# Patient Record
Sex: FEMALE | Race: WHITE | NOT HISPANIC OR LATINO | Employment: OTHER | URBAN - METROPOLITAN AREA
[De-identification: names, ages, dates, MRNs, and addresses within clinical notes are randomized per-mention and may not be internally consistent; named-entity substitution may affect disease eponyms.]

---

## 2017-01-09 DIAGNOSIS — R10.11 ABDOMINAL PAIN, RIGHT UPPER QUADRANT: Primary | ICD-10-CM

## 2017-01-09 DIAGNOSIS — G47.9 SLEEP DISORDER: ICD-10-CM

## 2017-01-09 DIAGNOSIS — M54.2 NECK PAIN: ICD-10-CM

## 2017-01-09 RX ORDER — OMEPRAZOLE 40 MG/1
40 CAPSULE, DELAYED RELEASE ORAL DAILY
Qty: 30 CAPSULE | Refills: 10 | Status: CANCELLED | OUTPATIENT
Start: 2017-01-09

## 2017-01-09 RX ORDER — OXYCODONE AND ACETAMINOPHEN 5; 325 MG/1; MG/1
1-2 TABLET ORAL EVERY 6 HOURS PRN
Qty: 120 TABLET | Refills: 0 | Status: SHIPPED | OUTPATIENT
Start: 2017-01-09 | End: 2017-02-07

## 2017-01-09 RX ORDER — ZOLPIDEM TARTRATE 5 MG/1
5 TABLET ORAL
Qty: 90 TABLET | Refills: 1 | Status: SHIPPED | OUTPATIENT
Start: 2017-01-09 | End: 2017-06-29

## 2017-01-09 NOTE — TELEPHONE ENCOUNTER
Last Written Prescription Date: 7/13/16  Last Fill Quantity: 90,  # refills: 1   Last Office Visit with [facility], P or [facility] prescribing provider: 12/9/16    Katlyn Mckenna, Pharmacy [facility]

## 2017-01-09 NOTE — TELEPHONE ENCOUNTER
Controlled Substance Refill Request for percocet and ambien  Problem List Complete:  No     PROVIDER TO CONSIDER COMPLETION OF PROBLEM LIST AND OVERVIEW/CONTROLLED SUBSTANCE AGREEMENT    Last Written Prescription Date:  Percocet=11/9 ambien=7/13/16  Last Fill Quantity: percocet=120,  ambien=90 # refills: percocet=0 ambien=1    Last Office Visit with [facility] primary care provider: 12/9/16    Future Office visit:     Controlled substance agreement on file: No.     Processing:  Staff will hand deliver Rx to on-site pharmacy   checked in past 6 months?  No, route to RN looks lot be overdue  Katlyn Mckenna, Pharmacy [facility]

## 2017-01-10 RX ORDER — OMEPRAZOLE 40 MG/1
40 CAPSULE, DELAYED RELEASE ORAL DAILY
Qty: 90 CAPSULE | Refills: 3 | Status: SHIPPED | OUTPATIENT
Start: 2017-01-10 | End: 2018-01-21

## 2017-01-10 NOTE — TELEPHONE ENCOUNTER
Prescription approved per [facility] Refill Protocol.      Tom Price [facility]  Flo Pharmacist on behalf of: [facility]

## 2017-01-20 DIAGNOSIS — G47.00 PERSISTENT DISORDER OF INITIATING OR MAINTAINING SLEEP: Primary | ICD-10-CM

## 2017-01-20 NOTE — TELEPHONE ENCOUNTER
Trazodone 50mg       Last Written Prescription Date: 1/18/16  Last Fill Quantity: 90; # refills: 2  Last Office Visit with [facility], [facility] or [facility] prescribing provider:  12/9/16        Last PHQ-9 score on record=   PHQ-9 SCORE 12/9/2016   Total Score -   Total Score 1       AST       10   9/20/2015  ALT       13   9/20/2015      Gemma Kapoor CPhT  [facility]    On behalf of [facility]

## 2017-01-21 PROBLEM — Z98.1 S/P CERVICAL SPINAL FUSION: Status: ACTIVE | Noted: 2017-01-21

## 2017-01-21 PROBLEM — G47.00 PERSISTENT DISORDER OF INITIATING OR MAINTAINING SLEEP: Status: ACTIVE | Noted: 2017-01-21

## 2017-01-21 RX ORDER — TRAZODONE HYDROCHLORIDE 50 MG/1
50 TABLET, FILM COATED ORAL AT BEDTIME
Qty: 90 TABLET | Refills: 2 | Status: SHIPPED | OUTPATIENT
Start: 2017-01-21 | End: 2017-09-12

## 2017-01-24 RX ORDER — TRAZODONE HYDROCHLORIDE 50 MG/1
100 TABLET, FILM COATED ORAL AT BEDTIME
Qty: 180 TABLET | Refills: 2 | Status: CANCELLED | OUTPATIENT
Start: 2017-01-24

## 2017-01-24 RX ORDER — TRAZODONE HYDROCHLORIDE 100 MG/1
100 TABLET ORAL
Qty: 90 TABLET | Refills: 2 | Status: SHIPPED | OUTPATIENT
Start: 2017-01-24 | End: 2017-09-08

## 2017-01-24 NOTE — TELEPHONE ENCOUNTER
Pt calls, now taking 2 tablets at qhs, discussed at 12/9/16 visit, needs new rx sent as now to early to refill and pt out, routed to PAVAN TRACEY MD, please confirm dose, stay at 50 mg or change to 100 mg qhs (both doses T'd up), pt states taking 100 mg qhs now, inform pt when final  Annalise Clifford RN, BSN  Message handled by Nurse Triage.

## 2017-02-07 DIAGNOSIS — M54.2 NECK PAIN: Primary | ICD-10-CM

## 2017-02-07 NOTE — TELEPHONE ENCOUNTER
[place] reviewed:  reviewed- no concerns    [place]  [email or web address]

## 2017-02-07 NOTE — TELEPHONE ENCOUNTER
Controlled Substance Refill Request for Percocet  Problem List Complete:  Yes    Patient is followed by Data Unavailable for ongoing prescription of pain medication.  All refills should be approved by this provider, or covering partner.    Medication(s):     .   Maximum quantity per month: 180  Clinic visit frequency required: Q 4 months     Controlled substance agreement on file: YES    Pain Clinic evaluation in the past: Yes       Date/Location:   [facility] [address] 2013  DIRE Total Score(s):  No flowsheet data found.    Last [facility] website verification:  done on recheck 6/16     checked in past 6 months?  No, route to RN Overdue    Katlyn Mckenna, Pharmacy [facility]

## 2017-02-08 RX ORDER — OXYCODONE AND ACETAMINOPHEN 5; 325 MG/1; MG/1
1-2 TABLET ORAL EVERY 6 HOURS PRN
Qty: 120 TABLET | Refills: 0 | Status: SHIPPED | OUTPATIENT
Start: 2017-02-08 | End: 2017-03-08

## 2017-02-08 RX ORDER — CYCLOBENZAPRINE HCL 10 MG
10 TABLET ORAL 3 TIMES DAILY PRN
Qty: 45 TABLET | Refills: 1 | Status: SHIPPED | OUTPATIENT
Start: 2017-02-08 | End: 2017-04-05

## 2017-03-08 ENCOUNTER — OFFICE VISIT (OUTPATIENT)
Dept: FAMILY MEDICINE | Facility: CLINIC | Age: 62
End: 2017-03-08
Payer: COMMERCIAL

## 2017-03-08 VITALS
HEART RATE: 90 BPM | BODY MASS INDEX: 23.48 KG/M2 | RESPIRATION RATE: 12 BRPM | TEMPERATURE: 98.4 F | WEIGHT: 146.1 LBS | HEIGHT: 66 IN

## 2017-03-08 DIAGNOSIS — D06.9 CARCINOMA IN SITU OF CERVIX UTERI: Primary | ICD-10-CM

## 2017-03-08 DIAGNOSIS — Z98.1 S/P CERVICAL SPINAL FUSION: ICD-10-CM

## 2017-03-08 DIAGNOSIS — F41.8 DEPRESSION WITH ANXIETY: ICD-10-CM

## 2017-03-08 DIAGNOSIS — D35.02 ADRENAL ADENOMA, LEFT: ICD-10-CM

## 2017-03-08 DIAGNOSIS — G47.00 PERSISTENT DISORDER OF INITIATING OR MAINTAINING SLEEP: ICD-10-CM

## 2017-03-08 DIAGNOSIS — M54.2 NECK PAIN: ICD-10-CM

## 2017-03-08 PROCEDURE — 99214 OFFICE O/P EST MOD 30 MIN: CPT | Performed by: FAMILY MEDICINE

## 2017-03-08 PROCEDURE — 99000 SPECIMEN HANDLING OFFICE-LAB: CPT | Performed by: FAMILY MEDICINE

## 2017-03-08 PROCEDURE — 80307 DRUG TEST PRSMV CHEM ANLYZR: CPT | Mod: 90 | Performed by: FAMILY MEDICINE

## 2017-03-08 RX ORDER — OXYCODONE AND ACETAMINOPHEN 5; 325 MG/1; MG/1
1-2 TABLET ORAL EVERY 6 HOURS PRN
Qty: 120 TABLET | Refills: 0 | Status: SHIPPED | OUTPATIENT
Start: 2017-03-08 | End: 2017-04-05

## 2017-03-08 NOTE — PROGRESS NOTES
SUBJECTIVE:                                                    Annalise Wallace is a 61 year old female who presents to clinic today for the following health issues:      Medication Followup of Oxycodone    Taking Medication as prescribed: yes    Side Effects:  None    Medication Helping Symptoms:  yes       Past Medical History   Diagnosis Date     Abnormal Papanicolaou smear of cervix and cervical HPV      Degenerative disc disease      Depressive disorder, not elsewhere classified      Displacement of cervical intervertebral disc without myelopathy      Gastro-oesophageal reflux disease      H/O hysterectomy for benign disease      TOBACCO ABUSE-CONTINUOUS        Past Surgical History   Procedure Laterality Date     C nonspecific procedure       Lump removed L. breast - benign     C nonspecific procedure       Cervical disk     C nonspecific procedure       C-spine - Nany     Hysterectomy vaginal, bilateral salpingo-oopherectomy, combined       Back surgery       Discectomy lumbar minimally invasive one level  2/13/2014     Procedure: DISCECTOMY LUMBAR MINIMALLY INVASIVE ONE LEVEL;  Minimally Invasive Discectomy L2-3 Left ;  Surgeon: Juanjose Pitt MD;  Location: [facility] OR     Discectomy lumbar minimally invasive one level       Fusion spine posterior minimally invasive one level  5/15/2014     Procedure: FUSION SPINE POSTERIOR MINIMALLY INVASIVE ONE LEVEL;  Surgeon: Juanjose Pitt MD;  Location: [facility] OR     Laparoscopic hysterectomy total         Family History   Problem Relation Age of Onset     CANCER Mother      CANCER Father      throat     DIABETES Paternal Grandmother        Social History   Substance Use Topics     Smoking status: Current Some Day Smoker     Packs/day: 1.00     Years: 30.00     Types: Cigarettes     Smokeless tobacco: Never Used      Comment: 2 cigs per day, using e-cig     Alcohol use No     Inventory of mental status shows issues with anxiety and  Depression.  Current  Outpatient Prescriptions   Medication     oxyCODONE-acetaminophen (PERCOCET) 5-325 MG per tablet     cyclobenzaprine (FLEXERIL) 10 MG tablet     traZODone (DESYREL) 100 MG tablet     traZODone (DESYREL) 50 MG tablet     omeprazole (PRILOSEC) 40 MG capsule     zolpidem (AMBIEN) 5 MG tablet     citalopram (CELEXA) 20 MG tablet     No current facility-administered medications for this visit.        Discussed regarding anxiety, sleep, anhedonia, irritability, energy,  perspective, self image, affect, current stressors, holistic parameters, work situation,  physical and social mitigating factors.    Past history has included past  episodes and positive  family history.    Prior treatment has included ssri  and talk therapy is not active  .    Suicidal ideation is  absent.    No problems with vision, hearing, thyroid, chest pain, dyspnea,rash,  stomach upset, polyuria, polydipsia, dysuria,muscle aches, numbness,  cough, tics or balance issues. Memory is reliable .    SANAZ,thyroid normal, lungs clear, s1s2,soft abdoman, symmetrical dtrs,  no abdominal mass,good peripheral pulses, vs stable.  Discussed ssri vs dop/ser grouping vs wellbutrin in depression mgmt  Discussed anxiolytics as situational tools not specific therapy  On exam the vital signs are stable  Weight is Body mass index is 23.58 kg/(m^2).   Eyes show sanaz   No neck masses or thyromegaly.Ear nose and throat shows normal  No bruits, murmers, rubs or extrasounds. No cardiomegaly or chest wall tenderness. Lungs clear, no abdominal masses or organomegaly. No CVA tenderness.    No hernias, good range of motion neck, back and extremities. No abnormal skin lesions. Normal genitalia. Good peripheral pulses. No adenopathy.  Normal gait and stance. Neck is supple.  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

## 2017-03-08 NOTE — LETTER
[facility]    03/08/17    Patient: Annalise Wallace  YOB: 1955  Medical Record Number: [id number]                                                                  Controlled Substance Agreement  I understand that my care provider has prescribed controlled substances (narcotics, tranquilizers, and/or stimulants) to help manage my condition(s).  I am taking this medicine to help me function or work.  I know that this is strong medicine.  It could have serious side effects and even cause a dependency on the drug.  If I stop these medicines suddenly, I could have severe withdrawal symptoms.    The risks, benefits, and side effects of these medication(s) were explained to me.  I agree that:  1. I will take part in other treatments as advised by my provider.  This may be psychiatry or counseling, physical therapy, behavioral therapy, group treatment, or a referral to a pain clinic.  I will reduce or stop my medicine when my provider tells me to do so.   2. I will take my medicines as prescribed.  I will not change the dose or schedule unless my provider tells me to.  There will be no refills if I  run out early.   I may be contacted at any time without warning and asked to complete a drug test or pill count.   3. I will keep all my appointments at the clinic.  If I miss appointments or fail to follow instructions, my provider may stop my medicine.  4. I will not ask other providers to prescribe controlled substances. And I will not accept controlled substances from other people. If I need another prescribed controlled substance for a new reason, I will notify my provider within one business day.  5. If I enroll in the [place] Medical Marijuana program, I will tell my provider.  I will also sign an agreement to share my medical records with my provider.  6. I will use one pharmacy to fill all of my controlled substance prescriptions.  If my prescription is mailed to my pharmacy, it may take  5 to 7 days for my medicine to be ready.  7. I understand that my provider, clinic care team, and pharmacy can track controlled substance prescriptions from other providers through a central database (prescription monitoring program).  8. I will bring in my list of medications (or my medicine bottles) each time I come to the clinic.  REV- 04/2016                                                                                                                                            Page 1 of 2      [facility]    03/08/17    Patient: Annalise Wallace  YOB: 1955  Medical Record Number: [id number]    9. Refills of controlled substances will be made only during office hours.  It is up to me to make sure that I do not run out of my medicines on weekends or holidays.    10. I am responsible for my prescriptions.  If the medicine is lost or stolen, it will not be replaced.   I also agree not to share these medicines with anyone.  11. I agree to not use ANY illegal or recreational drugs.  This includes marijuana, cocaine, bath salts or other drugs.  I agree not to use alcohol unless my provider says I may.  I agree to give urine samples whenever asked.  If I fail to give a urine sample, the provider may stop my medicine.     12. I will tell my nurse or provider right away if I become pregnant or have a new medical problem treated outside of [facility].  13. I understand that this medicine can affect my thinking and judgment.  It may be unsafe for me to drive, use machinery and do dangerous tasks.  I will not do any of these things until I know how the medicine affects me.  If my dose changes, I will wait to see how it affects me.  I will contact my provider if I have concerns about medicine side effects.  I understand that if I do not follow any of the conditions above, my prescriptions or treatment may be stopped.    I agree that my provider, clinic care team, and pharmacy may work with  any city, state or federal law enforcement agency that investigates the misuse, sale, or other diversion of my controlled medicine. I will allow my provider to discuss my care with or share a copy of this agreement with any other treating provider, pharmacy or emergency room where I receive care.  I agree to give up (waive) any right of privacy or confidentiality with respect to these authorizations.   I have read this agreement and have asked questions about anything I did not understand.   ___________________________________    ___________________________  Patient Signature                                                           Date and Time  ___________________________________     ____________________________  Witness                                                                            Date and Time  ___________________________________  Abelino Flores MD  REV-  04/2016                                                                                                                                                                 Page 2 of 2

## 2017-03-08 NOTE — NURSING NOTE
"Chief Complaint   Patient presents with     Recheck Medication       Initial BP (P) 130/70 (BP Location: Left arm, Patient Position: Chair, Cuff Size: Adult Regular)  Pulse 90  Temp 98.4  F (36.9  C) (Oral)  Resp 12  Ht 5' 6\" (1.676 m)  Wt 146 lb 1.6 oz (66.3 kg)  BMI 23.58 kg/m2 Estimated body mass index is 23.58 kg/(m^2) as calculated from the following:    Height as of this encounter: 5' 6\" (1.676 m).    Weight as of this encounter: 146 lb 1.6 oz (66.3 kg).  Medication Reconciliation: complete   Tom Arnold CMA       "

## 2017-03-08 NOTE — MR AVS SNAPSHOT
"              After Visit Summary   3/8/2017    Annalise Wallace    MRN: [id number]           Patient Information     Date Of Birth          1955        Visit Information        Provider Department      3/8/2017 9:00 AM Abelino Flores MD [facility]        Today's Diagnoses     Carcinoma in situ of cervix uteri    -  1    Neck pain        Depression with anxiety        Adrenal adenoma, left        Persistent disorder of initiating or maintaining sleep        S/P cervical spinal fusion           Follow-ups after your visit        Follow-up notes from your care team     Return in about 3 months (around 6/8/2017).      Who to contact     If you have questions or need follow up information about today's clinic visit or your schedule please contact [facility] directly at [phone].  Normal or non-critical lab and imaging results will be communicated to you by MyChart, letter or phone within 4 business days after the clinic has received the results. If you do not hear from us within 7 days, please contact the clinic through MyChart or phone. If you have a critical or abnormal lab result, we will notify you by phone as soon as possible.  Submit refill requests through [employer] or call your pharmacy and they will forward the refill request to us. Please allow 3 business days for your refill to be completed.          Additional Information About Your Visit        MyChart Information     [employer] lets you send messages to your doctor, view your test results, renew your prescriptions, schedule appointments and more. To sign up, go to [email or web address] . Click on \"Log in\" on the left side of the screen, which will take you to the Welcome page. Then click on \"Sign up Now\" on the right side of the page.     You will be asked to enter the access code listed below, as well as some personal information. Please follow the directions to create your username and password.   " "  Your access code is: [email or web address]  Expires: 3/9/2017  8:53 AM     Your access code will  in 90 days. If you need help or a new code, please call your [facility] clinic or [phone].        Care EveryWhere ID     This is your Care EveryWhere ID. This could be used by other organizations to access your [facility] medical records  [id number]        Your Vitals Were     Pulse Temperature Respirations Height BMI (Body Mass Index)       90 98.4  F (36.9  C) (Oral) 12 5' 6\" (1.676 m) 23.58 kg/m2        Blood Pressure from Last 3 Encounters:   17 (P) 130/70   16 126/78   16 130/80    Weight from Last 3 Encounters:   17 146 lb 1.6 oz (66.3 kg)   16 142 lb (64.4 kg)   16 143 lb (64.9 kg)              We Performed the Following     Drug Screen Comprehensive , Urine with Reported Meds (Pain Care Package)          Where to get your medicines      Some of these will need a paper prescription and others can be bought over the counter.  Ask your nurse if you have questions.     Bring a paper prescription for each of these medications     oxyCODONE-acetaminophen 5-325 MG per tablet          Primary Care Provider Office Phone # Fax #    Abelino Flores [phone]       [address] [address]        Thank you!     Thank you for choosing [facility]  for your care. Our goal is always to provide you with excellent care. Hearing back from our patients is one way we can continue to improve our services. Please take a few minutes to complete the written survey that you may receive in the mail after your visit with us. Thank you!             Your Updated Medication List - Protect others around you: Learn how to safely use, store and throw away your medicines at www.disposemymeds.org.          This list is accurate as of: 3/8/17  8:34 PM.  Always use your most recent med list.                   Brand Name " Dispense Instructions for use    citalopram 20 MG tablet    celeXA    90 tablet    Take 1 tablet (20 mg) by mouth daily       cyclobenzaprine 10 MG tablet    FLEXERIL    45 tablet    Take 1 tablet (10 mg) by mouth 3 times daily as needed for muscle spasms       omeprazole 40 MG capsule    priLOSEC    90 capsule    Take 1 capsule (40 mg) by mouth daily       oxyCODONE-acetaminophen 5-325 MG per tablet    PERCOCET    120 tablet    Take 1-2 tablets by mouth every 6 hours as needed for moderate to severe pain LAST REFILL VISIT NEXT TIME       * traZODone 50 MG tablet    DESYREL    90 tablet    Take 1 tablet (50 mg) by mouth At Bedtime       * traZODone 100 MG tablet    DESYREL    90 tablet    Take 1 tablet (100 mg) by mouth nightly as needed for sleep       zolpidem 5 MG tablet    AMBIEN    90 tablet    Take 1 tablet (5 mg) by mouth nightly as needed for sleep       * Notice:  This list has 2 medication(s) that are the same as other medications prescribed for you. Read the directions carefully, and ask your doctor or other care provider to review them with you.

## 2017-03-11 LAB — PAIN DRUG SCR UR W RPTD MEDS: NORMAL

## 2017-04-05 DIAGNOSIS — N95.1 SYMPTOMATIC MENOPAUSAL OR FEMALE CLIMACTERIC STATES: ICD-10-CM

## 2017-04-05 DIAGNOSIS — M54.2 NECK PAIN: ICD-10-CM

## 2017-04-05 RX ORDER — OXYCODONE AND ACETAMINOPHEN 5; 325 MG/1; MG/1
1-2 TABLET ORAL EVERY 6 HOURS PRN
Qty: 120 TABLET | Refills: 0 | Status: SHIPPED | OUTPATIENT
Start: 2017-04-05 | End: 2017-05-08

## 2017-04-05 NOTE — TELEPHONE ENCOUNTER
Controlled Substance Refill Request for Percocet  Problem List Complete:  Yes    Patient is followed by Data Unavailable for ongoing prescription of pain medication.  All refills should be approved by this provider, or covering partner.    Medication(s):     .   Maximum quantity per month: 180  Clinic visit frequency required: Q 4 months Last office visit: 3/8/17    Controlled substance agreement on file: YES    Pain Clinic evaluation in the past: Yes       Date/Location:   [facility] [address] 2013  DIRE Total Score(s):  No flowsheet data found.    Last [facility] website verification:  done on recheck 6/16     checked in past 6 months?  Yes 2/7/17     Last picked up: 3/8/17, qty 120 for 15 days, due 3/23    Katlyn Mckenna, Pharmacy [facility]

## 2017-04-06 NOTE — TELEPHONE ENCOUNTER
Other encounter for Percocet walked to pharmacy, this encounter is for Flexeril and Celexa      cyclobenzaprine (FLEXERIL) 10 MG tablet 45 tablet 1 2/8/2017  No   Sig: Take 1 tablet (10 mg) by mouth 3 times daily as needed for muscle spasms          Last Written Prescription Date: 2.8.17  Last Fill Quantity: 45,  # refills: 1   [facility] Visit with [facility] or [facility] prescribing provider:                                                  citalopram (CELEXA) 20 MG tablet 90 tablet 1 4/14/2016  No   Sig: Take 1 tablet (20 mg) by mouth daily         Last Written Prescription Date: 4.14.16  Last Fill Quantity: 90, # refills: 1  Last Office Visit with [facility] [place] care provider:  3.8.17        Last PHQ-9 score on record=   PHQ-9 SCORE 12/9/2016   Total Score -   Total Score 1         Routing refill request to provider for review/approval because:  Drug not on the [facility] refill protocol     Etelvina Gonzalez RN, BS  Clinical Nurse Triage.

## 2017-04-07 RX ORDER — CITALOPRAM HYDROBROMIDE 20 MG/1
TABLET ORAL
Qty: 90 TABLET | Refills: 1 | Status: SHIPPED | OUTPATIENT
Start: 2017-04-07 | End: 2017-09-08

## 2017-04-07 RX ORDER — CYCLOBENZAPRINE HCL 10 MG
TABLET ORAL
Qty: 45 TABLET | Refills: 1 | Status: SHIPPED | OUTPATIENT
Start: 2017-04-07 | End: 2017-06-29

## 2017-04-25 ENCOUNTER — TELEPHONE (OUTPATIENT)
Dept: FAMILY MEDICINE | Facility: CLINIC | Age: 62
End: 2017-04-25

## 2017-04-25 NOTE — TELEPHONE ENCOUNTER
4/25/2017    Call Regarding Preventive Health Screening Mammogram    Attempt 1    Message on voicemail     Comments:       Outreach   KV

## 2017-05-08 ENCOUNTER — OFFICE VISIT (OUTPATIENT)
Dept: FAMILY MEDICINE | Facility: CLINIC | Age: 62
End: 2017-05-08
Payer: COMMERCIAL

## 2017-05-08 VITALS
WEIGHT: 143.6 LBS | HEIGHT: 66 IN | DIASTOLIC BLOOD PRESSURE: 82 MMHG | HEART RATE: 81 BPM | TEMPERATURE: 97.7 F | RESPIRATION RATE: 12 BRPM | SYSTOLIC BLOOD PRESSURE: 122 MMHG | BODY MASS INDEX: 23.08 KG/M2 | OXYGEN SATURATION: 99 %

## 2017-05-08 DIAGNOSIS — Z12.31 ENCOUNTER FOR SCREENING MAMMOGRAM FOR BREAST CANCER: ICD-10-CM

## 2017-05-08 DIAGNOSIS — M54.2 NECK PAIN: ICD-10-CM

## 2017-05-08 DIAGNOSIS — Z11.59 NEED FOR HEPATITIS C SCREENING TEST: Primary | ICD-10-CM

## 2017-05-08 PROCEDURE — 99214 OFFICE O/P EST MOD 30 MIN: CPT | Performed by: FAMILY MEDICINE

## 2017-05-08 RX ORDER — OXYCODONE AND ACETAMINOPHEN 5; 325 MG/1; MG/1
1-2 TABLET ORAL EVERY 6 HOURS PRN
Qty: 120 TABLET | Refills: 0 | Status: SHIPPED | OUTPATIENT
Start: 2017-05-08 | End: 2017-06-07

## 2017-05-08 NOTE — MR AVS SNAPSHOT
"              After Visit Summary   5/8/2017    Annalise Wallace    MRN: [id number]           Patient Information     Date Of Birth          1955        Visit Information        Provider Department      5/8/2017 9:30 AM Abelino Flores MD [facility]        Today's Diagnoses     Need for hepatitis C screening test    -  1    Encounter for screening mammogram for breast cancer        Neck pain          Care Instructions    Get mammogram this spring        Follow-ups after your visit        Follow-up notes from your care team     Return in about 3 months (around 8/8/2017).      Future tests that were ordered for you today     Open Future Orders        Priority Expected Expires Ordered    *MA Screening Digital Bilateral Routine  5/8/2018 5/8/2017    **Hepatitis C Screen Reflex to RNA FUTURE anytime Routine 5/8/2017 5/8/2018 5/8/2017            Who to contact     If you have questions or need follow up information about today's clinic visit or your schedule please contact [facility] directly at [phone].  Normal or non-critical lab and imaging results will be communicated to you by Futureware Inchart, letter or phone within 4 business days after the clinic has received the results. If you do not hear from us within 7 days, please contact the clinic through [facility] [facility] [employer] or phone. If you have a critical or abnormal lab result, we will notify you by phone as soon as possible.  Submit refill requests through Guaranteach or call your pharmacy and they will forward the refill request to us. Please allow 3 business days for your refill to be completed.          Additional Information About Your Visit        Futureware Inchart Information     Guaranteach lets you send messages to your doctor, view your test results, renew your prescriptions, schedule appointments and more. To sign up, go to [email or web address] . Click on \"Log in\" on the left side of the screen, which will take you to the Welcome page. Then " "click on \"Sign up Now\" on the right side of the page.     You will be asked to enter the access code listed below, as well as some personal information. Please follow the directions to create your username and password.     Your access code is: [email or web address]  Expires: 2017  9:44 AM     Your access code will  in 90 days. If you need help or a new code, please call your [facility] clinic or [phone].        Care EveryWhere ID     This is your Care EveryWhere ID. This could be used by other organizations to access your [facility] medical records  [id number]        Your Vitals Were     Pulse Temperature Respirations Height Pulse Oximetry BMI (Body Mass Index)    81 97.7  F (36.5  C) (Oral) 12 5' 6\" (1.676 m) 99% 23.18 kg/m2       Blood Pressure from Last 3 Encounters:   17 122/82   17 (P) 130/70   16 126/78    Weight from Last 3 Encounters:   17 143 lb 9.6 oz (65.1 kg)   17 146 lb 1.6 oz (66.3 kg)   16 142 lb (64.4 kg)                 Where to get your medicines      Some of these will need a paper prescription and others can be bought over the counter.  Ask your nurse if you have questions.     Bring a paper prescription for each of these medications     oxyCODONE-acetaminophen 5-325 MG per tablet          Primary Care Provider Office Phone # Fax #    Abelino Flores [phone]       [facility] [id number] [place] [id number]        Thank you!     Thank you for choosing [facility]  for your care. Our goal is always to provide you with excellent care. Hearing back from our patients is one way we can continue to improve our services. Please take a few minutes to complete the written survey that you may receive in the mail after your visit with us. Thank you!             Your Updated Medication List - Protect others around you: Learn how to safely use, store and throw away your medicines at " www.disposemymeds.org.          This list is accurate as of: 5/8/17  9:44 AM.  Always use your most recent med list.                   Brand Name Dispense Instructions for use    citalopram 20 MG tablet    celeXA    90 tablet    TAKE ONE TABLET BY MOUTH DAILY       cyclobenzaprine 10 MG tablet    FLEXERIL    45 tablet    TAKE ONE TABLET BY MOUTH THREE TIMES A DAY AS NEEDED FOR MUSCLE SPASMS       omeprazole 40 MG capsule    priLOSEC    90 capsule    Take 1 capsule (40 mg) by mouth daily       oxyCODONE-acetaminophen 5-325 MG per tablet    PERCOCET    120 tablet    Take 1-2 tablets by mouth every 6 hours as needed for moderate to severe pain LAST REFILL VISIT NEXT TIME       * traZODone 50 MG tablet    DESYREL    90 tablet    Take 1 tablet (50 mg) by mouth At Bedtime       * traZODone 100 MG tablet    DESYREL    90 tablet    Take 1 tablet (100 mg) by mouth nightly as needed for sleep       zolpidem 5 MG tablet    AMBIEN    90 tablet    Take 1 tablet (5 mg) by mouth nightly as needed for sleep       * Notice:  This list has 2 medication(s) that are the same as other medications prescribed for you. Read the directions carefully, and ask your doctor or other care provider to review them with you.

## 2017-05-08 NOTE — NURSING NOTE
"Chief Complaint   Patient presents with     chronic pain follow up     medication check       Initial /82 (BP Location: Right arm, Patient Position: Chair, Cuff Size: Adult Regular)  Pulse 81  Temp 97.7  F (36.5  C) (Oral)  Resp 12  Ht 5' 6\" (1.676 m)  Wt 143 lb 9.6 oz (65.1 kg)  SpO2 99%  BMI 23.18 kg/m2 Estimated body mass index is 23.18 kg/(m^2) as calculated from the following:    Height as of this encounter: 5' 6\" (1.676 m).    Weight as of this encounter: 143 lb 9.6 oz (65.1 kg).  Medication Reconciliation: complete   Tom Arnold CMA       "

## 2017-05-15 NOTE — TELEPHONE ENCOUNTER
5/15/2017     Call Regarding Preventive Health Screening Mammogram  Attempt 2     Message on voicemail   Comments:         Outreach   HAIR

## 2017-05-26 NOTE — TELEPHONE ENCOUNTER
5/26/2017    Call Regarding Preventive Health Screening Mammogram    Attempt 3    Message on voicemail     Comments:       Outreach   hussain

## 2017-06-07 DIAGNOSIS — M54.2 NECK PAIN: ICD-10-CM

## 2017-06-07 RX ORDER — OXYCODONE AND ACETAMINOPHEN 5; 325 MG/1; MG/1
1-2 TABLET ORAL EVERY 6 HOURS PRN
Qty: 120 TABLET | Refills: 0 | Status: SHIPPED | OUTPATIENT
Start: 2017-06-07 | End: 2017-07-07

## 2017-06-07 NOTE — TELEPHONE ENCOUNTER
Controlled Substance Refill Request for Percocet  Problem List Complete:  Yes    Patient is followed by Data Unavailable for ongoing prescription of pain medication.  All refills should be approved by this provider, or covering partner.    Medication(s):     .   Maximum quantity per month: 180  Clinic visit frequency required: Q 4 months Last office visit: 5/8/17    Controlled substance agreement on file: YES    Pain Clinic evaluation in the past: Yes       Date/Location:   [facility] [address] 2013  DIRE Total Score(s):  No flowsheet data found.    Last [facility] website verification:  done on recheck 6/16   [email or web address]       checked in past 6 months?  Yes 2/7/17     Last picked up: 5/8/17    Katlyn Mckenna, Pharmacy [facility]

## 2017-06-16 ENCOUNTER — RADIANT APPOINTMENT (OUTPATIENT)
Dept: MAMMOGRAPHY | Facility: CLINIC | Age: 62
End: 2017-06-16
Attending: FAMILY MEDICINE
Payer: COMMERCIAL

## 2017-06-16 DIAGNOSIS — Z12.31 ENCOUNTER FOR SCREENING MAMMOGRAM FOR BREAST CANCER: ICD-10-CM

## 2017-06-16 PROCEDURE — G0202 SCR MAMMO BI INCL CAD: HCPCS | Mod: TC

## 2017-06-29 DIAGNOSIS — G47.9 SLEEP DISORDER: ICD-10-CM

## 2017-06-29 DIAGNOSIS — M54.2 NECK PAIN: ICD-10-CM

## 2017-06-29 NOTE — TELEPHONE ENCOUNTER
Cyclobenzaprine 10 mg       Last Written Prescription Date: 04/07/14  Last Fill Quantity: 45,  # refills: 1   Last Office Visit with G, P or [facility] prescribing provider: 05/08/17 Dr. Flores                                         Next 5 appointments (look out 90 days)     Jul 07, 2017  9:30 AM CDT   SHORT with Abelino Flores MD   [facility]    [address] [place] [id number] [phone]

## 2017-06-29 NOTE — TELEPHONE ENCOUNTER
Routing refill request to provider for review/approval because:  Drug not on the [facility] refill protocol     Etelvina Gonzalez RN, BS  Clinical Nurse Triage.

## 2017-06-29 NOTE — TELEPHONE ENCOUNTER
Controlled Substance Refill Request for Ambien  Problem List Complete:  No     PROVIDER TO CONSIDER COMPLETION OF PROBLEM LIST AND OVERVIEW/CONTROLLED SUBSTANCE AGREEMENT    Last Written Prescription Date:  1/9/17  Last Fill Quantity: 90,   # refills: 1    Last Office Visit with [facility] care provider: 5/8/17    Future Office visit:   Next 5 appointments (look out 90 days)     Jul 07, 2017  9:30 AM CDT   SHORT with Abelino Flores MD   [facility]    [address] [place] [phone]                  Controlled substance agreement on file: Yes:  Date 3/16/17.     Processing:  Staff will hand deliver Rx to on-site pharmacy   checked in past 6 months?  Yes 2/17/17     Katlyn Mckenna, Pharmacy [facility]

## 2017-06-30 RX ORDER — CYCLOBENZAPRINE HCL 10 MG
TABLET ORAL
Qty: 45 TABLET | Refills: 1 | Status: SHIPPED | OUTPATIENT
Start: 2017-06-30 | End: 2017-09-08 | Stop reason: ALTCHOICE

## 2017-06-30 RX ORDER — ZOLPIDEM TARTRATE 5 MG/1
5 TABLET ORAL
Qty: 90 TABLET | Refills: 1 | Status: SHIPPED | OUTPATIENT
Start: 2017-06-30 | End: 2017-12-27

## 2017-07-07 ENCOUNTER — OFFICE VISIT (OUTPATIENT)
Dept: FAMILY MEDICINE | Facility: CLINIC | Age: 62
End: 2017-07-07
Payer: COMMERCIAL

## 2017-07-07 VITALS
TEMPERATURE: 97.9 F | SYSTOLIC BLOOD PRESSURE: 129 MMHG | HEIGHT: 66 IN | WEIGHT: 145.7 LBS | BODY MASS INDEX: 23.42 KG/M2 | OXYGEN SATURATION: 96 % | HEART RATE: 95 BPM | DIASTOLIC BLOOD PRESSURE: 83 MMHG | RESPIRATION RATE: 10 BRPM

## 2017-07-07 DIAGNOSIS — M54.2 NECK PAIN: ICD-10-CM

## 2017-07-07 DIAGNOSIS — Z13.220 LIPID SCREENING: ICD-10-CM

## 2017-07-07 DIAGNOSIS — D35.02 ADRENAL ADENOMA, LEFT: ICD-10-CM

## 2017-07-07 DIAGNOSIS — F41.8 DEPRESSION WITH ANXIETY: ICD-10-CM

## 2017-07-07 DIAGNOSIS — M47.12 CERVICAL SPONDYLOSIS WITH MYELOPATHY: Primary | ICD-10-CM

## 2017-07-07 DIAGNOSIS — Z11.59 ENCOUNTER FOR HCV SCREENING TEST FOR LOW RISK PATIENT: ICD-10-CM

## 2017-07-07 LAB
ALBUMIN SERPL-MCNC: 4.1 G/DL (ref 3.4–5)
ALP SERPL-CCNC: 91 U/L (ref 40–150)
ALT SERPL W P-5'-P-CCNC: 14 U/L (ref 0–50)
ANION GAP SERPL CALCULATED.3IONS-SCNC: 8 MMOL/L (ref 3–14)
AST SERPL W P-5'-P-CCNC: 12 U/L (ref 0–45)
BILIRUB SERPL-MCNC: 0.3 MG/DL (ref 0.2–1.3)
BUN SERPL-MCNC: 19 MG/DL (ref 7–30)
CALCIUM SERPL-MCNC: 9.2 MG/DL (ref 8.5–10.1)
CHLORIDE SERPL-SCNC: 105 MMOL/L (ref 94–109)
CO2 SERPL-SCNC: 27 MMOL/L (ref 20–32)
CREAT SERPL-MCNC: 0.96 MG/DL (ref 0.52–1.04)
GFR SERPL CREATININE-BSD FRML MDRD: 59 ML/MIN/1.7M2
GLUCOSE SERPL-MCNC: 97 MG/DL (ref 70–99)
LDLC SERPL DIRECT ASSAY-MCNC: 138 MG/DL
POTASSIUM SERPL-SCNC: 4.6 MMOL/L (ref 3.4–5.3)
PROT SERPL-MCNC: 7.9 G/DL (ref 6.8–8.8)
SODIUM SERPL-SCNC: 140 MMOL/L (ref 133–144)

## 2017-07-07 PROCEDURE — 99214 OFFICE O/P EST MOD 30 MIN: CPT | Performed by: FAMILY MEDICINE

## 2017-07-07 PROCEDURE — 83721 ASSAY OF BLOOD LIPOPROTEIN: CPT | Performed by: FAMILY MEDICINE

## 2017-07-07 PROCEDURE — 36415 COLL VENOUS BLD VENIPUNCTURE: CPT | Performed by: FAMILY MEDICINE

## 2017-07-07 PROCEDURE — 86803 HEPATITIS C AB TEST: CPT | Performed by: FAMILY MEDICINE

## 2017-07-07 PROCEDURE — 80053 COMPREHEN METABOLIC PANEL: CPT | Performed by: FAMILY MEDICINE

## 2017-07-07 RX ORDER — OXYCODONE AND ACETAMINOPHEN 5; 325 MG/1; MG/1
1-2 TABLET ORAL EVERY 6 HOURS PRN
Qty: 120 TABLET | Refills: 0 | Status: SHIPPED | OUTPATIENT
Start: 2017-07-07 | End: 2017-08-07

## 2017-07-07 NOTE — LETTER
[facility]  [address] [place], [address]  [phone]      July 11, 2017    Annalise MELARA Rodrigo  [address] [place] [address]          Dear Annalise,    Blood is pretty good. Cholesterol is up just a little, GFR is a measure of how our kidneys age-you have modest signs of kidney aging (stage 3 kidney disease),  Avoiding advil and aleve makes sense to minimize wear and tear. Enclosed is a copy of the results.  It was a pleasure to see you at your last appointment.    If you have any questions or concerns, please call myself or my nurse at [phone].          Sincerely,    Abelino Flores MD    Results for orders placed or performed in visit on 07/07/17   Comprehensive metabolic panel (BMP + Alb, Alk Phos, ALT, AST, Total. Bili, TP)   Result Value Ref Range    Sodium 140 133 - 144 mmol/L    Potassium 4.6 3.4 - 5.3 mmol/L    Chloride 105 94 - 109 mmol/L    Carbon Dioxide 27 20 - 32 mmol/L    Anion Gap 8 3 - 14 mmol/L    Glucose 97 70 - 99 mg/dL    Urea Nitrogen 19 7 - 30 mg/dL    Creatinine 0.96 0.52 - 1.04 mg/dL    GFR Estimate 59 (L) >60 mL/min/1.7m2    GFR Estimate If Black 71 >60 mL/min/1.7m2    Calcium 9.2 8.5 - 10.1 mg/dL    Bilirubin Total 0.3 0.2 - 1.3 mg/dL    Albumin 4.1 3.4 - 5.0 g/dL    Protein Total 7.9 6.8 - 8.8 g/dL    Alkaline Phosphatase 91 40 - 150 U/L    ALT 14 0 - 50 U/L    AST 12 0 - 45 U/L   Hepatitis C Screen Reflex to HCV RNA Quant and Genotype   Result Value Ref Range    Hepatitis C Antibody  NR     Nonreactive   Assay performance characteristics have not been established for newborns,   infants, and children     LDL cholesterol direct   Result Value Ref Range    LDL Cholesterol Direct 138 (H) <100 mg/dL     MJ

## 2017-07-07 NOTE — PROGRESS NOTES
SUBJECTIVE:                                                    Annalise Wallace is a 61 year old female who presents to clinic today for the following health issues:      Medication Followup of Percocet,Cervial fusion status, depression and anxiety   Current Outpatient Prescriptions   Medication     oxyCODONE-acetaminophen (PERCOCET) 5-325 MG per tablet     cyclobenzaprine (FLEXERIL) 10 MG tablet     zolpidem (AMBIEN) 5 MG tablet     citalopram (CELEXA) 20 MG tablet     traZODone (DESYREL) 100 MG tablet     traZODone (DESYREL) 50 MG tablet     omeprazole (PRILOSEC) 40 MG capsule     No current facility-administered medications for this visit.          Taking Medication as prescribed: yes    Side Effects:  None    Medication Helping Symptoms:  yes   SHE HAS QUIT TOBACCO THIS YEAR  Back Subjective:         Symptoms began:   15 year(s) ago       Symptoms changing:  onset after trauma  and are better.                  Location:  neck bilateral region       Radiation to radiates to arm        At worst a 7 on a scale of 1-10.         Personal hx of back pain is:  recurrent self limited episodes of low back pain in the past.       Pain is exacerbated by: lifting, standing and sitting.       Pain is relieved by: heat and rest.       Associated sx include: none.       Previous plain films obtained: No.        Results: post fusion degeneration.       Red flag symptoms: negative    Inventory of mental status shows issues with anxiety and  depression.    Discussed regarding anxiety, sleep, anhedonia, irritability, energy,  perspective, self image, affect, current stressors, holistic parameters, work situation,  physical and social mitigating factors.    Past history has included chronic pain  episodes and positive  family history.    Prior treatment has included ssri and talk therapy is none .    Suicidal ideation is  absent.    No problems with vision, hearing, thyroid, chest pain, dyspnea,rash,  stomach upset, polyuria,  polydipsia, dysuria,muscle aches, numbness,  cough, tics or balance issues. Memory is reliable .    SANAZ,thyroid normal, lungs clear, s1s2,soft abdoman, symmetrical dtrs,  no abdominal mass,good peripheral pulses, vs stable.  Discussed ssri vs dop/ser grouping vs wellbutrin in depression mgmt  Discussed anxiolytics as situational tools not specific therapy  .  (M47.12) Cervical spondylosis with myelopathy  (primary encounter diagnosis)  Comment: stable, no vehicle for improvement   Plan: meds, overall wellness and exercise     (F41.8) Depression with anxiety  Comment:   Plan: Comprehensive metabolic panel (BMP + Alb, Alk         Phos, ALT, AST, Total. Bili, TP)        Much better lately, grandchildren pick her up     (D35.02) Adrenal adenoma, left  Comment:   Plan: follows on ct with Endocrine     (Z11.59) Encounter for HCV screening test for low risk patient  Comment:   Plan: Hepatitis C Screen Reflex to HCV RNA Quant and         Genotype            (Z13.220) Lipid screening  Comment:   Plan: LDL cholesterol direct        At goal    (M54.2) Neck pain  Comment:   Plan: oxyCODONE-acetaminophen (PERCOCET) 5-325 MG per        tablet        Stable on her meds

## 2017-07-07 NOTE — NURSING NOTE
"Chief Complaint   Patient presents with     Recheck Medication       Initial /83 (BP Location: Left arm, Patient Position: Chair, Cuff Size: Adult Regular)  Pulse 95  Temp 97.9  F (36.6  C) (Oral)  Resp 10  Ht 5' 6\" (1.676 m)  Wt 145 lb 11.2 oz (66.1 kg)  SpO2 96%  BMI 23.52 kg/m2 Estimated body mass index is 23.52 kg/(m^2) as calculated from the following:    Height as of this encounter: 5' 6\" (1.676 m).    Weight as of this encounter: 145 lb 11.2 oz (66.1 kg).  Medication Reconciliation: complete   Tom Arnold CMA       "

## 2017-07-07 NOTE — MR AVS SNAPSHOT
"              After Visit Summary   2017    Annalise Wallace    MRN: [id number]           Patient Information     Date Of Birth          1955        Visit Information        Provider Department      2017 9:30 AM Abelino Flores MD [facility]        Today's Diagnoses     Cervical spondylosis with myelopathy    -  1    Depression with anxiety        Adrenal adenoma, left        Encounter for HCV screening test for low risk patient        Lipid screening           Follow-ups after your visit        Who to contact     If you have questions or need follow up information about today's clinic visit or your schedule please contact [facility] directly at [phone].  Normal or non-critical lab and imaging results will be communicated to you by SiteJabberhart, letter or phone within 4 business days after the clinic has received the results. If you do not hear from us within 7 days, please contact the clinic through SiteJabberhart or phone. If you have a critical or abnormal lab result, we will notify you by phone as soon as possible.  Submit refill requests through [employer] Xueyuan or call your pharmacy and they will forward the refill request to us. Please allow 3 business days for your refill to be completed.          Additional Information About Your Visit        MyChart Information     Jike Xueyuan lets you send messages to your doctor, view your test results, renew your prescriptions, schedule appointments and more. To sign up, go to [email or web address] . Click on \"Log in\" on the left side of the screen, which will take you to the Welcome page. Then click on \"Sign up Now\" on the right side of the page.     You will be asked to enter the access code listed below, as well as some personal information. Please follow the directions to create your username and password.     Your access code is: [email or web address]  Expires: 2017  9:44 AM     Your access code will  in 90 days. If you need " "help or a new code, please call your [facility] clinic or [phone].        Care EveryWhere ID     This is your Care EveryWhere ID. This could be used by other organizations to access your [facility] medical records  [id number]        Your Vitals Were     Pulse Temperature Respirations Height Pulse Oximetry BMI (Body Mass Index)    95 97.9  F (36.6  C) (Oral) 10 5' 6\" (1.676 m) 96% 23.52 kg/m2       Blood Pressure from Last 3 Encounters:   07/07/17 129/83   05/08/17 122/82   03/08/17 (P) 130/70    Weight from Last 3 Encounters:   07/07/17 145 lb 11.2 oz (66.1 kg)   05/08/17 143 lb 9.6 oz (65.1 kg)   03/08/17 146 lb 1.6 oz (66.3 kg)              We Performed the Following     Comprehensive metabolic panel (BMP + Alb, Alk Phos, ALT, AST, Total. Bili, TP)     Hepatitis C Screen Reflex to HCV RNA Quant and Genotype     LDL cholesterol direct        Primary Care Provider Office Phone # Fax #    Abelino Rudolph Flores [phone]       [address] [address]        Equal Access to Services     LE ELY : Hadii aad ku hadasho Soomaali, waaxda luqadaha, qaybta kaalmada adeegyada, waxay idiin haylucasn chema frye laamarjit park. So [facility] [phone].    ATENCIÓN: Si habla español, tiene a esteban disposición servicios gratuitos de asistencia lingüística. Llbradley al [phone].    We comply with applicable federal civil rights laws and [place] laws. We do not discriminate on the basis of race, color, national origin, age, disability sex, sexual orientation or gender identity.            Thank you!     Thank you for choosing [facility]  for your care. Our goal is always to provide you with excellent care. Hearing back from our patients is one way we can continue to improve our services. Please take a few minutes to complete the written survey that you may receive in the mail after your visit with us. Thank you!             Your Updated Medication " List - Protect others around you: Learn how to safely use, store and throw away your medicines at www.disposemymeds.org.          This list is accurate as of: 7/7/17  9:47 AM.  Always use your most recent med list.                   Brand Name Dispense Instructions for use Diagnosis    citalopram 20 MG tablet    celeXA    90 tablet    TAKE ONE TABLET BY MOUTH DAILY    Symptomatic menopausal or female climacteric states       cyclobenzaprine 10 MG tablet    FLEXERIL    45 tablet    TAKE ONE TABLET BY MOUTH THREE TIMES A DAY AS NEEDED FOR MUSCLE SPASMS    Neck pain       omeprazole 40 MG capsule    priLOSEC    90 capsule    Take 1 capsule (40 mg) by mouth daily    Abdominal pain, right upper quadrant       oxyCODONE-acetaminophen 5-325 MG per tablet    PERCOCET    120 tablet    Take 1-2 tablets by mouth every 6 hours as needed for moderate to severe pain LAST REFILL VISIT NEXT TIME    Neck pain       * traZODone 50 MG tablet    DESYREL    90 tablet    Take 1 tablet (50 mg) by mouth At Bedtime    Persistent disorder of initiating or maintaining sleep       * traZODone 100 MG tablet    DESYREL    90 tablet    Take 1 tablet (100 mg) by mouth nightly as needed for sleep    Persistent disorder of initiating or maintaining sleep       zolpidem 5 MG tablet    AMBIEN    90 tablet    Take 1 tablet (5 mg) by mouth nightly as needed for sleep    Sleep disorder       * Notice:  This list has 2 medication(s) that are the same as other medications prescribed for you. Read the directions carefully, and ask your doctor or other care provider to review them with you.

## 2017-07-08 LAB — HCV AB SERPL QL IA: NORMAL

## 2017-08-07 ENCOUNTER — TELEPHONE (OUTPATIENT)
Dept: FAMILY MEDICINE | Facility: CLINIC | Age: 62
End: 2017-08-07

## 2017-08-07 DIAGNOSIS — M54.2 NECK PAIN: ICD-10-CM

## 2017-08-07 RX ORDER — OXYCODONE AND ACETAMINOPHEN 5; 325 MG/1; MG/1
1-2 TABLET ORAL EVERY 6 HOURS PRN
Qty: 120 TABLET | Refills: 0 | Status: SHIPPED | OUTPATIENT
Start: 2017-08-07 | End: 2017-09-08

## 2017-08-07 NOTE — TELEPHONE ENCOUNTER
Controlled Substance Refill Request for Percocet  Problem List Complete:  Yes  Patient is followed by Data Unavailable for ongoing prescription of pain medication.  All refills should be approved by this provider, or covering partner.    Medication(s):     .   Maximum quantity per month: 180  Clinic visit frequency required: Q 4 months last 7/7/17    Controlled substance agreement on file: YES    Pain Clinic evaluation in the past: Yes       Date/Location:   [facility] [address] 2013  DIRE Total Score(s):  No flowsheet data found.    Last [facility] website verification:  done on recheck 6/16   [email or web address]     checked in past 6 months?  Yes 2/17/17     Last Picked up: 7/7/17    Katlyn Mckenna, Pharmacy [facility]

## 2017-09-08 ENCOUNTER — OFFICE VISIT (OUTPATIENT)
Dept: FAMILY MEDICINE | Facility: CLINIC | Age: 62
End: 2017-09-08
Payer: COMMERCIAL

## 2017-09-08 VITALS
WEIGHT: 153.8 LBS | HEIGHT: 66 IN | SYSTOLIC BLOOD PRESSURE: 138 MMHG | BODY MASS INDEX: 24.72 KG/M2 | TEMPERATURE: 97.9 F | RESPIRATION RATE: 12 BRPM | DIASTOLIC BLOOD PRESSURE: 88 MMHG | HEART RATE: 68 BPM | OXYGEN SATURATION: 99 %

## 2017-09-08 DIAGNOSIS — G47.00 PERSISTENT DISORDER OF INITIATING OR MAINTAINING SLEEP: ICD-10-CM

## 2017-09-08 DIAGNOSIS — Z98.1 S/P CERVICAL SPINAL FUSION: ICD-10-CM

## 2017-09-08 DIAGNOSIS — M54.2 NECK PAIN: ICD-10-CM

## 2017-09-08 DIAGNOSIS — N95.1 SYMPTOMATIC MENOPAUSAL OR FEMALE CLIMACTERIC STATES: ICD-10-CM

## 2017-09-08 DIAGNOSIS — D35.02 ADRENAL ADENOMA, LEFT: ICD-10-CM

## 2017-09-08 DIAGNOSIS — G89.29 OTHER CHRONIC PAIN: Primary | ICD-10-CM

## 2017-09-08 PROCEDURE — 99213 OFFICE O/P EST LOW 20 MIN: CPT | Performed by: FAMILY MEDICINE

## 2017-09-08 RX ORDER — CITALOPRAM HYDROBROMIDE 20 MG/1
20 TABLET ORAL DAILY
Qty: 90 TABLET | Refills: 1 | Status: SHIPPED | OUTPATIENT
Start: 2017-09-08 | End: 2018-03-23

## 2017-09-08 RX ORDER — OXYCODONE AND ACETAMINOPHEN 5; 325 MG/1; MG/1
1-2 TABLET ORAL EVERY 6 HOURS PRN
Qty: 120 TABLET | Refills: 0 | Status: SHIPPED | OUTPATIENT
Start: 2017-09-08 | End: 2017-10-05

## 2017-09-08 RX ORDER — TRAZODONE HYDROCHLORIDE 100 MG/1
100 TABLET ORAL
Qty: 90 TABLET | Refills: 2 | Status: SHIPPED | OUTPATIENT
Start: 2017-09-08 | End: 2018-07-21

## 2017-09-08 NOTE — PROGRESS NOTES
SUBJECTIVE:   Annalise Wallace is a 61 year old female who presents to clinic today for the following health issues:      Medication Followup of Percocet    Taking Medication as prescribed: yes    Side Effects:  None    Medication Helping Symptoms:  yes         Patient would also like a referral for an MR of her back to see what is going on.    Past Medical History:   Diagnosis Date     Abnormal Papanicolaou smear of cervix and cervical HPV      Degenerative disc disease      Depressive disorder, not elsewhere classified      Displacement of cervical intervertebral disc without myelopathy      Gastro-oesophageal reflux disease      H/O hysterectomy for benign disease      TOBACCO ABUSE-CONTINUOUS        Past Surgical History:   Procedure Laterality Date     BACK SURGERY       C NONSPECIFIC PROCEDURE      Lump removed L. breast - benign     C NONSPECIFIC PROCEDURE      Cervical disk     C NONSPECIFIC PROCEDURE      C-spine - N[place]     DISCECTOMY LUMBAR MINIMALLY INVASIVE ONE LEVEL  2/13/2014    Procedure: DISCECTOMY LUMBAR MINIMALLY INVASIVE ONE LEVEL;  Minimally Invasive Discectomy L2-3 Left ;  Surgeon: Juanjose Pitt MD;  Location: [facility] OR     DISCECTOMY LUMBAR MINIMALLY INVASIVE ONE LEVEL       FUSION SPINE POSTERIOR MINIMALLY INVASIVE ONE LEVEL  5/15/2014    Procedure: FUSION SPINE POSTERIOR MINIMALLY INVASIVE ONE LEVEL;  Surgeon: Juanjose Pitt MD;  Location: [facility] OR     HYSTERECTOMY VAGINAL, BILATERAL SALPINGO-OOPHERECTOMY, COMBINED       LAPAROSCOPIC HYSTERECTOMY TOTAL         Family History   Problem Relation Age of Onset     CANCER Mother      CANCER Father      throat     DIABETES Paternal Grandmother        Social History   Substance Use Topics     Smoking status: Former Smoker     Packs/day: 1.00     Years: 30.00     Types: Cigarettes     Quit date: 6/21/2017     Smokeless tobacco: Never Used     Alcohol use No        REVIEW OF SYSTEMS    Generally has been mostly feeling well until this  episode. No problems with vision, hearing, dental or neck pain.Has hph airborne or ingestion allergy  No chest pain, palpitations, dyspnea, change in bowel habits, blood  in stool or dyspepsia.  No rashes, changing moles, weakness, lassitude or back problems.  No chronic issues . No dysuria  Patient still a smoker. No problems with significant headaches.  Neck pain and low back pain     On exam the vital signs are stable  Weight is Body mass index is 24.82 kg/(m^2).   Eyes show leticia   No neck masses or thyromegaly.Ear nose and throat shows normal  No bruits, murmers, rubs or extrasounds. No cardiomegaly or chest wall tenderness. Lungs clear, no abdominal masses or organomegaly. No CVA tenderness.  Skin eval no iman, pain on lumbar lateral flexion  No hernias, good range of motion neck, back and extremities. No abnormal skin lesions. Normal genitalia. Good peripheral pulses. No adenopathy.  Normal gait and stance. Neck is supple.      (G89.29) Other chronic pain  (primary encounter diagnosis)  Comment: stable on low dose, exercise is regular but modest  Plan: improve the exercise     (Z98.1) S/P cervical spinal fusion  Comment:   Plan:     (D35.02) Adrenal adenoma, left  Comment:   Plan: monitor at 5 year intervals

## 2017-09-08 NOTE — MR AVS SNAPSHOT
After Visit Summary   9/8/2017    Annalise Wallace    MRN: [id number]           Patient Information     Date Of Birth          1955        Visit Information        Provider Department      9/8/2017 9:15 AM Abelino Flores MD [facility]        Today's Diagnoses     Other chronic pain    -  1    S/P cervical spinal fusion        Adrenal adenoma, left        Neck pain        Persistent disorder of initiating or maintaining sleep        Symptomatic menopausal or female climacteric states          Care Instructions      Before Your Surgery      Call your surgeon if there is any change in your health. This includes signs of a cold or flu (such as a sore throat, runny nose, cough, rash or fever).    Do not smoke, drink alcohol or take over the counter medicine (unless your surgeon or primary care doctor tells you to) for the 24 hours before and after surgery.    If you take prescribed drugs: Follow your doctor s orders about which medicines to take and which to stop until after surgery.    Eating and drinking prior to surgery: follow the instructions from your surgeon    Take a shower or bath the night before surgery. Use the soap your surgeon gave you to gently clean your skin. If you do not have soap from your surgeon, use your regular soap. Do not shave or scrub the surgery site.  Wear clean pajamas and have clean sheets on your bed.           Follow-ups after your visit        Who to contact     If you have questions or need follow up information about today's clinic visit or your schedule please contact [facility] directly at [phone].  Normal or non-critical lab and imaging results will be communicated to you by MyChart, letter or phone within 4 business days after the clinic has received the results. If you do not hear from us within 7 days, please contact the clinic through MyChart or phone. If you have a critical or abnormal lab result, we will  "notify you by phone as soon as possible.  Submit refill requests through [employer] or call your pharmacy and they will forward the refill request to us. Please allow 3 business days for your refill to be completed.          Additional Information About Your Visit        [employer] Information     [employer] lets you send messages to your doctor, view your test results, renew your prescriptions, schedule appointments and more. To sign up, go to [email or web address] . Click on \"Log in\" on the left side of the screen, which will take you to the Welcome page. Then click on \"Sign up Now\" on the right side of the page.     You will be asked to enter the access code listed below, as well as some personal information. Please follow the directions to create your username and password.     Your access code is: [email or web address]  Expires: 2017  9:44 AM     Your access code will  in 90 days. If you need help or a new code, please call your [place] clinic or [phone].        Care EveryWhere ID     This is your Care EveryWhere ID. This could be used by other organizations to access your [place] medical records  [id number]        Your Vitals Were     Pulse Temperature Respirations Height Pulse Oximetry BMI (Body Mass Index)    68 97.9  F (36.6  C) (Oral) 12 5' 6\" (1.676 m) 99% 24.82 kg/m2       Blood Pressure from Last 3 Encounters:   17 138/88   17 129/83   17 122/82    Weight from Last 3 Encounters:   17 153 lb 12.8 oz (69.8 kg)   17 145 lb 11.2 oz (66.1 kg)   17 143 lb 9.6 oz (65.1 kg)              Today, you had the following     No orders found for display         Today's Medication Changes          These changes are accurate as of: 17  9:44 AM.  If you have any questions, ask your nurse or doctor.               Start taking these medicines.        Dose/Directions    tiZANidine 4 MG tablet   Commonly known as:  ZANAFLEX   Used for:  S/P cervical spinal fusion   Started by:  " Abelino Flores MD        Dose:  4-8 mg   Take 1-2 tablets (4-8 mg) by mouth 3 times daily as needed for muscle spasms   Quantity:  60 tablet   Refills:  1         These medicines have changed or have updated prescriptions.        Dose/Directions    citalopram 20 MG tablet   Commonly known as:  celeXA   This may have changed:  See the new instructions.   Used for:  Symptomatic menopausal or female climacteric states   Changed by:  Abelino Flores MD        Dose:  20 mg   Take 1 tablet (20 mg) by mouth daily   Quantity:  90 tablet   Refills:  1         Stop taking these medicines if you haven't already. Please contact your care team if you have questions.     cyclobenzaprine 10 MG tablet   Commonly known as:  FLEXERIL   Stopped by:  Abelino Flores MD                Where to get your medicines      These medications were sent to [facility] [address]  [id number] [place] [address]     Phone:  [phone]     citalopram 20 MG tablet    tiZANidine 4 MG tablet    traZODone 100 MG tablet         Some of these will need a paper prescription and others can be bought over the counter.  Ask your nurse if you have questions.     Bring a paper prescription for each of these medications     oxyCODONE-acetaminophen 5-325 MG per tablet                Primary Care Provider Office Phone # Fax #    Abelino Flores [phone] [address] [facility] [address]        Equal Access to Services     [facility] : Hadii rito ku hadasho Soomaali, waaxda luqadaha, qaybta kaalmada adeegyada, joel park. So [facility] [phone].    ATENCIÓN: Si habla español, tiene a esteban disposición servicios gratuitos de asistencia lingüística. Llame al [phone].    We comply with applicable federal civil rights laws and [place] laws. We do not discriminate on the basis of race, color, national origin, age,  disability sex, sexual orientation or gender identity.            Thank you!     Thank you for choosing [facility]  for your care. Our goal is always to provide you with excellent care. Hearing back from our patients is one way we can continue to improve our services. Please take a few minutes to complete the written survey that you may receive in the mail after your visit with us. Thank you!             Your Updated Medication List - Protect others around you: Learn how to safely use, store and throw away your medicines at www.disposemymeds.org.          This list is accurate as of: 9/8/17  9:44 AM.  Always use your most recent med list.                   Brand Name Dispense Instructions for use Diagnosis    citalopram 20 MG tablet    celeXA    90 tablet    Take 1 tablet (20 mg) by mouth daily    Symptomatic menopausal or female climacteric states       omeprazole 40 MG capsule    priLOSEC    90 capsule    Take 1 capsule (40 mg) by mouth daily    Abdominal pain, right upper quadrant       oxyCODONE-acetaminophen 5-325 MG per tablet    PERCOCET    120 tablet    Take 1-2 tablets by mouth every 6 hours as needed for moderate to severe pain LAST REFILL VISIT NEXT TIME    Neck pain       tiZANidine 4 MG tablet    ZANAFLEX    60 tablet    Take 1-2 tablets (4-8 mg) by mouth 3 times daily as needed for muscle spasms    S/P cervical spinal fusion       * traZODone 50 MG tablet    DESYREL    90 tablet    Take 1 tablet (50 mg) by mouth At Bedtime    Persistent disorder of initiating or maintaining sleep       * traZODone 100 MG tablet    DESYREL    90 tablet    Take 1 tablet (100 mg) by mouth nightly as needed for sleep    Persistent disorder of initiating or maintaining sleep       zolpidem 5 MG tablet    AMBIEN    90 tablet    Take 1 tablet (5 mg) by mouth nightly as needed for sleep    Sleep disorder       * Notice:  This list has 2 medication(s) that are the same as other medications prescribed for you.  Read the directions carefully, and ask your doctor or other care provider to review them with you.

## 2017-09-08 NOTE — NURSING NOTE
"Chief Complaint   Patient presents with     Recheck Medication       Initial /78 (BP Location: Left arm, Patient Position: Chair, Cuff Size: Adult Regular)  Pulse 68  Temp 97.9  F (36.6  C) (Oral)  Resp 12  Ht 5' 6\" (1.676 m)  Wt 153 lb 12.8 oz (69.8 kg)  SpO2 99%  BMI 24.82 kg/m2 Estimated body mass index is 24.82 kg/(m^2) as calculated from the following:    Height as of this encounter: 5' 6\" (1.676 m).    Weight as of this encounter: 153 lb 12.8 oz (69.8 kg).  Medication Reconciliation: complete   Tom Arnold CMA       "

## 2017-09-12 ENCOUNTER — OFFICE VISIT (OUTPATIENT)
Dept: FAMILY MEDICINE | Facility: CLINIC | Age: 62
End: 2017-09-12
Payer: COMMERCIAL

## 2017-09-12 VITALS
TEMPERATURE: 98.5 F | HEART RATE: 60 BPM | BODY MASS INDEX: 24.69 KG/M2 | WEIGHT: 153 LBS | SYSTOLIC BLOOD PRESSURE: 126 MMHG | RESPIRATION RATE: 14 BRPM | DIASTOLIC BLOOD PRESSURE: 78 MMHG

## 2017-09-12 DIAGNOSIS — J02.9 ACUTE PHARYNGITIS, UNSPECIFIED ETIOLOGY: Primary | ICD-10-CM

## 2017-09-12 LAB
DEPRECATED S PYO AG THROAT QL EIA: NORMAL
SPECIMEN SOURCE: NORMAL

## 2017-09-12 PROCEDURE — 87081 CULTURE SCREEN ONLY: CPT | Performed by: NURSE PRACTITIONER

## 2017-09-12 PROCEDURE — 99213 OFFICE O/P EST LOW 20 MIN: CPT | Performed by: NURSE PRACTITIONER

## 2017-09-12 PROCEDURE — 87880 STREP A ASSAY W/OPTIC: CPT | Performed by: NURSE PRACTITIONER

## 2017-09-12 NOTE — PATIENT INSTRUCTIONS
Viral Pharyngitis (Sore Throat)    You (or your child, if your child is the patient) have pharyngitis (sore throat). This infection is caused by a virus. It can cause throat pain that is worse when swallowing, aching all over, headache, and fever. The infection may be spread by coughing, kissing, or touching others after touching your mouth or nose. Antibiotic medications do not work against viruses, so they are not used for treating this condition.  Home care    If your symptoms are severe, rest at home. Return to work or school when you feel well enough.     Drink plenty of fluids to avoid dehydration.    For children: Use acetaminophen for fever, fussiness or discomfort. In infants over six months of age, you may use ibuprofen instead of acetaminophen. (NOTE: If your child has chronic liver or kidney disease or ever had a stomach ulcer or GI bleeding, talk with your doctor before using these medicines.) (NOTE: Aspirin should never be used in anyone under 18 years of age who is ill with a fever. It may cause severe liver damage.)     For adults: You may use acetaminophen or ibuprofen to control pain or fever, unless another medicine was prescribed for this. (NOTE: If you have chronic liver or kidney disease or ever had a stomach ulcer or GI bleeding, talk with your doctor before using these medicines.)    Throat lozenges or numbing throat sprays can help reduce pain. Gargling with warm salt water will also help reduce throat pain. For this, dissolve 1/2 teaspoon of salt in 1 glass of warm water. To help soothe a sore throat, children can sip on juice or a popsicle. Children 5 years and older can also suck on a lollipop or hard candy.    Avoid salty or spicy foods, which can be irritating to the throat.  Follow-up care  Follow up with your healthcare provider or our staff if you are not improving over the next week.  When to seek medical advice  Call your healthcare provider right away if any of these  occur:    Fever as directed by your doctor.  For children, seek care if:    Your child is of any age and has repeated fevers above 104 F (40 C).    Your child is younger than 2 years of age and has a fever of 100.4 F (38 C) that continues for more than 1 day.    Your child is 2 years old or older and has a fever of 100.4 F (38 C) that continues for more than 3 days.    New or worsening ear pain, sinus pain, or headache    Painful lumps in the back of neck    Stiff neck    Lymph nodes are getting larger    Inability to swallow liquids, excessive drooling, or inability to open mouth wide due to throat pain    Signs of dehydration (very dark urine or no urine, sunken eyes, dizziness)    Trouble breathing or noisy breathing    Muffled voice    New rash    Child appears to be getting sicker  Date Last Reviewed: 4/13/2015 2000-2017 [employer]. [address] [place] [address], [place] [address]. All rights reserved. This information is not intended as a substitute for professional medical care. Always follow your healthcare professional's instructions.

## 2017-09-12 NOTE — NURSING NOTE
"Chief Complaint   Patient presents with     Pharyngitis       Initial /78 (BP Location: Right arm, Cuff Size: Adult Regular)  Pulse 60  Temp 98.5  F (36.9  C) (Oral)  Resp 14  Wt 153 lb (69.4 kg)  BMI 24.69 kg/m2 Estimated body mass index is 24.69 kg/(m^2) as calculated from the following:    Height as of 9/8/17: 5' 6\" (1.676 m).    Weight as of this encounter: 153 lb (69.4 kg).  Medication Reconciliation: complete   Molly Rubin MA    "

## 2017-09-12 NOTE — PROGRESS NOTES
HPI    SUBJECTIVE:   Annalise Wallace is a 61 year old female who presents to clinic today for the following health issues:      RESPIRATORY SYMPTOMS      Duration: started 2 days ago     Description  sore throat and nausea    Severity: moderate    Accompanying signs and symptoms: None    History (predisposing factors):  None-strep exposure a month ago     Precipitating or alleviating factors: None    Therapies tried and outcome:  none    Had a sore throat about a month ago when her granddaughter had strep.  Never seen.  Sore throat resolved, but has been really bad again for the past 2 days.  No fevers.  Pain worse with swallowing.          Problem list and histories reviewed & adjusted, as indicated.  Additional history: as documented    Current Outpatient Prescriptions   Medication Sig Dispense Refill     oxyCODONE-acetaminophen (PERCOCET) 5-325 MG per tablet Take 1-2 tablets by mouth every 6 hours as needed for moderate to severe pain LAST REFILL VISIT NEXT TIME 120 tablet 0     traZODone (DESYREL) 100 MG tablet Take 1 tablet (100 mg) by mouth nightly as needed for sleep 90 tablet 2     citalopram (CELEXA) 20 MG tablet Take 1 tablet (20 mg) by mouth daily 90 tablet 1     tiZANidine (ZANAFLEX) 4 MG tablet Take 1-2 tablets (4-8 mg) by mouth 3 times daily as needed for muscle spasms 60 tablet 1     zolpidem (AMBIEN) 5 MG tablet Take 1 tablet (5 mg) by mouth nightly as needed for sleep 90 tablet 1     omeprazole (PRILOSEC) 40 MG capsule Take 1 capsule (40 mg) by mouth daily 90 capsule 3     [DISCONTINUED] traZODone (DESYREL) 50 MG tablet Take 1 tablet (50 mg) by mouth At Bedtime 90 tablet 2     Allergies   Allergen Reactions     Sulfa Drugs Other (See Comments)     Headaches       Reviewed and updated as needed this visit by clinical staffTobacco  Allergies  Problems  Med Hx  Soc Hx      Reviewed and updated as needed this visit by Provider         ROS:  Constitutional, HEENT, cardiovascular, pulmonary, gi and gu  systems are negative, except as otherwise noted.      OBJECTIVE:   /78 (BP Location: Right arm, Cuff Size: Adult Regular)  Pulse 60  Temp 98.5  F (36.9  C) (Oral)  Resp 14  Wt 153 lb (69.4 kg)  BMI 24.69 kg/m2  Body mass index is 24.69 kg/(m^2).  GENERAL: healthy, alert and no distress  HENT: ear canals and TM's normal, nose and mouth without ulcers or lesions, mild erythema over oropharynx, moist mucous membranes  NECK: no adenopathy, no asymmetry, masses, or scars and thyroid normal to palpation  RESP: lungs clear to auscultation - no rales, rhonchi or wheezes  CV: regular rate and rhythm, normal S1 S2, no S3 or S4, no murmur, click or rub    Diagnostic Test Results:  Results for orders placed or performed in visit on 09/12/17   Strep, Rapid Screen   Result Value Ref Range    Specimen Description Throat     Rapid Strep A Screen       NEGATIVE: No Group A streptococcal antigen detected by immunoassay, await culture report.       ASSESSMENT/PLAN:   1. Acute pharyngitis, unspecified etiology  RST negative; likely viral.  Supportive cares.   - Strep, Rapid Screen  - Beta strep group A culture    Patient Instructions     Viral Pharyngitis (Sore Throat)    You (or your child, if your child is the patient) have pharyngitis (sore throat). This infection is caused by a virus. It can cause throat pain that is worse when swallowing, aching all over, headache, and fever. The infection may be spread by coughing, kissing, or touching others after touching your mouth or nose. Antibiotic medications do not work against viruses, so they are not used for treating this condition.  Home care    If your symptoms are severe, rest at home. Return to work or school when you feel well enough.     Drink plenty of fluids to avoid dehydration.    For children: Use acetaminophen for fever, fussiness or discomfort. In infants over six months of age, you may use ibuprofen instead of acetaminophen. (NOTE: If your child has chronic  liver or kidney disease or ever had a stomach ulcer or GI bleeding, talk with your doctor before using these medicines.) (NOTE: Aspirin should never be used in anyone under 18 years of age who is ill with a fever. It may cause severe liver damage.)     For adults: You may use acetaminophen or ibuprofen to control pain or fever, unless another medicine was prescribed for this. (NOTE: If you have chronic liver or kidney disease or ever had a stomach ulcer or GI bleeding, talk with your doctor before using these medicines.)    Throat lozenges or numbing throat sprays can help reduce pain. Gargling with warm salt water will also help reduce throat pain. For this, dissolve 1/2 teaspoon of salt in 1 glass of warm water. To help soothe a sore throat, children can sip on juice or a popsicle. Children 5 years and older can also suck on a lollipop or hard candy.    Avoid salty or spicy foods, which can be irritating to the throat.  Follow-up care  Follow up with your healthcare provider or our staff if you are not improving over the next week.  When to seek medical advice  Call your healthcare provider right away if any of these occur:    Fever as directed by your doctor.  For children, seek care if:    Your child is of any age and has repeated fevers above 104 F (40 C).    Your child is younger than 2 years of age and has a fever of 100.4 F (38 C) that continues for more than 1 day.    Your child is 2 years old or older and has a fever of 100.4 F (38 C) that continues for more than 3 days.    New or worsening ear pain, sinus pain, or headache    Painful lumps in the back of neck    Stiff neck    Lymph nodes are getting larger    Inability to swallow liquids, excessive drooling, or inability to open mouth wide due to throat pain    Signs of dehydration (very dark urine or no urine, sunken eyes, dizziness)    Trouble breathing or noisy breathing    Muffled voice    New rash    Child appears to be getting sicker  Date Last  Reviewed: 4/13/2015 2000-2017 [employer]. [address] [place] [address], [place] [address]. All rights reserved. This information is not intended as a substitute for professional medical care. Always follow your healthcare professional's instructions.        RTC prn     Ruth Flynn, KWADWO RUSHING  [facility]      Physical Exam

## 2017-09-12 NOTE — MR AVS SNAPSHOT
After Visit Summary   9/12/2017    Annalise Wallace    MRN: [id number]           Patient Information     Date Of Birth          1955        Visit Information        Provider Department      9/12/2017 1:20 PM Ruth Flynn APRN [facility]        Today's Diagnoses     Acute pharyngitis, unspecified etiology    -  1      Care Instructions      Viral Pharyngitis (Sore Throat)    You (or your child, if your child is the patient) have pharyngitis (sore throat). This infection is caused by a virus. It can cause throat pain that is worse when swallowing, aching all over, headache, and fever. The infection may be spread by coughing, kissing, or touching others after touching your mouth or nose. Antibiotic medications do not work against viruses, so they are not used for treating this condition.  Home care    If your symptoms are severe, rest at home. Return to work or school when you feel well enough.     Drink plenty of fluids to avoid dehydration.    For children: Use acetaminophen for fever, fussiness or discomfort. In infants over six months of age, you may use ibuprofen instead of acetaminophen. (NOTE: If your child has chronic liver or kidney disease or ever had a stomach ulcer or GI bleeding, talk with your doctor before using these medicines.) (NOTE: Aspirin should never be used in anyone under 18 years of age who is ill with a fever. It may cause severe liver damage.)     For adults: You may use acetaminophen or ibuprofen to control pain or fever, unless another medicine was prescribed for this. (NOTE: If you have chronic liver or kidney disease or ever had a stomach ulcer or GI bleeding, talk with your doctor before using these medicines.)    Throat lozenges or numbing throat sprays can help reduce pain. Gargling with warm salt water will also help reduce throat pain. For this, dissolve 1/2 teaspoon of salt in 1 glass of warm water. To help soothe a sore throat,  children can sip on juice or a popsicle. Children 5 years and older can also suck on a lollipop or hard candy.    Avoid salty or spicy foods, which can be irritating to the throat.  Follow-up care  Follow up with your healthcare provider or our staff if you are not improving over the next week.  When to seek medical advice  Call your healthcare provider right away if any of these occur:    Fever as directed by your doctor.  For children, seek care if:    Your child is of any age and has repeated fevers above 104 F (40 C).    Your child is younger than 2 years of age and has a fever of 100.4 F (38 C) that continues for more than 1 day.    Your child is 2 years old or older and has a fever of 100.4 F (38 C) that continues for more than 3 days.    New or worsening ear pain, sinus pain, or headache    Painful lumps in the back of neck    Stiff neck    Lymph nodes are getting larger    Inability to swallow liquids, excessive drooling, or inability to open mouth wide due to throat pain    Signs of dehydration (very dark urine or no urine, sunken eyes, dizziness)    Trouble breathing or noisy breathing    Muffled voice    New rash    Child appears to be getting sicker  Date Last Reviewed: 4/13/2015 2000-2017 [employer]. [address] [place] [address], [place] [address]. All rights reserved. This information is not intended as a substitute for professional medical care. Always follow your healthcare professional's instructions.                Follow-ups after your visit        Who to contact     If you have questions or need follow up information about today's clinic visit or your schedule please contact [facility] directly at [phone].  Normal or non-critical lab and imaging results will be communicated to you by MyChart, letter or phone within 4 business days after the clinic has received the results. If you do not hear from us within 7 days, please contact the clinic through Courserat or  "phone. If you have a critical or abnormal lab result, we will notify you by phone as soon as possible.  Submit refill requests through Bolster or call your pharmacy and they will forward the refill request to us. Please allow 3 business days for your refill to be completed.          Additional Information About Your Visit        Skorpios Technologieshart Information     Bolster lets you send messages to your doctor, view your test results, renew your prescriptions, schedule appointments and more. To sign up, go to [email or web address] . Click on \"Log in\" on the left side of the screen, which will take you to the Welcome page. Then click on \"Sign up Now\" on the right side of the page.     You will be asked to enter the access code listed below, as well as some personal information. Please follow the directions to create your username and password.     Your access code is: [email or web address]  Expires: 2017  9:44 AM     Your access code will  in 90 days. If you need help or a new code, please call your [place] clinic or [phone].        Care EveryWhere ID     This is your Care EveryWhere ID. This could be used by other organizations to access your [place] medical records  [id number]        Your Vitals Were     Pulse Temperature Respirations BMI (Body Mass Index)          60 98.5  F (36.9  C) (Oral) 14 24.69 kg/m2         Blood Pressure from Last 3 Encounters:   17 126/78   17 138/88   17 129/83    Weight from Last 3 Encounters:   17 153 lb (69.4 kg)   17 153 lb 12.8 oz (69.8 kg)   17 145 lb 11.2 oz (66.1 kg)              We Performed the Following     Beta strep group A culture     Strep, Rapid Screen        Primary Care Provider Office Phone # Fax #    Abelino Flores [phone] [address] [facility] [address]        Equal Access to Services     LE ELY AH: Ankit Michaud, shakira bush, qaybta joel anderson " jenna montelexa laLisaaalv ah. So [facility] [phone].    ATENCIÓN: Si samyla bita, tiene a esteban disposición servicios gratuitos de asistencia lingüística. Dave douglas [phone].    We comply with applicable federal civil rights laws and [place] laws. We do not discriminate on the basis of race, color, national origin, age, disability sex, sexual orientation or gender identity.            Thank you!     Thank you for choosing [facility]  for your care. Our goal is always to provide you with excellent care. Hearing back from our patients is one way we can continue to improve our services. Please take a few minutes to complete the written survey that you may receive in the mail after your visit with us. Thank you!             Your Updated Medication List - Protect others around you: Learn how to safely use, store and throw away your medicines at www.disposemymeds.org.          This list is accurate as of: 9/12/17  1:46 PM.  Always use your most recent med list.                   Brand Name Dispense Instructions for use Diagnosis    citalopram 20 MG tablet    celeXA    90 tablet    Take 1 tablet (20 mg) by mouth daily    Symptomatic menopausal or female climacteric states       omeprazole 40 MG capsule    priLOSEC    90 capsule    Take 1 capsule (40 mg) by mouth daily    Abdominal pain, right upper quadrant       oxyCODONE-acetaminophen 5-325 MG per tablet    PERCOCET    120 tablet    Take 1-2 tablets by mouth every 6 hours as needed for moderate to severe pain LAST REFILL VISIT NEXT TIME    Neck pain       tiZANidine 4 MG tablet    ZANAFLEX    60 tablet    Take 1-2 tablets (4-8 mg) by mouth 3 times daily as needed for muscle spasms    S/P cervical spinal fusion       traZODone 100 MG tablet    DESYREL    90 tablet    Take 1 tablet (100 mg) by mouth nightly as needed for sleep    Persistent disorder of initiating or maintaining sleep       zolpidem 5 MG tablet    AMBIEN    90 tablet    Take 1 tablet (5  mg) by mouth nightly as needed for sleep    Sleep disorder

## 2017-09-13 LAB
BACTERIA SPEC CULT: NORMAL
SPECIMEN SOURCE: NORMAL

## 2017-09-21 NOTE — TELEPHONE ENCOUNTER
Routing refill request to provider for review/approval because:  Diagnosis associated is neither depression/anxiety or insomnia.  Requires review      Axel Miller, PharmD  [facility] Pharmacist    on behalf of: [facility].            Patient

## 2017-09-26 ENCOUNTER — OFFICE VISIT (OUTPATIENT)
Dept: FAMILY MEDICINE | Facility: CLINIC | Age: 62
End: 2017-09-26
Payer: COMMERCIAL

## 2017-09-26 VITALS
SYSTOLIC BLOOD PRESSURE: 129 MMHG | HEART RATE: 69 BPM | HEIGHT: 66 IN | TEMPERATURE: 98.4 F | DIASTOLIC BLOOD PRESSURE: 61 MMHG | BODY MASS INDEX: 24.62 KG/M2 | OXYGEN SATURATION: 98 % | WEIGHT: 153.2 LBS

## 2017-09-26 DIAGNOSIS — R07.0 THROAT PAIN: Primary | ICD-10-CM

## 2017-09-26 DIAGNOSIS — J00 ACUTE NASOPHARYNGITIS: ICD-10-CM

## 2017-09-26 LAB
DEPRECATED S PYO AG THROAT QL EIA: NORMAL
SPECIMEN SOURCE: NORMAL

## 2017-09-26 PROCEDURE — 87081 CULTURE SCREEN ONLY: CPT | Performed by: FAMILY MEDICINE

## 2017-09-26 PROCEDURE — 87880 STREP A ASSAY W/OPTIC: CPT | Performed by: FAMILY MEDICINE

## 2017-09-26 PROCEDURE — 99213 OFFICE O/P EST LOW 20 MIN: CPT | Performed by: FAMILY MEDICINE

## 2017-09-26 NOTE — NURSING NOTE
"Chief Complaint   Patient presents with     Pharyngitis       Initial /61 (BP Location: Right arm, Patient Position: Chair, Cuff Size: Adult Regular)  Pulse 69  Temp 98.4  F (36.9  C) (Oral)  Ht 5' 6\" (1.676 m)  Wt 153 lb 3.2 oz (69.5 kg)  SpO2 98%  BMI 24.73 kg/m2 Estimated body mass index is 24.73 kg/(m^2) as calculated from the following:    Height as of this encounter: 5' 6\" (1.676 m).    Weight as of this encounter: 153 lb 3.2 oz (69.5 kg).  Medication Reconciliation: complete  "

## 2017-09-26 NOTE — MR AVS SNAPSHOT
"              After Visit Summary   2017    Annalise Wallace    MRN: [id number]           Patient Information     Date Of Birth          1955        Visit Information        Provider Department      2017 10:45 AM Abelino Flores MD [facility]        Today's Diagnoses     Throat pain    -  1    Acute nasopharyngitis           Follow-ups after your visit        Who to contact     If you have questions or need follow up information about today's clinic visit or your schedule please contact [facility] directly at [phone].  Normal or non-critical lab and imaging results will be communicated to you by Shoozyhart, letter or phone within 4 business days after the clinic has received the results. If you do not hear from us within 7 days, please contact the clinic through mechatronic systemtechnikt or phone. If you have a critical or abnormal lab result, we will notify you by phone as soon as possible.  Submit refill requests through Axcient or call your pharmacy and they will forward the refill request to us. Please allow 3 business days for your refill to be completed.          Additional Information About Your Visit        MyChart Information     Axcient lets you send messages to your doctor, view your test results, renew your prescriptions, schedule appointments and more. To sign up, go to [email or web address] . Click on \"Log in\" on the left side of the screen, which will take you to the Welcome page. Then click on \"Sign up Now\" on the right side of the page.     You will be asked to enter the access code listed below, as well as some personal information. Please follow the directions to create your username and password.     Your access code is: [email or web address]  Expires: 2017  9:44 AM     Your access code will  in 90 days. If you need help or a new code, please call your [facility] or [phone].        Care EveryWhere ID     This is your Care EveryWhere ID. " "This could be used by other organizations to access your [place] medical records  [id number]        Your Vitals Were     Pulse Temperature Height Pulse Oximetry BMI (Body Mass Index)       69 98.4  F (36.9  C) (Oral) 5' 6\" (1.676 m) 98% 24.73 kg/m2        Blood Pressure from Last 3 Encounters:   09/26/17 129/61   09/12/17 126/78   09/08/17 138/88    Weight from Last 3 Encounters:   09/26/17 153 lb 3.2 oz (69.5 kg)   09/12/17 153 lb (69.4 kg)   09/08/17 153 lb 12.8 oz (69.8 kg)              We Performed the Following     Beta strep group A culture     Rapid strep screen        Primary Care Provider Office Phone # Fax #    Abelino Flores [phone]  [id number]        Equal Access to Services     [facility] Center[facility] : Hadii rito masterso Somartha, waaxda luqadaha, qaybta kaalmada adeegyada, joel saleh . So [facility] [place] [phone].    ATENCIÓN: Si lew sunshine, tiene a esteban disposición servicios gratuitos de asistencia lingüística. Llame al [phone].    We comply with applicable federal civil rights laws and [place] laws. We do not discriminate on the basis of race, color, national origin, age, disability sex, sexual orientation or gender identity.            Thank you!     Thank you for choosing [facility]  for your care. Our goal is always to provide you with excellent care. Hearing back from our patients is one way we can continue to improve our services. Please take a few minutes to complete the written survey that you may receive in the mail after your visit with us. Thank you!             Your Updated Medication List - Protect others around you: Learn how to safely use, store and throw away your medicines at www.disposemymeds.org.          This list is accurate as of: 9/26/17 11:25 AM.  Always use your most recent med list.                   Brand Name Dispense Instructions for use Diagnosis    citalopram 20 MG " tablet    celeXA    90 tablet    Take 1 tablet (20 mg) by mouth daily    Symptomatic menopausal or female climacteric states       omeprazole 40 MG capsule    priLOSEC    90 capsule    Take 1 capsule (40 mg) by mouth daily    Abdominal pain, right upper quadrant       oxyCODONE-acetaminophen 5-325 MG per tablet    PERCOCET    120 tablet    Take 1-2 tablets by mouth every 6 hours as needed for moderate to severe pain LAST REFILL VISIT NEXT TIME    Neck pain       tiZANidine 4 MG tablet    ZANAFLEX    60 tablet    Take 1-2 tablets (4-8 mg) by mouth 3 times daily as needed for muscle spasms    S/P cervical spinal fusion       traZODone 100 MG tablet    DESYREL    90 tablet    Take 1 tablet (100 mg) by mouth nightly as needed for sleep    Persistent disorder of initiating or maintaining sleep       zolpidem 5 MG tablet    AMBIEN    90 tablet    Take 1 tablet (5 mg) by mouth nightly as needed for sleep    Sleep disorder

## 2017-09-26 NOTE — PROGRESS NOTES
SUBJECTIVE:   Annalise Wallace is a 61 year old female who presents to clinic today for the following health issues:      Acute Illness   Acute illness concerns: sore throat   Onset: 2 days     Fever: YES    Chills/Sweats: no     Headache (location?): YES    Sinus Pressure:YES    Conjunctivitis:  no    Ear Pain: YES: right    Rhinorrhea: no     Congestion: no     Sore Throat: YES     Cough: YES-non-productive    Wheeze: no     Decreased Appetite: YES    Nausea: no     Vomiting: no     Diarrhea:  Yes     Dysuria/Freq.: no     Fatigue/Achiness: YES    Sick/Strep Exposure: YES, grand kid      Therapies Tried and outcome: salt water        Past Medical History:   Diagnosis Date     Abnormal Papanicolaou smear of cervix and cervical HPV      Degenerative disc disease      Depressive disorder, not elsewhere classified      Displacement of cervical intervertebral disc without myelopathy      Gastro-oesophageal reflux disease      H/O hysterectomy for benign disease      TOBACCO ABUSE-CONTINUOUS        Past Surgical History:   Procedure Laterality Date     BACK SURGERY       C NONSPECIFIC PROCEDURE      Lump removed L. breast - benign     C NONSPECIFIC PROCEDURE      Cervical disk     C NONSPECIFIC PROCEDURE      C-spine - Nany     DISCECTOMY LUMBAR MINIMALLY INVASIVE ONE LEVEL  2/13/2014    Procedure: DISCECTOMY LUMBAR MINIMALLY INVASIVE ONE LEVEL;  Minimally Invasive Discectomy L2-3 Left ;  Surgeon: Juanjose Pitt MD;  Location: [facility] OR     DISCECTOMY LUMBAR MINIMALLY INVASIVE ONE LEVEL       FUSION SPINE POSTERIOR MINIMALLY INVASIVE ONE LEVEL  5/15/2014    Procedure: FUSION SPINE POSTERIOR MINIMALLY INVASIVE ONE LEVEL;  Surgeon: Juanjose Pitt MD;  Location: [facility] OR     HYSTERECTOMY VAGINAL, BILATERAL SALPINGO-OOPHERECTOMY, COMBINED       LAPAROSCOPIC HYSTERECTOMY TOTAL         Family History   Problem Relation Age of Onset     CANCER Mother      CANCER Father      throat     DIABETES Paternal Grandmother         Social History   Substance Use Topics     Smoking status: Former Smoker     Packs/day: 1.00     Years: 30.00     Types: Cigarettes     Quit date: 6/21/2017     Smokeless tobacco: Never Used     Alcohol use No   Complanis of sore throat for three days.  Associated symptoms include uri fever swollen glands strep contact slightheadache mildstomach upset.    Review of systems shows no problems with vision,hearing or learning  No heart murmer, asthma, bowel or bladder problems, rashes, back or muscle problems,dental problems, headaches,balance or frequent infections  On exam the vital signs are stable. no neck nodes no necks stiffness or thyromegaly.    No bruits, murmers, rubs or extrasounds. No cardiomegaly or chest wall tenderness. Lungs clear, no abdominal masses or organomegaly. No CVA tenderness.    Strep test is neg  Treatment  Viral support  and adjunctive vitimin c  (R07.0) Throat pain  (primary encounter diagnosis)  Comment:   Plan: Rapid strep screen, Beta strep group A culture        Granddaughter gets strep

## 2017-09-27 LAB
BACTERIA SPEC CULT: NORMAL
SPECIMEN SOURCE: NORMAL

## 2017-10-05 DIAGNOSIS — G89.29 OTHER CHRONIC PAIN: ICD-10-CM

## 2017-10-05 DIAGNOSIS — M54.2 NECK PAIN: ICD-10-CM

## 2017-10-05 NOTE — TELEPHONE ENCOUNTER
Controlled Substance Refill Request for percocet 5/325  Problem List Complete:  No     PROVIDER TO CONSIDER COMPLETION OF PROBLEM LIST AND OVERVIEW/CONTROLLED SUBSTANCE AGREEMENT    Last Written Prescription Date:  9/8/2017  Last Fill Quantity: 120,   # refills: 0    Last Office Visit with [facility] primary care provider: 9/26/2017    Future Office visit:     Controlled substance agreement on file: Yes:  Date 03/08/2017.     Processing:  Staff will hand deliver Rx to on-site pharmacy     checked in past 6 months?  No, route to RN - looks overdue

## 2017-10-06 RX ORDER — OXYCODONE AND ACETAMINOPHEN 5; 325 MG/1; MG/1
1-2 TABLET ORAL EVERY 6 HOURS PRN
Qty: 120 TABLET | Refills: 0 | Status: SHIPPED | OUTPATIENT
Start: 2017-10-06 | End: 2017-11-06

## 2017-10-06 NOTE — TELEPHONE ENCOUNTER
Patient called to find out the status of her refill.  Patient would like to  her prescription before the weekend.  Please call patient once the prescription has been walked over to the [facility] pharmacy. Patient can be reached at [phone].    Meena Walker/

## 2017-10-06 NOTE — TELEPHONE ENCOUNTER
Disp Refills Start End TONNY   oxyCODONE-acetaminophen (PERCOCET) 5-325 MG per tablet 120 tablet 0 9/8/2017  No   Sig: Take 1-2 tablets by mouth every 6 hours as needed for moderate to severe pain     Last OV: 9.26.17  Routing refill request to provider for review/approval because:  Drug not on the [facility] refill protocol   : 10.6.17, monthly fills from PCP    Etelvina Gonzalez RN, BS  Clinical Nurse Triage.

## 2017-10-19 DIAGNOSIS — G47.00 PERSISTENT DISORDER OF INITIATING OR MAINTAINING SLEEP: ICD-10-CM

## 2017-10-19 RX ORDER — TRAZODONE HYDROCHLORIDE 100 MG/1
TABLET ORAL
Qty: 90 TABLET | Refills: 2
Start: 2017-10-19

## 2017-10-19 NOTE — TELEPHONE ENCOUNTER
3 month Supply with 2 RF sent 9/8/17.                                   Fax sent to pharm informing above.  Please disregard request.    STEPHANIE Hidalgo  October 19, 2017  10:14 AM

## 2017-11-06 ENCOUNTER — OFFICE VISIT (OUTPATIENT)
Dept: FAMILY MEDICINE | Facility: CLINIC | Age: 62
End: 2017-11-06
Payer: COMMERCIAL

## 2017-11-06 VITALS
DIASTOLIC BLOOD PRESSURE: 73 MMHG | HEART RATE: 69 BPM | SYSTOLIC BLOOD PRESSURE: 138 MMHG | RESPIRATION RATE: 14 BRPM | HEIGHT: 66 IN | WEIGHT: 155.8 LBS | OXYGEN SATURATION: 99 % | BODY MASS INDEX: 25.04 KG/M2 | TEMPERATURE: 98.1 F

## 2017-11-06 DIAGNOSIS — Z23 NEED FOR PROPHYLACTIC VACCINATION AND INOCULATION AGAINST INFLUENZA: Primary | ICD-10-CM

## 2017-11-06 DIAGNOSIS — M54.2 NECK PAIN: ICD-10-CM

## 2017-11-06 DIAGNOSIS — Z98.1 S/P CERVICAL SPINAL FUSION: ICD-10-CM

## 2017-11-06 PROCEDURE — 90686 IIV4 VACC NO PRSV 0.5 ML IM: CPT | Performed by: FAMILY MEDICINE

## 2017-11-06 PROCEDURE — 99214 OFFICE O/P EST MOD 30 MIN: CPT | Mod: 25 | Performed by: FAMILY MEDICINE

## 2017-11-06 PROCEDURE — G0008 ADMIN INFLUENZA VIRUS VAC: HCPCS | Performed by: FAMILY MEDICINE

## 2017-11-06 RX ORDER — OXYCODONE AND ACETAMINOPHEN 5; 325 MG/1; MG/1
1-2 TABLET ORAL EVERY 6 HOURS PRN
Qty: 120 TABLET | Refills: 0 | Status: SHIPPED | OUTPATIENT
Start: 2017-11-06 | End: 2017-12-05

## 2017-11-06 NOTE — MR AVS SNAPSHOT
"              After Visit Summary   2017    Annalise Wallace    MRN: [id number]           Patient Information     Date Of Birth          1955        Visit Information        Provider Department      2017 9:30 AM Abelino Flores MD [facility]        Today's Diagnoses     Need for prophylactic vaccination and inoculation against influenza    -  1    Neck pain           Follow-ups after your visit        Who to contact     If you have questions or need follow up information about today's clinic visit or your schedule please contact [facility] directly at [phone].  Normal or non-critical lab and imaging results will be communicated to you by Reapplixhart, letter or phone within 4 business days after the clinic has received the results. If you do not hear from us within 7 days, please contact the clinic through Reapplixhart or phone. If you have a critical or abnormal lab result, we will notify you by phone as soon as possible.  Submit refill requests through [employer] or call your pharmacy and they will forward the refill request to us. Please allow 3 business days for your refill to be completed.          Additional Information About Your Visit        MyChart Information     [employer] lets you send messages to your doctor, view your test results, renew your prescriptions, schedule appointments and more. To sign up, go to [email or web address] . Click on \"Log in\" on the left side of the screen, which will take you to the Welcome page. Then click on \"Sign up Now\" on the right side of the page.     You will be asked to enter the access code listed below, as well as some personal information. Please follow the directions to create your username and password.     Your access code is: [email or web address]  Expires: 2017  8:44 AM     Your access code will  in 90 days. If you need help or a new code, please call your [facility] or [phone].        Care EveryWhere " "ID     This is your Care EveryWhere ID. This could be used by other organizations to access your [place] medical records  [id number]        Your Vitals Were     Pulse Temperature Respirations Height Pulse Oximetry BMI (Body Mass Index)    69 98.1  F (36.7  C) (Oral) 14 5' 6\" (1.676 m) 99% 25.15 kg/m2       Blood Pressure from Last 3 Encounters:   11/06/17 138/73   09/26/17 129/61   09/12/17 126/78    Weight from Last 3 Encounters:   11/06/17 155 lb 12.8 oz (70.7 kg)   09/26/17 153 lb 3.2 oz (69.5 kg)   09/12/17 153 lb (69.4 kg)              We Performed the Following     FLU VAC, SPLIT VIRUS IM > 3 YO (QUADRIVALENT) [71627]     Vaccine Administration, Initial [26426]          Where to get your medicines      Some of these will need a paper prescription and others can be bought over the counter.  Ask your nurse if you have questions.     Bring a paper prescription for each of these medications     oxyCODONE-acetaminophen 5-325 MG per tablet          Primary Care Provider Office Phone # Fax #    Abelino Flores [phone] [address] [facility] [address]        Equal Access to Services     LE ELY : Hadii rito ku hadasho Soomaali, waaxda luqadaha, qaybta kaalmada adeegyada, joel park. So [facility] [phone].    ATENCIÓN: Si habla español, tiene a esteban disposición servicios gratuitos de asistencia lingüística. Llame al [phone].    We comply with applicable federal civil rights laws and [place] laws. We do not discriminate on the basis of race, color, national origin, age, disability, sex, sexual orientation, or gender identity.            Thank you!     Thank you for choosing [facility]  for your care. Our goal is always to provide you with excellent care. Hearing back from our patients is one way we can continue to improve our services. Please take a few minutes to complete the written survey that you may receive in the " mail after your visit with us. Thank you!             Your Updated Medication List - Protect others around you: Learn how to safely use, store and throw away your medicines at www.disposemymeds.org.          This list is accurate as of: 11/6/17  9:48 AM.  Always use your most recent med list.                   Brand Name Dispense Instructions for use Diagnosis    citalopram 20 MG tablet    celeXA    90 tablet    Take 1 tablet (20 mg) by mouth daily    Symptomatic menopausal or female climacteric states       omeprazole 40 MG capsule    priLOSEC    90 capsule    Take 1 capsule (40 mg) by mouth daily    Abdominal pain, right upper quadrant       oxyCODONE-acetaminophen 5-325 MG per tablet    PERCOCET    120 tablet    Take 1-2 tablets by mouth every 6 hours as needed for moderate to severe pain    Neck pain       tiZANidine 4 MG tablet    ZANAFLEX    60 tablet    Take 1-2 tablets (4-8 mg) by mouth 3 times daily as needed for muscle spasms    S/P cervical spinal fusion       traZODone 100 MG tablet    DESYREL    90 tablet    Take 1 tablet (100 mg) by mouth nightly as needed for sleep    Persistent disorder of initiating or maintaining sleep       zolpidem 5 MG tablet    AMBIEN    90 tablet    Take 1 tablet (5 mg) by mouth nightly as needed for sleep    Sleep disorder

## 2017-11-06 NOTE — PROGRESS NOTES
SUBJECTIVE:   Annalise Wallace is a 62 year old female who presents to clinic today for the following health issues:      Chronic Pain Follow-Up, status post neck fusion        Type / Location of Pain: back and neck   Analgesia/pain control:       Recent changes:  improved      Overall control: Tolerable with discomfort  Activity level/function:      Daily activities:  Can do most things most days, with some rest    Work:  Unable to work  Adverse effects:  No  Adherance    Taking medication as directed?  Yes    Participating in other treatments: no -   Risk Factors:    Sleep:  Good    Mood/anxiety:  controlled    Recent family or social stressors:  none noted    Other aggravating factors: none  PHQ-9 SCORE 9/21/2015 4/14/2016 12/9/2016   Total Score - - -   Total Score 2 3 1     JEANNINE-7 SCORE 1/12/2015 9/21/2015 12/9/2016   Total Score 2 - -   Total Score - 0 0     Encounter-Level CSA - 03/08/2017:          Controlled Substance Agreement - Scan on 3/16/2017 11:46 AM : CONTROLLED SUBSTANCE AGREEMENT (below)            Encounter-Level CSA - 03/08/2017:          Controlled Substance Agreement - Scan on 12/16/2014  9:34 AM : [facility] Controlled Medication Agreement 12-15-14 (below)              Back Subjective:         Symptoms began:   20 years ago  year(s) ago       Symptoms changing:  onset ddd and cervical fusion and are stable.                  Location:  neck bilateral region       Radiation to radiates to right uppper arm       At worst a 7 on a scale of 1-10.         Personal hx of back pain is:  recurrent self limited episodes of low back pain in the past, previous osteoarthritis of lumbar spine and previous spinal surgery - cervical twice .       Pain is exacerbated by: lying, sitting and bending.       Pain is relieved by: heat, rest and meds .       Associated sx include: denies.       Previous plain films obtained: Yes.        Results: fusion.       Red flag symptoms: negative.      Amount of exercise or  "physical activity: couple of times a week    Problems taking medications regularly: No    Medication side effects: none    Diet: regular (no restrictions)                 ROS:  Constitutional, HEENT, cardiovascular, pulmonary, gi and gu systems are negative, except as otherwise noted.      OBJECTIVE:   /73 (BP Location: Right arm, Patient Position: Chair, Cuff Size: Adult Large)  Pulse 69  Temp 98.1  F (36.7  C) (Oral)  Resp 14  Ht 5' 6\" (1.676 m)  Wt 155 lb 12.8 oz (70.7 kg)  SpO2 99%  BMI 25.15 kg/m2  Body mass index is 25.15 kg/(m^2).  GENERAL: healthy, alert and no distress  EYES: Eyes grossly normal to inspection, PERRL and conjunctivae and sclerae normal  HENT: ear canals and TM's normal, nose and mouth without ulcers or lesions  NECK: no adenopathy, no asymmetry, masses, or scars and thyroid normal to palpation  RESP: lungs clear to auscultation - no rales, rhonchi or wheezes  CV: regular rate and rhythm, normal S1 S2, no S3 or S4, no murmur, click or rub, no peripheral edema and peripheral pulses strong  ABDOMEN: soft, nontender, no hepatosplenomegaly, no masses and bowel sounds normal  MS: no gross musculoskeletal defects noted, no edema  SKIN: no suspicious lesions or rashes        ASSESSMENT/PLAN:     (Z23) Need for prophylactic vaccination and inoculation against influenza  (primary encounter diagnosis)  Comment:   Plan: FLU VAC, SPLIT VIRUS IM > 3 YO (QUADRIVALENT)         [83420], Vaccine Administration, Initial         [82243]            (M54.2) Neck pain  Comment:   Plan: oxyCODONE-acetaminophen (PERCOCET) 5-325 MG per        tablet        Post fusion djd    (Z98.1) S/P cervical spinal fusion  Comment:   Plan: tiZANidine (ZANAFLEX) 4 MG tablet              Abelino Flores MD  [facility]   Injectable Influenza Immunization Documentation    1.  Is the person to be vaccinated sick today?   No    2. Does the person to be vaccinated have an allergy to a component "   of the vaccine?   No  Egg Allergy Algorithm Link    3. Has the person to be vaccinated ever had a serious reaction   to influenza vaccine in the past?   No    4. Has the person to be vaccinated ever had Guillain-Barré syndrome?   No    Form completed by Tammy Fountain

## 2017-11-06 NOTE — NURSING NOTE
"Chief Complaint   Patient presents with     Recheck Medication     Norco       Initial /73 (BP Location: Right arm, Patient Position: Chair, Cuff Size: Adult Large)  Pulse 69  Temp 98.1  F (36.7  C) (Oral)  Resp 14  Ht 5' 6\" (1.676 m)  Wt 155 lb 12.8 oz (70.7 kg)  SpO2 99%  BMI 25.15 kg/m2 Estimated body mass index is 25.15 kg/(m^2) as calculated from the following:    Height as of this encounter: 5' 6\" (1.676 m).    Weight as of this encounter: 155 lb 12.8 oz (70.7 kg).  Medication Reconciliation: complete   "

## 2017-12-05 ENCOUNTER — OFFICE VISIT (OUTPATIENT)
Dept: FAMILY MEDICINE | Facility: CLINIC | Age: 62
End: 2017-12-05
Payer: COMMERCIAL

## 2017-12-05 VITALS
TEMPERATURE: 98.1 F | SYSTOLIC BLOOD PRESSURE: 165 MMHG | DIASTOLIC BLOOD PRESSURE: 88 MMHG | RESPIRATION RATE: 12 BRPM | BODY MASS INDEX: 25.31 KG/M2 | HEART RATE: 74 BPM | HEIGHT: 66 IN | WEIGHT: 157.5 LBS | OXYGEN SATURATION: 97 %

## 2017-12-05 DIAGNOSIS — G89.29 CHRONIC BILATERAL LOW BACK PAIN WITHOUT SCIATICA: ICD-10-CM

## 2017-12-05 DIAGNOSIS — M54.2 NECK PAIN: ICD-10-CM

## 2017-12-05 DIAGNOSIS — M51.369 DDD (DEGENERATIVE DISC DISEASE), LUMBAR: Primary | ICD-10-CM

## 2017-12-05 DIAGNOSIS — Z98.1 S/P CERVICAL SPINAL FUSION: ICD-10-CM

## 2017-12-05 DIAGNOSIS — M54.50 CHRONIC BILATERAL LOW BACK PAIN WITHOUT SCIATICA: ICD-10-CM

## 2017-12-05 PROCEDURE — 99213 OFFICE O/P EST LOW 20 MIN: CPT | Performed by: FAMILY MEDICINE

## 2017-12-05 RX ORDER — OXYCODONE AND ACETAMINOPHEN 5; 325 MG/1; MG/1
1-2 TABLET ORAL EVERY 6 HOURS PRN
Qty: 120 TABLET | Refills: 0 | Status: SHIPPED | OUTPATIENT
Start: 2017-12-05 | End: 2018-01-03

## 2017-12-05 ASSESSMENT — PATIENT HEALTH QUESTIONNAIRE - PHQ9
SUM OF ALL RESPONSES TO PHQ QUESTIONS 1-9: 0
5. POOR APPETITE OR OVEREATING: NOT AT ALL

## 2017-12-05 ASSESSMENT — ANXIETY QUESTIONNAIRES
6. BECOMING EASILY ANNOYED OR IRRITABLE: NOT AT ALL
GAD7 TOTAL SCORE: 0
3. WORRYING TOO MUCH ABOUT DIFFERENT THINGS: NOT AT ALL
2. NOT BEING ABLE TO STOP OR CONTROL WORRYING: NOT AT ALL
1. FEELING NERVOUS, ANXIOUS, OR ON EDGE: NOT AT ALL
7. FEELING AFRAID AS IF SOMETHING AWFUL MIGHT HAPPEN: NOT AT ALL
IF YOU CHECKED OFF ANY PROBLEMS ON THIS QUESTIONNAIRE, HOW DIFFICULT HAVE THESE PROBLEMS MADE IT FOR YOU TO DO YOUR WORK, TAKE CARE OF THINGS AT HOME, OR GET ALONG WITH OTHER PEOPLE: NOT DIFFICULT AT ALL
5. BEING SO RESTLESS THAT IT IS HARD TO SIT STILL: NOT AT ALL

## 2017-12-05 NOTE — MR AVS SNAPSHOT
"              After Visit Summary   2017    Annalise Wallace    MRN: [id number]           Patient Information     Date Of Birth          1955        Visit Information        Provider Department      2017 10:30 AM Abelino Flores MD [facility]        Today's Diagnoses     DDD (degenerative disc disease), lumbar    -  1    Neck pain        S/P cervical spinal fusion        Chronic bilateral low back pain without sciatica           Follow-ups after your visit        Who to contact     If you have questions or need follow up information about today's clinic visit or your schedule please contact [facility] directly at [phone].  Normal or non-critical lab and imaging results will be communicated to you by MyChart, letter or phone within 4 business days after the clinic has received the results. If you do not hear from us within 7 days, please contact the clinic through MyChart or phone. If you have a critical or abnormal lab result, we will notify you by phone as soon as possible.  Submit refill requests through Subtextual or call your pharmacy and they will forward the refill request to us. Please allow 3 business days for your refill to be completed.          Additional Information About Your Visit        MyChart Information     Subtextual lets you send messages to your doctor, view your test results, renew your prescriptions, schedule appointments and more. To sign up, go to [email or web address] . Click on \"Log in\" on the left side of the screen, which will take you to the Welcome page. Then click on \"Sign up Now\" on the right side of the page.     You will be asked to enter the access code listed below, as well as some personal information. Please follow the directions to create your username and password.     Your access code is: [email or web address]  Expires: 2017  8:44 AM     Your access code will  in 90 days. If you need help or a new code, please " "call your [place] clinic or [phone].        Care EveryWhere ID     This is your Care EveryWhere ID. This could be used by other organizations to access your [place] medical records  [id number]        Your Vitals Were     Pulse Temperature Respirations Height Pulse Oximetry BMI (Body Mass Index)    74 98.1  F (36.7  C) (Oral) 12 5' 6\" (1.676 m) 97% 25.42 kg/m2       Blood Pressure from Last 3 Encounters:   12/05/17 165/88   11/06/17 138/73   09/26/17 129/61    Weight from Last 3 Encounters:   12/05/17 157 lb 8 oz (71.4 kg)   11/06/17 155 lb 12.8 oz (70.7 kg)   09/26/17 153 lb 3.2 oz (69.5 kg)              Today, you had the following     No orders found for display         Where to get your medicines      These medications were sent to [facility] [address]  [address] [facility] [address]     Phone:  [phone]     tiZANidine 4 MG tablet         Some of these will need a paper prescription and others can be bought over the counter.  Ask your nurse if you have questions.     Bring a paper prescription for each of these medications     oxyCODONE-acetaminophen 5-325 MG per tablet          Primary Care Provider Office Phone # Fax #    Abelino Flores [phone] [address] [facility] [address]        Equal Access to Services     LE ELY AH: Hadii rito ku hadasho Soomaali, waaxda luqadaha, qaybta kaalmada adeegyada, [place]. So [place] [phone].    ATENCIÓN: Si habla español, tiene a esteban disposición servicios gratuitos de asistencia lingüística. Llame al [phone].    We comply with applicable federal civil rights laws and [place] laws. We do not discriminate on the basis of race, color, national origin, age, disability, sex, sexual orientation, or gender identity.            Thank you!     Thank you for choosing [facility]  for your care. Our goal is always to " provide you with excellent care. Hearing back from our patients is one way we can continue to improve our services. Please take a few minutes to complete the written survey that you may receive in the mail after your visit with us. Thank you!             Your Updated Medication List - Protect others around you: Learn how to safely use, store and throw away your medicines at www.disposemymeds.org.          This list is accurate as of: 12/5/17 11:59 PM.  Always use your most recent med list.                   Brand Name Dispense Instructions for use Diagnosis    citalopram 20 MG tablet    celeXA    90 tablet    Take 1 tablet (20 mg) by mouth daily    Symptomatic menopausal or female climacteric states       omeprazole 40 MG capsule    priLOSEC    90 capsule    Take 1 capsule (40 mg) by mouth daily    Abdominal pain, right upper quadrant       oxyCODONE-acetaminophen 5-325 MG per tablet    PERCOCET    120 tablet    Take 1-2 tablets by mouth every 6 hours as needed for moderate to severe pain    Neck pain       tiZANidine 4 MG tablet    ZANAFLEX    60 tablet    Take 1-2 tablets (4-8 mg) by mouth 3 times daily as needed for muscle spasms    S/P cervical spinal fusion       traZODone 100 MG tablet    DESYREL    90 tablet    Take 1 tablet (100 mg) by mouth nightly as needed for sleep    Persistent disorder of initiating or maintaining sleep       zolpidem 5 MG tablet    AMBIEN    90 tablet    Take 1 tablet (5 mg) by mouth nightly as needed for sleep    Sleep disorder

## 2017-12-05 NOTE — PROGRESS NOTES
SUBJECTIVE:   Annalise Wallace is a 62 year old female who presents to clinic today for the following health issues:      Back Pain  Duration of complaint: Patient is here for a follow up on her back pain.  It has been getting worse for the past 2 weeks.    Back Subjective:         Symptoms began:   2 week(s) ago       Symptoms changing:  onset gradual and are worse.                  Location:  low back bilateral region       Radiation to radiates into the right buttocks       At worst a 7 on a scale of 1-10.         Personal hx of back pain is:  recurrent self limited episodes of low back pain in the past, previous osteoarthritis of lumbar spine and previous spinal surgery - cervical .       Pain is exacerbated by: lifting, standing and lying.       Pain is relieved by: heat and rest.       Associated sx include: none.       Previous plain films obtained: No.        Results: two prior laminectomies and djd:        Red flag symptoms: no   Past Medical History:   Diagnosis Date     Abnormal Papanicolaou smear of cervix and cervical HPV      Degenerative disc disease      Depressive disorder, not elsewhere classified      Displacement of cervical intervertebral disc without myelopathy      Gastro-oesophageal reflux disease      H/O hysterectomy for benign disease      TOBACCO ABUSE-CONTINUOUS        Past Surgical History:   Procedure Laterality Date     BACK SURGERY       C NONSPECIFIC PROCEDURE      Lump removed L. breast - benign     C NONSPECIFIC PROCEDURE      Cervical disk     C NONSPECIFIC PROCEDURE      C-spine - [place]     DISCECTOMY LUMBAR MINIMALLY INVASIVE ONE LEVEL  2/13/2014    Procedure: DISCECTOMY LUMBAR MINIMALLY INVASIVE ONE LEVEL;  Minimally Invasive Discectomy L2-3 Left ;  Surgeon: Juanjose Pitt MD;  Location: [facility] OR     DISCECTOMY LUMBAR MINIMALLY INVASIVE ONE LEVEL       FUSION SPINE POSTERIOR MINIMALLY INVASIVE ONE LEVEL  5/15/2014    Procedure: FUSION SPINE POSTERIOR MINIMALLY INVASIVE ONE  LEVEL;  Surgeon: Juanjose Pitt MD;  Location: [facility] OR     HYSTERECTOMY VAGINAL, BILATERAL SALPINGO-OOPHERECTOMY, COMBINED       LAPAROSCOPIC HYSTERECTOMY TOTAL         Family History   Problem Relation Age of Onset     CANCER Mother      CANCER Father      throat     DIABETES Paternal Grandmother        Reflexes symmetrical, no weakness, limited lateral flexion     (M54.2) Neck pain  Comment:   Plan: oxyCODONE-acetaminophen (PERCOCET) 5-325 MG per        tablet        Long hx, two fusion     (Z98.1) S/P cervical spinal fusion  Comment:   Plan: tiZANidine (ZANAFLEX) 4 MG tablet        New issues with recurrent lumbar pain     LUMBAR LOW BACK PAIN BILATERAL WITHOUT SCIATICA

## 2017-12-06 ASSESSMENT — ANXIETY QUESTIONNAIRES: GAD7 TOTAL SCORE: 0

## 2017-12-27 DIAGNOSIS — G47.9 SLEEP DISORDER: ICD-10-CM

## 2017-12-27 RX ORDER — ZOLPIDEM TARTRATE 5 MG/1
5 TABLET ORAL
Qty: 90 TABLET | Refills: 1 | Status: SHIPPED | OUTPATIENT
Start: 2017-12-27 | End: 2018-06-21

## 2017-12-27 NOTE — TELEPHONE ENCOUNTER
Controlled Substance Refill Request for zolpidem  Problem List Complete:  No     PROVIDER TO CONSIDER COMPLETION OF PROBLEM LIST AND OVERVIEW/CONTROLLED SUBSTANCE AGREEMENT    Last Written Prescription Date:  6/30/17  Last Fill Quantity: 90,   # refills: 1    [place] with [facility] [place] provider: 12/5/17    Future Office visit:     Controlled substance agreement on file: Yes:  Date 3/16/17.     Processing:  Staff will hand deliver Rx to on-site pharmacy     checked in past 6 months?  Yes 10/6/17     Katlyn Mckenna, Pharmacy [facility]

## 2018-01-03 DIAGNOSIS — M54.2 NECK PAIN: ICD-10-CM

## 2018-01-03 RX ORDER — OXYCODONE AND ACETAMINOPHEN 5; 325 MG/1; MG/1
1-2 TABLET ORAL EVERY 6 HOURS PRN
Qty: 120 TABLET | Refills: 0 | Status: SHIPPED | OUTPATIENT
Start: 2018-01-03 | End: 2018-01-31

## 2018-01-03 NOTE — TELEPHONE ENCOUNTER
Controlled Substance Refill Request for Percocet  Problem List Complete:  Yes  Patient is followed by Abelino Flores for ongoing prescription of pain medication.  All refills should be approved by this provider, or covering partner.    Medication(s): oxyCODONE-acetaminophen (PERCOCET) 5-325 MG per tablet    .   Maximum quantity per month: 120  Clinic visit frequency required: Q 4 months last office visit: 12/5/17    Controlled substance agreement on file: YES    Pain Clinic evaluation in the past: Yes       Date/Location:   [facility] [address] 2013  DIRE Total Score(s):  No flowsheet data found.    Last [facility] website verification:  10.6.17   [email or web address]     checked in past 6 months?  Yes 10/6/17     Last picked up: 12/5/17    Katlyn Mckenna, Pharmacy [facility]

## 2018-01-21 DIAGNOSIS — R10.11 ABDOMINAL PAIN, RIGHT UPPER QUADRANT: ICD-10-CM

## 2018-01-21 NOTE — TELEPHONE ENCOUNTER
"Requested Prescriptions   Pending Prescriptions Disp Refills     omeprazole (PRILOSEC) 40 MG capsule [Pharmacy Med Name: OMEPRAZOLE 40MG CPDR]  Last Written Prescription Date:  1/10/2017  Last Fill Quantity: 90 capsule,  # refills: 3   Last Office Visit with G, P or [facility] prescribing provider:  2/5/2017    90 capsule 3     Sig: TAKE ONE CAPSULE BY MOUTH ONCE DAILY    PPI Protocol Passed    1/21/2018 12:42 PM       Passed - Not on Clopidogrel (unless Pantoprazole ordered)       Passed - No diagnosis of osteoporosis on record       Passed - Recent or future visit with authorizing provider's specialty    Patient had office visit in the last year or has a visit in the next 30 days with authorizing provider.  See \"Patient Info\" tab in inbasket, or \"Choose Columns\" in Meds & Orders section of the refill encounter.            Passed - Patient is age 18 or older       Passed - No active pregnacy on record       Passed - No positive pregnancy test in past 12 months          "

## 2018-01-24 RX ORDER — OMEPRAZOLE 40 MG/1
CAPSULE, DELAYED RELEASE ORAL
Qty: 90 CAPSULE | Refills: 3 | Status: SHIPPED | OUTPATIENT
Start: 2018-01-24 | End: 2019-02-13

## 2018-01-24 NOTE — TELEPHONE ENCOUNTER
Prescription approved per [facility] Refill Protocol.  Cinthya Aguirre RN, BSN

## 2018-01-31 ENCOUNTER — OFFICE VISIT (OUTPATIENT)
Dept: FAMILY MEDICINE | Facility: CLINIC | Age: 63
End: 2018-01-31
Payer: COMMERCIAL

## 2018-01-31 VITALS
WEIGHT: 157.2 LBS | BODY MASS INDEX: 25.26 KG/M2 | TEMPERATURE: 97.7 F | OXYGEN SATURATION: 97 % | SYSTOLIC BLOOD PRESSURE: 149 MMHG | RESPIRATION RATE: 16 BRPM | HEART RATE: 62 BPM | HEIGHT: 66 IN | DIASTOLIC BLOOD PRESSURE: 81 MMHG

## 2018-01-31 DIAGNOSIS — M54.2 NECK PAIN: ICD-10-CM

## 2018-01-31 DIAGNOSIS — Z98.1 S/P CERVICAL SPINAL FUSION: ICD-10-CM

## 2018-01-31 DIAGNOSIS — J01.00 ACUTE NON-RECURRENT MAXILLARY SINUSITIS: Primary | ICD-10-CM

## 2018-01-31 DIAGNOSIS — F51.01 PRIMARY INSOMNIA: ICD-10-CM

## 2018-01-31 PROCEDURE — 99213 OFFICE O/P EST LOW 20 MIN: CPT | Performed by: FAMILY MEDICINE

## 2018-01-31 RX ORDER — OXYCODONE AND ACETAMINOPHEN 5; 325 MG/1; MG/1
1-2 TABLET ORAL EVERY 6 HOURS PRN
Qty: 120 TABLET | Refills: 0 | Status: SHIPPED | OUTPATIENT
Start: 2018-01-31 | End: 2018-03-02

## 2018-01-31 RX ORDER — AZITHROMYCIN 250 MG/1
TABLET, FILM COATED ORAL
Qty: 6 TABLET | Refills: 0 | Status: SHIPPED | OUTPATIENT
Start: 2018-01-31 | End: 2018-02-09

## 2018-01-31 NOTE — NURSING NOTE
"Chief Complaint   Patient presents with     Sinus Problem       Initial /81 (BP Location: Right arm, Patient Position: Chair, Cuff Size: Adult Regular)  Pulse 62  Temp 97.7  F (36.5  C) (Oral)  Resp 16  Ht 5' 6\" (1.676 m)  Wt 157 lb 3.2 oz (71.3 kg)  SpO2 97%  BMI 25.37 kg/m2 Estimated body mass index is 25.37 kg/(m^2) as calculated from the following:    Height as of this encounter: 5' 6\" (1.676 m).    Weight as of this encounter: 157 lb 3.2 oz (71.3 kg).  Medication Reconciliation: complete   "

## 2018-01-31 NOTE — PROGRESS NOTES
SUBJECTIVE:   Annalise Wallace is a 62 year old female who presents to clinic today for the following health issues:      Acute Illness   Acute illness concerns: sinus   Onset:  1 1/2 month    Fever: no    Chills/Sweats: YES    Headache (location?): YES    Sinus Pressure:YES    Conjunctivitis:  YES: bilateral    Ear Pain: YES: right    Rhinorrhea: YES    Congestion: YES    Sore Throat: no      Cough: non    Wheeze: no     Decreased Appetite: YES    Nausea: no    Vomiting: no    Diarrhea:  no    Dysuria/Freq.: no    Fatigue/Achiness: no     Sick/Strep Exposure: no      Therapies Tried and outcome:   Patient complains of congestion with sore throat, nasal congestion and sinus pain. Some mucopurulant discharge but no upper dental pain and no sustained fever. Duration less than five dasy.  Has history of airborn allergy or asthma.   No chest pain, diaphoresis, or sob.  moderatelyproductive cough.  TMs dull not *red, sinus tenderness none   lungs clear, s1s2,soft abd,no distress, cyanosis or stridor.  A:URI with congestion, nearly a year off smoking !!!  P:fluids, antipyretics,rest and .meds as directed.  Recheck PRN worsening or non-improvement over 5 days.  Discussed signs of systemic or constutional illness.    Follow up chronic neck pain:  Current Outpatient Prescriptions   Medication     azithromycin (ZITHROMAX) 250 MG tablet     tiZANidine (ZANAFLEX) 4 MG tablet     oxyCODONE-acetaminophen (PERCOCET) 5-325 MG per tablet     omeprazole (PRILOSEC) 40 MG capsule     zolpidem (AMBIEN) 5 MG tablet     traZODone (DESYREL) 100 MG tablet     citalopram (CELEXA) 20 MG tablet     No current facility-administered medications for this visit.        (J01.00) Acute non-recurrent maxillary sinusitis  (primary encounter diagnosis)  Comment:   Plan: azithromycin (ZITHROMAX) 250 MG tablet          Primary insomnia: zolpidem prn if trazodone fails

## 2018-01-31 NOTE — MR AVS SNAPSHOT
"              After Visit Summary   2018    Annalise Wallace    MRN: [id number]           Patient Information     Date Of Birth          1955        Visit Information        Provider Department      2018 2:30 PM Abelino Flores MD [facility]        Today's Diagnoses     Acute non-recurrent maxillary sinusitis    -  1       Follow-ups after your visit        Who to contact     If you have questions or need follow up information about today's clinic visit or your schedule please contact [facility] directly at [phone].  Normal or non-critical lab and imaging results will be communicated to you by Philohart, letter or phone within 4 business days after the clinic has received the results. If you do not hear from us within 7 days, please contact the clinic through Philohart or phone. If you have a critical or abnormal lab result, we will notify you by phone as soon as possible.  Submit refill requests through Socialmoth or call your pharmacy and they will forward the refill request to us. Please allow 3 business days for your refill to be completed.          Additional Information About Your Visit        MyChart Information     Socialmoth lets you send messages to your doctor, view your test results, renew your prescriptions, schedule appointments and more. To sign up, go to [email or web address] . Click on \"Log in\" on the left side of the screen, which will take you to the Welcome page. Then click on \"Sign up Now\" on the right side of the page.     You will be asked to enter the access code listed below, as well as some personal information. Please follow the directions to create your username and password.     Your access code is: [email or web address]  Expires: 2018  2:49 PM     Your access code will  in 90 days. If you need help or a new code, please call your [facility] or [phone].        Care EveryWhere ID     This is your Care EveryWhere ID. This " "could be used by other organizations to access your [facility] [place] medical records  [id number]        Your Vitals Were     Pulse Temperature Respirations Height Pulse Oximetry BMI (Body Mass Index)    62 97.7  F (36.5  C) (Oral) 16 5' 6\" (1.676 m) 97% 25.37 kg/m2       Blood Pressure from Last 3 Encounters:   01/31/18 149/81   12/05/17 165/88   11/06/17 138/73    Weight from Last 3 Encounters:   01/31/18 157 lb 3.2 oz (71.3 kg)   12/05/17 157 lb 8 oz (71.4 kg)   11/06/17 155 lb 12.8 oz (70.7 kg)              Today, you had the following     No orders found for display         Today's Medication Changes          These changes are accurate as of 1/31/18  2:49 PM.  If you have any questions, ask your nurse or doctor.               Start taking these medicines.        Dose/Directions    azithromycin 250 MG tablet   Commonly known as:  ZITHROMAX   Used for:  Acute non-recurrent maxillary sinusitis   Started by:  Abelino Flores MD        Two tablets first day, then one tablet daily for four days.   Quantity:  6 tablet   Refills:  0            Where to get your medicines      These medications were sent to [facility] [address] [address]     Phone:  [phone]     azithromycin 250 MG tablet                Primary Care Provider Office Phone # Fax #    Abelino Flores [phone] [address] [facility] [address]        Equal Access to Services     SACHA ELY : Hadii aad ku hadasho Somartha, waaxda luqadaha, qaybta kaalmada kiarra, joel idiin hayaan adeeg kharash la'aan . So [facility] [phone].    ATENCIÓN: Si habla español, tiene a esteban disposición servicios gratuitos de asistencia lingüística. Llame al [phone].    We comply with applicable federal civil rights laws and [place] laws. We do not discriminate on the basis of race, color, national origin, age, disability, sex, sexual orientation, or " gender identity.            Thank you!     Thank you for choosing [facility]  for your care. Our goal is always to provide you with excellent care. Hearing back from our patients is one way we can continue to improve our services. Please take a few minutes to complete the written survey that you may receive in the mail after your visit with us. Thank you!             Your Updated Medication List - Protect others around you: Learn how to safely use, store and throw away your medicines at www.disposemymeds.org.          This list is accurate as of 1/31/18  2:49 PM.  Always use your most recent med list.                   Brand Name Dispense Instructions for use Diagnosis    azithromycin 250 MG tablet    ZITHROMAX    6 tablet    Two tablets first day, then one tablet daily for four days.    Acute non-recurrent maxillary sinusitis       citalopram 20 MG tablet    celeXA    90 tablet    Take 1 tablet (20 mg) by mouth daily    Symptomatic menopausal or female climacteric states       omeprazole 40 MG capsule    priLOSEC    90 capsule    TAKE ONE CAPSULE BY MOUTH ONCE DAILY    Abdominal pain, right upper quadrant       oxyCODONE-acetaminophen 5-325 MG per tablet    PERCOCET    120 tablet    Take 1-2 tablets by mouth every 6 hours as needed for moderate to severe pain    Neck pain       tiZANidine 4 MG tablet    ZANAFLEX    60 tablet    Take 1-2 tablets (4-8 mg) by mouth 3 times daily as needed for muscle spasms    S/P cervical spinal fusion       traZODone 100 MG tablet    DESYREL    90 tablet    Take 1 tablet (100 mg) by mouth nightly as needed for sleep    Persistent disorder of initiating or maintaining sleep       zolpidem 5 MG tablet    AMBIEN    90 tablet    Take 1 tablet (5 mg) by mouth nightly as needed for sleep    Sleep disorder

## 2018-02-09 ENCOUNTER — OFFICE VISIT (OUTPATIENT)
Dept: FAMILY MEDICINE | Facility: CLINIC | Age: 63
End: 2018-02-09
Payer: COMMERCIAL

## 2018-02-09 VITALS
RESPIRATION RATE: 14 BRPM | DIASTOLIC BLOOD PRESSURE: 82 MMHG | TEMPERATURE: 97.4 F | WEIGHT: 157 LBS | BODY MASS INDEX: 25.34 KG/M2 | OXYGEN SATURATION: 99 % | HEART RATE: 72 BPM | SYSTOLIC BLOOD PRESSURE: 130 MMHG

## 2018-02-09 DIAGNOSIS — J01.01 ACUTE RECURRENT MAXILLARY SINUSITIS: Primary | ICD-10-CM

## 2018-02-09 PROCEDURE — 99213 OFFICE O/P EST LOW 20 MIN: CPT | Performed by: NURSE PRACTITIONER

## 2018-02-09 NOTE — PROGRESS NOTES
SUBJECTIVE:   Annalise Wallace is a 62 year old female who presents to clinic today for the following health issues:    RESPIRATORY SYMPTOMS      Duration: 2 days    Description  nasal congestion, rhinorrhea, facial pain/pressure and headache    Severity: moderate    Accompanying signs and symptoms: None    History (predisposing factors):  none    Precipitating or alleviating factors: None    Therapies tried and outcome:  Recently treated with antibiotic for same symptoms, improved while on it but then came right back    Annalise reports that she has been having sinus problems since Christmas. She saw Dr. Flores 1/31/18 and received a Z-pac. She was feeling better while on the Z-pac but in the past few days it has gotten worse again. She also mentioned that her right ear and right side of her throat has been tickling. She has been drinking plenty of fluids. She has been taking Mucinex daily. She has not had any other sinus concerns recently. She denies having a fever. Stopped smoking in 7/2017. Denies cough, shortness of breath, wheezing.       Problem list and histories reviewed & adjusted, as indicated.  Additional history: as documented    Reviewed and updated as needed this visit by clinical staff  Tobacco  Allergies  Meds  Problems  Med Hx  Surg Hx  Fam Hx  Soc Hx        Reviewed and updated as needed this visit by Provider  Allergies  Meds  Problems       ROS:  Constitutional, HEENT, cardiovascular, pulmonary and psych systems are negative, except as otherwise noted.    This document serves as a record of the services and decisions personally performed and made by Susan Haase, CNP. It was created on her behalf by Franc Chaparro, a trained medical scribe. The creation of this document is based the provider's statements to the medical scribe.  Franc Chaparro February 9, 2018 11:45 AM      OBJECTIVE:   /82 (BP Location: Right arm, Patient Position: Chair, Cuff Size: Adult Regular)  Pulse 72  Temp 97.4   F (36.3  C) (Oral)  Resp 14  Wt 71.2 kg (157 lb)  SpO2 99%  BMI 25.34 kg/m2  Body mass index is 25.34 kg/(m^2).  GENERAL: healthy, alert and no distress  EYES: Eyes grossly normal to inspection, PERRL and conjunctivae and sclerae normal  HENT: frontal sinus tender to palpation, ear canals and TM's normal, nose and mouth without ulcers or lesions  NECK: no adenopathy, no asymmetry, masses, or scars and thyroid normal to palpation  RESP: lungs clear to auscultation - no rales, rhonchi or wheezes  CV: regular rate and rhythm, normal S1 S2, no S3 or S4, no murmur, click or rub,  PSYCH: mentation appears normal, affect normal/bright    ASSESSMENT/PLAN:     Annalise was seen today for uri.    Diagnoses and all orders for this visit:    Acute recurrent maxillary sinusitis: continue on saline nasal spray as well as decongestant.  Increase fluid intake, cool mist humidifier.   -     amoxicillin-clavulanate (AUGMENTIN) 875-125 MG per tablet; Take 1 tablet by mouth 2 times daily  Follow up as needed if symptoms get worse or do not improve.   The information in this document, created by the medical scribe for me, accurately reflects the services I personally performed and the decisions made by me. I have reviewed and approved this document for accuracy.   Susan Haase, CNP Susan Haase, APRN [facility]

## 2018-02-09 NOTE — MR AVS SNAPSHOT
"              After Visit Summary   2018    Annalise Wallace    MRN: [id number]           Patient Information     Date Of Birth          1955        Visit Information        Provider Department      2018 11:30 AM Haase, Susan Rachele, APRN CNP [facility]        Today's Diagnoses     Acute recurrent maxillary sinusitis    -  1       Follow-ups after your visit        Follow-up notes from your care team     Return if symptoms worsen or fail to improve.      Who to contact     If you have questions or need follow up information about today's clinic visit or your schedule please contact [facility] directly at [phone].  Normal or non-critical lab and imaging results will be communicated to you by MyChart, letter or phone within 4 business days after the clinic has received the results. If you do not hear from us within 7 days, please contact the clinic through MyChart or phone. If you have a critical or abnormal lab result, we will notify you by phone as soon as possible.  Submit refill requests through [employer] or call your pharmacy and they will forward the refill request to us. Please allow 3 business days for your refill to be completed.          Additional Information About Your Visit        MyChart Information     [employer] lets you send messages to your doctor, view your test results, renew your prescriptions, schedule appointments and more. To sign up, go to [email or web address] . Click on \"Log in\" on the left side of the screen, which will take you to the Welcome page. Then click on \"Sign up Now\" on the right side of the page.     You will be asked to enter the access code listed below, as well as some personal information. Please follow the directions to create your username and password.     Your access code is: [email or web address]  Expires: 2018  2:49 PM     Your access code will  in 90 days. If you need help or a new code, please call your [place] " clinic or [phone].        Care EveryWhere ID     This is your Care EveryWhere ID. This could be used by other organizations to access your [place] medical records  [id number]        Your Vitals Were     Pulse Temperature Respirations Pulse Oximetry BMI (Body Mass Index)       72 97.4  F (36.3  C) (Oral) 14 99% 25.34 kg/m2        Blood Pressure from Last 3 Encounters:   02/09/18 130/82   01/31/18 149/81   12/05/17 165/88    Weight from Last 3 Encounters:   02/09/18 157 lb (71.2 kg)   01/31/18 157 lb 3.2 oz (71.3 kg)   12/05/17 157 lb 8 oz (71.4 kg)              Today, you had the following     No orders found for display         Today's Medication Changes          These changes are accurate as of 2/9/18 11:49 AM.  If you have any questions, ask your nurse or doctor.               Start taking these medicines.        Dose/Directions    amoxicillin-clavulanate 875-125 MG per tablet   Commonly known as:  AUGMENTIN   Used for:  Acute recurrent maxillary sinusitis   Started by:  Haase, Susan Rachele, APRN CNP        Dose:  1 tablet   Take 1 tablet by mouth 2 times daily   Quantity:  20 tablet   Refills:  0            Where to get your medicines      These medications were sent to [facility] [address] [address]     Phone:  [phone]     amoxicillin-clavulanate 875-125 MG per tablet                Primary Care Provider Office Phone # Fax #    Abelino Flores [phone] [address] [facility] [address]        Equal Access to Services     LE ELY : Ankit Michaud, wafriedada ludavid, qaybta kaaljoel shields. So [facility] [phone].    ATENCIÓN: Si habla español, tiene a esteban disposición servicios gratuitos de asistencia lingüística. Dave al [phone].    We comply with applicable federal civil rights laws and [place] laws. We do not  discriminate on the basis of race, color, national origin, age, disability, sex, sexual orientation, or gender identity.            Thank you!     Thank you for choosing [facility]  for your care. Our goal is always to provide you with excellent care. Hearing back from our patients is one way we can continue to improve our services. Please take a few minutes to complete the written survey that you may receive in the mail after your visit with us. Thank you!             Your Updated Medication List - Protect others around you: Learn how to safely use, store and throw away your medicines at www.disposemymeds.org.          This list is accurate as of 2/9/18 11:49 AM.  Always use your most recent med list.                   Brand Name Dispense Instructions for use Diagnosis    amoxicillin-clavulanate 875-125 MG per tablet    AUGMENTIN    20 tablet    Take 1 tablet by mouth 2 times daily    Acute recurrent maxillary sinusitis       citalopram 20 MG tablet    celeXA    90 tablet    Take 1 tablet (20 mg) by mouth daily    Symptomatic menopausal or female climacteric states       omeprazole 40 MG capsule    priLOSEC    90 capsule    TAKE ONE CAPSULE BY MOUTH ONCE DAILY    Abdominal pain, right upper quadrant       oxyCODONE-acetaminophen 5-325 MG per tablet    PERCOCET    120 tablet    Take 1-2 tablets by mouth every 6 hours as needed for moderate to severe pain    Neck pain       tiZANidine 4 MG tablet    ZANAFLEX    60 tablet    Take 1-2 tablets (4-8 mg) by mouth 3 times daily as needed for muscle spasms    S/P cervical spinal fusion       traZODone 100 MG tablet    DESYREL    90 tablet    Take 1 tablet (100 mg) by mouth nightly as needed for sleep    Persistent disorder of initiating or maintaining sleep       zolpidem 5 MG tablet    AMBIEN    90 tablet    Take 1 tablet (5 mg) by mouth nightly as needed for sleep    Sleep disorder

## 2018-03-02 DIAGNOSIS — M54.2 NECK PAIN: ICD-10-CM

## 2018-03-02 RX ORDER — OXYCODONE AND ACETAMINOPHEN 5; 325 MG/1; MG/1
1-2 TABLET ORAL EVERY 6 HOURS PRN
Qty: 120 TABLET | Refills: 0 | Status: SHIPPED | OUTPATIENT
Start: 2018-03-02 | End: 2018-03-30

## 2018-03-02 NOTE — TELEPHONE ENCOUNTER
Routing refill request to provider to review approval because:  Drug not on the [facility], [facility] or [facility] refill protocol or controlled substance    Routed to Abelino Flores MD, pt needs today    Last OV: 1/31/18  Date last filled: 1/31/18    Annalise Clifford RN, BSN  Message handled by Nurse Triage.

## 2018-03-02 NOTE — TELEPHONE ENCOUNTER
Controlled Substance Refill Request for Percocet 5-325  Problem List Complete:  Yes    Patient is followed by Abelino Flores for ongoing prescription of pain medication.  All refills should be approved by this provider, or covering partner.    Medication(s): oxyCODONE-acetaminophen (PERCOCET) 5-325 MG per tablet    .   Maximum quantity per month: 120  Clinic visit frequency required: Q 4 months     Controlled substance agreement on file: YES    Pain Clinic evaluation in the past: Yes       Date/Location:   [facility] [address]  DIRE Total Score(s):  No flowsheet data found.    Last [facility] website verification:  10.6.17   [email or web address]       checked in past 6 months?  Yes 10.6.2017     Thanks!    Niall Brady  Pharmacy Technician  [facility]  [id number] [place] [address] [phone]

## 2018-03-23 DIAGNOSIS — N95.1 SYMPTOMATIC MENOPAUSAL OR FEMALE CLIMACTERIC STATES: ICD-10-CM

## 2018-03-23 NOTE — TELEPHONE ENCOUNTER
"Requested Prescriptions   Pending Prescriptions Disp Refills     citalopram (CELEXA) 20 MG tablet [Pharmacy Med Name: CITALOPRAM 20MG TAB] 90 tablet 1    Last Written Prescription Date:  09/08/2017  Last Fill Quantity: 90 tablet,  # refills: 1   Last office visit: 2/9/2018 with prescribing provider:  02/09/2018   Future Office Visit:     Sig: TAKE ONE TABLET BY MOUTH EVERY DAY    SSRIs Protocol Passed    3/23/2018 12:19 PM       Passed - Recent (12 mo) or future (30 days) visit within the authorizing provider's specialty    Patient had office visit in the last 12 months or has a visit in the next 30 days with authorizing provider or within the authorizing provider's specialty.  See \"Patient Info\" tab in inbasket, or \"Choose Columns\" in Meds & Orders section of the refill encounter.           Passed - Patient is age 18 or older       Passed - No active pregnancy on record       Passed - No positive pregnancy test in last 12 months          Porter BROWN  "

## 2018-03-23 NOTE — TELEPHONE ENCOUNTER
"Requested Prescriptions   Pending Prescriptions Disp Refills     citalopram (CELEXA) 20 MG tablet [Pharmacy Med Name: CITALOPRAM 20MG TAB] 90 tablet 1     Sig: TAKE ONE TABLET BY MOUTH EVERY DAY    Last Written Prescription Date:  9/8/2017  Last Fill Quantity: 90 tablet,  # refills: 1   Last office visit: 2/9/2018 with prescribing provider:  Sandra   Future Office Visit:        SSRIs Protocol Passed    3/23/2018 12:19 PM  PHQ-9 SCORE 4/14/2016 12/9/2016 12/5/2017   Total Score - - -   Total Score 3 1 0     JEANNINE-7 SCORE 9/21/2015 12/9/2016 12/5/2017   Total Score - - -   Total Score 0 0 0            Passed - Recent (12 mo) or future (30 days) visit within the authorizing provider's specialty    Patient had office visit in the last 12 months or has a visit in the next 30 days with authorizing provider or within the authorizing provider's specialty.  See \"Patient Info\" tab in inbasket, or \"Choose Columns\" in Meds & Orders section of the refill encounter.           Passed - Patient is age 18 or older       Passed - No active pregnancy on record       Passed - No positive pregnancy test in last 12 months        "

## 2018-03-24 NOTE — TELEPHONE ENCOUNTER
Routing refill request to provider for review/approval because:  Drug interaction warning    Tiffanie Boswell, RN

## 2018-03-25 RX ORDER — CITALOPRAM HYDROBROMIDE 20 MG/1
TABLET ORAL
Qty: 90 TABLET | Refills: 1 | Status: SHIPPED | OUTPATIENT
Start: 2018-03-25 | End: 2018-09-17

## 2018-03-30 DIAGNOSIS — Z98.1 S/P CERVICAL SPINAL FUSION: ICD-10-CM

## 2018-03-30 NOTE — TELEPHONE ENCOUNTER
Requested Prescriptions   Pending Prescriptions Disp Refills     tiZANidine (ZANAFLEX) 4 MG tablet [Pharmacy Med Name: TIZANIDINE HCL 4MG TABS] 60 tablet 1     Sig: TAKE ONE TO TWO TABLETS BY MOUTH THREE TIMES A DAY AS NEEDED FOR MUSCLE SPASMS    There is no refill protocol information for this order        Last Written Prescription Date:  1/31/18  Last Fill Quantity: 60,  # refills: 1   Last Office Visit: 2/9/2018   Future Office Visit:

## 2018-04-02 NOTE — TELEPHONE ENCOUNTER
Routing refill request to provider to review approval because:  Drug not on the [facility], [facility] or [facility] refill protocol or controlled substance  ? Ongoing  Annalise Clifford RN, BSN  Message handled by Nurse Triage.

## 2018-04-30 DIAGNOSIS — M54.2 NECK PAIN: ICD-10-CM

## 2018-04-30 DIAGNOSIS — G89.29 OTHER CHRONIC PAIN: ICD-10-CM

## 2018-04-30 RX ORDER — OXYCODONE AND ACETAMINOPHEN 5; 325 MG/1; MG/1
1-2 TABLET ORAL EVERY 6 HOURS PRN
Qty: 120 TABLET | Refills: 0 | Status: SHIPPED | OUTPATIENT
Start: 2018-04-30 | End: 2018-05-31

## 2018-04-30 NOTE — TELEPHONE ENCOUNTER
GUZMANMP completed.  LPU 4/2/18  Dr. Flores is only prescirber    Katlyn Mckenna, Pharmacy [facility]

## 2018-04-30 NOTE — TELEPHONE ENCOUNTER
Controlled Substance Refill Request for Percocet  Problem List Complete:  Yes    Patient is followed by Abelino Flores for ongoing prescription of pain medication.  All refills should be approved by this provider, or covering partner.    Medication(s): oxyCODONE-acetaminophen (PERCOCET) 5-325 MG per tablet    .   Maximum quantity per month: 120  Clinic visit frequency required: Q 4 months Last Office Visit: 1/31/18    Controlled substance agreement on file: YES    Pain Clinic evaluation in the past: Yes       Date/Location:   [facility] [address]  DIRE Total Score(s):  No flowsheet data found.    Last [facility] website verification:  10.6.17   [email or web address]       checked in past 6 months?  Yes no     Katlyn Mckenna, Pharmacy [facility]

## 2018-05-31 DIAGNOSIS — M54.2 NECK PAIN: ICD-10-CM

## 2018-05-31 RX ORDER — OXYCODONE AND ACETAMINOPHEN 5; 325 MG/1; MG/1
1-2 TABLET ORAL EVERY 6 HOURS PRN
Qty: 120 TABLET | Refills: 0 | Status: SHIPPED | OUTPATIENT
Start: 2018-05-31 | End: 2018-06-27

## 2018-05-31 NOTE — TELEPHONE ENCOUNTER
Controlled Substance Refill Request for percocet 5/325  Problem List Complete:  Yes  Patient is followed by Abelino Flores for ongoing prescription of pain medication.  All refills should be approved by this provider, or covering partner.    Medication(s): oxyCODONE-acetaminophen (PERCOCET) 5-325 MG per tablet    .   Maximum quantity per month: 120  Clinic visit frequency required: Q 4 months     Controlled substance agreement on file: YES    Pain Clinic evaluation in the past: Yes       Date/Location:   [facility] [address]  DIRE Total Score(s):  No flowsheet data found.    Last [facility] website verification:  4/30/2018   [email or web address]     checked in past 6 months?  Yes 4-30-18     Please walk signed prescription over to pharmacy.    Thanks!  Patricia Fya CPhT  [facility]  [phone]

## 2018-06-11 DIAGNOSIS — Z98.1 S/P CERVICAL SPINAL FUSION: ICD-10-CM

## 2018-06-11 NOTE — TELEPHONE ENCOUNTER
Routing refill request to provider for review/approval because:  Drug not on the [facility] refill protocol   Cinthya Aguirre RN, BSN

## 2018-06-11 NOTE — TELEPHONE ENCOUNTER
Controlled Substance Refill Request for tiZANidine (ZANAFLEX) 4 MG tablet  Problem List Complete:  No     PROVIDER TO CONSIDER COMPLETION OF PROBLEM LIST AND OVERVIEW/CONTROLLED SUBSTANCE AGREEMENT    Last Written Prescription Date:  4/2/2018  Last Fill Quantity: 60 tablet,   # refills: 1    Last Office Visit with [facility] primary care provider: 2/9/2018Sandra      Controlled substance agreement on file: No.     Processing:  Staff will hand deliver Rx to on-site pharmacy    Last [facility] website verification:  4/30/2018   [email or web address]     checked in past 6 months?  Yes 4/30/2018

## 2018-06-21 DIAGNOSIS — G47.9 SLEEP DISORDER: ICD-10-CM

## 2018-06-21 RX ORDER — ZOLPIDEM TARTRATE 5 MG/1
5 TABLET ORAL
Qty: 90 TABLET | Refills: 1 | Status: SHIPPED | OUTPATIENT
Start: 2018-06-21 | End: 2018-12-18

## 2018-06-21 NOTE — TELEPHONE ENCOUNTER
Controlled Substance Refill Request for ambien 5  Problem List Complete:  No     PROVIDER TO CONSIDER COMPLETION OF PROBLEM LIST AND OVERVIEW/CONTROLLED SUBSTANCE AGREEMENT    Last Written Prescription Date:  12/27/17  Last Fill Quantity: 90,   # refills: 1    Last Office Visit with [facility] primary care provider: 2/9/18    Future Office visit:     Controlled substance agreement on file: Yes:  Date 3/8/17.     Processing:  Staff will hand deliver Rx to on-site pharmacy   checked in past 6 months?  Yes 4/30/18     Thanks  Patricia Fay CPhT  [facility]  [phone]

## 2018-06-21 NOTE — TELEPHONE ENCOUNTER
Controlled Substance Refill Request for zolpidem (AMBIEN) 5 MG tablet  Problem List Complete:  Yes   checked in past 6 months?  Yes yes    Other       CARDIOVASCULAR SCREENING; LDL GOAL LESS THAN 160       Health Care Home       S/P cervical spinal fusion       Primary insomnia

## 2018-06-27 DIAGNOSIS — M54.2 NECK PAIN: ICD-10-CM

## 2018-06-27 NOTE — TELEPHONE ENCOUNTER
Pt would like to  Rx Friday evening, she is leaving town 6/30 AM.    Controlled Substance Refill Request for oxyCODONE-acetaminophen (PERCOCET) 5-325 MG per tablet  Problem List Complete:  Yes    Last Written Prescription Date:  5/31/18  Last Fill Quantity: 120,   # refills: 0    Last Office Visit with [facility] primary care provider: 2/9/2018   Future Office visit:     Processing:  Staff will hand deliver Rx to on-site pharmacy   checked in past 3 months?  Yes 4/30/18     ---------------------------------------------------------------------------------------------------------------------------------      Noted:  9/23/2015       Priority:  Medium      Overview Addendum 4/30/2018  2:11 PM by Hanna Mckenna     Patient is followed by Abelino Flores for ongoing prescription of pain medication.  All refills should be approved by this provider, or covering partner.     Medication(s): oxyCODONE-acetaminophen (PERCOCET) 5-325 MG per tablet     .   Maximum quantity per month: 120  Clinic visit frequency required: Q 4 months      Controlled substance agreement on file: YES     Pain Clinic evaluation in the past: Yes       Date/Location:   [facility] [address]  DIRE Total Score(s):  No flowsheet data found.     Last [facility] website verification:  4/30/2018     David Calderon   06/27/18 1:33 PM

## 2018-06-28 RX ORDER — OXYCODONE AND ACETAMINOPHEN 5; 325 MG/1; MG/1
1-2 TABLET ORAL EVERY 6 HOURS PRN
Qty: 120 TABLET | Refills: 0 | Status: SHIPPED | OUTPATIENT
Start: 2018-06-28 | End: 2018-07-30

## 2018-07-03 ENCOUNTER — TELEPHONE (OUTPATIENT)
Dept: FAMILY MEDICINE | Facility: CLINIC | Age: 63
End: 2018-07-03

## 2018-07-03 NOTE — TELEPHONE ENCOUNTER
Panel Management Review      Patient has the following on her problem list: None      Composite cancer screening  Chart review shows that this patient is due/due soon for the following Colonoscopy  Summary:    Patient is due/failing the following:   COLONOSCOPY    Action needed:   Patient needs referral/order: colonoscopy    Type of outreach:    panel pool    Questions for provider review:    None                                                                                                                                    Kirsty Mcbride/WILIAN  [place]       Chart routed to Care Team .

## 2018-07-05 NOTE — TELEPHONE ENCOUNTER
Type of outreach:  Phone, spoke to patient.  Patient declined but will talk to Dr. Flores about it

## 2018-07-21 DIAGNOSIS — G47.00 PERSISTENT DISORDER OF INITIATING OR MAINTAINING SLEEP: ICD-10-CM

## 2018-07-21 NOTE — TELEPHONE ENCOUNTER
"Last Written Prescription Date:  9/08/17  Last Fill Quantity: 90 tablet,  # refills: 2   Last office visit: 1/31/2018 with prescribing provider:  Sandra   Future Office Visit:      Requested Prescriptions   Pending Prescriptions Disp Refills     traZODone (DESYREL) 100 MG tablet [Pharmacy Med Name: TRAZODONE HCL 100MG TABS] 90 tablet 2     Sig: TAKE ONE TABLET BY MOUTH NIGHTLY AS NEEDED FOR SLEEP    Serotonin Modulators Passed    7/21/2018 12:25 PM       Passed - Recent (12 mo) or future (30 days) visit within the authorizing provider's specialty    Patient had office visit in the last 12 months or has a visit in the next 30 days with authorizing provider or within the authorizing provider's specialty.  See \"Patient Info\" tab in inbasket, or \"Choose Columns\" in Meds & Orders section of the refill encounter.           Passed - Patient is age 18 or older       Passed - No active pregnancy on record       Passed - No positive pregnancy test in past 12 months          "

## 2018-07-23 RX ORDER — TRAZODONE HYDROCHLORIDE 100 MG/1
TABLET ORAL
Qty: 90 TABLET | Refills: 0 | Status: SHIPPED | OUTPATIENT
Start: 2018-07-23 | End: 2018-10-23

## 2018-07-23 NOTE — TELEPHONE ENCOUNTER
Prescription approved per [facility] Refill Protocol.  Annalise Clifford RN, BSN

## 2018-07-30 DIAGNOSIS — M54.2 NECK PAIN: ICD-10-CM

## 2018-07-30 RX ORDER — OXYCODONE AND ACETAMINOPHEN 5; 325 MG/1; MG/1
1-2 TABLET ORAL EVERY 6 HOURS PRN
Qty: 120 TABLET | Refills: 0 | Status: SHIPPED | OUTPATIENT
Start: 2018-07-30 | End: 2018-08-28

## 2018-07-30 NOTE — TELEPHONE ENCOUNTER
Controlled Substance Refill Request for Percocet  Problem List Complete:  Yes    Patient is followed by Abelino Flores for ongoing prescription of pain medication.  All refills should be approved by this provider, or covering partner.    Medication(s): oxyCODONE-acetaminophen (PERCOCET) 5-325 MG per tablet    .   Maximum quantity per month: 120  Clinic visit frequency required: Q 4 months Last Office Visit: 2/9/18    Controlled substance agreement on file: YES    Pain Clinic evaluation in the past: Yes       Date/Location:   [facility] [address]  DIRE Total Score(s):  No flowsheet data found.    Last [facility] website verification:  4/30/2018   [email or web address]     checked in past 3 months?  Yes 7/30/18    Katlyn Mckenna, Pharmacy [facility]

## 2018-08-28 DIAGNOSIS — M54.2 NECK PAIN: ICD-10-CM

## 2018-08-28 DIAGNOSIS — Z98.1 S/P CERVICAL SPINAL FUSION: ICD-10-CM

## 2018-08-28 DIAGNOSIS — G89.29 OTHER CHRONIC PAIN: ICD-10-CM

## 2018-08-28 RX ORDER — OXYCODONE AND ACETAMINOPHEN 5; 325 MG/1; MG/1
1-2 TABLET ORAL EVERY 6 HOURS PRN
Qty: 120 TABLET | Refills: 0 | Status: SHIPPED | OUTPATIENT
Start: 2018-08-28 | End: 2018-09-25

## 2018-08-28 NOTE — TELEPHONE ENCOUNTER
Requested Prescriptions   Pending Prescriptions Disp Refills     tiZANidine (ZANAFLEX) 4 MG tablet [Pharmacy Med Name: TIZANIDINE HCL 4MG TABS] 60 tablet 1     Sig: TAKE ONE TO TWO TABLETS BY MOUTH THREE TIMES A DAY AS NEEDED FOR MUSCLE SPASMS    There is no refill protocol information for this order        tiZANidine (ZANAFLEX) 4 MG tablet      Last Written Prescription Date:  6/11/18  Last Fill Quantity: 60 tablet,   # refills: 1  Last Office Visit: 1/31/18 (Sandra)  Future Office visit:       Routing refill request to provider for review/approval because:  Drug not on the [facility], [facility] or [facility] refill protocol or controlled substance

## 2018-08-28 NOTE — LETTER
[facility]  [address] [place], [address]  [phone]        August 28, 2018    Annalise Wallace                                                                                                                                                       [address] [place] [address]            Dear Annalise,    I need to see you in September for your medication check. Hope you've had a good summer.         Abelino Flores MD

## 2018-08-28 NOTE — TELEPHONE ENCOUNTER
Controlled Substance Refill Request for Percocet  Problem List Complete:  Yes    Patient is followed by Abelino Flores for ongoing prescription of pain medication.  All refills should be approved by this provider, or covering partner.    Medication(s): oxyCODONE-acetaminophen (PERCOCET) 5-325 MG per tablet    .   Maximum quantity per month: 120  Clinic visit frequency required: Q 4 months Last Office Visit: 2/9/18    Controlled substance agreement on file: YES    Pain Clinic evaluation in the past: Yes       Date/Location:   [facility] [address]  DIRE Total Score(s):  No flowsheet data found.    Last [facility] website verification:  4/30/2018   [email or web address]   checked in past 3 months?  Yes 4/30/18     Katlyn Mckenna, Pharmacy [facility]

## 2018-09-05 ENCOUNTER — OFFICE VISIT (OUTPATIENT)
Dept: FAMILY MEDICINE | Facility: CLINIC | Age: 63
End: 2018-09-05
Payer: COMMERCIAL

## 2018-09-05 VITALS
RESPIRATION RATE: 14 BRPM | HEART RATE: 71 BPM | WEIGHT: 155.6 LBS | SYSTOLIC BLOOD PRESSURE: 132 MMHG | BODY MASS INDEX: 25.11 KG/M2 | DIASTOLIC BLOOD PRESSURE: 84 MMHG | OXYGEN SATURATION: 99 % | TEMPERATURE: 97.9 F

## 2018-09-05 DIAGNOSIS — Z98.1 S/P CERVICAL SPINAL FUSION: ICD-10-CM

## 2018-09-05 DIAGNOSIS — Z12.11 SPECIAL SCREENING FOR MALIGNANT NEOPLASMS, COLON: Primary | ICD-10-CM

## 2018-09-05 DIAGNOSIS — G89.29 OTHER CHRONIC PAIN: ICD-10-CM

## 2018-09-05 DIAGNOSIS — Z23 NEED FOR PROPHYLACTIC VACCINATION AND INOCULATION AGAINST INFLUENZA: ICD-10-CM

## 2018-09-05 PROCEDURE — 99000 SPECIMEN HANDLING OFFICE-LAB: CPT | Performed by: FAMILY MEDICINE

## 2018-09-05 PROCEDURE — 99214 OFFICE O/P EST MOD 30 MIN: CPT | Performed by: FAMILY MEDICINE

## 2018-09-05 PROCEDURE — 80307 DRUG TEST PRSMV CHEM ANLYZR: CPT | Mod: 90 | Performed by: FAMILY MEDICINE

## 2018-09-05 PROCEDURE — 90682 RIV4 VACC RECOMBINANT DNA IM: CPT | Performed by: FAMILY MEDICINE

## 2018-09-05 NOTE — PROGRESS NOTES
SUBJECTIVE:   Annalise Wallace is a 62 year old female who presents to clinic today for the following health issues:      Medication Followup of tinzanidine    Taking Medication as prescribed: yes    Side Effects:  None    Medication Helping Symptoms:  Somewhat would like to discuss a different medication     Busy grandmother, retired from work, stays busy and walks for exercise     SUBJECTIVE:    CC: Annalise Wallace is a 62 year old female who presents for follow up chronic pain in neck and needs for flu shot     HPI: finds muscle relaxants sedate her, discussed not to overlap opioid and muscle relaxants, and the role for just stopping the relaxant. She'll try minimal dosing first     Discussed activity, diet, aerobics,  Stress management and the importance of health screen of her colon     PROBLEM LIST:                   Patient Active Problem List   Diagnosis     Carcinoma in situ of cervix uteri     Acute maxillary sinusitis     Symptomatic menopausal or female climacteric states     CARDIOVASCULAR SCREENING; LDL GOAL LESS THAN 160     Health Care Home     Depression with anxiety     Benign neoplasm of skin     Adrenal adenoma     Ganglion cyst     Other chronic pain     Inflamed seborrheic keratosis     Persistent disorder of initiating or maintaining sleep     S/P cervical spinal fusion     Primary insomnia       PAST MEDICAL HISTORY:                  Past Medical History:   Diagnosis Date     Abnormal Papanicolaou smear of cervix and cervical HPV      Degenerative disc disease      Depressive disorder, not elsewhere classified      Displacement of cervical intervertebral disc without myelopathy      Gastro-oesophageal reflux disease      H/O hysterectomy for benign disease      TOBACCO ABUSE-CONTINUOUS        PAST SURGICAL HISTORY:                  Past Surgical History:   Procedure Laterality Date     BACK SURGERY       C NONSPECIFIC PROCEDURE      Lump removed L. breast - benign     C NONSPECIFIC PROCEDURE       Cervical disk     C NONSPECIFIC PROCEDURE      C-spine - Nany     DISCECTOMY LUMBAR MINIMALLY INVASIVE ONE LEVEL  2/13/2014    Procedure: DISCECTOMY LUMBAR MINIMALLY INVASIVE ONE LEVEL;  Minimally Invasive Discectomy L2-3 Left ;  Surgeon: Juanjose Pitt MD;  Location: [facility] OR     DISCECTOMY LUMBAR MINIMALLY INVASIVE ONE LEVEL       FUSION SPINE POSTERIOR MINIMALLY INVASIVE ONE LEVEL  5/15/2014    Procedure: FUSION SPINE POSTERIOR MINIMALLY INVASIVE ONE LEVEL;  Surgeon: Juanjose Pitt MD;  Location: [facility] OR     HYSTERECTOMY VAGINAL, BILATERAL SALPINGO-OOPHERECTOMY, COMBINED       LAPAROSCOPIC HYSTERECTOMY TOTAL         CURRENT MEDICATIONS:                  Current Outpatient Prescriptions   Medication Sig Dispense Refill     citalopram (CELEXA) 20 MG tablet TAKE ONE TABLET BY MOUTH EVERY DAY 90 tablet 1     omeprazole (PRILOSEC) 40 MG capsule TAKE ONE CAPSULE BY MOUTH ONCE DAILY 90 capsule 3     oxyCODONE-acetaminophen (PERCOCET) 5-325 MG per tablet Take 1-2 tablets by mouth every 6 hours as needed for moderate to severe pain LAST REFILL SEE  tablet 0     tiZANidine (ZANAFLEX) 4 MG tablet TAKE ONE TO TWO TABLETS BY MOUTH THREE TIMES A DAY AS NEEDED FOR MUSCLE SPASMS 60 tablet 1     traZODone (DESYREL) 100 MG tablet TAKE ONE TABLET BY MOUTH NIGHTLY AS NEEDED FOR SLEEP 90 tablet 0     zolpidem (AMBIEN) 5 MG tablet Take 1 tablet (5 mg) by mouth nightly as needed for sleep 90 tablet 1             FAMILY HISTORY:                   Family History   Problem Relation Age of Onset     Cancer Mother      Cancer Father      throat     Diabetes Paternal Grandmother        HEALTH MAINTENANCE:                    REVIEW OF OUTSIDE RECORDS: NO    REVIEW OF SYSTEMS:  CONSTITUTIONAL: NEGATIVE for fever, chills  EYES: NEGATIVE for vision changes   RESP: NEGATIVE for significant cough or SOB  CV: NEGATIVE for chest pain, palpitations   GI: NEGATIVE for nausea, abdominal pain, heartburn, or change in bowel habits  :  NEGATIVE for frequency, dysuria, or hematuria  MUSCULOSKELETAL: NEGATIVE for significant arthralgias or myalgia  NEURO: NEGATIVE for weakness, dizziness or paresthesias or headache  NVS:no headache or balance issus  INTEG:no moles or new rashes  LYMPH:no nodes or night sweats    EXAM:    /84 (BP Location: Left arm, Patient Position: Sitting, Cuff Size: Adult Regular)  Pulse 71  Temp 97.9  F (36.6  C) (Oral)  Resp 14  Wt 155 lb 9.6 oz (70.6 kg)  SpO2 99%  Breastfeeding? No  BMI 25.11 kg/m2  GENERAL APPEARANCE: healthy, alert and no distress   EXAM:  GENERAL APPEARANCE: healthy, alert and no distress  EYES: EOMI,  PERRL  HENT: ear canals and TM's normal and nose and mouth without ulcers or lesions  RESP: lungs clear to auscultation - no rales, rhonchi or wheezes  CV: regular rates and rhythm, normal S1 S2, no S3 or S4 and no murmur, click or rub -  ABDOMEN:  soft, nontender, no HSM or masses and bowel sounds normal  BACK:no tenderness or pain on straight let raise           ASSESSMENT/PLAN  1. Special screening for malignant neoplasms, colon    2. Other chronic pain    3. Need for prophylactic vaccination and inoculation against influenza      She needs follow up of past colon polyps: request colon screen     Review urine tox,   CSA     She's been very stable                                I have discussed with patient the risks, benefits, medications, treatment options and modalities.   I have instructed the patient to call or schedule a follow-up appointment if any problems or failure to improve.

## 2018-09-05 NOTE — MR AVS SNAPSHOT
After Visit Summary   9/5/2018    Annalise Wallace    MRN: [id number]           Patient Information     Date Of Birth          1955        Visit Information        Provider Department      9/5/2018 1:30 PM Abelino Flores MD [facility]        Today's Diagnoses     Special screening for malignant neoplasms, colon    -  1       Follow-ups after your visit        Additional Services     GASTROENTEROLOGY ADULT REF PROCEDURE ONLY Jhonatan Denson [phone]; [facility]       Last Lab Result: Creatinine (mg/dL)       Date                     Value                 07/07/2017               0.96             ----------  Body mass index is 25.11 kg/(m^2).      Patient will be contacted to schedule procedure.     Please be aware that coverage of these services is subject to the terms and limitations of your health insurance plan.  Call member services at your health plan with any benefit or coverage questions.  Any procedures must be performed at a [place] facility OR coordinated by your clinic's referral office.    Please bring the following with you to your appointment:    (1) Any X-Rays, CTs or MRIs which have been performed.  Contact the facility where they were done to arrange for  prior to your scheduled appointment.    (2) List of current medications   (3) This referral request   (4) Any documents/labs given to you for this referral                  Who to contact     If you have questions or need follow up information about today's clinic visit or your schedule please contact [facility] directly at [phone].  Normal or non-critical lab and imaging results will be communicated to you by MyChart, letter or phone within 4 business days after the clinic has received the results. If you do not hear from us within 7 days, please contact the clinic through MyChart or phone. If you have a critical or abnormal lab result, we will notify you by phone  "as soon as possible.  Submit refill requests through [employer] or call your pharmacy and they will forward the refill request to us. Please allow 3 business days for your refill to be completed.          Additional Information About Your Visit        [employer] Information     [employer] lets you send messages to your doctor, view your test results, renew your prescriptions, schedule appointments and more. To sign up, go to www.[email or web address].[employer] . Click on \"Log in\" on the left side of the screen, which will take you to the Welcome page. Then click on \"Sign up Now\" on the right side of the page.     You will be asked to enter the access code listed below, as well as some personal information. Please follow the directions to create your username and password.     Your access code is: [email or web address]  Expires: 2018  1:16 PM     Your access code will  in 90 days. If you need help or a new code, please call your [place] clinic or [phone].        Care EveryWhere ID     This is your Care EveryWhere ID. This could be used by other organizations to access your [place] medical records  [id number]        Your Vitals Were     Pulse Temperature Respirations Pulse Oximetry Breastfeeding? BMI (Body Mass Index)    71 97.9  F (36.6  C) (Oral) 14 99% No 25.11 kg/m2       Blood Pressure from Last 3 Encounters:   18 132/84   18 130/82   18 149/81    Weight from Last 3 Encounters:   18 155 lb 9.6 oz (70.6 kg)   18 157 lb (71.2 kg)   18 157 lb 3.2 oz (71.3 kg)              We Performed the Following     GASTROENTEROLOGY ADULT REF PROCEDURE ONLY Jhonatan Denson [phone]; [facility]          Today's Medication Changes          These changes are accurate as of 18  2:07 PM.  If you have any questions, ask your nurse or doctor.               Stop taking these medicines if you haven't already. Please contact your care team if you have questions.     amoxicillin-clavulanate 875-125 MG " per tablet   Commonly known as:  AUGMENTIN   Stopped by:  Abelino Flores MD                    Primary Care Provider Office Phone # Fax #    Abelino Flores [phone] [address] [facility] [address]        Equal Access to Services     [facility]: Hadii aad ku hadasho Soomaali, waaxda luqadaha, qaybta kaalmada adeegyada, waxay idiin hayaan adeeg kharash la'lucasn . So [facility] [phone].    ATENCIÓN: Si habla español, tiene a esteban disposición servicios gratuitos de asistencia lingüística. Llame al [phone].    We comply with applicable federal civil rights laws and [place] laws. We do not discriminate on the basis of race, color, national origin, age, disability, sex, sexual orientation, or gender identity.            Thank you!     Thank you for choosing [facility]  for your care. Our goal is always to provide you with excellent care. Hearing back from our patients is one way we can continue to improve our services. Please take a few minutes to complete the written survey that you may receive in the mail after your visit with us. Thank you!             Your Updated Medication List - Protect others around you: Learn how to safely use, store and throw away your medicines at www.disposemymeds.org.          This list is accurate as of 9/5/18  2:07 PM.  Always use your most recent med list.                   Brand Name Dispense Instructions for use Diagnosis    citalopram 20 MG tablet    celeXA    90 tablet    TAKE ONE TABLET BY MOUTH EVERY DAY    Symptomatic menopausal or female climacteric states       omeprazole 40 MG capsule    priLOSEC    90 capsule    TAKE ONE CAPSULE BY MOUTH ONCE DAILY    Abdominal pain, right upper quadrant       oxyCODONE-acetaminophen 5-325 MG per tablet    PERCOCET    120 tablet    Take 1-2 tablets by mouth every 6 hours as needed for moderate to severe pain LAST REFILL SEE MD    Neck pain       tiZANidine 4 MG tablet     ZANAFLEX    60 tablet    TAKE ONE TO TWO TABLETS BY MOUTH THREE TIMES A DAY AS NEEDED FOR MUSCLE SPASMS    S/P cervical spinal fusion       traZODone 100 MG tablet    DESYREL    90 tablet    TAKE ONE TABLET BY MOUTH NIGHTLY AS NEEDED FOR SLEEP    Persistent disorder of initiating or maintaining sleep       zolpidem 5 MG tablet    AMBIEN    90 tablet    Take 1 tablet (5 mg) by mouth nightly as needed for sleep    Sleep disorder

## 2018-09-05 NOTE — PROGRESS NOTES

## 2018-09-08 LAB — COMPREHEN DRUG ANALYSIS UR: NORMAL

## 2018-09-17 DIAGNOSIS — N95.1 SYMPTOMATIC MENOPAUSAL OR FEMALE CLIMACTERIC STATES: ICD-10-CM

## 2018-09-17 NOTE — TELEPHONE ENCOUNTER
"Requested Prescriptions   Pending Prescriptions Disp Refills     citalopram (CELEXA) 20 MG tablet [Pharmacy Med Name: CITALOPRAM HYDROBROMIDE 20MG TABS]  Last Written Prescription Date:  3/25/2018  Last Fill Quantity: 90 tablet,  # refills: 1   Last office visit: 9/5/2018 with prescribing provider:  Sandra      90 tablet 1     Sig: TAKE ONE TABLET BY MOUTH EVERY DAY    SSRIs Protocol Passed    9/17/2018 11:08 AM       Passed - Recent (12 mo) or future (30 days) visit within the authorizing provider's specialty    Patient had office visit in the last 12 months or has a visit in the next 30 days with authorizing provider or within the authorizing provider's specialty.  See \"Patient Info\" tab in inbasket, or \"Choose Columns\" in Meds & Orders section of the refill encounter.           Passed - Patient is age 18 or older       Passed - No active pregnancy on record       Passed - No positive pregnancy test in last 12 months          "

## 2018-09-18 RX ORDER — CITALOPRAM HYDROBROMIDE 20 MG/1
TABLET ORAL
Qty: 90 TABLET | Refills: 1 | Status: SHIPPED | OUTPATIENT
Start: 2018-09-18 | End: 2019-03-12

## 2018-09-18 NOTE — TELEPHONE ENCOUNTER
Routing refill request to provider to review approval because:  OK TO refill for diagnosis, no associated diagnosis in past 12 months  Annalise Clifford RN, BSN  Message handled by Nurse Triage.

## 2018-09-25 DIAGNOSIS — G89.29 OTHER CHRONIC PAIN: ICD-10-CM

## 2018-09-25 DIAGNOSIS — M54.2 NECK PAIN: ICD-10-CM

## 2018-09-25 NOTE — TELEPHONE ENCOUNTER
Controlled Substance Refill Request for Percocet  Problem List Complete:  Yes    Patient is followed by Abelino Flores for ongoing prescription of pain medication.  All refills should be approved by this provider, or covering partner.    Medication(s): oxyCODONE-acetaminophen (PERCOCET) 5-325 MG per tablet    .   Maximum quantity per month: 120  Clinic visit frequency required: Q 3 months Last Office Visit: 9/5/18    Controlled substance agreement on file: YES    Pain Clinic evaluation in the past: Yes       Date/Location:   [facility] [address]  DIRE Total Score(s):  No flowsheet data found.    Last [facility] website verification:  4/30/2018   [email or web address]   checked in past 3 months?  Yes 9/25/18     [facility] ran today;  Looks good; dates are good; Dr. Flores is only controlled prescriber (besides one [facility] in 5/31/18 )    Katlyn Mckenna, Pharmacy [facility]

## 2018-09-26 RX ORDER — OXYCODONE AND ACETAMINOPHEN 5; 325 MG/1; MG/1
1-2 TABLET ORAL EVERY 6 HOURS PRN
Qty: 120 TABLET | Refills: 0 | Status: SHIPPED | OUTPATIENT
Start: 2018-09-26 | End: 2018-10-23

## 2018-10-23 ENCOUNTER — MYC REFILL (OUTPATIENT)
Dept: FAMILY MEDICINE | Facility: CLINIC | Age: 63
End: 2018-10-23

## 2018-10-23 DIAGNOSIS — G47.00 PERSISTENT DISORDER OF INITIATING OR MAINTAINING SLEEP: ICD-10-CM

## 2018-10-23 DIAGNOSIS — M54.2 NECK PAIN: ICD-10-CM

## 2018-10-24 RX ORDER — TRAZODONE HYDROCHLORIDE 100 MG/1
100 TABLET ORAL AT BEDTIME
Qty: 90 TABLET | Refills: 1 | Status: SHIPPED | OUTPATIENT
Start: 2018-10-24 | End: 2019-04-15

## 2018-10-24 NOTE — TELEPHONE ENCOUNTER
Message from [email or web address]:  Original authorizing provider: Abelino Flores MD    Annalise Wallace would like a refill of the following medications:  traZODone (DESYREL) 100 MG tablet [Abelino Flores MD]  oxyCODONE-acetaminophen (PERCOCET) 5-325 MG per tablet [Abelino Flores MD]    Preferred pharmacy: [place] [address]    Comment:

## 2018-10-24 NOTE — TELEPHONE ENCOUNTER
[facility] inform pt when final, routed to MP as PAVAN TRACEY MD OOO    Routing refill request to provider to review approval because:  Drug not on the [facility], [facility] or  Health refill protocol or controlled substance    Last OV: 9/5/18  Date last filled: 9/26/18 #120    Controlled Substance Refill Request for Norco  Problem List Complete:  Yes    edication(s): oxyCODONE-acetaminophen (PERCOCET) 5-325 MG per tablet    .   Maximum quantity per month: 120  Clinic visit frequency required: Q 3 months     Controlled substance agreement on file: YES    Pain Clinic evaluation in the past: Yes       Date/Location:   [facility] [address]  DIRE Total Score(s):  No flowsheet data found.    Last [facility] website verification:  09/25/2018   [email or web address]  Annalise Clifford RN, BSN  Message handled by Nurse Triage.

## 2018-10-25 ENCOUNTER — MYC REFILL (OUTPATIENT)
Dept: FAMILY MEDICINE | Facility: CLINIC | Age: 63
End: 2018-10-25

## 2018-10-25 DIAGNOSIS — M54.2 NECK PAIN: ICD-10-CM

## 2018-10-25 RX ORDER — OXYCODONE AND ACETAMINOPHEN 5; 325 MG/1; MG/1
1-2 TABLET ORAL EVERY 6 HOURS PRN
Qty: 120 TABLET | Refills: 0 | Status: CANCELLED | OUTPATIENT
Start: 2018-10-25

## 2018-10-25 NOTE — TELEPHONE ENCOUNTER
Message from [email or web address]:  Original authorizing provider: Abelino Flores MD    Annalise MELARADanielle Rodrigo would like a refill of the following medications:  oxyCODONE-acetaminophen (PERCOCET) 5-325 MG per tablet [Abelino Flores MD]    Preferred pharmacy: [facility] [address]    Comment:

## 2018-10-25 NOTE — TELEPHONE ENCOUNTER
Triplicate, see other refill encounter  Annalise Clifford, RN, BSN  Message handled by Nurse Triage.

## 2018-10-26 RX ORDER — OXYCODONE AND ACETAMINOPHEN 5; 325 MG/1; MG/1
1-2 TABLET ORAL EVERY 6 HOURS PRN
Qty: 120 TABLET | Refills: 0 | Status: SHIPPED | OUTPATIENT
Start: 2018-10-26 | End: 2018-11-26

## 2018-11-05 ENCOUNTER — TELEPHONE (OUTPATIENT)
Dept: FAMILY MEDICINE | Facility: CLINIC | Age: 63
End: 2018-11-05

## 2018-11-05 NOTE — TELEPHONE ENCOUNTER
Panel Management Review      Patient has the following on her problem list:     Depression / Dysthymia review    Measure:  Needs PHQ-9 score of 4 or less during index window.  Administer PHQ-9 and if score is 5 or more, send encounter to provider for next steps.      PHQ-9 SCORE 4/14/2016 12/9/2016 12/5/2017   Total Score - - -   Total Score 3 1 0       If PHQ-9 recheck is 5 or more, route to provider for next steps.    Patient is due for:  PHQ9      Composite cancer screening  Chart review shows that this patient is due/due soon for the following Colonoscopy  Summary:    Patient is due/failing the following:   COLONOSCOPY    Action needed:   Patient needs referral/order: colonoscopy    Type of outreach:    routed to panel pool for outreach    Questions for provider review:    None                                                                                                                                    Maria G Wallace       Chart routed to Care Team .

## 2018-11-05 NOTE — TELEPHONE ENCOUNTER
Type of outreach:  Phone, spoke to patient.  Patient states she just lost her brother and wants to postpone scheduling a colonoscopy for a couple of months.    Megan Spain MA on 11/5/2018 at 12:04 PM

## 2018-11-14 DIAGNOSIS — Z98.1 S/P CERVICAL SPINAL FUSION: ICD-10-CM

## 2018-11-15 NOTE — TELEPHONE ENCOUNTER
Requested Prescriptions   Pending Prescriptions Disp Refills     tiZANidine (ZANAFLEX) 4 MG tablet [Pharmacy Med Name: TIZANIDINE HCL 4MG TABS] 60 tablet 1     Sig: TAKE ONE TO TWO TABLETS BY MOUTH THREE TIMES A DAY AS NEEDED FOR MUSCLE SPASMS    There is no refill protocol information for this order        LatiZANidine (ZANAFLEX) 4 MG tablets      Written Prescription Date: 8/28/18  Last Fill Quantity: 60 tablets,   # refills: 1  Last Office Visit: 9/5/18 Sandra  Future Office visit:       Routing refill request to provider for review/approval because:  Drug not on the [facility], P or [facility] refill protocol or controlled substance

## 2018-11-15 NOTE — TELEPHONE ENCOUNTER
Routing refill request to provider to review approval because:  Drug not on the [facility], [facility] or [facility] refill protocol or controlled substance  Annalise Clifford RN, BSN  Message handled by Nurse Triage.

## 2018-11-26 DIAGNOSIS — M54.2 NECK PAIN: ICD-10-CM

## 2018-11-26 RX ORDER — OXYCODONE AND ACETAMINOPHEN 5; 325 MG/1; MG/1
1-2 TABLET ORAL EVERY 6 HOURS PRN
Qty: 120 TABLET | Refills: 0 | Status: SHIPPED | OUTPATIENT
Start: 2018-11-26 | End: 2018-12-26

## 2018-11-26 NOTE — TELEPHONE ENCOUNTER
Controlled Substance Refill Request for Percocet  Problem List Complete:  Yes    Patient is followed by Abelino Flores for ongoing prescription of pain medication.  All refills should be approved by this provider, or covering partner.    Medication(s): oxyCODONE-acetaminophen (PERCOCET) 5-325 MG per tablet    .   Maximum quantity per month: 120  Clinic visit frequency required: Q 3 months Last Office Visit: 9/5/18    Controlled substance agreement on file: YES    Pain Clinic evaluation in the past: Yes       Date/Location:   [facility] [address]  DIRE Total Score(s):  No flowsheet data found.    Last [facility] website verification:  09/25/2018   [email or web address]   checked in past 3 months?  Yes 9/25/18     Katlyn Mckenna, Pharmacy [facility]  [phone]

## 2018-12-14 DIAGNOSIS — G47.9 SLEEP DISORDER: ICD-10-CM

## 2018-12-14 RX ORDER — ZOLPIDEM TARTRATE 5 MG/1
5 TABLET ORAL
Qty: 90 TABLET | Refills: 1 | Status: CANCELLED | OUTPATIENT
Start: 2018-12-14

## 2018-12-14 NOTE — TELEPHONE ENCOUNTER
zolpidem (AMBIEN) 5 MG tablet  Last Written Prescription Date:  6/21/18  Last Fill Quantity: 90 tablets,   # refills: 1  Last Office Visit: 9/5/18 Sandra  Future Office visit:    Next 5 appointments (look out 90 days)    Dec 18, 2018  3:45 PM CST  SHORT with Abelino Flores MD  [facility] [address] [place] [id number] [phone]           Routing refill request to provider for review/approval because:  Drug not on the [facility], [facility] or [facility] refill protocol or controlled substance

## 2018-12-18 ENCOUNTER — OFFICE VISIT (OUTPATIENT)
Dept: FAMILY MEDICINE | Facility: CLINIC | Age: 63
End: 2018-12-18
Payer: COMMERCIAL

## 2018-12-18 DIAGNOSIS — G47.9 SLEEP DISORDER: ICD-10-CM

## 2018-12-18 DIAGNOSIS — J01.01 ACUTE RECURRENT MAXILLARY SINUSITIS: Primary | ICD-10-CM

## 2018-12-18 PROCEDURE — 99213 OFFICE O/P EST LOW 20 MIN: CPT | Performed by: FAMILY MEDICINE

## 2018-12-18 RX ORDER — ZOLPIDEM TARTRATE 5 MG/1
5 TABLET ORAL
Qty: 90 TABLET | Refills: 1 | Status: SHIPPED | OUTPATIENT
Start: 2018-12-18 | End: 2021-05-06

## 2018-12-18 NOTE — PROGRESS NOTES
SUBJECTIVE:   Annalise Wallace is a 63 year old female who presents to clinic today for the following health issues:      RESPIRATORY SYMPTOMS      Duration: 3 months    Description  nasal congestion and headache    Severity: severe    Accompanying signs and symptoms: None    History (predisposing factors):  allergies    Precipitating or alleviating factors: None    Therapies tried and outcome:  Multiple otc medications     Patient complains of congestion with sore throat, nasal congestion and sinus pain. Some mucopurulant discharge but no upper dental pain and no sustained fever. Duration less than three weks.  Sinus pain and history of airborn allergy or asthma.   No chest pain, diaphoresis, or sob.  moderatelynotproductive cough.  TMs dull not *red, sinus tenderness left   lungs clear, s1s2,soft abd,no distress, cyanosis or stridor.  A:URI with maxillary sinusitis  P:fluids, antipyretics,rest and meds as directed.  Recheck PRN worsening or non-improvement over 5 days.  Discussed signs of systemic or constutional illness.    (J01.01) Acute recurrent maxillary sinusitis  (primary encounter diagnosis)  Comment:   Plan: amoxicillin-clavulanate (AUGMENTIN) 875-125 MG         tablet            (G47.9) Sleep disorder  Comment: review success, low dose effective  Plan: zolpidem (AMBIEN) 5 MG tablet                   Reviewed and updated as needed this visit by Provider

## 2018-12-19 VITALS
SYSTOLIC BLOOD PRESSURE: 138 MMHG | OXYGEN SATURATION: 97 % | DIASTOLIC BLOOD PRESSURE: 84 MMHG | HEART RATE: 83 BPM | WEIGHT: 161.1 LBS | BODY MASS INDEX: 26 KG/M2 | TEMPERATURE: 98.2 F | RESPIRATION RATE: 12 BRPM

## 2018-12-21 ENCOUNTER — TELEPHONE (OUTPATIENT)
Dept: FAMILY MEDICINE | Facility: CLINIC | Age: 63
End: 2018-12-21

## 2018-12-21 NOTE — TELEPHONE ENCOUNTER
Fax from [facility], zolpidem no longer covered in 2019, recommend Belsomra, called pt, discussed, aware of issue, has 90 day supply so we do not need to do anything now, pt will let us know next year if paying cash or wants rx change  Annalise Clifford RN, BSN  Message handled by Nurse Triage.

## 2018-12-23 ENCOUNTER — OFFICE VISIT (OUTPATIENT)
Dept: URGENT CARE | Facility: URGENT CARE | Age: 63
End: 2018-12-23
Payer: COMMERCIAL

## 2018-12-23 VITALS
SYSTOLIC BLOOD PRESSURE: 134 MMHG | OXYGEN SATURATION: 97 % | DIASTOLIC BLOOD PRESSURE: 78 MMHG | BODY MASS INDEX: 25.99 KG/M2 | WEIGHT: 161 LBS | HEART RATE: 75 BPM | TEMPERATURE: 97.8 F

## 2018-12-23 DIAGNOSIS — J01.00 ACUTE NON-RECURRENT MAXILLARY SINUSITIS: Primary | ICD-10-CM

## 2018-12-23 PROCEDURE — 99213 OFFICE O/P EST LOW 20 MIN: CPT | Performed by: FAMILY MEDICINE

## 2018-12-23 RX ORDER — PREDNISONE 20 MG/1
40 TABLET ORAL DAILY
Qty: 10 TABLET | Refills: 0 | Status: SHIPPED | OUTPATIENT
Start: 2018-12-23 | End: 2018-12-28

## 2018-12-23 NOTE — PROGRESS NOTES
SUBJECTIVE:  Chief Complaint   Patient presents with     Urgent Care     on Amoxicillin      Sinus Problem     sinus congestion, SOB, hard to swallow seen on Tuesday and getting worse over the last 2 days, sinus feels blocked      Annalise Wallace is a 63 year old female here with concerns about sinus infection.  She states onset of symptoms were 10 day(s) ago.  She has had maxillary, frontal pressure. Course of illness is worsening. Severity moderate  She was evaluated in primary care 5 days ago and diagnosed with sinusitis,  Taking augmentin and trying to apply fluticisone spray inside the nostrils to decrease swelling  The swellling in her nose has become much increased to the point that no air passes through the nose and when she tries to apply nasal medications the liquids will not enter, they immediately drain out due to nasal blockage  She is not aware of any allergens, or allergic exposures  She takes 1 zyrtec daily  Current and Associated symptoms: nasal congestion, facial pain/pressure, tooth pain, headache and nausea  Predisposing factors include HX of recurrent sinusitis and recent illness. Recent treatment has included -  Used sudafed weeks ago- but none for 2 weeks  No cough , or lung symptoms    Past Medical History:   Diagnosis Date     Abnormal Papanicolaou smear of cervix and cervical HPV      Degenerative disc disease      Depressive disorder, not elsewhere classified      Displacement of cervical intervertebral disc without myelopathy      Gastro-oesophageal reflux disease      H/O hysterectomy for benign disease      TOBACCO ABUSE-CONTINUOUS      Patient Active Problem List   Diagnosis     Carcinoma in situ of cervix uteri     Acute maxillary sinusitis     Symptomatic menopausal or female climacteric states     CARDIOVASCULAR SCREENING; LDL GOAL LESS THAN 160     Health Care Home     Depression with anxiety     Benign neoplasm of skin     Adrenal adenoma     Ganglion cyst     Other chronic pain      Inflamed seborrheic keratosis     Persistent disorder of initiating or maintaining sleep     S/P cervical spinal fusion     Primary insomnia         ALLERGIES:  Sulfa drugs      Current Outpatient Medications on File Prior to Visit:  amoxicillin-clavulanate (AUGMENTIN) 875-125 MG tablet Take 1 tablet by mouth 2 times daily for 10 days   citalopram (CELEXA) 20 MG tablet TAKE ONE TABLET BY MOUTH EVERY DAY   omeprazole (PRILOSEC) 40 MG capsule TAKE ONE CAPSULE BY MOUTH ONCE DAILY   oxyCODONE-acetaminophen (PERCOCET) 5-325 MG tablet Take 1-2 tablets by mouth every 6 hours as needed for moderate to severe pain   tiZANidine (ZANAFLEX) 4 MG tablet TAKE ONE TO TWO TABLETS BY MOUTH THREE TIMES A DAY AS NEEDED FOR MUSCLE SPASMS   traZODone (DESYREL) 100 MG tablet Take 1 tablet (100 mg) by mouth At Bedtime   zolpidem (AMBIEN) 5 MG tablet Take 1 tablet (5 mg) by mouth nightly as needed for sleep     No current facility-administered medications on file prior to visit.     Social History     Tobacco Use     Smoking status: Former Smoker     Packs/day: 1.00     Years: 30.00     Pack years: 30.00     Types: Cigarettes     Last attempt to quit: 2017     Years since quittin.5     Smokeless tobacco: Never Used   Substance Use Topics     Alcohol use: No     Alcohol/week: 0.0 oz       Family History   Problem Relation Age of Onset     Cancer Mother      Cancer Father         throat     Diabetes Paternal Grandmother        ROS:  CONSTITUTIONAL:NEGATIVE for fever, chills, change in weight  INTEGUMENTARY/SKIN: NEGATIVE for worrisome rashes, moles or lesions  EYES: NEGATIVE for vision changes or irritation  RESP:NEGATIVE for significant cough or SOB  GI: NEGATIVE for nausea, abdominal pain, heartburn, or change in bowel habits    OBJECTIVE:  /78   Pulse 75   Temp 97.8  F (36.6  C) (Oral)   Wt 73 kg (161 lb)   SpO2 97%   BMI 25.99 kg/m    GENERAL APPEARANCE: alert, moderate distress and cooperative  EYES: EOMI,   PERRL, conjunctiva clear  HENT: ear canals and TM's normal.     Pharynx erythematous with some exudate noted.  HENT: nasal turbinates erythematous, swollen- complete blockage of the nasal passages , bilateral  NECK: supple, non-tender to palpation, no adenopathy noted  RESP: lungs clear to auscultation - no rales, rhonchi or wheezes  CV: regular rates and rhythm, normal S1 S2, no murmur noted  SKIN: no suspicious lesions or rashes    ASSESSMENT:  Acute non-recurrent maxillary sinusitis     - predniSONE (DELTASONE) 20 MG tablet; Take 40 mg by mouth daily for 5 days.    To decrease the swelling of the turbinates- will take prednisone, since topical steroid will not pass the swelling  Also recommend increased dose of OTC allergy meds 2-4 claritin/ zyrtec per day until turbinates decreased  Discussed rebound swelling of the turbinates with use of afrin or prolonged decongestants    Once the turbinate swelling is reduced - she may start normal cares with nasal steroids/ rinses    She should finish her augmentin     We discussed the primary importance of home cares to promote drainage and ventilation of the sinuses to decrease symptoms of sinus pressure and to eliminate infectious drainage from the sinuses.  I encouraged the use of saline nasal spray as needed to promote cleaning of the nasal passages and to promote drainage of the sinuses.  Allergy medications and steroid nasal spray help reduce swelling within the nasal tissue and may help open drainage/ ventilation passages to the sinuses.  Expectorants are recommended rather than decongestants to help promote sinus drainage.  Antibiotics are discussed as a secondary therapy for sinus infections that are unresponsive to home measures to promote sinus drainage and ventilation.     Follow up with primary clinic if not improving

## 2018-12-26 DIAGNOSIS — M54.2 NECK PAIN: ICD-10-CM

## 2018-12-26 RX ORDER — OXYCODONE AND ACETAMINOPHEN 5; 325 MG/1; MG/1
1-2 TABLET ORAL EVERY 6 HOURS PRN
Qty: 120 TABLET | Refills: 0 | Status: SHIPPED | OUTPATIENT
Start: 2018-12-26 | End: 2019-01-23

## 2018-12-26 NOTE — TELEPHONE ENCOUNTER
Controlled Substance Refill Request for Percocet  Problem List Complete:  Yes    Patient is followed by Abelino Flores for ongoing prescription of pain medication.  All refills should be approved by this provider, or covering partner.     Medication(s): oxyCODONE-acetaminophen (PERCOCET) 5-325 MG per tablet     .   Maximum quantity per month: 120  Clinic visit frequency required: Q 3 months last office visit: 12/18/18      Controlled substance agreement on file: YES     Pain Clinic evaluation in the past: Yes       Date/Location:   [facility] [address]  DIRE Total Score(s):  No flowsheet data found.     Last [facility] website verification:  09/25/2018   [email or web address]      checked in past 3 months?  Yes 9/25/2018     Katlyn Mckenna, Pharmacy [facility]  [phone]

## 2019-01-15 NOTE — TELEPHONE ENCOUNTER
Fax from [employer] confirming below, pt has supply and not needed until March, see note below, pt will inform us when needed  Annalise Clifford RN, BSN  Message handled by Nurse Triage.

## 2019-01-22 DIAGNOSIS — M54.2 NECK PAIN: ICD-10-CM

## 2019-01-22 DIAGNOSIS — G89.29 OTHER CHRONIC PAIN: ICD-10-CM

## 2019-01-22 NOTE — TELEPHONE ENCOUNTER
Drug:percocet 5/325  Last Fill Date: 12/26/2018  Last Fill Quantity: 120  Last Office Visit: 12/23/2018    Please walk signed prescription over to pharmacy.  Thanks!  Patricia Fay CPhT  [facility]  [phone]

## 2019-01-23 RX ORDER — OXYCODONE AND ACETAMINOPHEN 5; 325 MG/1; MG/1
1-2 TABLET ORAL EVERY 6 HOURS PRN
Qty: 120 TABLET | Refills: 0 | Status: SHIPPED | OUTPATIENT
Start: 2019-01-24 | End: 2019-02-22

## 2019-01-23 NOTE — TELEPHONE ENCOUNTER
Routing refill request to provider to review approval because:  Drug not on the G, P or [facility] refill protocol or controlled substance    LAST REFILL: 12/26/18 -DATED 1/24/19      Medication(s): oxyCODONE-acetaminophen (PERCOCET) 5-325 MG per tablet    .   Maximum quantity per month: 120  Clinic visit frequency required: Q 3 months     Controlled substance agreement on file: YES    Pain Clinic evaluation in the past: Yes       Date/Location:   [facility] [address]  DIRE Total Score(s):  No flowsheet data found.    Last [facility] website verification:  1/23/19 at [facility]   [email or web address]  Annalise Clifford RN, BSN  Message handled by Nurse Triage.

## 2019-02-04 ENCOUNTER — OFFICE VISIT (OUTPATIENT)
Dept: FAMILY MEDICINE | Facility: CLINIC | Age: 64
End: 2019-02-04
Payer: COMMERCIAL

## 2019-02-04 VITALS
BODY MASS INDEX: 26.79 KG/M2 | HEART RATE: 82 BPM | WEIGHT: 166 LBS | TEMPERATURE: 98.2 F | SYSTOLIC BLOOD PRESSURE: 138 MMHG | OXYGEN SATURATION: 95 % | DIASTOLIC BLOOD PRESSURE: 88 MMHG

## 2019-02-04 DIAGNOSIS — G89.29 OTHER CHRONIC PAIN: ICD-10-CM

## 2019-02-04 DIAGNOSIS — F51.01 PRIMARY INSOMNIA: ICD-10-CM

## 2019-02-04 DIAGNOSIS — J01.01 ACUTE RECURRENT MAXILLARY SINUSITIS: Primary | ICD-10-CM

## 2019-02-04 PROCEDURE — 99214 OFFICE O/P EST MOD 30 MIN: CPT | Performed by: FAMILY MEDICINE

## 2019-02-04 RX ORDER — AZITHROMYCIN 250 MG/1
TABLET, FILM COATED ORAL
Qty: 6 TABLET | Refills: 0 | Status: SHIPPED | OUTPATIENT
Start: 2019-02-04 | End: 2019-09-16

## 2019-02-04 NOTE — PROGRESS NOTES
SUBJECTIVE:   Annalise Wallace is a 63 year old female who presents to clinic today for the following health issues:      Patient is here for a follow up to her sinus problems for the past year.  Past Medical History:   Diagnosis Date     Abnormal Papanicolaou smear of cervix and cervical HPV      Degenerative disc disease      Depressive disorder, not elsewhere classified      Displacement of cervical intervertebral disc without myelopathy      Gastro-oesophageal reflux disease      H/O hysterectomy for benign disease      TOBACCO ABUSE-CONTINUOUS        Past Surgical History:   Procedure Laterality Date     BACK SURGERY       C NONSPECIFIC PROCEDURE      Lump removed L. breast - benign     C NONSPECIFIC PROCEDURE      Cervical disk     C NONSPECIFIC PROCEDURE      C-spine - [place]     DISCECTOMY LUMBAR MINIMALLY INVASIVE ONE LEVEL  2014    Procedure: DISCECTOMY LUMBAR MINIMALLY INVASIVE ONE LEVEL;  Minimally Invasive Discectomy L2-3 Left ;  Surgeon: Juanjose Pitt MD;  Location: [facility] OR     DISCECTOMY LUMBAR MINIMALLY INVASIVE ONE LEVEL       FUSION SPINE POSTERIOR MINIMALLY INVASIVE ONE LEVEL  5/15/2014    Procedure: FUSION SPINE POSTERIOR MINIMALLY INVASIVE ONE LEVEL;  Surgeon: Juanjose Pitt MD;  Location: [facility] OR     HYSTERECTOMY VAGINAL, BILATERAL SALPINGO-OOPHERECTOMY, COMBINED       LAPAROSCOPIC HYSTERECTOMY TOTAL         Family History   Problem Relation Age of Onset     Cancer Mother      Cancer Father         throat     Diabetes Paternal Grandmother        Social History     Tobacco Use     Smoking status: Former Smoker     Packs/day: 1.00     Years: 30.00     Pack years: 30.00     Types: Cigarettes     Last attempt to quit: 2017     Years since quittin.6     Smokeless tobacco: Never Used   Substance Use Topics     Alcohol use: No     Alcohol/week: 0.0 oz     Patient complains of congestion with sore throat, nasal congestion and sinus pain. Some mucopurulant discharge but no upper  dental pain and no sustained fever. Duration less than ten days.  Has smoking history of airborn allergy or asthma.   No chest pain, diaphoresis, or sob.  Moderately productive cough.  TMs dull not *red, sinus tenderness bilateral Left >right   lungs clear, s1s2,soft abd,no distress, cyanosis or stridor.  A:URI with acute maxillary sinusitis   P:fluids, antipyretics,rest and meds as directed.  Recheck PRN worsening or non-improvement over 5 days.  Discussed signs of systemic or constutional illne  (J01.01) Acute recurrent maxillary sinusitis  (primary encounter diagnosis)  Comment:   Plan: OTOLARYNGOLOGY REFERRAL, azithromycin         (ZITHROMAX Z-DEANN) 250 MG tablet        Chronic sinusitis    (G89.29) Other chronic pain  Comment:   Plan: cervical fusion and radicular     (F51.01) Primary insomnia  Comment:   Plan:   Current Outpatient Medications   Medication     azithromycin (ZITHROMAX Z-DEANN) 250 MG tablet     citalopram (CELEXA) 20 MG tablet     omeprazole (PRILOSEC) 40 MG capsule     oxyCODONE-acetaminophen (PERCOCET) 5-325 MG tablet     tiZANidine (ZANAFLEX) 4 MG tablet     traZODone (DESYREL) 100 MG tablet     zolpidem (AMBIEN) 5 MG tablet     No current facility-administered medications for this visit.

## 2019-02-13 DIAGNOSIS — R10.11 ABDOMINAL PAIN, RIGHT UPPER QUADRANT: ICD-10-CM

## 2019-02-14 RX ORDER — OMEPRAZOLE 40 MG/1
CAPSULE, DELAYED RELEASE ORAL
Qty: 90 CAPSULE | Refills: 3 | Status: SHIPPED | OUTPATIENT
Start: 2019-02-14 | End: 2020-02-17

## 2019-02-14 NOTE — TELEPHONE ENCOUNTER
"Requested Prescriptions   Pending Prescriptions Disp Refills     omeprazole (PRILOSEC) 40 MG DR capsule [Pharmacy Med Name: OMEPRAZOLE 40MG CPDR]    Last Written Prescription Date:  1/24/18  Last Fill Quantity: 90 capsules,  # refills: 3   Last office visit: 2/4/2019 with prescribing provider:  Sandra   Future Office Visit:     90 capsule 3     Sig: TAKE ONE CAPSULE BY MOUTH ONCE DAILY    PPI Protocol Passed - 2/13/2019  3:21 PM       Passed - Not on Clopidogrel (unless Pantoprazole ordered)       Passed - No diagnosis of osteoporosis on record       Passed - Recent (12 mo) or future (30 days) visit within the authorizing provider's specialty    Patient had office visit in the last 12 months or has a visit in the next 30 days with authorizing provider or within the authorizing provider's specialty.  See \"Patient Info\" tab in inbasket, or \"Choose Columns\" in Meds & Orders section of the refill encounter.             Passed - Medication is active on med list       Passed - Patient is age 18 or older       Passed - No active pregnacy on record       Passed - No positive pregnancy test in past 12 months          "

## 2019-02-14 NOTE — TELEPHONE ENCOUNTER
Routing refill request to provider to review approval because:  No associated diagnosis in past 12 months  Annalise Clifford RN, BSN  Message handled by Nurse Triage.

## 2019-02-22 DIAGNOSIS — M54.2 NECK PAIN: ICD-10-CM

## 2019-02-22 RX ORDER — OXYCODONE AND ACETAMINOPHEN 5; 325 MG/1; MG/1
1-2 TABLET ORAL EVERY 6 HOURS PRN
Qty: 120 TABLET | Refills: 0 | Status: SHIPPED | OUTPATIENT
Start: 2019-02-22 | End: 2019-03-22

## 2019-02-22 NOTE — TELEPHONE ENCOUNTER
Discharge Summary



NAME: MARTI LEW

MRN:  [id number]        : 1990     AGE: 27Y

ADMITTED: 2017                  DISCHARGED: 2017



INDICATION FOR ADMISSION:

A 6-week intrauterine pregnancy with pyelonephritis.



HOSPITAL COURSE:

The patient is a 27-year-old female admitted at 6 weeks' gestation with

pyelonephritis.  She received Rocephin as an outpatient, but her flank

pain had actually increased despite this.  She presented with nausea and

increasing flank pain to the hospital.  During the course of her stay she

was placed on IV Rocephin.  During the course of her stay her flank pain

gradually resolved and by the time of discharge she had no flank pain and

felt well.  She remained afebrile throughout her stay.



DISCHARGE MEDICATIONS:

Keflex 500 mg 1 p.o. q.i.d.  The patient has home prescription for this

already.



DISCHARGE INSTRUCTIONS:

She was advised to push p.o. fluids.  She will follow up in 2 weeks in

clinic, sooner should problems arise.







DICTATING PHYSICIAN:  BAM BURNETTE M.D.





[id number]                  DT: 2017    2219

PHY#: [id number]            DD: 2017    2148

ID:   [id number]           JOB#: [id number]       ACCT: [id number]



cc:BAM BURNETTE M.D.

> Percocet 5/325      Last Written Prescription Date:  Last picked up 1/24 /19  Last Fill Quantity: 120 for 15 days supply,   # refills: 0  Last Office Visit: 2/04/2019  Future Office visit:       Routing refill request to provider for review/approval because:  Drug not on the [facility], P or [facility] refill protocol or controlled substance    Please walk signed prescription over to pharmacy.  Thanks!  Patricia Fay [facility]  [phone]

## 2019-02-22 NOTE — TELEPHONE ENCOUNTER
Requested Prescriptions   Pending Prescriptions Disp Refills     oxyCODONE-acetaminophen (PERCOCET) 5-325 MG tablet 120 tablet 0     Sig: Take 1-2 tablets by mouth every 6 hours as needed for moderate to severe pain    There is no refill protocol information for this order        Routing refill request to provider for review/approval because:  Drug not on the [facility] refill protocol   : 1..19  Annual Controlled substance agreement: 3.8.17  Annual drug screenin.5.18    Lilibeth Gonzalez RN

## 2019-03-12 DIAGNOSIS — N95.1 SYMPTOMATIC MENOPAUSAL OR FEMALE CLIMACTERIC STATES: ICD-10-CM

## 2019-03-15 RX ORDER — CITALOPRAM HYDROBROMIDE 20 MG/1
TABLET ORAL
Qty: 90 TABLET | Refills: 1 | Status: SHIPPED | OUTPATIENT
Start: 2019-03-15 | End: 2019-09-16

## 2019-03-21 DIAGNOSIS — M54.2 NECK PAIN: ICD-10-CM

## 2019-03-21 NOTE — TELEPHONE ENCOUNTER
Percocet 5/35      Last Written Prescription Date:  Last picked up 2/23 for 15 days  Last Fill Quantity: 120,   # refills: 0  Last Office Visit: 2/04/2019  Future Office visit:       Routing refill request to provider for review/approval because:  Drug not on the [facility], [facility] or [facility] refill protocol or controlled substance    Please walk signed prescription over to pharmacy.  Thanks!  Patricia Fay, [facility]  [phone]

## 2019-03-22 RX ORDER — OXYCODONE AND ACETAMINOPHEN 5; 325 MG/1; MG/1
1-2 TABLET ORAL EVERY 6 HOURS PRN
Qty: 60 TABLET | Refills: 0 | Status: SHIPPED | OUTPATIENT
Start: 2019-03-22 | End: 2019-04-03

## 2019-03-22 NOTE — TELEPHONE ENCOUNTER
Prescription walked to [facility], 03/22/19.    Hetal Puentes/DAVID

## 2019-03-29 ENCOUNTER — TRANSFERRED RECORDS (OUTPATIENT)
Dept: HEALTH INFORMATION MANAGEMENT | Facility: CLINIC | Age: 64
End: 2019-03-29

## 2019-04-03 ENCOUNTER — TELEPHONE (OUTPATIENT)
Dept: FAMILY MEDICINE | Facility: CLINIC | Age: 64
End: 2019-04-03

## 2019-04-03 DIAGNOSIS — J01.00 ACUTE MAXILLARY SINUSITIS: Primary | ICD-10-CM

## 2019-04-03 DIAGNOSIS — M54.2 NECK PAIN: ICD-10-CM

## 2019-04-03 RX ORDER — OXYCODONE AND ACETAMINOPHEN 5; 325 MG/1; MG/1
1-2 TABLET ORAL EVERY 6 HOURS PRN
Qty: 120 TABLET | Refills: 0 | Status: SHIPPED | OUTPATIENT
Start: 2019-04-03 | End: 2019-05-01

## 2019-04-03 NOTE — TELEPHONE ENCOUNTER
Needs an allergy referral per ENT ([phone])     Sent in at this time.     Esme Cason RN Flex

## 2019-04-03 NOTE — TELEPHONE ENCOUNTER
Routing refill request to provider for review/approval because:  Drug not on the [facility] refill protocol     Last Written Prescription Date: 3/22/19  Last Fill Quantity: 60,  # refills: 0  Last office visit: 2/4/2019 with prescribing provider:    Future Office Visit:      Patient stated she only received a half of refill on 3/22/19 and that she is leaving out of town this weekend and needs a refill before Friday 4/5/19.        checked: no concerns.    Esme Cason RN Flex

## 2019-04-15 ENCOUNTER — MYC REFILL (OUTPATIENT)
Dept: FAMILY MEDICINE | Facility: CLINIC | Age: 64
End: 2019-04-15

## 2019-04-15 DIAGNOSIS — Z98.1 S/P CERVICAL SPINAL FUSION: ICD-10-CM

## 2019-04-15 DIAGNOSIS — G47.00 PERSISTENT DISORDER OF INITIATING OR MAINTAINING SLEEP: ICD-10-CM

## 2019-04-15 NOTE — TELEPHONE ENCOUNTER
"Requested Prescriptions   Pending Prescriptions Disp Refills     traZODone (DESYREL) 100 MG tablet  Last Written Prescription Date:  10/24/2018  Last Fill Quantity: 90 tablet,  # refills: 1   Last office visit: 2/4/2019 with prescribing provider:  Sandra   Future Office Visit:     90 tablet 1     Sig: Take 1 tablet (100 mg) by mouth At Bedtime       Serotonin Modulators Passed - 4/15/2019  1:45 PM        Passed - Recent (12 mo) or future (30 days) visit within the authorizing provider's specialty     Patient had office visit in the last 12 months or has a visit in the next 30 days with authorizing provider or within the authorizing provider's specialty.  See \"Patient Info\" tab in inbasket, or \"Choose Columns\" in Meds & Orders section of the refill encounter.              Passed - Medication is active on med list        Passed - Patient is age 18 or older        Passed - No active pregnancy on record        Passed - No positive pregnancy test in past 12 months        tiZANidine (ZANAFLEX) 4 MG tablet  Last Written Prescription Date:  11/15/2018  Last Fill Quantity: 60 tablet,   # refills: 1  Last Office Visit: 2/4/2019, Sandra  Future Office visit:       Routing refill request to provider for review/approval because:  Drug not on the G, P or  Health refill protocol or controlled substance   60 tablet 1       There is no refill protocol information for this order          "

## 2019-04-17 RX ORDER — TRAZODONE HYDROCHLORIDE 100 MG/1
100 TABLET ORAL AT BEDTIME
Qty: 90 TABLET | Refills: 0 | Status: SHIPPED | OUTPATIENT
Start: 2019-04-17 | End: 2019-07-15

## 2019-04-17 NOTE — TELEPHONE ENCOUNTER
Routing refill request to provider to review approval because:  Drug not on the [facility], [facility] or [facility] refill protocol or controlled substance  Prescription approved per [facility] Refill Protocol.  Annalise Clifford RN, BSN

## 2019-05-01 ENCOUNTER — MYC REFILL (OUTPATIENT)
Dept: FAMILY MEDICINE | Facility: CLINIC | Age: 64
End: 2019-05-01

## 2019-05-01 DIAGNOSIS — M54.2 NECK PAIN: ICD-10-CM

## 2019-05-01 NOTE — TELEPHONE ENCOUNTER
Last Written Prescription Date:  4.3.19  Last Fill Quantity: 120,  # refills: 0   Last office visit: 2019 with prescribing provider:  Sandra Dobbs Office Visit:      Requested Prescriptions   Pending Prescriptions Disp Refills     oxyCODONE-acetaminophen (PERCOCET) 5-325 MG tablet 120 tablet 0     Sig: Take 1-2 tablets by mouth every 6 hours as needed for moderate to severe pain       There is no refill protocol information for this order        Routing refill request to provider for review/approval because:  Drug not on the [facility] refill protocol   : 1..19  Annual Controlled substance agreement: 3.8.17  Annual drug screenin.    Lilibeth Gonzalez RN

## 2019-05-03 RX ORDER — OXYCODONE AND ACETAMINOPHEN 5; 325 MG/1; MG/1
1-2 TABLET ORAL EVERY 6 HOURS PRN
Qty: 120 TABLET | Refills: 0 | Status: SHIPPED | OUTPATIENT
Start: 2019-05-03 | End: 2019-05-31

## 2019-05-31 ENCOUNTER — MYC REFILL (OUTPATIENT)
Dept: FAMILY MEDICINE | Facility: CLINIC | Age: 64
End: 2019-05-31

## 2019-05-31 DIAGNOSIS — M54.2 NECK PAIN: ICD-10-CM

## 2019-06-03 RX ORDER — OXYCODONE AND ACETAMINOPHEN 5; 325 MG/1; MG/1
1-2 TABLET ORAL EVERY 6 HOURS PRN
Qty: 120 TABLET | Refills: 0 | Status: SHIPPED | OUTPATIENT
Start: 2019-06-03 | End: 2019-07-01

## 2019-06-03 NOTE — TELEPHONE ENCOUNTER
Controlled Substance Refill Request for oxyCODONE-acetaminophen (PERCOCET) 5-325 MG tablet  Problem List Complete:    No     PROVIDER TO CONSIDER COMPLETION OF PROBLEM LIST AND OVERVIEW/CONTROLLED SUBSTANCE AGREEMENT    Last Written Prescription Date:  5/3/2019  Last Fill Quantity: 120 tablet,   # refills: 0    THE MOST RECENT OFFICE VISIT MUST BE WITHIN THE PAST 3 MONTHS. AT LEAST ONE FACE TO FACE VISIT MUST OCCUR EVERY 6 MONTHS. ADDITIONAL VISITS CAN BE VIRTUAL.  (THIS STATEMENT SHOULD BE DELETED.)    Last Office Visit with [facility] primary care provider: 2/4/2019Sandra    Future Office visit:     Controlled substance agreement:   Encounter-Level CSA - 03/08/2017:    Controlled Substance Agreement - Scan on 3/16/2017 11:46 AM: CONTROLLED SUBSTANCE AGREEMENT (below)       Encounter-Level CSA - 12/15/2014:    Controlled Substance Agreement - Scan on 12/16/2014  9:34 AM: [facility] Controlled Medication Agreement 12-15-14 (below)       Patient-Level CSA:    There are no patient-level csa.         Last Urine Drug Screen:   Pain Drug SCR UR W RPTD Meds   Date Value Ref Range Status   03/08/2017   Final    FINAL  (Note)  ====================================================================  TOXASSURE COMP DRUG ANALYSIS,UR  ====================================================================  Test                             Result       Flag       Units  Drug Present and Declared for Prescription Verification   Zolpidem                       PRESENT      EXPECTED   Citalopram                     PRESENT      EXPECTED   Desmethylcitalopram            PRESENT      EXPECTED    Desmethylcitalopram is an expected metabolite of citalopram or    the enantiomeric form, escitalopram.     Trazodone                      PRESENT      EXPECTED   1,3 chlorophenyl piperazine    PRESENT      EXPECTED    1,3-chlorophenyl piperazine is an expected metabolite of    trazodone.      Drug Present not Declared for Prescription Verification    Tramadol                       PRESENT      UNEXPECTED   O-Desmethyltramadol            PRESENT      UNEXPECTED   N-Desmethyltramadol            PRESENT      UNEXPECTED    Source of tramadol is a prescription medication.    O-desmethyltramadol and N-desmethyltramadol are expected    metabolites of tramadol.     Ibuprofen                      PRESENT      UNEXPECTED   Diphenhydramine                PRESENT      UNEXPECTED   Dextromethorphan               PRESENT      UNEXPECTED   Dextrorphan/Levorphanol        PRESENT      UNEXPECTED    Dextrorphan is an expected metabolite of dextromethorphan, an    over-the-counter or prescription cough suppressant. Dextrorphan    cannot be distinguished from the scheduled prescription    medication levorphanol by the method used for analysis.      Drug Absent but Declared for Prescription Verification   Oxycodone                      Not Detected UNEXPECTED ng/mg creat   Cyclobenzaprine                Not Detected UNEXPECTED   Acetaminophen                  Not Detected UNEXPECTED    Acetaminophen, as indicated in the declared medication list, is    not always detected even when used as directed.    ====================================================================  Test                      Result    Flag   Units      Ref Range   Creatinine              91               mg/dL      >=20  ====================================================================  Declared Medications:  The flagging and interpretation on this report are based on the  following declared medications.  Unexpected results may arise from  inaccuracies in the declared medications.    **Note: The testing scope of this panel includes these medications:    Citalopram (Celexa)  Cyclobenzaprine  Oxycodone (Percocet)  Trazodone (Desyrel)    **Note: The testing scope of this panel does not include small to  moderate amounts of these reported medications:    Acetaminophen (Percocet)  Zolpidem    **Note: The testing  scope of this panel does not include following  reported medications:    Omeprazole (Prilosec)  ====================================================================  For clinical consultation, please call [phone].  ====================================================================  Analysis performed by [employer], [address] [place], [place] [address]   [facility] Drug Analysis UR   Date Value Ref Range Status   09/05/2018 FINAL  Final     Comment:     (Note)  ====================================================================  COMPREHENSIVE DRUG ANALYSIS,UR  ====================================================================  Test                             Result       Flag       Units        Drug Present   Oxycodone                      1122                    ng/mg creat   Oxymorphone                    1461                    ng/mg creat   Noroxycodone                   4461                    ng/mg creat   Noroxymorphone                 449                     ng/mg creat    Sources of oxycodone are scheduled prescription medications.    Oxymorphone, noroxycodone, and noroxymorphone are expected    metabolites of oxycodone. Oxymorphone is also available as a    scheduled prescription medication.   Zolpidem Acid                  PRESENT                                Zolpidem acid is an expected metabolite of zolpidem.   Citalopram                     PRESENT                               Desmethylcitalopram            PRESENT                                Desmethylcitalopram is an expected metabolite of citalopram or    the enantiomeric form, escitalopram.   Trazodone                      PRESENT                               1,3 chlorophenyl piperazine    PRESENT                                1,3-chlorophenyl piperazine is an expected metabolite of    trazodone.   Acetaminophen                  PRESENT                               Diphenhydramine                PRESENT                               ====================================================================  Test                      Result    Flag   Units      Ref Range        Creatinine              41               mg/dL      >=20            ====================================================================  For clinical consultation, please call [phone].  ====================================================================  Analysis performed by [employer] [place] [address]     , No results found for: THC13, PCP13, COC13, MAMP13, OPI13, AMP13, BZO13, TCA13, MTD13, BAR13, OXY13, PPX13, BUP13     Processing:  Staff will hand deliver Rx to on-site pharmacy     [email or web address]       checked in past 3 months?  No, route to RN     Last [place]MP website verification:  1/23/19   [email or web address]

## 2019-06-03 NOTE — TELEPHONE ENCOUNTER
Disp Refills Start End TONNY   oxyCODONE-acetaminophen (PERCOCET) 5-325 MG tablet 120 tablet 0 5/3/2019  No   Sig - Route: Take 1-2 tablets by mouth every 6 hours as needed for moderate to severe pain - Oral   Sent to pharmacy as: oxyCODONE-acetaminophen (PERCOCET) 5-325 MG tablet   Class: E-Prescribe     Last OV  2..    Routing refill request to provider for review/approval because:  Drug not on the [facility] refill protocol   : 1..19  Annual Controlled substance agreement: 12.15.14  Annual drug screenin18    Lilibeth Gonzalez RN

## 2019-07-01 ENCOUNTER — MYC REFILL (OUTPATIENT)
Dept: FAMILY MEDICINE | Facility: CLINIC | Age: 64
End: 2019-07-01

## 2019-07-01 DIAGNOSIS — Z98.1 S/P CERVICAL SPINAL FUSION: ICD-10-CM

## 2019-07-01 DIAGNOSIS — G89.29 OTHER CHRONIC PAIN: ICD-10-CM

## 2019-07-01 DIAGNOSIS — M54.2 NECK PAIN: ICD-10-CM

## 2019-07-02 RX ORDER — OXYCODONE AND ACETAMINOPHEN 5; 325 MG/1; MG/1
1-2 TABLET ORAL EVERY 6 HOURS PRN
Qty: 120 TABLET | Refills: 0 | Status: SHIPPED | OUTPATIENT
Start: 2019-07-02 | End: 2019-07-30

## 2019-07-02 NOTE — TELEPHONE ENCOUNTER
Last Written Prescription Date:    Last Fill Quantity: ,  # refills:   Zanaflex 4.17.19 #60 R 1  Percocet 6.3.19 #120 R 0   Last office visit: 2019 with prescribing provider:  Sandra Dobbs Office Visit:      Requested Prescriptions   Pending Prescriptions Disp Refills     tiZANidine (ZANAFLEX) 4 MG tablet 60 tablet 1     Sig: Take 1 tablet (4 mg) by mouth 2 times daily as needed for muscle spasms       There is no refill protocol information for this order        oxyCODONE-acetaminophen (PERCOCET) 5-325 MG tablet 120 tablet 0     Sig: Take 1-2 tablets by mouth every 6 hours as needed for moderate to severe pain       There is no refill protocol information for this order        Routing refill request to provider for review/approval because:  Drug not on the [facility] refill protocol   : 7.2.19  Annual Controlled substance agreement: 3.8.17  Annual drug screenin.    Lilibeth Gonzalez RN

## 2019-07-15 DIAGNOSIS — G47.00 PERSISTENT DISORDER OF INITIATING OR MAINTAINING SLEEP: ICD-10-CM

## 2019-07-17 RX ORDER — TRAZODONE HYDROCHLORIDE 100 MG/1
TABLET ORAL
Qty: 90 TABLET | Refills: 1 | Status: SHIPPED | OUTPATIENT
Start: 2019-07-17 | End: 2020-01-13

## 2019-07-17 NOTE — TELEPHONE ENCOUNTER
Trazodone  Prescription approved per [facility] Refill Protocol.    Leonila Yoon, RN, BSN

## 2019-08-06 DIAGNOSIS — M54.2 NECK PAIN: ICD-10-CM

## 2019-08-06 NOTE — TELEPHONE ENCOUNTER
Patient called to schedule OV with Dr Flores, patient cannot get in to see him till September, patient will be out, or has filled medication. Please see encounter or refill from 07/31/19.     Controlled Substance Refill Request for oxyCODONE-acetaminophen (PERCOCET) 5-325 MG tablet  Problem List Complete:    No     PROVIDER TO CONSIDER COMPLETION OF PROBLEM LIST AND OVERVIEW/CONTROLLED SUBSTANCE AGREEMENT    Last Written Prescription Date:  07/31/19  Last Fill Quantity: 120,   # refills: 0    THE MOST RECENT OFFICE VISIT MUST BE WITHIN THE PAST 3 MONTHS. AT LEAST ONE FACE TO FACE VISIT MUST OCCUR EVERY 6 MONTHS. ADDITIONAL VISITS CAN BE VIRTUAL.  (THIS STATEMENT SHOULD BE DELETED.)    Last Office Visit with [facility] primary care provider: Dr Flores    Future Office visit:   Next 5 appointments (look out 90 days)    Sep 07, 2019 10:40 AM CDT  Office visit with Abelino Flores MD,  [facility] [address] [place] [address] [phone]          Controlled substance agreement:   Encounter-Level CSA - 03/08/2017:    Controlled Substance Agreement - Scan on 3/16/2017 11:46 AM: CONTROLLED SUBSTANCE AGREEMENT (below)       Encounter-Level CSA - 12/15/2014:    Controlled Substance Agreement - Scan on 12/16/2014  9:34 AM: [facility] Controlled Medication Agreement 12-15-14 (below)       Patient-Level CSA:    There are no patient-level csa.         Last Urine Drug Screen:   Pain Drug SCR UR W RPTD Meds   Date Value Ref Range Status   03/08/2017   Final    FINAL  (Note)  ====================================================================  TOXASSURE COMP DRUG ANALYSIS,UR  ====================================================================  Test                             Result       Flag       Units  Drug Present and Declared for Prescription Verification   Zolpidem                       PRESENT      EXPECTED   Citalopram                      PRESENT      EXPECTED   Desmethylcitalopram            PRESENT      EXPECTED    Desmethylcitalopram is an expected metabolite of citalopram or    the enantiomeric form, escitalopram.     Trazodone                      PRESENT      EXPECTED   1,3 chlorophenyl piperazine    PRESENT      EXPECTED    1,3-chlorophenyl piperazine is an expected metabolite of    trazodone.      Drug Present not Declared for Prescription Verification   Tramadol                       PRESENT      UNEXPECTED   O-Desmethyltramadol            PRESENT      UNEXPECTED   N-Desmethyltramadol            PRESENT      UNEXPECTED    Source of tramadol is a prescription medication.    O-desmethyltramadol and N-desmethyltramadol are expected    metabolites of tramadol.     Ibuprofen                      PRESENT      UNEXPECTED   Diphenhydramine                PRESENT      UNEXPECTED   Dextromethorphan               PRESENT      UNEXPECTED   Dextrorphan/Levorphanol        PRESENT      UNEXPECTED    Dextrorphan is an expected metabolite of dextromethorphan, an    over-the-counter or prescription cough suppressant. Dextrorphan    cannot be distinguished from the scheduled prescription    medication levorphanol by the method used for analysis.      Drug Absent but Declared for Prescription Verification   Oxycodone                      Not Detected UNEXPECTED ng/mg creat   Cyclobenzaprine                Not Detected UNEXPECTED   Acetaminophen                  Not Detected UNEXPECTED    Acetaminophen, as indicated in the declared medication list, is    not always detected even when used as directed.    ====================================================================  Test                      Result    Flag   Units      Ref Range   Creatinine              91               mg/dL      >=20  ====================================================================  Declared Medications:  The flagging and interpretation on this report are based on  the  following declared medications.  Unexpected results may arise from  inaccuracies in the declared medications.    **Note: The testing scope of this panel includes these medications:    Citalopram (Celexa)  Cyclobenzaprine  Oxycodone (Percocet)  Trazodone (Desyrel)    **Note: The testing scope of this panel does not include small to  moderate amounts of these reported medications:    Acetaminophen (Percocet)  Zolpidem    **Note: The testing scope of this panel does not include following  reported medications:    Omeprazole (Prilosec)  ====================================================================  For clinical consultation, please call [phone].  ====================================================================  Analysis performed by [employer], [address] [place] [address]        [facility] [employer] [facility] Drug Analysis UR   Date Value Ref Range Status   09/05/2018 FINAL  Final     Comment:     (Note)  ====================================================================  COMPREHENSIVE DRUG ANALYSIS,UR  ====================================================================  Test                             Result       Flag       Units        Drug Present   Oxycodone                      1122                    ng/mg creat   Oxymorphone                    1461                    ng/mg creat   Noroxycodone                   4461                    ng/mg creat   Noroxymorphone                 449                     ng/mg creat    Sources of oxycodone are scheduled prescription medications.    Oxymorphone, noroxycodone, and noroxymorphone are expected    metabolites of oxycodone. Oxymorphone is also available as a    scheduled prescription medication.   Zolpidem Acid                  PRESENT                                Zolpidem acid is an expected metabolite of zolpidem.   Citalopram                     PRESENT                               Desmethylcitalopram            PRESENT                                 Desmethylcitalopram is an expected metabolite of citalopram or    the enantiomeric form, escitalopram.   Trazodone                      PRESENT                               1,3 chlorophenyl piperazine    PRESENT                                1,3-chlorophenyl piperazine is an expected metabolite of    trazodone.   Acetaminophen                  PRESENT                               Diphenhydramine                PRESENT                              ====================================================================  Test                      Result    Flag   Units      Ref Range        Creatinine              41               mg/dL      >=20            ====================================================================  For clinical consultation, please call [phone].  ====================================================================  Analysis performed by [employer] [place] [address]     , No results found for: THC13, PCP13, COC13, MAMP13, OPI13, AMP13, BZO13, TCA13, MTD13, BAR13, OXY13, PPX13, BUP13     Processing:  Staff will hand deliver Rx to on-site pharmacy     [email or web address]       checked in past 3 months?  No, route to RN

## 2019-08-07 RX ORDER — OXYCODONE AND ACETAMINOPHEN 5; 325 MG/1; MG/1
1-2 TABLET ORAL EVERY 6 HOURS PRN
Qty: 120 TABLET | Refills: 0 | Status: SHIPPED | OUTPATIENT
Start: 2019-08-14 | End: 2019-09-07

## 2019-08-07 NOTE — TELEPHONE ENCOUNTER
RX monitoring program ([place]MP) reviewed:  reviewed- no concerns    Pt filled 15 day supply on 7/31/19    [place]MP profile:  [email or web address]

## 2019-09-07 ENCOUNTER — OFFICE VISIT (OUTPATIENT)
Dept: FAMILY MEDICINE | Facility: CLINIC | Age: 64
End: 2019-09-07
Payer: COMMERCIAL

## 2019-09-07 VITALS
SYSTOLIC BLOOD PRESSURE: 144 MMHG | HEART RATE: 82 BPM | WEIGHT: 160.2 LBS | RESPIRATION RATE: 16 BRPM | DIASTOLIC BLOOD PRESSURE: 81 MMHG | OXYGEN SATURATION: 96 % | BODY MASS INDEX: 25.86 KG/M2 | TEMPERATURE: 98 F

## 2019-09-07 DIAGNOSIS — G89.29 OTHER CHRONIC PAIN: ICD-10-CM

## 2019-09-07 DIAGNOSIS — Z98.1 HISTORY OF FUSION OF CERVICAL SPINE: ICD-10-CM

## 2019-09-07 DIAGNOSIS — K43.9 VENTRAL HERNIA WITHOUT OBSTRUCTION OR GANGRENE: ICD-10-CM

## 2019-09-07 DIAGNOSIS — R35.0 URINARY FREQUENCY: Primary | ICD-10-CM

## 2019-09-07 DIAGNOSIS — M54.2 NECK PAIN: ICD-10-CM

## 2019-09-07 DIAGNOSIS — Z98.1 HISTORY OF LUMBAR FUSION: ICD-10-CM

## 2019-09-07 LAB
ALBUMIN UR-MCNC: NEGATIVE MG/DL
APPEARANCE UR: CLEAR
BILIRUB UR QL STRIP: NEGATIVE
COLOR UR AUTO: YELLOW
GLUCOSE UR STRIP-MCNC: NEGATIVE MG/DL
HGB UR QL STRIP: ABNORMAL
HYALINE CASTS #/AREA URNS LPF: ABNORMAL /LPF
KETONES UR STRIP-MCNC: NEGATIVE MG/DL
LEUKOCYTE ESTERASE UR QL STRIP: ABNORMAL
MUCOUS THREADS #/AREA URNS LPF: PRESENT /LPF
NITRATE UR QL: NEGATIVE
NON-SQ EPI CELLS #/AREA URNS LPF: ABNORMAL /LPF
PH UR STRIP: 5.5 PH (ref 5–7)
RBC #/AREA URNS AUTO: ABNORMAL /HPF
SOURCE: ABNORMAL
SP GR UR STRIP: 1.02 (ref 1–1.03)
UROBILINOGEN UR STRIP-ACNC: 0.2 EU/DL (ref 0.2–1)
WBC #/AREA URNS AUTO: ABNORMAL /HPF

## 2019-09-07 PROCEDURE — 99215 OFFICE O/P EST HI 40 MIN: CPT | Performed by: FAMILY MEDICINE

## 2019-09-07 PROCEDURE — 87086 URINE CULTURE/COLONY COUNT: CPT | Performed by: FAMILY MEDICINE

## 2019-09-07 PROCEDURE — 81001 URINALYSIS AUTO W/SCOPE: CPT | Performed by: FAMILY MEDICINE

## 2019-09-07 ASSESSMENT — PATIENT HEALTH QUESTIONNAIRE - PHQ9
SUM OF ALL RESPONSES TO PHQ QUESTIONS 1-9: 5
10. IF YOU CHECKED OFF ANY PROBLEMS, HOW DIFFICULT HAVE THESE PROBLEMS MADE IT FOR YOU TO DO YOUR WORK, TAKE CARE OF THINGS AT HOME, OR GET ALONG WITH OTHER PEOPLE: NOT DIFFICULT AT ALL
SUM OF ALL RESPONSES TO PHQ QUESTIONS 1-9: 5

## 2019-09-07 ASSESSMENT — ANXIETY QUESTIONNAIRES
1. FEELING NERVOUS, ANXIOUS, OR ON EDGE: SEVERAL DAYS
3. WORRYING TOO MUCH ABOUT DIFFERENT THINGS: SEVERAL DAYS
GAD7 TOTAL SCORE: 3
2. NOT BEING ABLE TO STOP OR CONTROL WORRYING: SEVERAL DAYS
GAD7 TOTAL SCORE: 3
GAD7 TOTAL SCORE: 3
7. FEELING AFRAID AS IF SOMETHING AWFUL MIGHT HAPPEN: NOT AT ALL
4. TROUBLE RELAXING: NOT AT ALL
7. FEELING AFRAID AS IF SOMETHING AWFUL MIGHT HAPPEN: NOT AT ALL
6. BECOMING EASILY ANNOYED OR IRRITABLE: NOT AT ALL
5. BEING SO RESTLESS THAT IT IS HARD TO SIT STILL: NOT AT ALL

## 2019-09-07 NOTE — PROGRESS NOTES
Subjective    Allergy and sinus, back pain and right buttock (hx spinal fusion L2-3) no longer a smoker     Annalise Wallace is a 63 year old female who presents to clinic today for the following health issues:  Patient complains of congestion with sore throat, nasal congestion and sinus pain. Some mucopurulant discharge but no upper dental pain and no sustained fever. Duration less than seasonal with chronic rhinitis .  Has history of airborn allergy or asthma.   No chest pain, diaphoresis, or sob.  nonproductive cough.  TMs dull not *red, sinus tenderness none, not smoking now for two years  lungs clear, s1s2,soft abd,no distress, cyanosis or stridor.  A:URI with recurrent maxillary sinusitis   P:fluids, antipyretics,rest and meds as directed.  Recheck PRN worsening or non-improvement over 5 days.  Discussed signs of systemic or constutional illness.    HPI   Chronic/Recurring Back Pain Follow Up      Where is your back pain located? (Select all that apply) neck     How would you describe your back pain?  sharp    Where does your back pain spread? nowhere    Since your last clinic visit for back pain, how has your pain changed? always present, but gets better and worse    Does your back pain interfere with your job? YES, No, Not applicable    Since your last visit, have you tried any new treatment? No        How many servings of fruits and vegetables do you eat daily?  0-1    On average, how many sweetened beverages do you drink each day (soda, juice, sweet tea, etc)?   2    How many days per week do you miss taking your medication? 0            BP Readings from Last 3 Encounters:   09/07/19 (!) 144/81   02/04/19 138/88   12/23/18 134/78    Wt Readings from Last 3 Encounters:   09/07/19 72.7 kg (160 lb 3.2 oz)   02/04/19 75.3 kg (166 lb)   12/23/18 73 kg (161 lb)                      Reviewed and updated as needed this visit by Provider       Tender bulge on the vental abdomen almost to the xiphoid : potential high  ventral hernia  Review of Systems   ROS COMP: Constitutional, HEENT, cardiovascular, pulmonary, GI, , musculoskeletal, neuro, skin, endocrine and psych systems are negative, except as otherwise noted.      Objective    BP (!) 144/81 (BP Location: Right arm, Patient Position: Chair, Cuff Size: Adult Regular)   Pulse 82   Temp 98  F (36.7  C) (Oral)   Resp 16   Wt 72.7 kg (160 lb 3.2 oz)   SpO2 96%   BMI 25.86 kg/m    Body mass index is 25.86 kg/m .  Physical Exam   GENERAL: healthy, alert and no distress  EYES: Eyes grossly normal to inspection, PERRL and conjunctivae and sclerae normal  HENT: ear canals and TM's normal, nose and mouth without ulcers or lesions  NECK: no adenopathy, no asymmetry, masses, or scars and thyroid normal to palpation  RESP: lungs clear to auscultation - no rales, rhonchi or wheezes  BREAST: normal without masses, tenderness or nipple discharge and no palpable axillary masses or adenopathy  CV: regular rate and rhythm, normal S1 S2, no S3 or S4, no murmur, click or rub, no peripheral edema and peripheral pulses strong  ABDOMEN: soft, nontender, no hepatosplenomegaly, no masses and bowel sounds normal  MS: no gross musculoskeletal defects noted, no edema  SKIN: no suspicious lesions or rashes  NEURO: Normal strength and tone, mentation intact and speech normal  PSYCH: mentation appears normal, affect normal/bright    Diagnostic Test Results:  Labs reviewed in Epic        Assessment & Plan   Assessment      Plan  There are no diagnoses linked to this encounter.    (R35.0) Urinary frequency  (primary encounter diagnosis)  Comment:   Plan: UA reflex to Microscopic and Culture, Urine         Microscopic, Urine Culture Aerobic Bacterial        uc to rule out uti    (K43.9) Ventral hernia without obstruction or gangrene  Comment:   Plan: GENERAL SURG ADULT REFERRAL        Get surgical eval     (Z98.1) History of lumbar fusion  Comment:   Plan: MR Lumbar Spine w/o Contrast        Right  buttock and low back pain, get MRI    (Z98.1) History of fusion of cervical spine  Comment: neck is much better   Plan: MR Lumbar Spine w/o Contrast          60 minutes time spent, over 50% in counseling, multiple presentations            Abelino Flores MD  [facility]

## 2019-09-08 LAB
BACTERIA SPEC CULT: NORMAL
SPECIMEN SOURCE: NORMAL

## 2019-09-08 ASSESSMENT — ANXIETY QUESTIONNAIRES: GAD7 TOTAL SCORE: 3

## 2019-09-08 ASSESSMENT — PATIENT HEALTH QUESTIONNAIRE - PHQ9: SUM OF ALL RESPONSES TO PHQ QUESTIONS 1-9: 5

## 2019-09-09 ENCOUNTER — MYC REFILL (OUTPATIENT)
Dept: FAMILY MEDICINE | Facility: CLINIC | Age: 64
End: 2019-09-09

## 2019-09-09 DIAGNOSIS — M54.2 NECK PAIN: ICD-10-CM

## 2019-09-09 RX ORDER — OXYCODONE AND ACETAMINOPHEN 5; 325 MG/1; MG/1
TABLET ORAL
Qty: 120 TABLET | Refills: 0 | Status: SHIPPED | OUTPATIENT
Start: 2019-09-09 | End: 2019-10-08

## 2019-09-09 RX ORDER — OXYCODONE AND ACETAMINOPHEN 5; 325 MG/1; MG/1
1-2 TABLET ORAL EVERY 6 HOURS PRN
Qty: 120 TABLET | Refills: 0 | Status: CANCELLED | OUTPATIENT
Start: 2019-09-09

## 2019-09-09 NOTE — TELEPHONE ENCOUNTER
Dr. Flores -  [facility]: updated, no concerns. Last filled 8/14/19 for 15 days.  Last OV: 2/4/19 with plan to return in 3 months. No upcoming appointments scheduled.    Routing refill request to provider for review/approval because:  Drug not on the [facility] refill protocol     Zenia Somers, MSN, RN, PHN

## 2019-09-13 ENCOUNTER — HOSPITAL ENCOUNTER (OUTPATIENT)
Dept: MRI IMAGING | Facility: CLINIC | Age: 64
Discharge: HOME | End: 2019-09-13
Attending: FAMILY MEDICINE | Admitting: FAMILY MEDICINE
Payer: COMMERCIAL

## 2019-09-13 DIAGNOSIS — Z98.1 HISTORY OF LUMBAR FUSION: ICD-10-CM

## 2019-09-13 DIAGNOSIS — Z98.1 HISTORY OF FUSION OF CERVICAL SPINE: ICD-10-CM

## 2019-09-13 PROCEDURE — 25500064 ZZH RX 255 OP 636: Performed by: FAMILY MEDICINE

## 2019-09-13 PROCEDURE — 72158 MRI LUMBAR SPINE W/O & W/DYE: CPT

## 2019-09-13 PROCEDURE — A9585 GADOBUTROL INJECTION: HCPCS | Performed by: FAMILY MEDICINE

## 2019-09-13 RX ORDER — GADOBUTROL 604.72 MG/ML
7.5 INJECTION INTRAVENOUS ONCE
Status: COMPLETED | OUTPATIENT
Start: 2019-09-13 | End: 2019-09-13

## 2019-09-13 RX ADMIN — GADOBUTROL 7.5 ML: 604.72 INJECTION INTRAVENOUS at 10:09

## 2019-09-16 ENCOUNTER — OFFICE VISIT (OUTPATIENT)
Dept: SURGERY | Facility: CLINIC | Age: 64
End: 2019-09-16
Payer: COMMERCIAL

## 2019-09-16 ENCOUNTER — MYC REFILL (OUTPATIENT)
Dept: FAMILY MEDICINE | Facility: CLINIC | Age: 64
End: 2019-09-16

## 2019-09-16 VITALS
WEIGHT: 160 LBS | BODY MASS INDEX: 25.71 KG/M2 | HEART RATE: 95 BPM | RESPIRATION RATE: 16 BRPM | DIASTOLIC BLOOD PRESSURE: 88 MMHG | OXYGEN SATURATION: 98 % | SYSTOLIC BLOOD PRESSURE: 132 MMHG | HEIGHT: 66 IN

## 2019-09-16 DIAGNOSIS — N95.1 SYMPTOMATIC MENOPAUSAL OR FEMALE CLIMACTERIC STATES: ICD-10-CM

## 2019-09-16 DIAGNOSIS — R10.12 ABDOMINAL PAIN, LEFT UPPER QUADRANT: Primary | ICD-10-CM

## 2019-09-16 PROCEDURE — 99203 OFFICE O/P NEW LOW 30 MIN: CPT | Performed by: SURGERY

## 2019-09-16 ASSESSMENT — MIFFLIN-ST. JEOR: SCORE: 1287.51

## 2019-09-16 NOTE — PATIENT INSTRUCTIONS
CT ABDOMEN AND PELVIS WITH CONTRAST and with valsalva    Date: 9-19-19  Time: 3:20 pm   Location: [facility]  [address] [facility]  [address] [place] [address]  [id number]        Please check in at 3:00 pm       Preparation for CT scanning      Do not eat or drink anything TWO hours prior to your exam

## 2019-09-16 NOTE — LETTER
2019       Re: Annalise Wallace - 1955    I was asked by Dr. Abelino Flores to evaluate this patient regarding her symptoms.  Annalise is a 63 year old female who presents for evaluation of left upper quadrant pain and bulging.  The patient was seen by her primary care physician, who felt this might represent a hernia.  Patient notices a lump with exertion and she feels like she can push it back in.  She describes this variably as being either just above the rib margin or just below the ribs.  She denies any associated nausea or vomiting.  She does state that it hurts significantly.  It is been present for a long time, but has been getting worse.     Past Medical History:  has a past medical history of Abnormal Papanicolaou smear of cervix and cervical HPV, Degenerative disc disease, Depressive disorder, not elsewhere classified, Displacement of cervical intervertebral disc without myelopathy, Gastro-oesophageal reflux disease, H/O hysterectomy for benign disease, and TOBACCO ABUSE-CONTINUOUS.      ROS: The 10 point review of systems is negative other than noted in the HPI and above.     PE:    General- Well-developed, well-nourished, patient able to get up on table without difficulty.  HEENT- Normocephalic and atraumatic. Pupils equal and round.  Mucous membranes moist.  Sclera are nonicteric.  Neck- No lymphadenopathy or masses   Respirations- are regular and non labored  Abdomen is soft.  There are no obvious palpable hernias.  High in the left upper quadrant, there is a bit of tenderness near the rib margin.  With the patient upright, this feels to be right at the rib margin.  With the patient supine, she indicates that her symptoms are above the rib margin.  This is in the midclavicular line on the left.     Assessment: This is a patient with discomfort in the left upper quadrant near the rib margin.  I suspect this is related to her costochondral junction, but there is a small possibility that  this could represent a very unusual hernia.  We will obtain a CT scan of the area.  If there is no evidence of a hernia, I think it might be worth having the patient see a thoracic surgeon.     Plan:  We will obtain a CT scan and I will contact the patient with the results of the study by phone.        Nixon Dobson MD

## 2019-09-16 NOTE — PROGRESS NOTES
HPI:  I was asked by Dr. Abelino Flores to evaluate this patient regarding her symptoms.  Annalise is a 63 year old female who presents for evaluation of left upper quadrant pain and bulging.  The patient was seen by her primary care physician, who felt this might represent a hernia.  Patient notices a lump with exertion and she feels like she can push it back in.  She describes this variably as being either just above the rib margin or just below the ribs.  She denies any associated nausea or vomiting.  She does state that it hurts significantly.  It is been present for a long time, but has been getting worse.    Past Medical History:   has a past medical history of Abnormal Papanicolaou smear of cervix and cervical HPV, Degenerative disc disease, Depressive disorder, not elsewhere classified, Displacement of cervical intervertebral disc without myelopathy, Gastro-oesophageal reflux disease, H/O hysterectomy for benign disease, and TOBACCO ABUSE-CONTINUOUS.    Past Surgical History:  Past Surgical History:   Procedure Laterality Date     BACK SURGERY       C NONSPECIFIC PROCEDURE      Lump removed L. breast - benign     C NONSPECIFIC PROCEDURE      Cervical disk     C NONSPECIFIC PROCEDURE      C-spine - Nany     DISCECTOMY LUMBAR MINIMALLY INVASIVE ONE LEVEL  2/13/2014    Procedure: DISCECTOMY LUMBAR MINIMALLY INVASIVE ONE LEVEL;  Minimally Invasive Discectomy L2-3 Left ;  Surgeon: Juanjose Pitt MD;  Location: [facility]     DISCECTOMY LUMBAR MINIMALLY INVASIVE ONE LEVEL       FUSION SPINE POSTERIOR MINIMALLY INVASIVE ONE LEVEL  5/15/2014    Procedure: FUSION SPINE POSTERIOR MINIMALLY INVASIVE ONE LEVEL;  Surgeon: Juanjose Pitt MD;  Location: [facility]     HYSTERECTOMY VAGINAL, BILATERAL SALPINGO-OOPHERECTOMY, COMBINED       LAPAROSCOPIC HYSTERECTOMY TOTAL          Social History:  Social History     Socioeconomic History     Marital status: Single     Spouse name: Not on file     Number of children: Not on  file     Years of education: Not on file     Highest education level: Not on file   Occupational History     Not on file   Social Needs     Financial resource strain: Not on file     Food insecurity:     Worry: Not on file     Inability: Not on file     Transportation needs:     Medical: Not on file     Non-medical: Not on file   Tobacco Use     Smoking status: Former Smoker     Packs/day: 1.00     Years: 30.00     Pack years: 30.00     Types: Cigarettes     Last attempt to quit: 2017     Years since quittin.2     Smokeless tobacco: Never Used   Substance and Sexual Activity     Alcohol use: No     Alcohol/week: 0.0 oz     Drug use: No     Sexual activity: Never     Partners: Male   Lifestyle     Physical activity:     Days per week: Not on file     Minutes per session: Not on file     Stress: Not on file   Relationships     Social connections:     Talks on phone: Not on file     Gets together: Not on file     Attends Zoroastrianism service: Not on file     Active member of club or organization: Not on file     Attends meetings of clubs or organizations: Not on file     Relationship status: Not on file     Intimate partner violence:     Fear of current or ex partner: Not on file     Emotionally abused: Not on file     Physically abused: Not on file     Forced sexual activity: Not on file   Other Topics Concern      Service Not Asked     Blood Transfusions Not Asked     Caffeine Concern Yes     Comment: 3 diet pepsi     Occupational Exposure Not Asked     Hobby Hazards Not Asked     Sleep Concern Not Asked     Stress Concern Not Asked     Weight Concern Not Asked     Special Diet Yes     Comment: tries to eat a lot of frts and vegs, little eating out     Back Care Not Asked     Exercise Yes     Comment: Very active job, sit-ups at night     Bike Helmet Not Asked     Seat Belt Yes     Self-Exams Yes     Parent/sibling w/ CABG, MI or angioplasty before 65F 55M? No   Social History Narrative     Not on file         Family History:  Family History   Problem Relation Age of Onset     Cancer Mother      Cancer Father         throat     Diabetes Paternal Grandmother          ROS:  The 10 point review of systems is negative other than noted in the HPI and above.    PE:    General- Well-developed, well-nourished, patient able to get up on table without difficulty.  HEENT- Normocephalic and atraumatic. Pupils equal and round.  Mucous membranes moist.  Sclera are nonicteric.  Neck- No lymphadenopathy or masses   Respirations- are regular and non labored  Abdomen is soft.  There are no obvious palpable hernias.  High in the left upper quadrant, there is a bit of tenderness near the rib margin.  With the patient upright, this feels to be right at the rib margin.  With the patient supine, she indicates that her symptoms are above the rib margin.  This is in the midclavicular line on the left.    Assessment: This is a patient with discomfort in the left upper quadrant near the rib margin.  I suspect this is related to her costochondral junction, but there is a small possibility that this could represent a very unusual hernia.  We will obtain a CT scan of the area.  If there is no evidence of a hernia, I think it might be worth having the patient see a thoracic surgeon.    Plan:    We will obtain a CT scan and I will contact the patient with the results of the study by phone.      Nixon Dobson MD    Please route or send letter to:  Primary Care Provider (PCP)

## 2019-09-17 RX ORDER — CITALOPRAM HYDROBROMIDE 20 MG/1
20 TABLET ORAL DAILY
Qty: 90 TABLET | Refills: 1 | Status: SHIPPED | OUTPATIENT
Start: 2019-09-17 | End: 2020-03-12

## 2019-09-17 NOTE — TELEPHONE ENCOUNTER
"Last Written Prescription Date:  3/15/19  Last Fill Quantity: 90 tablet,  # refills: 1   Last office visit: 9/7/2019 with prescribing provider:  CATRACHITO Flores   Future Office Visit:      [facility] Follow-up to PHQ 12/9/2016 12/5/2017 9/7/2019   PHQ-9 9. Suicide Ideation past 2 weeks Not at all Not at all Not at all     JEANNINE-7 SCORE 12/9/2016 12/5/2017 9/7/2019   Total Score - - -   Total Score - - 3 (minimal anxiety)   Total Score 0 0 3         Requested Prescriptions   Pending Prescriptions Disp Refills     citalopram (CELEXA) 20 MG tablet 90 tablet 1     Sig: Take 1 tablet (20 mg) by mouth daily       SSRIs Protocol Passed - 9/16/2019 10:45 AM        Passed - Recent (12 mo) or future (30 days) visit within the authorizing provider's specialty     Patient had office visit in the last 12 months or has a visit in the next 30 days with authorizing provider or within the authorizing provider's specialty.  See \"Patient Info\" tab in inbasket, or \"Choose Columns\" in Meds & Orders section of the refill encounter.              Passed - Medication is active on med list        Passed - Patient is age 18 or older        Passed - No active pregnancy on record        Passed - No positive pregnancy test in last 12 months          "

## 2019-09-17 NOTE — TELEPHONE ENCOUNTER
Prescription approved per [facility] Refill Protocol.  Annalise Clifford RN, BSN

## 2019-09-19 ENCOUNTER — HOSPITAL ENCOUNTER (OUTPATIENT)
Dept: CT IMAGING | Facility: CLINIC | Age: 64
Discharge: HOME | End: 2019-09-19
Attending: SURGERY | Admitting: SURGERY
Payer: COMMERCIAL

## 2019-09-19 DIAGNOSIS — R10.12 ABDOMINAL PAIN, LEFT UPPER QUADRANT: ICD-10-CM

## 2019-09-19 PROCEDURE — 25000128 H RX IP 250 OP 636: Performed by: SURGERY

## 2019-09-19 PROCEDURE — 74177 CT ABD & PELVIS W/CONTRAST: CPT

## 2019-09-19 RX ORDER — IOPAMIDOL 755 MG/ML
81 INJECTION, SOLUTION INTRAVASCULAR ONCE
Status: COMPLETED | OUTPATIENT
Start: 2019-09-19 | End: 2019-09-19

## 2019-09-19 RX ADMIN — IOPAMIDOL 81 ML: 755 INJECTION, SOLUTION INTRAVENOUS at 15:05

## 2019-09-23 ENCOUNTER — TELEPHONE (OUTPATIENT)
Dept: SURGERY | Facility: CLINIC | Age: 64
End: 2019-09-23

## 2019-09-23 NOTE — TELEPHONE ENCOUNTER
Name of caller: Patient    Reason for Call:  Looking for her CT results.    Surgeon:  Dr. Dobson    Recent Surgery:  No    If yes, when & what type:  N/A      Best phone number to reach pt at is: [phone] (M)  Ok to leave a message with medical info? No    Pharmacy preferred (if calling for a refill): N/A

## 2019-09-24 NOTE — TELEPHONE ENCOUNTER
RECORDS STATUS - ALL OTHER DIAGNOSIS      RECORDS RECEIVED FROM: Epic/   DATE RECEIVED: 9/26/2019    NOTES STATUS DETAILS   OFFICE NOTE from referring provider Complete  Dr. Nixon Dobson and Dr. Abelino Pablo   OFFICE NOTE from medical oncologist N/A    DISCHARGE SUMMARY from hospital N/A    DISCHARGE REPORT from the ER N/A    OPERATIVE REPORT Complete EPIC   MEDICATION LIST Complete [facility]   CLINICAL TRIAL TREATMENTS TO DATE N/A    LABS     PATHOLOGY REPORTS N/A    ANYTHING RELATED TO DIAGNOSIS Complete EPIC   GENONOMIC TESTING     TYPE:     IMAGING (NEED IMAGES & REPORT)     CT SCANS Complete PACS   MRI Complete PACS   MAMMO     ULTRASOUND     PET

## 2019-09-24 NOTE — TELEPHONE ENCOUNTER
ONCOLOGY INTAKE: Records Information      APPT INFORMATION:  Referring provider:  Dr. Nixon Dobson and Dr. Abelino Pablo  Referring provider s clinic:  [facility]  Reason for visit/diagnosis:  Hernia in Diaphragm  Has patient been notified of appointment date and time?: yes    RECORDS INFORMATION:  Were the records received with the referral (via [employer])? No, internal referral records are in [facility]  MR on 9/13/19  CT on 9/19/19

## 2019-09-26 ENCOUNTER — PRE VISIT (OUTPATIENT)
Dept: ONCOLOGY | Facility: CLINIC | Age: 64
End: 2019-09-26

## 2019-09-26 ENCOUNTER — ONCOLOGY VISIT (OUTPATIENT)
Dept: ONCOLOGY | Facility: CLINIC | Age: 64
End: 2019-09-26
Attending: FAMILY MEDICINE
Payer: COMMERCIAL

## 2019-09-26 VITALS
RESPIRATION RATE: 16 BRPM | HEIGHT: 66 IN | SYSTOLIC BLOOD PRESSURE: 131 MMHG | BODY MASS INDEX: 27.16 KG/M2 | DIASTOLIC BLOOD PRESSURE: 81 MMHG | OXYGEN SATURATION: 97 % | TEMPERATURE: 97.4 F | HEART RATE: 105 BPM | WEIGHT: 169 LBS

## 2019-09-26 DIAGNOSIS — K44.9 DIAPHRAGMATIC HERNIA WITHOUT OBSTRUCTION AND WITHOUT GANGRENE: ICD-10-CM

## 2019-09-26 PROCEDURE — 99203 OFFICE O/P NEW LOW 30 MIN: CPT | Performed by: THORACIC SURGERY (CARDIOTHORACIC VASCULAR SURGERY)

## 2019-09-26 PROCEDURE — G0463 HOSPITAL OUTPT CLINIC VISIT: HCPCS

## 2019-09-26 ASSESSMENT — PAIN SCALES - GENERAL: PAINLEVEL: NO PAIN (0)

## 2019-09-26 ASSESSMENT — MIFFLIN-ST. JEOR: SCORE: 1328.33

## 2019-09-26 NOTE — PROGRESS NOTES
THORACIC SURGERY - NEW PATIENT OFFICE VISIT      Dear Dr. Flores,    I saw Annalise Wallace at Dr. Dobson s request in consultation for the evaluation and treatment of a diaphragmatic hernia.     HPI  Annalise Wallace is a 63 year old woman who presents with discomfort in the epigastrium, generally after eating.  Crouching can also exacerbate the symptoms.  There is no sharp pain or burning.  Pressing on the area can relieve the symptoms.She has had the symptoms for more than one year.  She has heartburn and takes it once daily for at least 3 years.  She has no nausea or vomiting.  She has bowel movements ranging from daily to once every 5 to 6 days.    Previsit Tests   CT A/P 9/15/2019:      CT A/P 9/20/2015:      CT A/P 10/2/2007:    PMH    Past Medical History:   Diagnosis Date     Abnormal Papanicolaou smear of cervix and cervical HPV      Degenerative disc disease      Depressive disorder, not elsewhere classified      Displacement of cervical intervertebral disc without myelopathy      Gastro-oesophageal reflux disease      H/O hysterectomy for benign disease      TOBACCO ABUSE-CONTINUOUS         PSH    Past Surgical History:   Procedure Laterality Date     BACK SURGERY       C NONSPECIFIC PROCEDURE      Lump removed L. breast - benign     C NONSPECIFIC PROCEDURE      Cervical disk     C NONSPECIFIC PROCEDURE      C-spine - Nany     DISCECTOMY LUMBAR MINIMALLY INVASIVE ONE LEVEL  2/13/2014    Procedure: DISCECTOMY LUMBAR MINIMALLY INVASIVE ONE LEVEL;  Minimally Invasive Discectomy L2-3 Left ;  Surgeon: Juanjose Pitt MD;  Location: [facility]     DISCECTOMY LUMBAR MINIMALLY INVASIVE ONE LEVEL       FUSION SPINE POSTERIOR MINIMALLY INVASIVE ONE LEVEL  5/15/2014    Procedure: FUSION SPINE POSTERIOR MINIMALLY INVASIVE ONE LEVEL;  Surgeon: Juanjose Pitt MD;  Location: [facility]     HYSTERECTOMY VAGINAL, BILATERAL SALPINGO-OOPHERECTOMY, COMBINED       LAPAROSCOPIC HYSTERECTOMY TOTAL          ETOH:  rarely  TOB:   "Smoked cigarettes age 18 to 61, 1 pack per day.  43 pack years.    Physical examination  /81   Pulse 105   Temp 97.4  F (36.3  C) (Tympanic)   Resp 16   Ht 1.676 m (5' 6\")   Wt 76.7 kg (169 lb)   SpO2 97%   BMI 27.28 kg/m     Physical Exam  Constitutional:       Appearance: Normal appearance. She is normal weight.   Eyes:      Conjunctiva/sclera: Conjunctivae normal.   Abdominal:      General: There is no distension.      Palpations: Abdomen is soft. There is no mass.      Tenderness: There is no tenderness.   Musculoskeletal:      Comments: No chest wall tenderness where pain is located.  No bony deformities.   Neurological:      Mental Status: She is alert.          From a personal perspective, Ms. Wallace lives in a single room in a house with two other people.  She has three adult children.  She is retired from [employer] and is now on disability.    IMPRESSION (K44.9) Diaphragmatic hernia without obstruction and without gangrene    This is a 63 year old woman with bilateral small diaphragmatic hernias containing fat.  These appear, after review with radiology staff, to be stable on the left since 2007 and stable on the right at least since 2015.  The right hernia is not well visualized on the right in 2007.  Neither is the source of her current discomfort, which could be related to her GERD.      She is also a candidate for lung cancer screening CT scans of the chest, given her age, 43 pack year smoking history and two years since quitting.    PLAN  I spent a total of 30 minutes with Ms. Wallace, more than 50% of which were spent in counseling, coordination of care, and face-to-face time. I reviewed the plan as follows:    I explained that the hernias do not require intervention at this time and are not related to her pain.  They will be imaged on annual lung cancer screening, CT scans of the chest.  I will defer to Dr. Flores to order these annually.    All questions were answered and Annalise " Rodrigo was in agreement with the plan.  I appreciate the opportunity to participate in the care of your patient and will keep you updated.  Sincerely,     Vikas Dela Cruz

## 2019-09-26 NOTE — LETTER
9/26/2019         RE: Annalise Wallace  [address] [address] [place] [address]        Dear Colleague,    Thank you for referring your patient, Annalise Wallace, to the [facility]. Please see a copy of my visit note below.    THORACIC SURGERY - NEW PATIENT OFFICE VISIT      Dear Dr. Flores,    I saw Annalise Wallace at Dr. Dobson s request in consultation for the evaluation and treatment of a diaphragmatic hernia.     HPI  Annalise Wallace is a 63 year old woman who presents with discomfort in the epigastrium, generally after eating.  Crouching can also exacerbate the symptoms.  There is no sharp pain or burning.  Pressing on the area can relieve the symptoms.She has had the symptoms for more than one year.  She has heartburn and takes it once daily for at least 3 years.  She has no nausea or vomiting.  She has bowel movements ranging from daily to once every 5 to 6 days.    Previsit Tests   CT A/P 9/15/2019:      CT A/P 9/20/2015:      CT A/P 10/2/2007:    PMH    Past Medical History:   Diagnosis Date     Abnormal Papanicolaou smear of cervix and cervical HPV      Degenerative disc disease      Depressive disorder, not elsewhere classified      Displacement of cervical intervertebral disc without myelopathy      Gastro-oesophageal reflux disease      H/O hysterectomy for benign disease      TOBACCO ABUSE-CONTINUOUS         PSH    Past Surgical History:   Procedure Laterality Date     BACK SURGERY       C NONSPECIFIC PROCEDURE      Lump removed L. breast - benign     C NONSPECIFIC PROCEDURE      Cervical disk     C NONSPECIFIC PROCEDURE      C-spine - Nany     DISCECTOMY LUMBAR MINIMALLY INVASIVE ONE LEVEL  2/13/2014    Procedure: DISCECTOMY LUMBAR MINIMALLY INVASIVE ONE LEVEL;  Minimally Invasive Discectomy L2-3 Left ;  Surgeon: Juanjose Pitt MD;  Location: [facility]     DISCECTOMY LUMBAR MINIMALLY INVASIVE ONE LEVEL       FUSION SPINE POSTERIOR MINIMALLY INVASIVE ONE LEVEL  5/15/2014    Procedure:  "FUSION SPINE POSTERIOR MINIMALLY INVASIVE ONE LEVEL;  Surgeon: Juanjose Pitt MD;  Location: [facility]     HYSTERECTOMY VAGINAL, BILATERAL SALPINGO-OOPHERECTOMY, COMBINED       LAPAROSCOPIC HYSTERECTOMY TOTAL          ETOH:  rarely  TOB:  Smoked cigarettes age 18 to 61, 1 pack per day.  43 pack years.    Physical examination  /81   Pulse 105   Temp 97.4  F (36.3  C) (Tympanic)   Resp 16   Ht 1.676 m (5' 6\")   Wt 76.7 kg (169 lb)   SpO2 97%   BMI 27.28 kg/m      Physical Exam  Constitutional:       Appearance: Normal appearance. She is normal weight.   Eyes:      Conjunctiva/sclera: Conjunctivae normal.   Abdominal:      General: There is no distension.      Palpations: Abdomen is soft. There is no mass.      Tenderness: There is no tenderness.   Musculoskeletal:      Comments: No chest wall tenderness where pain is located.  No bony deformities.   Neurological:      Mental Status: She is alert.          From a personal perspective, Ms. Wallace lives in a single room in a house with two other people.  She has three adult children.  She is retired from [employer] and is now on disability.    IMPRESSION (K44.9) Diaphragmatic hernia without obstruction and without gangrene    This is a 63 year old woman with bilateral small diaphragmatic hernias containing fat.  These appear, after review with radiology staff, to be stable on the left since 2007 and stable on the right at least since 2015.  The right hernia is not well visualized on the right in 2007.  Neither is the source of her current discomfort, which could be related to her GERD.      She is also a candidate for lung cancer screening CT scans of the chest, given her age, 43 pack year smoking history and two years since quitting.    PLAN  I spent a total of 30 minutes with Ms. Wallace, more than 50% of which were spent in counseling, coordination of care, and face-to-face time. I reviewed the plan as follows:    I explained that the hernias do not " require intervention at this time and are not related to her pain.  They will be imaged on annual lung cancer screening, CT scans of the chest.  I will defer to Dr. Flores to order these annually.    All questions were answered and Annalise Wallace was in agreement with the plan.  I appreciate the opportunity to participate in the care of your patient and will keep you updated.  Sincerely,     Vikas Dela Cruz    Again, thank you for allowing me to participate in the care of your patient.        Sincerely,        Vikas Dela Cruz MD

## 2019-09-26 NOTE — NURSING NOTE
"Oncology Rooming Note    September 26, 2019 10:52 AM   Annalise Wallace is a 63 year old female who presents for:    Chief Complaint   Patient presents with     Oncology Clinic Visit     New Patient     Initial Vitals: /81   Pulse 105   Temp 97.4  F (36.3  C) (Tympanic)   Resp 16   Ht 1.676 m (5' 6\")   Wt 76.7 kg (169 lb)   SpO2 97%   BMI 27.28 kg/m   Estimated body mass index is 27.28 kg/m  as calculated from the following:    Height as of this encounter: 1.676 m (5' 6\").    Weight as of this encounter: 76.7 kg (169 lb). Body surface area is 1.89 meters squared.  No Pain (0) Comment: Data Unavailable   No LMP recorded. Patient has had a hysterectomy.  Allergies reviewed: Yes  Medications reviewed: Yes    Medications: Medication refills not needed today.  Pharmacy name entered into [facility]:    [id number] - [place], [place] - [address] [address] [place] - [address] [id number] - [place], [place] - [address]  [address]A    Clinical concerns: New Patient       Zandra Romano CMA              "

## 2019-10-08 ENCOUNTER — TELEPHONE (OUTPATIENT)
Dept: FAMILY MEDICINE | Facility: CLINIC | Age: 64
End: 2019-10-08

## 2019-10-08 DIAGNOSIS — M54.2 NECK PAIN: ICD-10-CM

## 2019-10-08 RX ORDER — OXYCODONE AND ACETAMINOPHEN 5; 325 MG/1; MG/1
TABLET ORAL
Qty: 120 TABLET | Refills: 0 | Status: SHIPPED | OUTPATIENT
Start: 2019-10-08 | End: 2019-11-05

## 2019-10-08 NOTE — TELEPHONE ENCOUNTER
Pt calling into clinic to obtain MRI results    Different ordering provider listed on order, unsure who that is    Please review results and advise    Etelvina Gonzalez RN, BS  Clinical Nurse Triage.

## 2019-11-02 ENCOUNTER — HEALTH MAINTENANCE LETTER (OUTPATIENT)
Age: 64
End: 2019-11-02

## 2019-11-05 ENCOUNTER — MYC REFILL (OUTPATIENT)
Dept: FAMILY MEDICINE | Facility: CLINIC | Age: 64
End: 2019-11-05

## 2019-11-05 DIAGNOSIS — G89.29 OTHER CHRONIC PAIN: ICD-10-CM

## 2019-11-05 DIAGNOSIS — Z98.1 S/P CERVICAL SPINAL FUSION: ICD-10-CM

## 2019-11-05 DIAGNOSIS — M54.2 NECK PAIN: ICD-10-CM

## 2019-11-06 RX ORDER — OXYCODONE AND ACETAMINOPHEN 5; 325 MG/1; MG/1
TABLET ORAL
Qty: 120 TABLET | Refills: 0 | Status: SHIPPED | OUTPATIENT
Start: 2019-11-06 | End: 2019-12-03

## 2019-11-06 NOTE — TELEPHONE ENCOUNTER
Routing refill request to provider for review/approval because:  Drug not on the [facility] refill protocol     Controlled Substance Refill Request for Percocet  Problem List Complete:  Yes      Patient is followed by Abelino Flores for ongoing prescription of pain medication.  All refills should be approved by this provider, or covering partner.    Medication(s): oxyCODONE-acetaminophen (PERCOCET) 5-325 MG per tablet    .   Maximum quantity per month: 120  Clinic visit frequency required: Q 3 months     Controlled substance agreement on file: YES    Pain Clinic evaluation in the past: Yes       Date/Location:   [facility] [address] 2013  DIRE Total Score(s):  No flowsheet data found.    Last [facility] website verification: 11/6/19   [email or web address]    Last Written Prescription Date:  Percocet  Last Fill Quantity: 120  # refills: 0  Last office visit: 9/7/2019 with prescribing provider:    Future Office Visit:      Controlled Substance Refill Request for Tizanidine   Last Written Prescription Date:  Tizanidine (7/2/19)  Last Fill Quantity: 60  # refills: 1   Last office visit: 9/7/2019 with prescribing provider:    Future Office Visit:        Esme Cason RN Flex

## 2019-11-06 NOTE — TELEPHONE ENCOUNTER
Requested Prescriptions   Pending Prescriptions Disp Refills     Controlled Substance Refill Request for tiZANidine (ZANAFLEX) 4 MG tablet  Problem List Complete:    No     PROVIDER TO CONSIDER COMPLETION OF PROBLEM LIST AND OVERVIEW/CONTROLLED SUBSTANCE AGREEMENT    Last Written Prescription Date:  7/2/2019  Last Fill Quantity: 60 tablet,   # refills: 1    THE MOST RECENT OFFICE VISIT MUST BE WITHIN THE PAST 3 MONTHS. AT LEAST ONE FACE TO FACE VISIT MUST OCCUR EVERY 6 MONTHS. ADDITIONAL VISITS CAN BE VIRTUAL.  (THIS STATEMENT SHOULD BE DELETED.)    Last Office Visit with [facility] [place] primary care provider: 9/7/2019Sandra    Future Office visit:     Controlled substance agreement:   Encounter-Level CSA - 03/08/2017:    Controlled Substance Agreement - Scan on 3/16/2017 11:46 AM: CONTROLLED SUBSTANCE AGREEMENT     Encounter-Level CSA - 12/15/2014:    Controlled Substance Agreement - Scan on 12/16/2014  9:34 AM: [facility] Controlled Medication Agreement 12-15-14     Patient-Level CSA:    There are no patient-level csa.         Last Urine Drug Screen:   Pain Drug SCR UR W RPTD Meds   Date Value Ref Range Status   03/08/2017   Final    FINAL  (Note)  ====================================================================  TOXAS[facility]RE COMP DRUG ANALYSIS,UR  ====================================================================  Test                             Result       Flag       Units  Drug Present and Declared for Prescription Verification   Zolpidem                       PRESENT      EXPECTED   Citalopram                     PRESENT      EXPECTED   Desmethylcitalopram            PRESENT      EXPECTED    Desmethylcitalopram is an expected metabolite of citalopram or    the enantiomeric form, escitalopram.     Trazodone                      PRESENT      EXPECTED   1,3 chlorophenyl piperazine    PRESENT      EXPECTED    1,3-chlorophenyl piperazine is an expected metabolite of    trazodone.      Drug Present not Declared for  Prescription Verification   Tramadol                       PRESENT      UNEXPECTED   O-Desmethyltramadol            PRESENT      UNEXPECTED   N-Desmethyltramadol            PRESENT      UNEXPECTED    Source of tramadol is a prescription medication.    O-desmethyltramadol and N-desmethyltramadol are expected    metabolites of tramadol.     Ibuprofen                      PRESENT      UNEXPECTED   Diphenhydramine                PRESENT      UNEXPECTED   Dextromethorphan               PRESENT      UNEXPECTED   Dextrorphan/Levorphanol        PRESENT      UNEXPECTED    Dextrorphan is an expected metabolite of dextromethorphan, an    over-the-counter or prescription cough suppressant. Dextrorphan    cannot be distinguished from the scheduled prescription    medication levorphanol by the method used for analysis.      Drug Absent but Declared for Prescription Verification   Oxycodone                      Not Detected UNEXPECTED ng/mg creat   Cyclobenzaprine                Not Detected UNEXPECTED   Acetaminophen                  Not Detected UNEXPECTED    Acetaminophen, as indicated in the declared medication list, is    not always detected even when used as directed.    ====================================================================  Test                      Result    Flag   Units      Ref Range   Creatinine              91               mg/dL      >=20  ====================================================================  Declared Medications:  The flagging and interpretation on this report are based on the  following declared medications.  Unexpected results may arise from  inaccuracies in the declared medications.    **Note: The testing scope of this panel includes these medications:    Citalopram (Celexa)  Cyclobenzaprine  Oxycodone (Percocet)  Trazodone (Desyrel)    **Note: The testing scope of this panel does not include small to  moderate amounts of these reported medications:    Acetaminophen  (Percocet)  Zolpidem    **Note: The testing scope of this panel does not include following  reported medications:    Omeprazole (Prilosec)  ====================================================================  For clinical consultation, please call [phone].  ====================================================================  Analysis performed by [facility], [address] [place], [place] [address]   [employer] Drug Analysis UR   Date Value Ref Range Status   09/05/2018 FINAL  Final     Comment:     (Note)  ====================================================================  COMPREHENSIVE DRUG ANALYSIS,UR  ====================================================================  Test                             Result       Flag       Units        Drug Present   Oxycodone                      1122                    ng/mg creat   Oxymorphone                    1461                    ng/mg creat   Noroxycodone                   4461                    ng/mg creat   Noroxymorphone                 449                     ng/mg creat    Sources of oxycodone are scheduled prescription medications.    Oxymorphone, noroxycodone, and noroxymorphone are expected    metabolites of oxycodone. Oxymorphone is also available as a    scheduled prescription medication.   Zolpidem Acid                  PRESENT                                Zolpidem acid is an expected metabolite of zolpidem.   Citalopram                     PRESENT                               Desmethylcitalopram            PRESENT                                Desmethylcitalopram is an expected metabolite of citalopram or    the enantiomeric form, escitalopram.   Trazodone                      PRESENT                               1,3 chlorophenyl piperazine    PRESENT                                1,3-chlorophenyl piperazine is an expected metabolite of    trazodone.   Acetaminophen                  PRESENT                               Diphenhydramine                 PRESENT                              ====================================================================  Test                      Result    Flag   Units      Ref Range        Creatinine              41               mg/dL      >=20            ====================================================================  For clinical consultation, please call [phone].  ====================================================================  Analysis performed by [facility] [place] [address]     , No results found for: THC13, PCP13, COC13, MAMP13, OPI13, AMP13, BZO13, TCA13, MTD13, BAR13, OXY13, PPX13, BUP13     Processing:  Staff will hand deliver Rx to on-site pharmacy     [email or web address]       checked in past 3 months?  Yes 9/9/2019       60 tablet 1     Sig: Take 1 tablet (4 mg) by mouth 2 times daily as needed for muscle spasms       There is no refill protocol information for this order        Controlled Substance Refill Request for oxyCODONE-acetaminophen (PERCOCET) 5-325 MG tablet  Problem List Complete:    No     PROVIDER TO CONSIDER COMPLETION OF PROBLEM LIST AND OVERVIEW/CONTROLLED SUBSTANCE AGREEMENT    Last Written Prescription Date:  10/8/2019  Last Fill Quantity: 120 tablet,   # refills: 0    THE MOST RECENT OFFICE VISIT MUST BE WITHIN THE PAST 3 MONTHS. AT LEAST ONE FACE TO FACE VISIT MUST OCCUR EVERY 6 MONTHS. ADDITIONAL VISITS CAN BE VIRTUAL.  (THIS STATEMENT SHOULD BE DELETED.)    Last Office Visit with [facility] [place] primary care provider: 9/7/2019Sandra    Future Office visit:     Controlled substance agreement:   Encounter-Level CSA - 03/08/2017:    Controlled Substance Agreement - Scan on 3/16/2017 11:46 AM: CONTROLLED SUBSTANCE AGREEMENT     Encounter-Level CSA - 12/15/2014:    Controlled Substance Agreement - Scan on 12/16/2014  9:34 AM: [facility] Controlled Medication Agreement 12-15-14     Patient-Level CSA:    There are no patient-level csa.          Last Urine Drug Screen:   Pain Drug SCR UR W RPTD Meds   Date Value Ref Range Status   03/08/2017   Final    FINAL  (Note)  ====================================================================  TOXASSURE COMP DRUG ANALYSIS,UR  ====================================================================  Test                             Result       Flag       Units  Drug Present and Declared for Prescription Verification   Zolpidem                       PRESENT      EXPECTED   Citalopram                     PRESENT      EXPECTED   Desmethylcitalopram            PRESENT      EXPECTED    Desmethylcitalopram is an expected metabolite of citalopram or    the enantiomeric form, escitalopram.     Trazodone                      PRESENT      EXPECTED   1,3 chlorophenyl piperazine    PRESENT      EXPECTED    1,3-chlorophenyl piperazine is an expected metabolite of    trazodone.      Drug Present not Declared for Prescription Verification   Tramadol                       PRESENT      UNEXPECTED   O-Desmethyltramadol            PRESENT      UNEXPECTED   N-Desmethyltramadol            PRESENT      UNEXPECTED    Source of tramadol is a prescription medication.    O-desmethyltramadol and N-desmethyltramadol are expected    metabolites of tramadol.     Ibuprofen                      PRESENT      UNEXPECTED   Diphenhydramine                PRESENT      UNEXPECTED   Dextromethorphan               PRESENT      UNEXPECTED   Dextrorphan/Levorphanol        PRESENT      UNEXPECTED    Dextrorphan is an expected metabolite of dextromethorphan, an    over-the-counter or prescription cough suppressant. Dextrorphan    cannot be distinguished from the scheduled prescription    medication levorphanol by the method used for analysis.      Drug Absent but Declared for Prescription Verification   Oxycodone                      Not Detected UNEXPECTED ng/mg creat   Cyclobenzaprine                Not Detected UNEXPECTED   Acetaminophen                   Not Detected UNEXPECTED    Acetaminophen, as indicated in the declared medication list, is    not always detected even when used as directed.    ====================================================================  Test                      Result    Flag   Units      Ref Range   Creatinine              91               mg/dL      >=20  ====================================================================  Declared Medications:  The flagging and interpretation on this report are based on the  following declared medications.  Unexpected results may arise from  inaccuracies in the declared medications.    **Note: The testing scope of this panel includes these medications:    Citalopram (Celexa)  Cyclobenzaprine  Oxycodone (Percocet)  Trazodone (Desyrel)    **Note: The testing scope of this panel does not include small to  moderate amounts of these reported medications:    Acetaminophen (Percocet)  Zolpidem    **Note: The testing scope of this panel does not include following  reported medications:    Omeprazole (Prilosec)  ====================================================================  For clinical consultation, please call [phone].  ====================================================================  Analysis performed by [facility], [address] [place] [address]     ,   [facility] Drug Analysis UR   Date Value Ref Range Status   09/05/2018 FINAL  Final     Comment:     (Note)  ====================================================================  COMPREHENSIVE DRUG ANALYSIS,UR  ====================================================================  Test                             Result       Flag       Units        Drug Present   Oxycodone                      1122                    ng/mg creat   Oxymorphone                    1461                    ng/mg creat   Noroxycodone                   4461                    ng/mg creat   Noroxymorphone                 449                     ng/mg creat     Sources of oxycodone are scheduled prescription medications.    Oxymorphone, noroxycodone, and noroxymorphone are expected    metabolites of oxycodone. Oxymorphone is also available as a    scheduled prescription medication.   Zolpidem Acid                  PRESENT                                Zolpidem acid is an expected metabolite of zolpidem.   Citalopram                     PRESENT                               Desmethylcitalopram            PRESENT                                Desmethylcitalopram is an expected metabolite of citalopram or    the enantiomeric form, escitalopram.   Trazodone                      PRESENT                               1,3 chlorophenyl piperazine    PRESENT                                1,3-chlorophenyl piperazine is an expected metabolite of    trazodone.   Acetaminophen                  PRESENT                               Diphenhydramine                PRESENT                              ====================================================================  Test                      Result    Flag   Units      Ref Range        Creatinine              41               mg/dL      >=20            ====================================================================  For clinical consultation, please call [phone].  ====================================================================  Analysis performed by [employer] [address] [place] [address]     , No results found for: THC13, PCP13, COC13, MAMP13, OPI13, AMP13, BZO13, TCA13, MTD13, BAR13, OXY13, PPX13, BUP13     Processing:  Staff will hand deliver Rx to on-site pharmacy     [email or web address]       checked in past 3 months?  Yes 9/9/2019       120 tablet 0     Sig: TAKE ONE TO TWO TABLETS BY MOUTH EVERY 6 HOURS AS NEEDED FOR MODERATE TO SEVERE PAIN       There is no refill protocol information for this order

## 2019-11-07 ENCOUNTER — TELEPHONE (OUTPATIENT)
Dept: FAMILY MEDICINE | Facility: CLINIC | Age: 64
End: 2019-11-07

## 2019-11-07 NOTE — TELEPHONE ENCOUNTER
Panel Management Review      Patient has the following on her problem list: None      Composite cancer screening  Chart review shows that this patient is due/due soon for the following Mammogram, Colonoscopy and Fecal Colorectal (FIT)  Summary:    Patient is due/failing the following:   COLONOSCOPY, FIT and MAMMOGRAM    Action needed:   Patient needs office visit for mammogram. and Patient needs referral/order: colonoscopy    Type of outreach:    Phone, spoke to patient.  mammogram appt made on 11/11/19. Pt declined colonoscopy    Questions for provider review:    None                                                                                                                                    Claudia Romero       Chart routed to none .

## 2019-12-03 ENCOUNTER — MYC REFILL (OUTPATIENT)
Dept: FAMILY MEDICINE | Facility: CLINIC | Age: 64
End: 2019-12-03

## 2019-12-03 DIAGNOSIS — M54.2 NECK PAIN: ICD-10-CM

## 2019-12-03 DIAGNOSIS — Z98.1 S/P CERVICAL SPINAL FUSION: ICD-10-CM

## 2019-12-03 DIAGNOSIS — G89.29 OTHER CHRONIC PAIN: ICD-10-CM

## 2019-12-04 ENCOUNTER — MYC REFILL (OUTPATIENT)
Dept: FAMILY MEDICINE | Facility: CLINIC | Age: 64
End: 2019-12-04

## 2019-12-04 DIAGNOSIS — Z98.1 S/P CERVICAL SPINAL FUSION: ICD-10-CM

## 2019-12-04 DIAGNOSIS — M54.2 NECK PAIN: ICD-10-CM

## 2019-12-04 RX ORDER — OXYCODONE AND ACETAMINOPHEN 5; 325 MG/1; MG/1
TABLET ORAL
Qty: 120 TABLET | Refills: 0 | Status: SHIPPED | OUTPATIENT
Start: 2019-12-04 | End: 2020-02-27

## 2019-12-04 NOTE — TELEPHONE ENCOUNTER
Routing refill request to provider for review/approval because:  Drug not on the [facility] refill protocol     Controlled Substance Refill Request for Percocet  Problem List Complete:  Yes    Patient is followed by Abelino Flores for ongoing prescription of pain medication.  All refills should be approved by this provider, or covering partner.    Medication(s): oxyCODONE-acetaminophen (PERCOCET) 5-325 MG per tablet    Maximum quantity per month: 120  Clinic visit frequency required: Q 3 months     Controlled substance agreement on file: YES    Pain Clinic evaluation in the past: Yes       Date/Location:   [facility] [address]  DIRE Total Score(s):  No flowsheet data found.    Last [facility] website verification: 12/4/19   [email or web address]       checked in past 3 months?  Yes 12/4/19    Percocet  Last Written Prescription Date:  11/6/19  Last Fill Quantity: 120  # refills: 0  Last office visit: 9/7/2019 with prescribing provider:    Future Office Visit:      Esme Cason RN Flex

## 2019-12-04 NOTE — TELEPHONE ENCOUNTER
Patient called checking the status of this refill, she thought it was odd that no one has read it yet. Please review and advise.  Nelda Toro

## 2019-12-06 NOTE — TELEPHONE ENCOUNTER
Shayt sent back    Per  pt just filled her percocet on 12/4/19 so it is too soon to fill this again    Cinthya Aguirre RN, BSN

## 2020-01-02 ENCOUNTER — MYC REFILL (OUTPATIENT)
Dept: FAMILY MEDICINE | Facility: CLINIC | Age: 65
End: 2020-01-02

## 2020-01-02 DIAGNOSIS — M54.2 NECK PAIN: ICD-10-CM

## 2020-01-02 NOTE — TELEPHONE ENCOUNTER
Routing refill request to provider to review approval because:  Drug not on the [facility], P or [facility] refill protocol or controlled substance    [place] reviewed:  reviewed-12/4/19, patient scheduled for appointment     [facility] profile:  [email or web address]    LAST REFILL FOR OXYCODONE: 12/4/19 #120    Last Written Prescription Date:  tizanidine  Last Fill Quantity: 11/6/19,  # refills: 0   Last office visit: 9/7/2019 with prescribing provider:     Future Office Visit:    Controlled Substance Refill Request for Oxycodone  Problem List Complete:  Yes  Medication(s): oxyCODONE-acetaminophen (PERCOCET) 5-325 MG per tablet    .   Maximum quantity per month: 120  Clinic visit frequency required: Q 3 months     Controlled substance agreement on file: YES    Pain Clinic evaluation in the past: Yes       Date/Location:   [facility] [address]  DIRE Total Score(s):  No flowsheet data found.    Last [facility] website verification: 12/4/19   [email or web address]  Annalise Clifford RN, BSN  Message handled by Nurse Triage.

## 2020-01-03 RX ORDER — OXYCODONE AND ACETAMINOPHEN 5; 325 MG/1; MG/1
TABLET ORAL
Qty: 120 TABLET | Refills: 0 | Status: SHIPPED | OUTPATIENT
Start: 2020-01-03 | End: 2020-01-31

## 2020-01-03 RX ORDER — OXYCODONE AND ACETAMINOPHEN 5; 325 MG/1; MG/1
TABLET ORAL
Qty: 120 TABLET | Refills: 0
Start: 2020-01-03

## 2020-01-13 ENCOUNTER — MYC REFILL (OUTPATIENT)
Dept: FAMILY MEDICINE | Facility: CLINIC | Age: 65
End: 2020-01-13

## 2020-01-13 DIAGNOSIS — G47.00 PERSISTENT DISORDER OF INITIATING OR MAINTAINING SLEEP: ICD-10-CM

## 2020-01-13 RX ORDER — TRAZODONE HYDROCHLORIDE 100 MG/1
100 TABLET ORAL AT BEDTIME
Qty: 90 TABLET | Refills: 1 | Status: SHIPPED | OUTPATIENT
Start: 2020-01-13 | End: 2020-07-14

## 2020-01-13 NOTE — TELEPHONE ENCOUNTER
"Requested Prescriptions   Pending Prescriptions Disp Refills     traZODone (DESYREL) 100 MG tablet 90 tablet 1     Sig: Take 1 tablet (100 mg) by mouth   Last Written Prescription Date:  7/17/19  Last Fill Quantity: 90,  # refills: 1   Last office visit: 9/7/2019 with prescribing provider   Future Office Visit:        Serotonin Modulators Passed - 1/13/2020  2:25 PM        Passed - Recent (12 mo) or future (30 days) visit within the authorizing provider's specialty     Patient has had an office visit with the authorizing provider or a provider within the authorizing providers department within the previous 12 mos or has a future within next 30 days. See \"Patient Info\" tab in inbasket, or \"Choose Columns\" in Meds & Orders section of the refill encounter.              Passed - Medication is active on med list        Passed - Patient is age 18 or older        Passed - No active pregnancy on record        Passed - No positive pregnancy test in past 12 months        Prescription approved per [facility] Refill Protocol.  Sruthi Saeed RN on 1/13/2020 at 2:30 PM    "

## 2020-01-31 ENCOUNTER — MYC REFILL (OUTPATIENT)
Dept: FAMILY MEDICINE | Facility: CLINIC | Age: 65
End: 2020-01-31

## 2020-01-31 DIAGNOSIS — M54.2 NECK PAIN: ICD-10-CM

## 2020-01-31 RX ORDER — OXYCODONE AND ACETAMINOPHEN 5; 325 MG/1; MG/1
TABLET ORAL
Qty: 120 TABLET | Refills: 0 | Status: SHIPPED | OUTPATIENT
Start: 2020-01-31 | End: 2020-03-23

## 2020-01-31 NOTE — TELEPHONE ENCOUNTER
Last Written Prescription Date:  1.3.2020  Last Fill Quantity: 120,  # refills: 0   Last office visit: 9/7/2019 with prescribing provider:  Sandra   Future Office Visit:      Controlled Substance Refill Request for Percocet  Problem List Complete:  Yes   checked in past 3 months?  Yes 12.4.19      Routing refill request to provider for review/approval because:  Drug not on the [facility] refill protocol

## 2020-02-10 ENCOUNTER — HEALTH MAINTENANCE LETTER (OUTPATIENT)
Age: 65
End: 2020-02-10

## 2020-02-16 DIAGNOSIS — R10.11 ABDOMINAL PAIN, RIGHT UPPER QUADRANT: ICD-10-CM

## 2020-02-17 RX ORDER — OMEPRAZOLE 40 MG/1
CAPSULE, DELAYED RELEASE ORAL
Qty: 90 CAPSULE | Refills: 0 | Status: SHIPPED | OUTPATIENT
Start: 2020-02-17 | End: 2020-05-15

## 2020-02-17 NOTE — TELEPHONE ENCOUNTER
"      Prescription approved per [facility] Refill Protocol.      Requested Prescriptions   Pending Prescriptions Disp Refills     omeprazole (PRILOSEC) 40 MG DR capsule [Pharmacy Med Name: OMEPRAZOLE 40MG CPDR] 90 capsule 3     Sig: TAKE ONE CAPSULE BY MOUTH ONCE DAILY       PPI Protocol Passed - 2/16/2020  8:55 AM        Passed - Not on Clopidogrel (unless Pantoprazole ordered)        Passed - No diagnosis of osteoporosis on record        Passed - Recent (12 mo) or future (30 days) visit within the authorizing provider's specialty     Patient has had an office visit with the authorizing provider or a provider within the authorizing providers department within the previous 12 mos or has a future within next 30 days. See \"Patient Info\" tab in inbasket, or \"Choose Columns\" in Meds & Orders section of the refill encounter.              Passed - Medication is active on med list        Passed - Patient is age 18 or older        Passed - No active pregnacy on record        Passed - No positive pregnancy test in past 12 months        "

## 2020-02-27 ENCOUNTER — OFFICE VISIT (OUTPATIENT)
Dept: FAMILY MEDICINE | Facility: CLINIC | Age: 65
End: 2020-02-27
Payer: COMMERCIAL

## 2020-02-27 VITALS
BODY MASS INDEX: 25.5 KG/M2 | SYSTOLIC BLOOD PRESSURE: 132 MMHG | WEIGHT: 158 LBS | DIASTOLIC BLOOD PRESSURE: 64 MMHG | TEMPERATURE: 97.7 F | OXYGEN SATURATION: 98 % | HEART RATE: 91 BPM

## 2020-02-27 DIAGNOSIS — G89.29 OTHER CHRONIC PAIN: Primary | ICD-10-CM

## 2020-02-27 DIAGNOSIS — M54.2 NECK PAIN: ICD-10-CM

## 2020-02-27 PROCEDURE — 99214 OFFICE O/P EST MOD 30 MIN: CPT | Performed by: FAMILY MEDICINE

## 2020-02-27 PROCEDURE — 80307 DRUG TEST PRSMV CHEM ANLYZR: CPT | Mod: 90 | Performed by: FAMILY MEDICINE

## 2020-02-27 PROCEDURE — 99000 SPECIMEN HANDLING OFFICE-LAB: CPT | Performed by: FAMILY MEDICINE

## 2020-02-27 RX ORDER — OXYCODONE AND ACETAMINOPHEN 5; 325 MG/1; MG/1
TABLET ORAL
Qty: 120 TABLET | Refills: 0 | Status: SHIPPED | OUTPATIENT
Start: 2020-02-27 | End: 2020-02-27

## 2020-02-27 NOTE — PROGRESS NOTES
Subjective     Annalise Wallace is a 64 year old female who presents to clinic today for the following health issues:    History of Present Illness        She eats 0-1 servings of fruits and vegetables daily.She consumes 0 sweetened beverage(s) daily.She exercises with enough effort to increase her heart rate 9 or less minutes per day.  She exercises with enough effort to increase her heart rate 3 or less days per week.   She is taking medications regularly.     Medication Followup of oxycodone    Taking Medication as prescribed: yes    Side Effects:  None    Medication Helping Symptoms:  yes     Chronic neck pain withhx three surgeries     BP Readings from Last 3 Encounters:   02/27/20 132/64   09/26/19 131/81   09/16/19 132/88    Wt Readings from Last 3 Encounters:   02/27/20 71.7 kg (158 lb)   09/26/19 76.7 kg (169 lb)   09/16/19 72.6 kg (160 lb)                    Reviewed and updated as needed this visit by Provider         Review of Systems   ROS COMP: Constitutional, HEENT, cardiovascular, pulmonary, GI, , musculoskeletal, neuro, skin, endocrine and psych systems are negative, except as otherwise noted.      Objective      Physical Exam   GENERAL: healthy, alert and no distress  EYES: Eyes grossly normal to inspection, PERRL and conjunctivae and sclerae normal  HENT: ear canals and TM's normal, nose and mouth without ulcers or lesions  NECK: no adenopathy, no asymmetry, masses, or scars and thyroid normal to palpation  RESP: lungs clear to auscultation - no rales, rhonchi or wheezes  CV: regular rate and rhythm, normal S1 S2, no S3 or S4, no murmur, click or rub, no peripheral edema and peripheral pulses strong  ABDOMEN: soft, nontender, no hepatosplenomegaly, no masses and bowel sounds normal  MS: no gross musculoskeletal defects noted, no edema  SKIN: no suspicious lesions or rashes  NEURO: Normal strength and tone, mentation intact and speech normal  PSYCH: mentation appears normal, affect  "normal/bright    Diagnostic Test Results:  Labs reviewed in Epic        Assessment & Plan   Assessment  (G89.29) Other chronic pain  (primary encounter diagnosis)  Comment:   Plan: Drug  Screen Comprehensive , Urine with         Reported Meds ([facility]) (Pain Care Package)            (M54.2) Neck pain  Comment:   Renew meds   Current Outpatient Medications   Medication     citalopram (CELEXA) 20 MG tablet     omeprazole (PRILOSEC) 40 MG DR capsule     oxyCODONE-acetaminophen (PERCOCET) 5-325 MG tablet     traZODone (DESYREL) 100 MG tablet     zolpidem (AMBIEN) 5 MG tablet     No current facility-administered medications for this visit.                      Plan  There are no diagnoses linked to this encounter.    BMI:   Estimated body mass index is 25.5 kg/m  as calculated from the following:    Height as of 9/26/19: 1.676 m (5' 6\").    Weight as of this encounter: 71.7 kg (158 lb).   Weight management plan: Discussed healthy diet and exercise guidelines        Regular exercise    No follow-ups on file.    Abelino Flores MD  [facility]"

## 2020-03-01 LAB — PAIN DRUG SCR UR W RPTD MEDS: NORMAL

## 2020-03-12 ENCOUNTER — MYC REFILL (OUTPATIENT)
Dept: FAMILY MEDICINE | Facility: CLINIC | Age: 65
End: 2020-03-12

## 2020-03-12 DIAGNOSIS — N95.1 SYMPTOMATIC MENOPAUSAL OR FEMALE CLIMACTERIC STATES: ICD-10-CM

## 2020-03-12 RX ORDER — CITALOPRAM HYDROBROMIDE 20 MG/1
20 TABLET ORAL DAILY
Qty: 90 TABLET | Refills: 1 | Status: SHIPPED | OUTPATIENT
Start: 2020-03-12 | End: 2020-09-08

## 2020-03-12 NOTE — TELEPHONE ENCOUNTER
"Routing refill request to provider for review/approval because:  Associated diagnosis not on protocol  Malaika Weston RN  [facility]          Requested Prescriptions   Pending Prescriptions Disp Refills     citalopram (CELEXA) 20 MG tablet 90 tablet 1     Sig: Take 1 tablet (20 mg) by mouth daily       SSRIs Protocol Passed - 3/12/2020 11:40 AM        Passed - Recent (12 mo) or future (30 days) visit within the authorizing provider's specialty     Patient has had an office visit with the authorizing provider or a provider within the authorizing providers department within the previous 12 mos or has a future within next 30 days. See \"Patient Info\" tab in inbasket, or \"Choose Columns\" in Meds & Orders section of the refill encounter.              Passed - Medication is active on med list        Passed - Patient is age 18 or older        Passed - No active pregnancy on record        Passed - No positive pregnancy test in last 12 months             "

## 2020-03-23 ENCOUNTER — MYC REFILL (OUTPATIENT)
Dept: FAMILY MEDICINE | Facility: CLINIC | Age: 65
End: 2020-03-23

## 2020-03-23 DIAGNOSIS — G89.29 OTHER CHRONIC PAIN: ICD-10-CM

## 2020-03-23 DIAGNOSIS — M54.2 NECK PAIN: ICD-10-CM

## 2020-03-23 RX ORDER — OXYCODONE AND ACETAMINOPHEN 5; 325 MG/1; MG/1
TABLET ORAL
Qty: 120 TABLET | Refills: 0 | Status: SHIPPED | OUTPATIENT
Start: 2020-03-23 | End: 2020-04-21

## 2020-03-23 NOTE — TELEPHONE ENCOUNTER
Routing refill request to provider for review/approval because:  Drug not on the [facility] refill protocol       Controlled Substance Refill Request for oxyCODONE-acetaminophen (PERCOCET) 5-325 MG tablet   Problem List Complete:    Yes    Last Written Prescription Date:  1/31/2020  Last Fill Quantity: 120,   # refills: 0    THE MOST RECENT OFFICE VISIT MUST BE WITHIN THE PAST 3 MONTHS. AT LEAST ONE FACE TO FACE VISIT MUST OCCUR EVERY 6 MONTHS. ADDITIONAL VISITS CAN BE VIRTUAL.  (THIS STATEMENT SHOULD BE DELETED.)    Last Office Visit with [facility] primary care provider: 2/27/2020    Future Office visit:     Controlled substance agreement:   Encounter-Level CSA - 03/08/2017:    Controlled Substance Agreement - Scan on 3/16/2017 11:46 AM: CONTROLLED SUBSTANCE AGREEMENT     Encounter-Level CSA - 12/15/2014:    Controlled Substance Agreement - Scan on 12/16/2014  9:34 AM: [facility] Controlled Medication Agreement 12-15-14     Patient-Level CSA:    There are no patient-level csa.         Last Urine Drug Screen:   Pain Drug SCR UR W RPTD Meds   Date Value Ref Range Status   02/27/2020 FINAL  Final     Comment:     (Note)  ====================================================================  [facility] COMP DRUG ANALYSIS,UR  ====================================================================  Test                             Result       Flag       Units        Drug Present and Declared for Prescription Verification   Noroxycodone                   70           EXPECTED   ng/mg creat    Noroxycodone is an expected metabolite of oxycodone. Sources of    oxycodone include scheduled prescription medications.   Citalopram                     PRESENT      EXPECTED                 Desmethylcitalopram            PRESENT      EXPECTED                  Desmethylcitalopram is an expected metabolite of citalopram or    the enantiomeric form, escitalopram.   Trazodone                      PRESENT      EXPECTED                 1,3 chlorophenyl  piperazine    PRESENT      EXPECTED                  1,3-chlorophenyl piperazine is an expected metabolite of    trazodone.  Drug Present not Declared for Prescription Verification   Acetaminophen                  PRESENT      UNEXPECTED               Diphenhydramine                PRESENT      UNEXPECTED              Drug Absent but Declared for Prescription Verification   Oxycodone                      Not Detected UNEXPECTED ng/mg creat    Oxycodone is almost always present in patients taking this drug    consistently.  Absence of oxycodone could be due to lapse of time    since the last dose or unusual pharmacokinetics (rapid    metabolism).   Zolpidem                       Not Detected UNEXPECTED                Zolpidem, as indicated in the declared medication list, is not    always detected even when used as directed.  ====================================================================  Test                      Result    Flag   Units      Ref Range        Creatinine              144              mg/dL      >=20            ====================================================================  Declared Medications:  The flagging and interpretation on this report are based on the  following declared medications.  Unexpected results may arise from  inaccuracies in the declared medications.  **Note: The testing scope of this panel includes these medications:  Citalopram  Oxycodone  Trazodone  **Note: The testing scope of this panel does not include small to  moderate amounts of these reported medications:  Zolpidem  ====================================================================  For clinical consultation, please call [phone].  ====================================================================  Analysis performed by [facility] [place] [address]     ,   [facility] Drug Analysis UR   Date Value Ref Range Status   09/05/2018 FINAL  Final     Comment:      (Note)  ====================================================================  COMPREHENSIVE DRUG ANALYSIS,UR  ====================================================================  Test                             Result       Flag       Units        Drug Present   Oxycodone                      1122                    ng/mg creat   Oxymorphone                    1461                    ng/mg creat   Noroxycodone                   4461                    ng/mg creat   Noroxymorphone                 449                     ng/mg creat    Sources of oxycodone are scheduled prescription medications.    Oxymorphone, noroxycodone, and noroxymorphone are expected    metabolites of oxycodone. Oxymorphone is also available as a    scheduled prescription medication.   Zolpidem Acid                  PRESENT                                Zolpidem acid is an expected metabolite of zolpidem.   Citalopram                     PRESENT                               Desmethylcitalopram            PRESENT                                Desmethylcitalopram is an expected metabolite of citalopram or    the enantiomeric form, escitalopram.   Trazodone                      PRESENT                               1,3 chlorophenyl piperazine    PRESENT                                1,3-chlorophenyl piperazine is an expected metabolite of    trazodone.   Acetaminophen                  PRESENT                               Diphenhydramine                PRESENT                              ====================================================================  Test                      Result    Flag   Units      Ref Range        Creatinine              41               mg/dL      >=20            ====================================================================  For clinical consultation, please call [phone].  ====================================================================  Analysis performed by [facility] .  [place] [id number]     , No results found for: THC13, PCP13, COC13, MAMP13, OPI13, AMP13, BZO13, TCA13, MTD13, BAR13, OXY13, PPX13, BUP13     Processing:  Rx to be electronically transmitted to pharmacy by provider     [email or web address]   checked in past 3 months?  Yes 3/23/2020    Tootie Ch, KOMALN, RN, PHN

## 2020-04-21 ENCOUNTER — MYC REFILL (OUTPATIENT)
Dept: FAMILY MEDICINE | Facility: CLINIC | Age: 65
End: 2020-04-21

## 2020-04-21 DIAGNOSIS — M54.2 NECK PAIN: ICD-10-CM

## 2020-04-21 RX ORDER — OXYCODONE AND ACETAMINOPHEN 5; 325 MG/1; MG/1
TABLET ORAL
Qty: 120 TABLET | Refills: 0 | Status: SHIPPED | OUTPATIENT
Start: 2020-04-21 | End: 2020-05-18

## 2020-05-18 ENCOUNTER — MYC REFILL (OUTPATIENT)
Dept: FAMILY MEDICINE | Facility: CLINIC | Age: 65
End: 2020-05-18

## 2020-05-18 DIAGNOSIS — M54.2 NECK PAIN: ICD-10-CM

## 2020-05-18 RX ORDER — OXYCODONE AND ACETAMINOPHEN 5; 325 MG/1; MG/1
TABLET ORAL
Qty: 120 TABLET | Refills: 0 | Status: SHIPPED | OUTPATIENT
Start: 2020-05-18 | End: 2020-06-15

## 2020-05-18 NOTE — TELEPHONE ENCOUNTER
Routing refill request to provider for review/approval because:  Drug not on the [facility] refill protocol     Jovita Diez RN   [facility] -- Triage Nurse

## 2020-06-15 ENCOUNTER — MYC REFILL (OUTPATIENT)
Dept: FAMILY MEDICINE | Facility: CLINIC | Age: 65
End: 2020-06-15

## 2020-06-15 DIAGNOSIS — M54.2 NECK PAIN: ICD-10-CM

## 2020-06-16 RX ORDER — OXYCODONE AND ACETAMINOPHEN 5; 325 MG/1; MG/1
TABLET ORAL
Qty: 120 TABLET | Refills: 0 | Status: SHIPPED | OUTPATIENT
Start: 2020-06-16 | End: 2020-07-14

## 2020-07-14 ENCOUNTER — MYC REFILL (OUTPATIENT)
Dept: FAMILY MEDICINE | Facility: CLINIC | Age: 65
End: 2020-07-14

## 2020-07-14 DIAGNOSIS — G47.00 PERSISTENT DISORDER OF INITIATING OR MAINTAINING SLEEP: ICD-10-CM

## 2020-07-14 DIAGNOSIS — M54.2 NECK PAIN: ICD-10-CM

## 2020-07-14 RX ORDER — TRAZODONE HYDROCHLORIDE 100 MG/1
100 TABLET ORAL AT BEDTIME
Qty: 90 TABLET | Refills: 0 | Status: SHIPPED | OUTPATIENT
Start: 2020-07-14 | End: 2020-10-14

## 2020-07-14 RX ORDER — OXYCODONE AND ACETAMINOPHEN 5; 325 MG/1; MG/1
TABLET ORAL
Qty: 120 TABLET | Refills: 0 | Status: SHIPPED | OUTPATIENT
Start: 2020-07-14 | End: 2020-08-11

## 2020-07-14 NOTE — TELEPHONE ENCOUNTER
Routing refill request to provider for review/approval because:  Drug not on the [facility] refill protocol     Jovita Diez RN   [facility] -- Triage Nurse

## 2020-08-11 ENCOUNTER — MYC REFILL (OUTPATIENT)
Dept: FAMILY MEDICINE | Facility: CLINIC | Age: 65
End: 2020-08-11

## 2020-08-11 DIAGNOSIS — M54.2 NECK PAIN: ICD-10-CM

## 2020-08-11 RX ORDER — OXYCODONE AND ACETAMINOPHEN 5; 325 MG/1; MG/1
TABLET ORAL
Qty: 120 TABLET | Refills: 0 | Status: SHIPPED | OUTPATIENT
Start: 2020-08-11 | End: 2020-09-08

## 2020-08-11 NOTE — TELEPHONE ENCOUNTER
Routing refill request to provider for review/approval because:  Drug not on the [facility] refill protocol     Jovita Diez RN   [facility] -- Triage Nurse

## 2020-08-14 ENCOUNTER — VIRTUAL VISIT (OUTPATIENT)
Dept: FAMILY MEDICINE | Facility: OTHER | Age: 65
End: 2020-08-14

## 2020-08-14 DIAGNOSIS — Z20.822 SUSPECTED COVID-19 VIRUS INFECTION: Primary | ICD-10-CM

## 2020-08-14 NOTE — PROGRESS NOTES
"Date: 2020 16:25:08  Clinician: Linette Otto  Clinician NPI: [id number]  Patient: Annalise Wallace  Patient : 1955  [place] Address[place] [address] [place] [id number]  Patient Phone: [phone]  Visit Protocol: URI  Patient Summary:  Annalise is a 64 year old ( : 1955 ) female who initiated a Visit for COVID-19 (Coronavirus) evaluation and screening. When asked the question \"Please sign me up to receive news, health information and promotions. \", Annalise responded \"No\".    Annalise states her symptoms started today.   Her symptoms consist of ageusia, nasal congestion, and anosmia.   Symptom details   Nasal secretions: The color of her mucus is clear.   Annalise denies having ear pain, headache, chills, enlarged lymph nodes, nausea, sore throat, teeth pain, diarrhea, cough, vomiting, rhinitis, myalgias, facial pain or pressure, fever, wheezing, and malaise. She also denies taking antibiotic medication in the past month and having recent facial or sinus surgery in the past 60 days. She is not experiencing dyspnea.    Pertinent COVID-19 (Coronavirus) information  In the past 14 days, Annalise has not worked in a congregate living setting.   She does not work or volunteer as healthcare worker or a  and does not work or volunteer in a healthcare facility.   Annalise also has not lived in a congregate living setting in the past 14 days. She lives with a healthcare worker.   Annalise has not had a close contact with a laboratory-confirmed COVID-19 patient within 14 days of symptom onset.   Since 2019, Annalise and has not had upper respiratory infection or influenza-like illness. Has not been diagnosed with lab-confirmed COVID-19 test   Pertinent medical history  Annalise does not get yeast infections when she takes antibiotics.   Annalise does not need a return to work/school note.   Weight: 145 lbs   Annalise smokes or uses smokeless tobacco.   Weight: 145 lbs    MEDICATIONS: Prilosec OTC oral, Celexa oral, " "Claritin oral, ALLERGIES: Sulfa (Sulfonamide Antibiotics)  Clinician Response:  Dear Annalise,   Your symptoms show that you may have coronavirus (COVID-19). This illness can cause fever, cough and trouble breathing. Many people get a mild case and get better on their own. Some people can get very sick.  What should I do?  We would like to test you for this virus.   1. Please call [phone] to schedule your visit. Explain that you were referred by [facility] to have a COVID-19 test. Be ready to share your [facility] visit ID number.  The following will serve as your written order for this COVID Test, ordered by me, for the indication of suspected COVID [Z20.828]: The test will be ordered in [employer], our electronic health record, after you are scheduled. It will show as ordered and authorized by Edin Perry MD.  Order: COVID-19 (Coronavirus) PCR for SYMPTOMATIC testing from [facility].      2. When it's time for your COVID test:  Stay at least 6 feet away from others. (If someone will drive you to your test, stay in the backseat, as far away from the  as you can.)   Cover your mouth and nose with a mask, tissue or washcloth.  Go straight to the testing site. Don't make any stops on the way there or back.      3.Starting now: Stay home and away from others (self-isolate) until:   You've had no fever---and no medicine that reduces fever---for one full day (24 hours). And...   Your other symptoms have gotten better. For example, your cough or breathing has improved. And...   At least 10 days have passed since your symptoms started.       During this time, don't leave the house except for testing or medical care.   Stay in your own room, even for meals. Use your own bathroom if you can.   Stay away from others in your home. No hugging, kissing or shaking hands. No visitors.  Don't go to work, school or anywhere else.    Clean \"high touch\" surfaces often (doorknobs, counters, handles, etc.). Use a household cleaning spray or wipes. " You'll find a full list of  on the EPA website: www.epa.gov/pesticide-registration/list-n-disinfectants-use-against-sars-cov-2.   Cover your mouth and nose with a mask, tissue or washcloth to avoid spreading germs.  Wash your hands and face often. Use soap and water.  Caregivers in these groups are at risk for severe illness due to COVID-19:  o People 65 years and older  o People who live in a nursing home or long-term care facility  o People with chronic disease (lung, heart, cancer, diabetes, kidney, liver, immunologic)  o People who have a weakened immune system, including those who:   Are in cancer treatment  Take medicine that weakens the immune system, such as corticosteroids  Had a bone marrow or organ transplant  Have an immune deficiency  Have poorly controlled HIV or AIDS  Are obese (body mass index of 40 or higher)  Smoke regularly   o Caregivers should wear gloves while washing dishes, handling laundry and cleaning bedrooms and bathrooms.  o Use caution when washing and drying laundry: Don't shake dirty laundry, and use the warmest water setting that you can.  o For more tips, go to www.cdc.gov/coronavirus/2019-ncov/downloads/10Things.pdf.    4.Sign up for [employer]. We know it's scary to hear that you might have COVID-19. We want to track your symptoms to make sure you're okay over the next 2 weeks. Please look for an email from [employer]---this is a free, online program that we'll use to keep in touch. To sign up, follow the link in the email. Learn more at [email or web address]  How can I take care of myself?   Get lots of rest. Drink extra fluids (unless a doctor has told you not to).   Take Tylenol (acetaminophen) for fever or pain. If you have liver or kidney problems, ask your family doctor if it's okay to take Tylenol.   Adults can take either:    650 mg (two 325 mg pills) every 4 to 6 hours, or...   1,000 mg (two 500 mg pills) every 8 hours as needed.    Note: Don't  take more than 3,000 mg in one day. Acetaminophen is found in many medicines (both prescribed and over-the-counter medicines). Read all labels to be sure you don't take too much.   For children, check the Tylenol bottle for the right dose. The dose is based on the child's age or weight.    If you have other health problems (like cancer, heart failure, an organ transplant or severe kidney disease): Call your specialty clinic if you don't feel better in the next 2 days.       Know when to call 911. Emergency warning signs include:    Trouble breathing or shortness of breath Pain or pressure in the chest that doesn't go away Feeling confused like you haven't felt before, or not being able to wake up Bluish-colored lips or face.  Where can I get more information?   [facility] -- About COVID-19: [email or web address]   CDC -- What to Do If You're Sick: www.cdc.gov/coronavirus/2019-ncov/about/steps-when-sick.html   CDC -- Ending Home Isolation: www.cdc.gov/coronavirus/2019-ncov/hcp/disposition-in-home-patients.html   CDC -- Caring for Someone: www.cdc.gov/coronavirus/2019-ncov/if-you-are-sick/care-for-someone.html   [facility] -- Interim Guidance for Hospital Discharge to Home: www.health.[facility].mn.us/diseases/coronavirus/hcp/hospdischarge.pdf   [facility] clinical trials (COVID-19 research studies): [email or web address].[facility]-clinical-trials    Below are the COVID-19 hotlines at the [facility] ([facility]). Interpreters are available.    For health questions: Call 816-720-2743 or 1-932.441.9703 (7 a.m. to 7 p.m.) For questions about schools and childcare: Call 085-645-4578 or 1-620.563.8362 (7 a.m. to 7 p.m.)    Diagnosis: Cough  Diagnosis ICD: R05

## 2020-08-15 DIAGNOSIS — Z20.822 SUSPECTED COVID-19 VIRUS INFECTION: ICD-10-CM

## 2020-08-15 PROCEDURE — U0003 INFECTIOUS AGENT DETECTION BY NUCLEIC ACID (DNA OR RNA); SEVERE ACUTE RESPIRATORY SYNDROME CORONAVIRUS 2 (SARS-COV-2) (CORONAVIRUS DISEASE [COVID-19]), AMPLIFIED PROBE TECHNIQUE, MAKING USE OF HIGH THROUGHPUT TECHNOLOGIES AS DESCRIBED BY CMS-2020-01-R: HCPCS | Performed by: FAMILY MEDICINE

## 2020-08-17 LAB
SARS-COV-2 RNA SPEC QL NAA+PROBE: NOT DETECTED
SPECIMEN SOURCE: NORMAL

## 2020-09-08 ENCOUNTER — MYC REFILL (OUTPATIENT)
Dept: FAMILY MEDICINE | Facility: CLINIC | Age: 65
End: 2020-09-08

## 2020-09-08 DIAGNOSIS — N95.1 SYMPTOMATIC MENOPAUSAL OR FEMALE CLIMACTERIC STATES: ICD-10-CM

## 2020-09-08 DIAGNOSIS — M54.2 NECK PAIN: ICD-10-CM

## 2020-09-08 RX ORDER — OXYCODONE AND ACETAMINOPHEN 5; 325 MG/1; MG/1
TABLET ORAL
Qty: 120 TABLET | Refills: 0 | Status: SHIPPED | OUTPATIENT
Start: 2020-09-08 | End: 2020-10-06

## 2020-09-08 NOTE — TELEPHONE ENCOUNTER
oxyCODONE-acetaminophen (PERCOCET) 5-325 MG tablet       Last Written Prescription Date:  8/11/2020  Last Fill Quantity: 120,   # refills: 0  Last Office Visit: 2/27/2020   Future Office visit:       Routing refill request to provider for review/approval because:  Drug not on the [facility], [facility] or [facility] refill protocol or controlled substance.    Fiorella Mena RN

## 2020-09-09 RX ORDER — CITALOPRAM HYDROBROMIDE 20 MG/1
20 TABLET ORAL DAILY
Qty: 90 TABLET | Refills: 1 | Status: SHIPPED | OUTPATIENT
Start: 2020-09-09 | End: 2021-03-22

## 2020-10-13 DIAGNOSIS — G47.00 PERSISTENT DISORDER OF INITIATING OR MAINTAINING SLEEP: ICD-10-CM

## 2020-10-13 NOTE — TELEPHONE ENCOUNTER
Routing refill request to provider for review/approval because:  Drug interaction warning    Rosaura Miller, RN

## 2020-10-14 RX ORDER — TRAZODONE HYDROCHLORIDE 100 MG/1
TABLET ORAL
Qty: 90 TABLET | Refills: 0 | Status: SHIPPED | OUTPATIENT
Start: 2020-10-14 | End: 2021-01-05

## 2020-11-04 NOTE — PROGRESS NOTES
"Pre-Visit Planning   Nov 5 2020 / Thursday  Video > 3528  Dr. Flores    Appointment Notes for this encounter:      ph: [phone]  Med Review / Oxycodone    Questionnaires Reviewed/Assigned  Additional questionnaires assigned        Patient preferred phone number: [phone]      Spoke to patient via phone. Patient does not have additional questions or concerns.        Visit is not preventive.    Patient is established.    Patient reminded of date and time. Barriers addressed: None  Chief complaint confirmed.    Health Maintenance Due   Topic Date Due     ANNUAL REVIEW OF HM ORDERS  1955     LIPID  11/05/1998     COLORECTAL CANCER SCREENING  05/13/2018     MEDICARE ANNUAL WELLNESS VISIT  11/05/2018     MAMMO SCREENING  06/16/2019     Patient is due for:  Mammo, Colonoscopy  will discuss with provider    Roger  Patient is active on [employer].    Questionnaire Review   Offered information on completing questionnaires via [employer].    Call Summary  \"Thank you for your time today.  If anything comes up before your appointment, please feel free to contact us at [phone].\"    "

## 2020-11-05 ENCOUNTER — VIRTUAL VISIT (OUTPATIENT)
Dept: FAMILY MEDICINE | Facility: CLINIC | Age: 65
End: 2020-11-05
Payer: COMMERCIAL

## 2020-11-05 DIAGNOSIS — M54.2 NECK PAIN: ICD-10-CM

## 2020-11-05 PROCEDURE — 99213 OFFICE O/P EST LOW 20 MIN: CPT | Mod: 95 | Performed by: FAMILY MEDICINE

## 2020-11-05 RX ORDER — OXYCODONE AND ACETAMINOPHEN 5; 325 MG/1; MG/1
TABLET ORAL
Qty: 120 TABLET | Refills: 0 | Status: SHIPPED | OUTPATIENT
Start: 2020-11-05 | End: 2020-12-01

## 2020-11-05 NOTE — PROGRESS NOTES
"Annalise Wallace is a 65 year old female who is being evaluated via a billable video visit.      The patient has been notified of following:     \"This video visit will be conducted via a call between you and your physician/provider. We have found that certain health care needs can be provided without the need for an in-person physical exam.  This service lets us provide the care you need with a video conversation.  If a prescription is necessary we can send it directly to your pharmacy.  If lab work is needed we can place an order for that and you can then stop by our lab to have the test done at a later time.    Video visits are billed at different rates depending on your insurance coverage.  Please reach out to your insurance provider with any questions.    If during the course of the call the physician/provider feels a video visit is not appropriate, you will not be charged for this service.\"    Patient has given verbal consent for Video visit? Yes  How would you like to obtain your AVS? MyChart  If you are dropped from the video visit, the video invite should be resent to: Text to cell phone: [phone] text   Will anyone else be joining your video visit? No      Subjective     Annalise Wallace is a 65 year old female who presents today via video visit for the following health issues:    HPI    meds effective helping her daughter with the young children's schooling   Medication Followup of oxycodone     Taking Medication as prescribed: yes    Side Effects:  None    Medication Helping Symptoms:  yes     Annual Wellness Visit    Patient has been advised of split billing requirements and indicates understanding: Yes     Are you in the first 12 months of your Medicare Part B coverage?  No    Physical Health:    In general, how would you rate your overall physical health? fair    Outside of work, how many days during the week do you exercise?2-3 days/week    Outside of work, approximately how many minutes a day do you " "exercise?30-45 minutes    If you drink alcohol do you typically have >3 drinks per day or >7 drinks per week? No    Do you usually eat at least 4 servings of fruit and vegetables a day, include whole grains & fiber and avoid regularly eating high fat or \"junk\" foods? NO    Do you have any problems taking medications regularly? No    Do you have any side effects from medications? none    Needs assistance for the following daily activities: no assistance needed    Which of the following safety concerns are present in your home?  none identified     Hearing impairment: No    In the past 6 months, have you been bothered by leaking of urine? no    There were no vitals taken for this visit.  Weight: Unable to obtain due to video visit  Height: Unable to obtain due to video visit  BMI: Unable to obtain due to video visit  Blood Pressure: Unable to obtain due to video visit    Mental Health:    In general, how would you rate your overall mental or emotional health? good  PHQ-2 Score:      Do you feel safe in your environment? Yes    Have you ever done Advance Care Planning? (For example, a Health Directive, POLST, or a discussion with a medical provider or your loved ones about your wishes)? No, advance care planning information given to patient to review.  Patient declined advance care planning discussion at this time.    Fall risk:  Fallen 2 or more times in the past year?: No  Any fall with injury in the past year?: No    Cognitive Screening: Unable to complete due to virtual visit; need for additional assessment in future face-to-face visit    Do you have sleep apnea, excessive snoring or daytime drowsiness?: no    Current providers sharing in care for this patient include:   Patient Care Team:  Abelino Flores MD as PCP - Juanjose Duffy MD as MD (Orthopaedic Surgery)  Abelino Flores MD as Assigned PCP  Vikas Dela Cruz MD as Assigned Heart and Vascular Provider  Nixon Dobson MD as " "Assigned Surgical Provider        Follow up cervical spondylosis and fusion and chronic pain meds. She'll come in next visit for re sing of CPM contract    Review of Systems   Constitutional, HEENT, cardiovascular, pulmonary, gi and gu systems are negative, except as otherwise noted.      Objective    Vitals - Patient Reported  Weight (Patient Reported): 68 kg (150 lb)  Height (Patient Reported): 167.6 cm (5' 6\")  BMI (Based on Pt Reported Ht/Wt): 24.21      Vitals:  No vitals were obtained today due to virtual visit.    Physical Exam     GENERAL: Healthy, alert and no distress  EYES: Eyes grossly normal to inspection.  No discharge or erythema, or obvious scleral/conjunctival abnormalities.  RESP: No audible wheeze, cough, or visible cyanosis.  No visible retractions or increased work of breathing.    SKIN: Visible skin clear. No significant rash, abnormal pigmentation or lesions.  PSYCH: Mentation appears normal, affect normal/bright, judgement and insight intact, normal speech and appearance well-groomed.              Past Medical History:   Diagnosis Date     Abnormal Papanicolaou smear of cervix and cervical HPV      Degenerative disc disease      Depressive disorder, not elsewhere classified      Displacement of cervical intervertebral disc without myelopathy      Gastro-oesophageal reflux disease      H/O hysterectomy for benign disease      TOBACCO ABUSE-CONTINUOUS        Past Surgical History:   Procedure Laterality Date     BACK SURGERY       DISCECTOMY LUMBAR MINIMALLY INVASIVE ONE LEVEL  2/13/2014    Procedure: DISCECTOMY LUMBAR MINIMALLY INVASIVE ONE LEVEL;  Minimally Invasive Discectomy L2-3 Left ;  Surgeon: Juanjose Pitt MD;  Location: [facility] OR     DISCECTOMY LUMBAR MINIMALLY INVASIVE ONE LEVEL       FUSION SPINE POSTERIOR MINIMALLY INVASIVE ONE LEVEL  5/15/2014    Procedure: FUSION SPINE POSTERIOR MINIMALLY INVASIVE ONE LEVEL;  Surgeon: Juanjose Pitt MD;  Location: [facility] OR     HYSTERECTOMY " VAGINAL, BILATERAL SALPINGO-OOPHERECTOMY, COMBINED       LAPAROSCOPIC HYSTERECTOMY TOTAL       ZZC NONSPECIFIC PROCEDURE      Lump removed L. breast - benign     ZZC NONSPECIFIC PROCEDURE      Cervical disk     ZZC NONSPECIFIC PROCEDURE      C-spine - Nany       Family History   Problem Relation Age of Onset     Cancer Mother      Cancer Father         throat     Diabetes Paternal Grandmother        Social History     Tobacco Use     Smoking status: Former Smoker     Packs/day: 1.00     Years: 30.00     Pack years: 30.00     Types: Cigarettes     Quit date: 6/21/2017     Years since quitting: 3.3     Smokeless tobacco: Never Used   Substance Use Topics     Alcohol use: No     Alcohol/week: 0.0 standard drinks           Video-Visit Details    Type of service:  Video Visit    Video End Time:9:32 AM    Originating Location (pt. Location): Home    Distant Location (provider location):  [facility] [place]     Platform used for Video Visit: Sandro

## 2020-11-17 ENCOUNTER — TELEPHONE (OUTPATIENT)
Dept: FAMILY MEDICINE | Facility: CLINIC | Age: 65
End: 2020-11-17

## 2020-11-17 DIAGNOSIS — G89.29 OTHER CHRONIC PAIN: Primary | ICD-10-CM

## 2020-11-17 NOTE — TELEPHONE ENCOUNTER
General Call:     Who is calling:  Patient     Reason for Call:  Pt is requesting a rx for a muscle relaxer.     What are your questions or concerns:  Pt stated her back is hurting and having spasms. She stated that she has gotten this RX from Dr Flores before.    Date of last appointment with provider: 11/5    Okay to leave a detailed message:Yes at Cell number on file:    Telephone Information:   Mobile [phone]

## 2020-11-23 ENCOUNTER — MYC REFILL (OUTPATIENT)
Dept: FAMILY MEDICINE | Facility: CLINIC | Age: 65
End: 2020-11-23

## 2020-11-23 DIAGNOSIS — R10.11 ABDOMINAL PAIN, RIGHT UPPER QUADRANT: ICD-10-CM

## 2020-11-23 RX ORDER — OMEPRAZOLE 40 MG/1
CAPSULE, DELAYED RELEASE ORAL
Qty: 90 CAPSULE | Refills: 3 | Status: SHIPPED | OUTPATIENT
Start: 2020-11-23 | End: 2021-08-31

## 2020-11-23 NOTE — TELEPHONE ENCOUNTER
Prescription approved per [facility] Refill Protocol.  Cinthya Aguirre RN, BSN

## 2020-11-29 ENCOUNTER — E-VISIT (OUTPATIENT)
Dept: FAMILY MEDICINE | Facility: CLINIC | Age: 65
End: 2020-11-29
Payer: COMMERCIAL

## 2020-11-29 DIAGNOSIS — Z20.822 EXPOSURE TO 2019 NOVEL CORONAVIRUS: Primary | ICD-10-CM

## 2020-11-29 DIAGNOSIS — Z20.822 SUSPECTED COVID-19 VIRUS INFECTION: ICD-10-CM

## 2020-11-29 PROCEDURE — 99421 OL DIG E/M SVC 5-10 MIN: CPT | Performed by: FAMILY MEDICINE

## 2020-11-30 DIAGNOSIS — Z20.822 EXPOSURE TO 2019 NOVEL CORONAVIRUS: ICD-10-CM

## 2020-11-30 PROCEDURE — U0003 INFECTIOUS AGENT DETECTION BY NUCLEIC ACID (DNA OR RNA); SEVERE ACUTE RESPIRATORY SYNDROME CORONAVIRUS 2 (SARS-COV-2) (CORONAVIRUS DISEASE [COVID-19]), AMPLIFIED PROBE TECHNIQUE, MAKING USE OF HIGH THROUGHPUT TECHNOLOGIES AS DESCRIBED BY CMS-2020-01-R: HCPCS | Performed by: FAMILY MEDICINE

## 2020-11-30 NOTE — TELEPHONE ENCOUNTER
Provider E-Visit time total (minutes): 12    I REQUESTED a covid test for you. I'd assume that I'm positive and stay self quarantined. They will call you about when your test can be done.

## 2020-11-30 NOTE — PATIENT INSTRUCTIONS
Dear Annalise Wallace,    Your symptoms show that you may have coronavirus (COVID-19). This illness can cause fever, cough and trouble breathing. Many people get a mild case and get better on their own. Some people can get very sick.    Will I be tested for COVID-19?  We would like to test you for Covid-19 virus. I have placed orders for this test.   To schedule: go to your [employer] home page and scroll down to the section that says  You have an appointment that needs to be scheduled  and click the large green button that says  Schedule Now  and follow the steps to find the next available openings.    If you are unable to complete these [employer] scheduling steps, please call [phone] to schedule your testing.     When it's time for your COVID test:  Stay at least 6 feet away from others. (If someone will drive you to your test, stay in the backseat, as far away from the  as you can.)  Cover your mouth and nose with a mask, tissue or washcloth.  Go straight to the testing site. Don't make any stops on the way there or back.    Starting now:     Do not go to work. Follow your usual processes for taking time away from work.  o If you receive a negative COVID-19 test result and were NOT exposed to someone with a known positive COVID-19 test, you can return to work once you're free of fever for 24 hours without fever-reducing medication and your symptoms are improving or resolved.  o If you receive a positive COVID-19 test result, return to work should be at least 10 days from symptom onset (20 days if people have immune compromise) and people should be fever-free for 24 hours without medications, and the respiratory symptoms should be improved significantly before returning to work or school  o If you were exposed to someone who has tested positive for COVID-19, you can return to work 14 days after your last contact with the positive individual, provided you do not have symptoms at all during that  "time.    During this time, don't leave the house except for testing or medical care.  o Stay in your own room, even for meals. Use your own bathroom if you can.  o Stay away from others in your home. No hugging, kissing or shaking hands. No visitors.  o Don't go to work, school or anywhere else.    Clean \"high touch\" surfaces often (doorknobs, counters, handles, etc.). Use a household cleaning spray or wipes. You'll find a full list of  on the EPA website: www.epa.gov/pesticide-registration/list-n-disinfectants-use-against-sars-cov-2.    Cover your mouth and nose with a mask, tissue or washcloth to avoid spreading germs.    Wash your hands and face often. Use soap and water.    People in these groups are at risk for severe illness due to COVID-19:  o People 65 years and older  o People who live in a nursing home or long-term care facility  o People with chronic disease (lung, heart, cancer, diabetes, kidney, liver, immunologic)  o People who have a weakened immune system, including those who:  - Are in cancer treatment  - Take medicine that weakens the immune system, such as corticosteroids  - Had a bone marrow or organ transplant  - Have an immune deficiency  - Have poorly controlled HIV or AIDS  - Are obese (body mass index of 40 or higher)  - Smoke regularly      Caregivers should wear gloves while washing dishes, handling laundry and cleaning bedrooms and bathrooms.    Use caution when washing and drying laundry: Don't shake dirty laundry, and use the warmest water setting that you can.    For more tips, go to www.cdc.gov/coronavirus/2019-ncov/downloads/10Things.pdf.    Sign up for [employer]. We know it's scary to hear that you might have COVID-19. We want to track your symptoms to make sure you're okay over the next 2 weeks. Please look for an email from [employer]--this is a free, online program that we'll use to keep in touch. To sign up, follow the link in the email you will receive. Learn more " at [email or web address]    How can I take care of myself?    Get lots of rest. Drink extra fluids (unless a doctor has told you not to)    Take Tylenol (acetaminophen) for fever or pain. If you have liver or kidney problems, ask your family doctor if it's okay to take Tylenol.  Adults can take either:    650 mg (two 325 mg pills) every 4 to 6 hours, or     1,000 mg (two 500 mg pills) every 8 hours as needed.    Note: Don't take more than 3,000 mg in one day. Acetaminophen is found in many medicines (both prescribed and over-the-counter medicines). Read all labels to be sure you don't take too much.  For children, check the Tylenol bottle for the right dose. The dose is based on the child's age or weight.    If you have other health problems (like cancer, heart failure, an organ transplant or severe kidney disease): Call your specialty clinic if you don't feel better in the next 2 days.    Know when to call 911. Emergency warning signs include:  Trouble breathing or shortness of breath  Pain or pressure in the chest that doesn't go away  Feeling confused like you haven't felt before, or not being able to wake up  Bluish-colored lips or face    Where can I get more information?    [facility] - About COVID-19: [email or web address]  CDC - What to Do If You're Sick: www.cdc.gov/coronavirus/2019-ncov/about/steps-when-sick.html    Dear Annalise Wallace,    Your symptoms show that you may have coronavirus (COVID-19). This illness can cause fever, cough and trouble breathing. Many people get a mild case and get better on their own. Some people can get very sick.    Will I be tested for COVID-19?  We would like to test you for Covid-19 virus. I have placed orders for this test.     For all employees or close contacts (except [place] and [place] - see below), go to your [employer] home page and scroll down to the section that says  You have an appointment that needs to be scheduled  and click the  "large green button that says  Schedule Now  and follow the steps to find the next available opening.     If you are unable to complete these steps or if you cannot find any available times, please call [phone] to schedule employee testing.       [place] employees or close contacts, please call [phone].   [employer] employees or close contacts call [phone].    When it's time for your COVID test:  Stay at least 6 feet away from others. (If someone will drive you to your test, stay in the backseat, as far away from the  as you can.)  Cover your mouth and nose with a mask, tissue or washcloth.  Go straight to the testing site. Don't make any stops on the way there or back.    Starting now:     Do not go to work.   o If you receive a negative COVID-19 test result and were NOT exposed to someone with a known positive COVID-19 test, you can return to work once you're free of fever for 24 hours without fever-reducing medication and your symptoms are improving or resolved.  o If you receive a positive COVID-19 test result, you must be cleared by Employee Occupational Health and Safety to return to work.   o If you were exposed to someone who has tested positive for COVID-19, you can return to work 14 days after your last contact with the positive individual, provided you do not have symptoms at all during that time. In some cases, your manager may ask you to come back sooner than 14 days.     During this time, don't leave the house except for testing or medical care.  o Stay in your own room, even for meals. Use your own bathroom if you can.  o Stay away from others in your home. No hugging, kissing or shaking hands. No visitors.  o Don't go to work, school or anywhere else.    Clean \"high touch\" surfaces often (doorknobs, counters, handles, etc.). Use a household cleaning spray or wipes. You'll find a full list of  on the EPA website: " www.epa.gov/pesticide-registration/list-n-disinfectants-use-against-sars-cov-2.    Cover your mouth and nose with a mask, tissue or washcloth to avoid spreading germs.    Wash your hands and face often. Use soap and water.    People in these groups are at risk for severe illness due to COVID-19:  o People 65 years and older  o People who live in a nursing home or long-term care facility  o People with chronic disease (lung, heart, cancer, diabetes, kidney, liver, immunologic)  o People who have a weakened immune system, including those who:  - Are in cancer treatment  - Take medicine that weakens the immune system, such as corticosteroids  - Had a bone marrow or organ transplant  - Have an immune deficiency  - Have poorly controlled HIV or AIDS  - Are obese (body mass index of 40 or higher)  - Smoke regularly      Caregivers should wear gloves while washing dishes, handling laundry and cleaning bedrooms and bathrooms.    Use caution when washing and drying laundry: Don't shake dirty laundry, and use the warmest water setting that you can.    For more tips, go to www.cdc.gov/coronavirus/2019-ncov/downloads/10Things.pdf.    Sign up for [employer]ix. We know it's scary to hear that you might have COVID-19. We want to track your symptoms to make sure you're okay over the next 2 weeks. Please look for an email from [employer]--this is a free, online program that we'll use to keep in touch. To sign up, follow the link in the email you will receive. Learn more at [email or web address]    How can I take care of myself?    Get lots of rest. Drink extra fluids (unless a doctor has told you not to)    Take Tylenol (acetaminophen) for fever or pain. If you have liver or kidney problems, ask your family doctor if it's okay to take Tylenol.  Adults can take either:    650 mg (two 325 mg pills) every 4 to 6 hours, or     1,000 mg (two 500 mg pills) every 8 hours as needed.    Note: Don't take more than 3,000 mg in  one day. Acetaminophen is found in many medicines (both prescribed and over-the-counter medicines). Read all labels to be sure you don't take too much.  For children, check the Tylenol bottle for the right dose. The dose is based on the child's age or weight.    If you have other health problems (like cancer, heart failure, an organ transplant or severe kidney disease): Call your specialty clinic if you don't feel better in the next 2 days.    Know when to call 911. Emergency warning signs include:  Trouble breathing or shortness of breath  Pain or pressure in the chest that doesn't go away  Feeling confused like you haven't felt before, or not being able to wake up  Bluish-colored lips or face    Where can I get more information?     [facility] [facility] - About COVID-19: [email or web address]  CDC - What to Do If You're Sick: www.cdc.gov/coronavirus/2019-ncov/about/steps-when-sick.html

## 2020-12-01 ENCOUNTER — MYC REFILL (OUTPATIENT)
Dept: FAMILY MEDICINE | Facility: CLINIC | Age: 65
End: 2020-12-01

## 2020-12-01 DIAGNOSIS — M54.2 NECK PAIN: ICD-10-CM

## 2020-12-01 LAB
SARS-COV-2 RNA SPEC QL NAA+PROBE: NOT DETECTED
SPECIMEN SOURCE: NORMAL

## 2020-12-01 RX ORDER — OXYCODONE AND ACETAMINOPHEN 5; 325 MG/1; MG/1
TABLET ORAL
Qty: 120 TABLET | Refills: 0 | Status: SHIPPED | OUTPATIENT
Start: 2020-12-01 | End: 2020-12-28

## 2020-12-01 NOTE — TELEPHONE ENCOUNTER
Routing refill request to provider for review/approval because:  Drug not on the [facility] refill protocol     Yandy Paul RN on 12/1/2020 at 8:38 AM

## 2020-12-28 ENCOUNTER — MYC REFILL (OUTPATIENT)
Dept: FAMILY MEDICINE | Facility: CLINIC | Age: 65
End: 2020-12-28

## 2020-12-28 DIAGNOSIS — M54.2 NECK PAIN: ICD-10-CM

## 2020-12-28 NOTE — TELEPHONE ENCOUNTER
Routing refill request to provider for review/approval because:  Drug not on the [facility] refill protocol     Jovita Diez RN   [facility] -- Triage Nurse

## 2020-12-29 RX ORDER — OXYCODONE AND ACETAMINOPHEN 5; 325 MG/1; MG/1
TABLET ORAL
Qty: 120 TABLET | Refills: 0 | Status: SHIPPED | OUTPATIENT
Start: 2020-12-29 | End: 2021-01-26

## 2021-01-05 ENCOUNTER — MYC REFILL (OUTPATIENT)
Dept: FAMILY MEDICINE | Facility: CLINIC | Age: 66
End: 2021-01-05

## 2021-01-05 DIAGNOSIS — G47.00 PERSISTENT DISORDER OF INITIATING OR MAINTAINING SLEEP: ICD-10-CM

## 2021-01-06 RX ORDER — TRAZODONE HYDROCHLORIDE 100 MG/1
TABLET ORAL
Qty: 90 TABLET | Refills: 0 | Status: SHIPPED | OUTPATIENT
Start: 2021-01-06 | End: 2021-04-07

## 2021-01-26 ENCOUNTER — TELEPHONE (OUTPATIENT)
Dept: FAMILY MEDICINE | Facility: CLINIC | Age: 66
End: 2021-01-26

## 2021-01-26 ENCOUNTER — MYC REFILL (OUTPATIENT)
Dept: FAMILY MEDICINE | Facility: CLINIC | Age: 66
End: 2021-01-26

## 2021-01-26 DIAGNOSIS — M54.2 NECK PAIN: ICD-10-CM

## 2021-01-26 RX ORDER — OXYCODONE AND ACETAMINOPHEN 5; 325 MG/1; MG/1
TABLET ORAL
Qty: 120 TABLET | Refills: 0 | Status: SHIPPED | OUTPATIENT
Start: 2021-01-26 | End: 2021-02-23

## 2021-01-26 NOTE — TELEPHONE ENCOUNTER
Pt has new insurance in 2021 and now needs a PA on Percocet; insurance will only cover 7 days supply until the PA is approved    Pt's insurance requires a PA on Percocet    Diagnosis Code: M542    Please provide reasoning / documentation and forward to PA team at [id number].  Thanks!!    PA NEEDED ON: Oxycodone/Acetaminophen 5/325mg  INS IS: Jc Part D  BIN: [id number]  PHONE # IS: [phone]  ID # IS: [id number]  GROUP: [id number]  PCN: [id number]    PLEASE LET US KNOW WHEN PA IS GRANTED/DENIED.  THANK YOU!    Trish Jiménez  [facility]  [phone]

## 2021-01-26 NOTE — TELEPHONE ENCOUNTER
Two level cervical vertebral fusion, with chronic neck and arm pain Stale on his regimen for 10 years without accelerating it.

## 2021-01-28 NOTE — TELEPHONE ENCOUNTER
Prior Authorization Approval    Authorization Effective Date: 12/29/2020  Authorization Expiration Date: 1/28/2022  Medication: oxyCODONE-acetaminophen (PERCOCET) 5-325 MG tablet--APPROVED  Approved Dose/Quantity:   Reference #:     Insurance Company: [employer] - Phone [phone] Fax [phone]  Expected CoPay:       CoPay Card Available:      Foundation Assistance Needed:    Which Pharmacy is filling the prescription (Not needed for infusion/clinic administered): [facility] [place], [place] - [address] [address]  Pharmacy Notified: Yes  Patient Notified: Yes **Instructed pharmacy to notify patient when script is ready to /ship.**

## 2021-01-28 NOTE — TELEPHONE ENCOUNTER
PA Initiation    Medication: oxyCODONE-acetaminophen (PERCOCET) 5-325 MG tablet  Insurance Company: [employer] - Phone [phone] Fax [phone]  Pharmacy Filling the Rx: [place], [place] - [address]  Filling Pharmacy Phone: [phone]  Filling Pharmacy Fax: [phone]  Start Date: 1/28/2021

## 2021-01-28 NOTE — TELEPHONE ENCOUNTER
Noted, pt and pharmacy informed, PA approved, FYI to PAVAN TRACEY MD, no action needed  Annalise Clifford RN, BSN  Message handled by CLINIC NURSE.

## 2021-02-23 ENCOUNTER — MYC REFILL (OUTPATIENT)
Dept: FAMILY MEDICINE | Facility: CLINIC | Age: 66
End: 2021-02-23

## 2021-02-23 DIAGNOSIS — M54.2 NECK PAIN: ICD-10-CM

## 2021-02-23 RX ORDER — OXYCODONE AND ACETAMINOPHEN 5; 325 MG/1; MG/1
TABLET ORAL
Qty: 120 TABLET | Refills: 0 | Status: SHIPPED | OUTPATIENT
Start: 2021-02-23 | End: 2021-03-21

## 2021-02-23 NOTE — TELEPHONE ENCOUNTER
Routing refill request to provider for review/approval because:  Drug not on the [facility] refill protocol     Yandy Paul RN on 2/23/2021 at 10:10 AM

## 2021-03-21 ENCOUNTER — MYC REFILL (OUTPATIENT)
Dept: FAMILY MEDICINE | Facility: CLINIC | Age: 66
End: 2021-03-21

## 2021-03-21 DIAGNOSIS — G89.29 OTHER CHRONIC PAIN: ICD-10-CM

## 2021-03-21 DIAGNOSIS — M54.2 NECK PAIN: ICD-10-CM

## 2021-03-22 DIAGNOSIS — N95.1 SYMPTOMATIC MENOPAUSAL OR FEMALE CLIMACTERIC STATES: ICD-10-CM

## 2021-03-22 RX ORDER — CITALOPRAM HYDROBROMIDE 20 MG/1
TABLET ORAL
Qty: 90 TABLET | Refills: 1 | Status: SHIPPED | OUTPATIENT
Start: 2021-03-22 | End: 2021-08-31

## 2021-03-22 RX ORDER — OXYCODONE AND ACETAMINOPHEN 5; 325 MG/1; MG/1
TABLET ORAL
Qty: 120 TABLET | Refills: 0 | Status: SHIPPED | OUTPATIENT
Start: 2021-03-22 | End: 2021-04-19

## 2021-04-06 DIAGNOSIS — G47.00 PERSISTENT DISORDER OF INITIATING OR MAINTAINING SLEEP: ICD-10-CM

## 2021-04-07 RX ORDER — TRAZODONE HYDROCHLORIDE 100 MG/1
TABLET ORAL
Qty: 90 TABLET | Refills: 1 | Status: SHIPPED | OUTPATIENT
Start: 2021-04-07 | End: 2021-08-31

## 2021-04-07 NOTE — TELEPHONE ENCOUNTER
Prescription approved per [facility] Refill Protocol.    Beau Patiño RN

## 2021-04-19 ENCOUNTER — MYC REFILL (OUTPATIENT)
Dept: FAMILY MEDICINE | Facility: CLINIC | Age: 66
End: 2021-04-19

## 2021-04-19 DIAGNOSIS — M54.2 NECK PAIN: ICD-10-CM

## 2021-04-19 RX ORDER — OXYCODONE AND ACETAMINOPHEN 5; 325 MG/1; MG/1
TABLET ORAL
Qty: 120 TABLET | Refills: 0 | Status: SHIPPED | OUTPATIENT
Start: 2021-04-19 | End: 2021-05-17

## 2021-04-19 NOTE — TELEPHONE ENCOUNTER
Over due for 3 month follow up with pcp for pain management. Refilled to allow for this. Please schedule. Should be OV. Has not been seen in office in 14 months.     -duglas reina, pac

## 2021-04-19 NOTE — TELEPHONE ENCOUNTER
Routing refill request to provider for review/approval because:  Drug not on the [facility] refill protocol     Jovita Diez RN   [facility] -- Triage Nurse

## 2021-04-20 NOTE — TELEPHONE ENCOUNTER
Patient returned call, scheduled a med check appointment for 05/06/21 with Dr. Luz Elena Wolf. Ruth Behrens.

## 2021-05-06 ENCOUNTER — OFFICE VISIT (OUTPATIENT)
Dept: FAMILY MEDICINE | Facility: CLINIC | Age: 66
End: 2021-05-06
Payer: COMMERCIAL

## 2021-05-06 VITALS
HEART RATE: 82 BPM | DIASTOLIC BLOOD PRESSURE: 77 MMHG | SYSTOLIC BLOOD PRESSURE: 139 MMHG | HEIGHT: 66 IN | TEMPERATURE: 97.9 F | BODY MASS INDEX: 25.62 KG/M2 | WEIGHT: 159.4 LBS | OXYGEN SATURATION: 96 %

## 2021-05-06 DIAGNOSIS — Z12.31 ENCOUNTER FOR SCREENING MAMMOGRAM FOR BREAST CANCER: ICD-10-CM

## 2021-05-06 DIAGNOSIS — G89.29 OTHER CHRONIC PAIN: Primary | ICD-10-CM

## 2021-05-06 DIAGNOSIS — Z12.11 ENCOUNTER FOR SCREENING COLONOSCOPY: ICD-10-CM

## 2021-05-06 PROCEDURE — 99213 OFFICE O/P EST LOW 20 MIN: CPT | Performed by: FAMILY MEDICINE

## 2021-05-06 PROCEDURE — 80307 DRUG TEST PRSMV CHEM ANLYZR: CPT | Performed by: FAMILY MEDICINE

## 2021-05-06 ASSESSMENT — ANXIETY QUESTIONNAIRES
6. BECOMING EASILY ANNOYED OR IRRITABLE: NOT AT ALL
7. FEELING AFRAID AS IF SOMETHING AWFUL MIGHT HAPPEN: NOT AT ALL
1. FEELING NERVOUS, ANXIOUS, OR ON EDGE: NOT AT ALL
GAD7 TOTAL SCORE: 0
GAD7 TOTAL SCORE: 0
5. BEING SO RESTLESS THAT IT IS HARD TO SIT STILL: NOT AT ALL
GAD7 TOTAL SCORE: 0
3. WORRYING TOO MUCH ABOUT DIFFERENT THINGS: NOT AT ALL
4. TROUBLE RELAXING: NOT AT ALL
2. NOT BEING ABLE TO STOP OR CONTROL WORRYING: NOT AT ALL
7. FEELING AFRAID AS IF SOMETHING AWFUL MIGHT HAPPEN: NOT AT ALL

## 2021-05-06 ASSESSMENT — PATIENT HEALTH QUESTIONNAIRE - PHQ9
SUM OF ALL RESPONSES TO PHQ QUESTIONS 1-9: 0
10. IF YOU CHECKED OFF ANY PROBLEMS, HOW DIFFICULT HAVE THESE PROBLEMS MADE IT FOR YOU TO DO YOUR WORK, TAKE CARE OF THINGS AT HOME, OR GET ALONG WITH OTHER PEOPLE: NOT DIFFICULT AT ALL
SUM OF ALL RESPONSES TO PHQ QUESTIONS 1-9: 0

## 2021-05-06 ASSESSMENT — MIFFLIN-ST. JEOR: SCORE: 1278.75

## 2021-05-06 NOTE — PROGRESS NOTES
"    Assessment & Plan     Other chronic pain  Reviewed PDMP, no red flags.  Ordered Urine drug screen today.  Assuming results return normal, patient can follow up with her PCP in 3-6 months.  We will refill her Percocet when it is due in a few weeks.  - Drug Confirmation Panel Urine with Creat (Care Map)    Encounter for screening colonoscopy  - GASTROENTEROLOGY ADULT REF PROCEDURE ONLY; Future    Encounter for screening mammogram for breast cancer  - *MA Screening Digital Bilateral; Future    15 minutes spent on the date of the encounter doing chart review, history and exam, documentation and further activities per the note     BMI:   Estimated body mass index is 25.53 kg/m  as calculated from the following:    Height as of this encounter: 1.683 m (5' 6.25\").    Weight as of this encounter: 72.3 kg (159 lb 6.4 oz).       See Patient Instructions    Return in about 6 months (around 11/6/2021) for Medication recheck.    Estefani Wolf MD  [facility]    Ganga Woody is a 65 year old who presents for the following health issues     Medication check > Oxycodone  Taking Rx as prescribed, no side effects    History of Present Illness       She eats 0-1 servings of fruits and vegetables daily.She consumes 0 sweetened beverage(s) daily.She exercises with enough effort to increase her heart rate 20 to 29 minutes per day.  She exercises with enough effort to increase her heart rate 4 days per week.   She is taking medications regularly.       Musculoskeletal problem/pain  Onset/Duration: 2 months approx  Description  Location: arm - right  Joint Swelling: no  Redness: no  Pain: YES  Warmth: no  Intensity:  Mild to severe  Progression of Symptoms:  same  Accompanying signs and symptoms:   Fevers: no  Numbness/tingling/weakness: YES- already have numbness/tingling from neck pain  History  Trauma to the area: no  Recent illness:  no  Previous similar problem: no  Previous evaluation:  " "no  Precipitating or alleviating factors:  Aggravating factors include: lying on right side  Therapies tried and outcome: Ibuprofen and Prescribed Rx    Here for medication recheck.  Chronic neck pain, has had multiple surgeries, has degenerative disc disease.  No changes with chronic pain.  Has been having some pains in right shoulder and right upper arm, getting some \"jolts of electricity\", would like to discuss possible MRI in the future with her PCP.    Does not need refills of anything today, however she was told that she needed to be seen in office for further refills of her chronic pain medication.    Review of Systems   Constitutional, HEENT, cardiovascular, pulmonary, gi and gu systems are negative, except as otherwise noted.      Objective    /77 (BP Location: Right arm, Patient Position: Sitting, Cuff Size: Adult Regular)   Pulse 82   Temp 97.9  F (36.6  C) (Oral)   Ht 1.683 m (5' 6.25\")   Wt 72.3 kg (159 lb 6.4 oz)   SpO2 96%   BMI 25.53 kg/m    Body mass index is 25.53 kg/m .  Physical Exam   GENERAL: healthy, alert and no distress  PSYCH: mentation appears normal, affect normal/bright            Answers for HPI/ROS submitted by the patient on 5/6/2021   Chronic problems general questions HPI Form  If you checked off any problems, how difficult have these problems made it for you to do your work, take care of things at home, or get along with other people?: Not difficult at all  PHQ9 TOTAL SCORE: 0  JEANNINE 7 TOTAL SCORE: 0    "

## 2021-05-06 NOTE — PATIENT INSTRUCTIONS
[facility],  [facility] [phone],   Toll Free: [phone]

## 2021-05-07 ASSESSMENT — ANXIETY QUESTIONNAIRES: GAD7 TOTAL SCORE: 0

## 2021-05-07 ASSESSMENT — PATIENT HEALTH QUESTIONNAIRE - PHQ9: SUM OF ALL RESPONSES TO PHQ QUESTIONS 1-9: 0

## 2021-05-11 LAB
CREAT UR-MCNC: 102 MG/DL
OXYCODONE UR CFM-MCNC: 940 NG/ML
OXYCODONE/CREAT UR: 922 NG/MG{CREAT}
OXYMORPHONE UR CFM-MCNC: 272 NG/ML
OXYMORPHONE/CREAT UR: 267 NG/MG{CREAT}
RPT COMMENT: ABNORMAL

## 2021-05-17 ENCOUNTER — MYC REFILL (OUTPATIENT)
Dept: FAMILY MEDICINE | Facility: CLINIC | Age: 66
End: 2021-05-17

## 2021-05-17 DIAGNOSIS — M54.2 NECK PAIN: ICD-10-CM

## 2021-05-18 RX ORDER — OXYCODONE AND ACETAMINOPHEN 5; 325 MG/1; MG/1
TABLET ORAL
Qty: 120 TABLET | Refills: 0 | Status: SHIPPED | OUTPATIENT
Start: 2021-05-18 | End: 2021-06-16

## 2021-06-16 ENCOUNTER — MYC REFILL (OUTPATIENT)
Dept: FAMILY MEDICINE | Facility: CLINIC | Age: 66
End: 2021-06-16

## 2021-06-16 DIAGNOSIS — M54.2 NECK PAIN: ICD-10-CM

## 2021-06-16 RX ORDER — OXYCODONE AND ACETAMINOPHEN 5; 325 MG/1; MG/1
TABLET ORAL
Qty: 120 TABLET | Refills: 0 | Status: SHIPPED | OUTPATIENT
Start: 2021-06-16 | End: 2021-07-12

## 2021-08-04 NOTE — TELEPHONE ENCOUNTER
"Requested Prescriptions   Pending Prescriptions Disp Refills     citalopram (CELEXA) 20 MG tablet [Pharmacy Med Name: CITALOPRAM HYDROBROMIDE 20MG TABS]  Last Written Prescription Date:  09/18/2018  Last Fill Quantity: 90 tablet,  # refills: 1   Last office visit: 2/4/2019 with prescribing provider:  Abelino Floers MD    Future Office Visit:     90 tablet 1     Sig: TAKE ONE TABLET BY MOUTH EVERY DAY    SSRIs Protocol Passed - 3/12/2019 11:30 AM   PHQ-9 SCORE 4/14/2016 12/9/2016 12/5/2017   PHQ-9 Total Score - - -   PHQ-9 Total Score 3 1 0     JEANNINE-7 SCORE 9/21/2015 12/9/2016 12/5/2017   Total Score - - -   Total Score 0 0 0           Passed - Recent (12 mo) or future (30 days) visit within the authorizing provider's specialty    Patient had office visit in the last 12 months or has a visit in the next 30 days with authorizing provider or within the authorizing provider's specialty.  See \"Patient Info\" tab in inbasket, or \"Choose Columns\" in Meds & Orders section of the refill encounter.             Passed - Medication is active on med list       Passed - Patient is age 18 or older       Passed - No active pregnancy on record       Passed - No positive pregnancy test in last 12 months          "
Routing refill request to provider for review/approval because:  Drug not on the [facility] refill protocol for dx attached  Cinthya Aguirre RN, BSN          
Concern for cardiopulmonary deterioration

## 2021-08-31 RX ORDER — OXYCODONE AND ACETAMINOPHEN 5; 325 MG/1; MG/1
TABLET ORAL
Qty: 120 TABLET | Refills: 0 | Status: CANCELLED | OUTPATIENT
Start: 2021-08-31

## 2021-09-01 NOTE — PROGRESS NOTES
Ganga Woody is a 65 year old who presents for the following health issues neck and shoulder severe chronic pain , cervical  Fusion history CSA for opioid treatment with history of strong compliance     History of Present Illness       She eats 2-3 servings of fruits and vegetables daily.She consumes 0 sweetened beverage(s) daily.She exercises with enough effort to increase her heart rate 10 to 19 minutes per day.  She exercises with enough effort to increase her heart rate 3 or less days per week.   She is taking medications regularly.       Chronic Pain Follow-Up    Where in your body do you have pain neck,back  How has your pain affected your ability to work? Not applicable  Which of these pain treatments have you tried since your last clinic visit? Heat  How well are you sleeping? Good  How has your mood been since your last visit? About the same  Have you had a significant life event? No  Other aggravating factors: prolonged sitting  Taking medication as directed? Yes    PHQ-9 SCORE 9/7/2019 5/6/2021 9/3/2021   PHQ-9 Total Score - - -   PHQ-9 Total Score MyChart 5 (Mild depression) 0 0   PHQ-9 Total Score 5 0 0     JEANNINE-7 SCORE 9/7/2019 5/6/2021 9/3/2021   Total Score - - -   Total Score 3 (minimal anxiety) 0 (minimal anxiety) 0 (minimal anxiety)   Total Score 3 0 0     No flowsheet data found.  Encounter-Level CSA - 03/08/2017:    Controlled Substance Agreement - Scan on 3/16/2017 11:46 AM: CONTROLLED SUBSTANCE AGREEMENT     Encounter-Level CSA - 12/15/2014:    Controlled Substance Agreement - Scan on 12/16/2014  9:34 AM: [facility] Controlled Medication Agreement 12-15-14     Patient-Level CSA:    There are no patient-level csa.         Medication Followup of  All meds    Taking Medication as prescribed: yes    Side Effects:  None    Medication Helping Symptoms:  yes       Review of Systems   Constitutional, HEENT, cardiovascular, pulmonary, GI, , musculoskeletal, neuro, skin, endocrine and  "psych systems are negative, except as otherwise noted.      Objective    /75 (BP Location: Right arm, Patient Position: Sitting, Cuff Size: Adult Regular)   Pulse 68   Resp 16   Ht 1.676 m (5' 6\")   Wt 73.5 kg (162 lb)   BMI 26.15 kg/m    Body mass index is 26.15 kg/m .  Physical Exam   GENERAL: healthy, alert and no distress  EYES: Eyes grossly normal to inspection, PERRL and conjunctivae and sclerae normal  HENT: ear canals and TM's normal, nose and mouth without ulcers or lesions  NECK: no adenopathy, no asymmetry, masses, or scars and thyroid normal to palpation  RESP: lungs clear to auscultation - no rales, rhonchi or wheezes  BREAST: mammogram today   CV: regular rate and rhythm, normal S1 S2, no S3 or S4, no murmur, click or rub, no peripheral edema and peripheral pulses strong  ABDOMEN: soft, nontender, no hepatosplenomegaly, no masses and bowel sounds normal  MS: no gross musculoskeletal defects noted, no edema  SKIN: no suspicious lesions or rashes  NEURO: Normal strength and tone, mentation intact and speech normal  PSYCH: mentation appears normal, affect normal/bright    Getting mammogram today     (N95.1) Symptomatic menopausal or female climacteric states  Comment:   Plan: citalopram (CELEXA) 20 MG tablet        Helps with irritability     (R10.11) ABDOMINAL PAIN RUQ [789.01]  Comment: and epigastric  Plan: omeprazole (PRILOSEC) 40 MG DR capsule        Foods bother some    (M54.2) Neck pain  Comment: fusion  Plan: Drug Confirmation Panel Urine with Creat - lab         collect            (G89.29) Other chronic pain  Comment:   Plan: tiZANidine (ZANAFLEX) 4 MG tablet, Drug         Confirmation Panel Urine with Creat - lab         collect        Oxycodone, compliance is excellent     (G47.00) Persistent disorder of initiating or maintaining sleep  Comment:   Plan: traZODone (DESYREL) 100 MG tablet        meds effective    (Z13.220) Screening for hyperlipidemia  Comment:   Plan: Lipid panel " reflex to direct LDL Fasting,         **Comprehensive metabolic panel FUTURE 2mo            (Z12.11) Screen for colon cancer  Comment:   Plan: Occult blood fecal HGB immuno                    Answers for HPI/ROS submitted by the patient on 9/3/2021  If you checked off any problems, how difficult have these problems made it for you to do your work, take care of things at home, or get along with other people?: Not difficult at all  PHQ9 TOTAL SCORE: 0  JEANNINE 7 TOTAL SCORE: 0

## 2021-09-03 ENCOUNTER — ANCILLARY PROCEDURE (OUTPATIENT)
Dept: MAMMOGRAPHY | Facility: CLINIC | Age: 66
End: 2021-09-03
Payer: COMMERCIAL

## 2021-09-03 ENCOUNTER — OFFICE VISIT (OUTPATIENT)
Dept: FAMILY MEDICINE | Facility: CLINIC | Age: 66
End: 2021-09-03
Payer: COMMERCIAL

## 2021-09-03 ENCOUNTER — TELEPHONE (OUTPATIENT)
Dept: FAMILY MEDICINE | Facility: CLINIC | Age: 66
End: 2021-09-03

## 2021-09-03 VITALS
RESPIRATION RATE: 16 BRPM | HEIGHT: 66 IN | BODY MASS INDEX: 26.03 KG/M2 | DIASTOLIC BLOOD PRESSURE: 75 MMHG | SYSTOLIC BLOOD PRESSURE: 134 MMHG | HEART RATE: 68 BPM | WEIGHT: 162 LBS

## 2021-09-03 DIAGNOSIS — G47.00 PERSISTENT DISORDER OF INITIATING OR MAINTAINING SLEEP: ICD-10-CM

## 2021-09-03 DIAGNOSIS — Z12.11 SCREEN FOR COLON CANCER: ICD-10-CM

## 2021-09-03 DIAGNOSIS — Z13.220 SCREENING FOR HYPERLIPIDEMIA: ICD-10-CM

## 2021-09-03 DIAGNOSIS — M54.2 NECK PAIN: ICD-10-CM

## 2021-09-03 DIAGNOSIS — G89.29 OTHER CHRONIC PAIN: ICD-10-CM

## 2021-09-03 DIAGNOSIS — Z12.31 ENCOUNTER FOR SCREENING MAMMOGRAM FOR BREAST CANCER: ICD-10-CM

## 2021-09-03 DIAGNOSIS — N95.1 SYMPTOMATIC MENOPAUSAL OR FEMALE CLIMACTERIC STATES: ICD-10-CM

## 2021-09-03 DIAGNOSIS — R10.11 ABDOMINAL PAIN, RIGHT UPPER QUADRANT: ICD-10-CM

## 2021-09-03 LAB — CREAT UR-MCNC: 32 MG/DL

## 2021-09-03 PROCEDURE — 99214 OFFICE O/P EST MOD 30 MIN: CPT | Performed by: FAMILY MEDICINE

## 2021-09-03 PROCEDURE — 80307 DRUG TEST PRSMV CHEM ANLYZR: CPT | Performed by: FAMILY MEDICINE

## 2021-09-03 PROCEDURE — 82570 ASSAY OF URINE CREATININE: CPT | Performed by: FAMILY MEDICINE

## 2021-09-03 PROCEDURE — 77067 SCR MAMMO BI INCL CAD: CPT | Mod: TC | Performed by: RADIOLOGY

## 2021-09-03 RX ORDER — TRAZODONE HYDROCHLORIDE 100 MG/1
TABLET ORAL
Qty: 90 TABLET | Refills: 1 | Status: SHIPPED | OUTPATIENT
Start: 2021-09-03 | End: 2022-03-24

## 2021-09-03 RX ORDER — CITALOPRAM HYDROBROMIDE 20 MG/1
20 TABLET ORAL DAILY
Qty: 90 TABLET | Refills: 3 | Status: SHIPPED | OUTPATIENT
Start: 2021-09-03 | End: 2022-09-14

## 2021-09-03 RX ORDER — OMEPRAZOLE 40 MG/1
CAPSULE, DELAYED RELEASE ORAL
Qty: 90 CAPSULE | Refills: 3 | Status: SHIPPED | OUTPATIENT
Start: 2021-09-03 | End: 2022-08-25

## 2021-09-03 ASSESSMENT — ANXIETY QUESTIONNAIRES
GAD7 TOTAL SCORE: 0
5. BEING SO RESTLESS THAT IT IS HARD TO SIT STILL: NOT AT ALL
8. IF YOU CHECKED OFF ANY PROBLEMS, HOW DIFFICULT HAVE THESE MADE IT FOR YOU TO DO YOUR WORK, TAKE CARE OF THINGS AT HOME, OR GET ALONG WITH OTHER PEOPLE?: NOT DIFFICULT AT ALL
3. WORRYING TOO MUCH ABOUT DIFFERENT THINGS: NOT AT ALL
2. NOT BEING ABLE TO STOP OR CONTROL WORRYING: NOT AT ALL
GAD7 TOTAL SCORE: 0
6. BECOMING EASILY ANNOYED OR IRRITABLE: NOT AT ALL
4. TROUBLE RELAXING: NOT AT ALL
7. FEELING AFRAID AS IF SOMETHING AWFUL MIGHT HAPPEN: NOT AT ALL
1. FEELING NERVOUS, ANXIOUS, OR ON EDGE: NOT AT ALL
GAD7 TOTAL SCORE: 0
7. FEELING AFRAID AS IF SOMETHING AWFUL MIGHT HAPPEN: NOT AT ALL

## 2021-09-03 ASSESSMENT — MIFFLIN-ST. JEOR: SCORE: 1286.58

## 2021-09-03 NOTE — LETTER
Opioid / Opioid Plus Controlled Substance Agreement    This is an agreement between you and your provider about the safe and appropriate use of controlled substance/opioids prescribed by your care team. Controlled substances are medicines that can cause physical and mental dependence (abuse).    There are strict laws about having and using these medicines. We here at [facility] are committing to working with you in your efforts to get better. To support you in this work, we ll help you schedule regular office appointments for medicine refills. If we must cancel or change your appointment for any reason, we ll make sure you have enough medicine to last until your next appointment.     As a Provider, I will:    Listen carefully to your concerns and treat you with respect.     Recommend a treatment plan that I believe is in your best interest. This plan may involve therapies other than opioid pain medication.     Talk with you often about the possible benefits, and the risk of harm of any medicine that we prescribe for you.     Provide a plan on how to taper (discontinue or go off) using this medicine if the decision is made to stop its use.    As a Patient, I understand that opioid(s):     Are a controlled substance prescribed by my care team to help me function or work and manage my condition(s).     Are strong medicines and can cause serious side effects such as:    Drowsiness, which can seriously affect my driving ability    A lower breathing rate, enough to cause death    Harm to my thinking ability     Depression     Abuse of and addiction to this medicine    Need to be taken exactly as prescribed. Combining opioids with certain medicines or chemicals (such as illegal drugs, sedatives, sleeping pills, and benzodiazepines) can be dangerous or even fatal. If I stop opioids suddenly, I may have severe withdrawal symptoms.    Do not work for all types of pain nor for all patients. If they re not helpful, I may  be asked to stop them.        The risks, benefits and side effects of these medicine(s) were explained to me. I agree that:  1. I will take part in other treatments as advised by my care team. This may be psychiatry or counseling, physical therapy, behavioral therapy, group treatment or a referral to a specialist.     2. I will keep all my appointments. I understand that this is part of the monitoring of opioids. My care team may require an office visit for EVERY opioid/controlled substance refill. If I miss appointments or don t follow instructions, my care team may stop my medicine.    3. I will take my medicines as prescribed. I will not change the dose or schedule unless my care team tells me to. There will be no refills if I run out early.     4. I may be asked to come to the clinic and complete a urine drug test or complete a pill count at any time. If I don t give a urine sample or participate in a pill count, the care team may stop my medicine.    5. I will only receive prescriptions from this clinic for chronic pain. If I am treated by another provider for acute pain issues, I will tell them that I am taking opioid pain medication for chronic pain and that I have a treatment agreement with this provider. I will inform my [facility] care team within one business day if I am given a prescription for any pain medication by another healthcare provider. My [facility] care team can contact other providers and pharmacists about my use of any medicines.    6. It is up to me to make sure that I don t run out of my medicines on weekends or holidays. If my care team is willing to refill my opioid prescription without a visit, I must request refills only during office hours. Refills may take up to 3 business days to process. I will use one pharmacy to fill all my opioid and other controlled substance prescriptions. I will notify the clinic about any changes to my insurance or medication  availability.    7. I am responsible for my prescriptions. If the medicine/prescription is lost, stolen or destroyed, it will not be replaced. I also agree not to share controlled substance medicines with anyone.    8. I am aware I should not use any illegal or recreational drugs. I agree not to drink alcohol unless my care team says I can.       9. If I enroll in the [place] Medical Cannabis program, I will tell my care team prior to my next refill.     10. I will tell my care team right away if I become pregnant, have a new medical problem treated outside of my regular clinic, or have a change in my medications.    11. I understand that this medicine can affect my thinking, judgment and reaction time. Alcohol and drugs affect the brain and body, which can affect the safety of my driving. Being under the influence of alcohol or drugs can affect my decision-making, behaviors, personal safety, and the safety of others. Driving while impaired (DWI) can occur if a person is driving, operating, or in physical control of a car, motorcycle, boat, snowmobile, ATV, motorbike, off-road vehicle, or any other motor vehicle ([place] Statute 169A.20). I understand the risk if I choose to drive or operate any vehicle or machinery.    I understand that if I do not follow any of the conditions above, my prescriptions or treatment may be stopped or changed.          Opioids  What You Need to Know    What are opioids?   Opioids are pain medicines that must be prescribed by a doctor. They are also known as narcotics.     Examples are:   1. morphine (MS Contin, Kate)  2. oxycodone (Oxycontin)  3. oxycodone and acetaminophen (Percocet)  4. hydrocodone and acetaminophen (Vicodin, Norco)   5. fentanyl patch (Duragesic)   6. hydromorphone (Dilaudid)   7. methadone  8. codeine (Tylenol #3)     What do opioids do well?   Opioids are best for severe short-term pain such as after a surgery or injury. They may work well for cancer pain. They may  help some people with long-lasting (chronic) pain.     What do opioids NOT do well?   Opioids never get rid of pain entirely, and they don t work well for most patients with chronic pain. Opioids don t reduce swelling, one of the causes of pain.                                    Other ways to manage chronic pain and improve function include:       Treat the health problem that may be causing pain    Anti-inflammation medicines, which reduce swelling and tenderness, such as ibuprofen (Advil, Motrin) or naproxen (Aleve)    Acetaminophen (Tylenol)    Antidepressants and anti-seizure medicines, especially for nerve pain    Topical treatments such as patches or creams    Injections or nerve blocks    Chiropractic or osteopathic treatment    Acupuncture, massage, deep breathing, meditation, visual imagery, aromatherapy    Use heat or ice at the pain site    Physical therapy     Exercise    Stop smoking    Take part in therapy       Risks and side effects     Talk to your doctor before you start or decide to keep taking opioids. Possible side effects include:      Lowering your breathing rate enough to cause death    Overdose, including death, especially if taking higher than prescribed doses    Worse depression symptoms; less pleasure in things you usually enjoy    Feeling tired or sluggish    Slower thoughts or cloudy thinking    Being more sensitive to pain over time; pain is harder to control    Trouble sleeping or restless sleep    Changes in hormone levels (for example, less testosterone)    Changes in sex drive or ability to have sex    Constipation    Unsafe driving    Itching and sweating    Dizziness    Nausea, throwing up and dry mouth    What else should I know about opioids?    Opioids may lead to dependence, tolerance, or addiction.      Dependence means that if you stop or reduce the medicine too quickly, you will have withdrawal symptoms. These include loose poop (diarrhea), jitters, flu-like symptoms,  nervousness and tremors. Dependence is not the same as addiction.                       Tolerance means needing higher doses over time to get the same effect. This may increase the chance of serious side effects.      Addiction is when people improperly use a substance that harms their body, their mind or their relations with others. Use of opiates can cause a relapse of addiction if you have a history of drug or alcohol abuse.      People who have used opioids for a long time may have a lower quality of life, worse depression, higher levels of pain and more visits to doctors.    You can overdose on opioids. Take these steps to lower your risk of overdose:    1. Recognize the signs:  Signs of overdose include decrease or loss of consciousness (blackout), slowed breathing, trouble waking up and blue lips. If someone is worried about overdose, they should call 911.    2. Talk to your doctor about Narcan (naloxone).   If you are at risk for overdose, you may be given a prescription for Narcan. This medicine very quickly reverses the effects of opioids.   If you overdose, a friend or family member can give you Narcan while waiting for the ambulance. They need to know the signs of overdose and how to give Narcan.     3. Don't use alcohol or street drugs.   Taking them with opioids can cause death.    4. Do not take any of these medicines unless your doctor says it s OK. Taking these with opioids can cause death:    Benzodiazepines, such as lorazepam (Ativan), alprazolam (Xanax) or diazepam (Valium)    Muscle relaxers, such as cyclobenzaprine (Flexeril)    Sleeping pills like zolpidem (Ambien)     Other opioids      How to keep you and other people safe while taking opioids:    1. Never share your opioids with others.  Opioid medicines are regulated by the Drug Enforcement Agency (JESSICA). Selling or sharing medications is a criminal act.    2. Be sure to store opioids in a secure place, locked up if possible. Young children  can easily swallow them and overdose.    3. When you are traveling with your medicines, keep them in the original bottles. If you use a pill box, be sure you also carry a copy of your medicine list from your clinic or pharmacy.    4. Safe disposal of opioids    Most pharmacies have places to get rid of medicine, called disposal kiosks. Medicine disposal options are also available in every [facility]. Search your county and  medication disposal  to find more options. You can find more details at:  [email or web address].[email or web address]-green/managing-unwanted-medications     I agree that my provider, clinic care team, and pharmacy may work with any city, state or federal law enforcement agency that investigates the misuse, sale, or other diversion of my controlled medicine. I will allow my provider to discuss my care with, or share a copy of, this agreement with any other treating provider, pharmacy or emergency room where I receive care.    I have read this agreement and have asked questions about anything I did not understand.    _______________________________________________________  Patient Signature - Annalise Wallace _____________________                   Date     _______________________________________________________  Provider Signature - Abelino Flores MD   _____________________                   Date     _______________________________________________________  Witness Signature (required if provider not present while patient signing)   _____________________                   Date

## 2021-09-03 NOTE — LETTER
[facility]  -- Controlled Medication Agreement    9/3/2021   Annalise Wallace   1955   [id number]       I understand that my provider is prescribing controlled medication (i.e., opioids, tranquilizers, barbiturates) to assist me in managing my chronic pain that has not responded to other treatments.  These medications are intended to decrease pain in order to improve function and/or ability to work.  The risks, benefits, and side effects or these medications have been explained to me and I agree to the following conditions for this type of treatment.    1. I will participate in other treatments (i.e., physical therapy, behavioral therapy, groups,) that my provider recommends.  I will be ready to taper or discontinue medications as other reasonable and effective treatments become available.  I understand I may be expected to see a health psychologist and a physical therapist at the discretion of my physician for ongoing functional assessment.  I will follow through with any recommendations made at this visit.    2. I will take my medications exactly as prescribed and will not change the medication dosage or schedule without my provider s approval.  Refills will not be given if I  run out early .  3. I will keep all regular appointments at the clinic (this includes nurse appointments and appointments with PT or behavioral medicine).  If I have three or more missed or cancelled appointments my medications may be discontinued.  4. I will not request or accept prescriptions for controlled substances from other physicians or individuals for my chronic pain condition.  If I develop another condition that requires the prescription of a controlled medication or if I am hospitalized for any reason, I will inform the clinic within one business day of receiving any treatment or medications.  5. I will designate one pharmacy where all of my prescriptions will be filled.  6. I will bring in the containers of all  medications prescribed each time I see my provider or a nurse even if there is no medication remaining.  This must be the original container from the pharmacy.  7. Refills of controlled medications will be made only during regular office hours, during a scheduled appointment with my provider or nurse.   8. I am responsible for my prescriptions.  If the medication is lost or stolen, I understand it will not be replaced.  9. I agree to abstain from all illegal and recreational drugs and will provide urine or blood specimens to monitor my compliance.  10. I will notify my nurse or provider immediately if I become pregnant.  11. I understand that controlled medications can affect my thinking and judgment and may interfere with my ability to drive.  I will not drive if I have this concern and will not drive if any dosage adjustments are made until my body has adjusted.        I understand that if I violate any of the above conditions my prescription medications and/or treatment may be terminated.  If the violation includes obtaining any controlled substances from other healthcare providers or individuals a report may be made to my physician, pharmacy, and other authorities including the police.    I have read this agreement and it has been explained to me.  I fully understand the consequences of violating this agreement.        _________________                             ___________                _________________       Patient Signature                                Date                              Witness      _________________  Abelino Flores MD

## 2021-09-03 NOTE — TELEPHONE ENCOUNTER
Patient Quality Outreach Summary      Summary:    Patient is due/failing the following:   Breast Cancer Screening - Mammogram    Type of outreach:    Phone, spoke to patient/parent. Apt made for mammo    Questions for provider review:    None                                                                                                                    Karen Carreon MA  [facility].         Chart routed to none.

## 2021-09-04 ASSESSMENT — ANXIETY QUESTIONNAIRES: GAD7 TOTAL SCORE: 0

## 2021-09-04 ASSESSMENT — PATIENT HEALTH QUESTIONNAIRE - PHQ9: SUM OF ALL RESPONSES TO PHQ QUESTIONS 1-9: 0

## 2021-09-08 LAB
CREATININE URINE MG/DL  (SYNCED VALUE): 32 MG/DL
OXYCODONE UR CFM-MCNC: 508 NG/ML
OXYCODONE/CREAT UR: 1588 NG/MG {CREAT}
OXYMORPHONE UR CFM-MCNC: 93 NG/ML
OXYMORPHONE/CREAT UR: 291 NG/MG {CREAT}

## 2021-09-09 ENCOUNTER — MYC REFILL (OUTPATIENT)
Dept: FAMILY MEDICINE | Facility: CLINIC | Age: 66
End: 2021-09-09

## 2021-09-09 DIAGNOSIS — M54.2 NECK PAIN: ICD-10-CM

## 2021-09-09 RX ORDER — OXYCODONE AND ACETAMINOPHEN 5; 325 MG/1; MG/1
TABLET ORAL
Qty: 120 TABLET | Refills: 0 | Status: SHIPPED | OUTPATIENT
Start: 2021-09-09 | End: 2021-10-06

## 2021-09-12 ENCOUNTER — HEALTH MAINTENANCE LETTER (OUTPATIENT)
Age: 66
End: 2021-09-12

## 2021-10-06 ENCOUNTER — MYC REFILL (OUTPATIENT)
Dept: FAMILY MEDICINE | Facility: CLINIC | Age: 66
End: 2021-10-06

## 2021-10-06 DIAGNOSIS — M54.2 NECK PAIN: ICD-10-CM

## 2021-10-06 RX ORDER — OXYCODONE AND ACETAMINOPHEN 5; 325 MG/1; MG/1
TABLET ORAL
Qty: 120 TABLET | Refills: 0 | Status: SHIPPED | OUTPATIENT
Start: 2021-10-06 | End: 2021-11-03

## 2021-11-03 ENCOUNTER — MYC REFILL (OUTPATIENT)
Dept: FAMILY MEDICINE | Facility: CLINIC | Age: 66
End: 2021-11-03

## 2021-11-03 DIAGNOSIS — M54.2 NECK PAIN: ICD-10-CM

## 2021-11-03 RX ORDER — OXYCODONE AND ACETAMINOPHEN 5; 325 MG/1; MG/1
TABLET ORAL
Qty: 120 TABLET | Refills: 0 | Status: SHIPPED | OUTPATIENT
Start: 2021-11-03 | End: 2021-11-30

## 2021-11-30 ENCOUNTER — MYC REFILL (OUTPATIENT)
Dept: FAMILY MEDICINE | Facility: CLINIC | Age: 66
End: 2021-11-30
Payer: COMMERCIAL

## 2021-11-30 DIAGNOSIS — M54.2 NECK PAIN: ICD-10-CM

## 2021-12-01 RX ORDER — OXYCODONE AND ACETAMINOPHEN 5; 325 MG/1; MG/1
1 TABLET ORAL EVERY 6 HOURS PRN
Qty: 120 TABLET | Refills: 0 | Status: SHIPPED | OUTPATIENT
Start: 2021-12-01 | End: 2021-12-29

## 2021-12-23 ENCOUNTER — OFFICE VISIT (OUTPATIENT)
Dept: FAMILY MEDICINE | Facility: CLINIC | Age: 66
End: 2021-12-23
Payer: COMMERCIAL

## 2021-12-23 VITALS
SYSTOLIC BLOOD PRESSURE: 136 MMHG | OXYGEN SATURATION: 93 % | WEIGHT: 164 LBS | HEIGHT: 66 IN | DIASTOLIC BLOOD PRESSURE: 72 MMHG | TEMPERATURE: 98.6 F | BODY MASS INDEX: 26.36 KG/M2 | HEART RATE: 74 BPM

## 2021-12-23 DIAGNOSIS — Z13.220 SCREENING FOR LIPID DISORDERS: Primary | ICD-10-CM

## 2021-12-23 DIAGNOSIS — F41.8 DEPRESSION WITH ANXIETY: ICD-10-CM

## 2021-12-23 DIAGNOSIS — M54.40 ACUTE RIGHT-SIDED LOW BACK PAIN WITH SCIATICA, SCIATICA LATERALITY UNSPECIFIED: ICD-10-CM

## 2021-12-23 DIAGNOSIS — M54.2 CERVICALGIA: ICD-10-CM

## 2021-12-23 PROCEDURE — 99214 OFFICE O/P EST MOD 30 MIN: CPT | Performed by: FAMILY MEDICINE

## 2021-12-23 ASSESSMENT — MIFFLIN-ST. JEOR: SCORE: 1290.65

## 2021-12-23 NOTE — PROGRESS NOTES
Ganga Woody is a 66 year old who presents for the following health issues     History of Present Illness       She eats 0-1 servings of fruits and vegetables daily.She consumes 1 sweetened beverage(s) daily.She exercises with enough effort to increase her heart rate 9 or less minutes per day.  She exercises with enough effort to increase her heart rate 3 or less days per week.   She is taking medications regularly.       Medication Followup of : Multiple past history of lumbar and cervical disk disease, cervical cancer, tobacco use    Taking Medication as prescribed: yes    Side Effects:  None    Medication Helping Symptoms:  yes     Past Medical History:   Diagnosis Date     Abnormal Papanicolaou smear of cervix and cervical HPV      Degenerative disc disease      Depressive disorder, not elsewhere classified      Displacement of cervical intervertebral disc without myelopathy      Gastro-oesophageal reflux disease      H/O hysterectomy for benign disease      TOBACCO ABUSE-CONTINUOUS        Past Surgical History:   Procedure Laterality Date     BACK SURGERY       DISCECTOMY LUMBAR MINIMALLY INVASIVE ONE LEVEL  2/13/2014    Procedure: DISCECTOMY LUMBAR MINIMALLY INVASIVE ONE LEVEL;  Minimally Invasive Discectomy L2-3 Left ;  Surgeon: Juanjose Pitt MD;  Location: [facility] OR     DISCECTOMY LUMBAR MINIMALLY INVASIVE ONE LEVEL       FUSION SPINE POSTERIOR MINIMALLY INVASIVE ONE LEVEL  5/15/2014    Procedure: FUSION SPINE POSTERIOR MINIMALLY INVASIVE ONE LEVEL;  Surgeon: Juanjose Pitt MD;  Location: [facility] OR     HYSTERECTOMY VAGINAL, BILATERAL SALPINGO-OOPHERECTOMY, COMBINED       LAPAROSCOPIC HYSTERECTOMY TOTAL       ZZC NONSPECIFIC PROCEDURE      Lump removed L. breast - benign     ZZC NONSPECIFIC PROCEDURE      Cervical disk     ZZC NONSPECIFIC PROCEDURE      C-spine - Nany       Family History   Problem Relation Age of Onset     Cancer Mother      Cancer Father         throat     Diabetes  "Paternal Grandmother        Social History     Tobacco Use     Smoking status: Former Smoker     Packs/day: 1.00     Years: 30.00     Pack years: 30.00     Types: Cigarettes     Quit date: 2017     Years since quittin.5     Smokeless tobacco: Never Used   Substance Use Topics     Alcohol use: No     Alcohol/week: 0.0 standard drinks           Review of Systems         Objective    /72 (BP Location: Right arm, Patient Position: Sitting, Cuff Size: Adult Large)   Pulse 74   Temp 98.6  F (37  C) (Oral)   Ht 1.676 m (5' 6\")   Wt 74.4 kg (164 lb)   SpO2 93%   BMI 26.47 kg/m    Body mass index is 26.47 kg/m .  Physical Exam   GENERAL: healthy, alert and no distress  EYES: Eyes grossly normal to inspection, PERRL and conjunctivae and sclerae normal  HENT: ear canals and TM's normal, nose and mouth without ulcers or lesions  NECK: no adenopathy, no asymmetry, masses, or scars and thyroid normal to palpation  RESP: lungs clear to auscultation - no rales, rhonchi or wheezes  CV: regular rate and rhythm, normal S1 S2, no S3 or S4, no murmur, click or rub, no peripheral edema and peripheral pulses strong  ABDOMEN: soft, nontender, no hepatosplenomegaly, no masses and bowel sounds normal  MS: no gross musculoskeletal defects noted, no edema  SKIN: no suspicious lesions or rashes  NEURO: Normal strength and tone, mentation intact and speech normal  PSYCH: mentation appears normal, affect normal/bright    .(Z13.220) Screening for lipid disorders  (primary encounter diagnosis)  Comment:   Plan: CANCELED: LDL cholesterol direct, CANCELED:         Comprehensive metabolic panel (BMP + Alb, Alk         Phos, ALT, AST, Total. Bili, TP)            (M54.2) Cervicalgia  Comment:   Plan: needs frequent position changes     (F41.8) Depression with anxiety  Comment:   Plan: cancer history, family challenges     (M54.40) Acute right-sided low back pain with sciatica, sciatica laterality unspecified  Comment:   Plan: daily, " chronic

## 2021-12-29 DIAGNOSIS — M54.2 NECK PAIN: ICD-10-CM

## 2021-12-29 RX ORDER — OXYCODONE AND ACETAMINOPHEN 5; 325 MG/1; MG/1
TABLET ORAL
Qty: 120 TABLET | Refills: 0 | Status: SHIPPED | OUTPATIENT
Start: 2021-12-29 | End: 2022-01-27

## 2022-01-02 ENCOUNTER — HEALTH MAINTENANCE LETTER (OUTPATIENT)
Age: 67
End: 2022-01-02

## 2022-01-27 ENCOUNTER — OFFICE VISIT (OUTPATIENT)
Dept: FAMILY MEDICINE | Facility: CLINIC | Age: 67
End: 2022-01-27
Payer: COMMERCIAL

## 2022-01-27 VITALS
HEIGHT: 66 IN | OXYGEN SATURATION: 97 % | TEMPERATURE: 97.4 F | WEIGHT: 168.7 LBS | HEART RATE: 70 BPM | RESPIRATION RATE: 18 BRPM | BODY MASS INDEX: 27.11 KG/M2 | DIASTOLIC BLOOD PRESSURE: 60 MMHG | SYSTOLIC BLOOD PRESSURE: 126 MMHG

## 2022-01-27 DIAGNOSIS — Z13.220 LIPID SCREENING: ICD-10-CM

## 2022-01-27 DIAGNOSIS — G89.29 OTHER CHRONIC PAIN: ICD-10-CM

## 2022-01-27 DIAGNOSIS — M54.2 NECK PAIN: ICD-10-CM

## 2022-01-27 DIAGNOSIS — Z00.00 MEDICARE ANNUAL WELLNESS VISIT, SUBSEQUENT: Primary | ICD-10-CM

## 2022-01-27 DIAGNOSIS — Z79.899 CONTROLLED SUBSTANCE AGREEMENT SIGNED: ICD-10-CM

## 2022-01-27 PROCEDURE — 99397 PER PM REEVAL EST PAT 65+ YR: CPT | Performed by: FAMILY MEDICINE

## 2022-01-27 PROCEDURE — 80053 COMPREHEN METABOLIC PANEL: CPT | Performed by: FAMILY MEDICINE

## 2022-01-27 PROCEDURE — 36415 COLL VENOUS BLD VENIPUNCTURE: CPT | Performed by: FAMILY MEDICINE

## 2022-01-27 PROCEDURE — 99213 OFFICE O/P EST LOW 20 MIN: CPT | Mod: 25 | Performed by: FAMILY MEDICINE

## 2022-01-27 PROCEDURE — 80061 LIPID PANEL: CPT | Performed by: FAMILY MEDICINE

## 2022-01-27 RX ORDER — OXYCODONE AND ACETAMINOPHEN 5; 325 MG/1; MG/1
1 TABLET ORAL EVERY 6 HOURS PRN
Qty: 120 TABLET | Refills: 0 | Status: SHIPPED | OUTPATIENT
Start: 2022-01-27 | End: 2022-02-24

## 2022-01-27 ASSESSMENT — MIFFLIN-ST. JEOR: SCORE: 1311.97

## 2022-01-27 NOTE — LETTER
Opioid / Opioid Plus Controlled Substance Agreement    This is an agreement between you and your provider about the safe and appropriate use of controlled substance/opioids prescribed by your care team. Controlled substances are medicines that can cause physical and mental dependence (abuse).    There are strict laws about having and using these medicines. We here at [facility] are committing to working with you in your efforts to get better. To support you in this work, we ll help you schedule regular office appointments for medicine refills. If we must cancel or change your appointment for any reason, we ll make sure you have enough medicine to last until your next appointment.     As a Provider, I will:    Listen carefully to your concerns and treat you with respect.     Recommend a treatment plan that I believe is in your best interest. This plan may involve therapies other than opioid pain medication.     Talk with you often about the possible benefits, and the risk of harm of any medicine that we prescribe for you.     Provide a plan on how to taper (discontinue or go off) using this medicine if the decision is made to stop its use.    As a Patient, I understand that opioid(s):     Are a controlled substance prescribed by my care team to help me function or work and manage my condition(s).     Are strong medicines and can cause serious side effects such as:    Drowsiness, which can seriously affect my driving ability    A lower breathing rate, enough to cause death    Harm to my thinking ability     Depression     Abuse of and addiction to this medicine    Need to be taken exactly as prescribed. Combining opioids with certain medicines or chemicals (such as illegal drugs, sedatives, sleeping pills, and benzodiazepines) can be dangerous or even fatal. If I stop opioids suddenly, I may have severe withdrawal symptoms.    Do not work for all types of pain nor for all patients. If they re not helpful, I may  be asked to stop them.        The risks, benefits and side effects of these medicine(s) were explained to me. I agree that:  1. I will take part in other treatments as advised by my care team. This may be psychiatry or counseling, physical therapy, behavioral therapy, group treatment or a referral to a specialist.     2. I will keep all my appointments. I understand that this is part of the monitoring of opioids. My care team may require an office visit for EVERY opioid/controlled substance refill. If I miss appointments or don t follow instructions, my care team may stop my medicine.    3. I will take my medicines as prescribed. I will not change the dose or schedule unless my care team tells me to. There will be no refills if I run out early.     4. I may be asked to come to the clinic and complete a urine drug test or complete a pill count at any time. If I don t give a urine sample or participate in a pill count, the care team may stop my medicine.    5. I will only receive prescriptions from this clinic for chronic pain. If I am treated by another provider for acute pain issues, I will tell them that I am taking opioid pain medication for chronic pain and that I have a treatment agreement with this provider. I will inform my [facility] care team within one business day if I am given a prescription for any pain medication by another healthcare provider. My [facility] care team can contact other providers and pharmacists about my use of any medicines.    6. It is up to me to make sure that I don t run out of my medicines on weekends or holidays. If my care team is willing to refill my opioid prescription without a visit, I must request refills only during office hours. Refills may take up to 3 business days to process. I will use one pharmacy to fill all my opioid and other controlled substance prescriptions. I will notify the clinic about any changes to my insurance or medication  availability.    7. I am responsible for my prescriptions. If the medicine/prescription is lost, stolen or destroyed, it will not be replaced. I also agree not to share controlled substance medicines with anyone.    8. I am aware I should not use any illegal or recreational drugs. I agree not to drink alcohol unless my care team says I can.       9. If I enroll in the [place] [facility] Cann[facility] program, I will tell my care team prior to my next refill.     10. I will tell my care team right away if I become pregnant, have a new medical problem treated outside of my regular clinic, or have a change in my medications.    11. I understand that this medicine can affect my thinking, judgment and reaction time. Alcohol and drugs affect the brain and body, which can affect the safety of my driving. Being under the influence of alcohol or drugs can affect my decision-making, behaviors, personal safety, and the safety of others. Driving while impaired (DWI) can occur if a person is driving, operating, or in physical control of a car, motorcycle, boat, snowmobile, ATV, motorbike, off-road vehicle, or any other motor vehicle ([place] Statute 169A.20). I understand the risk if I choose to drive or operate any vehicle or machinery.    I understand that if I do not follow any of the conditions above, my prescriptions or treatment may be stopped or changed.          Opioids  What You Need to Know    What are opioids?   Opioids are pain medicines that must be prescribed by a doctor. They are also known as narcotics.     Examples are:   1. morphine (MS Contin, Kate)  2. oxycodone (Oxycontin)  3. oxycodone and acetaminophen (Percocet)  4. hydrocodone and acetaminophen (Vicodin, Norco)   5. fentanyl patch (Duragesic)   6. hydromorphone (Dilaudid)   7. methadone  8. codeine (Tylenol #3)     What do opioids do well?   Opioids are best for severe short-term pain such as after a surgery or injury. They may work well for cancer pain. They may  help some people with long-lasting (chronic) pain.     What do opioids NOT do well?   Opioids never get rid of pain entirely, and they don t work well for most patients with chronic pain. Opioids don t reduce swelling, one of the causes of pain.                                    Other ways to manage chronic pain and improve function include:       Treat the health problem that may be causing pain    Anti-inflammation medicines, which reduce swelling and tenderness, such as ibuprofen (Advil, Motrin) or naproxen (Aleve)    Acetaminophen (Tylenol)    Antidepressants and anti-seizure medicines, especially for nerve pain    Topical treatments such as patches or creams    Injections or nerve blocks    Chiropractic or osteopathic treatment    Acupuncture, massage, deep breathing, meditation, visual imagery, aromatherapy    Use heat or ice at the pain site    Physical therapy     Exercise    Stop smoking    Take part in therapy       Risks and side effects     Talk to your doctor before you start or decide to keep taking opioids. Possible side effects include:      Lowering your breathing rate enough to cause death    Overdose, including death, especially if taking higher than prescribed doses    Worse depression symptoms; less pleasure in things you usually enjoy    Feeling tired or sluggish    Slower thoughts or cloudy thinking    Being more sensitive to pain over time; pain is harder to control    Trouble sleeping or restless sleep    Changes in hormone levels (for example, less testosterone)    Changes in sex drive or ability to have sex    Constipation    Unsafe driving    Itching and sweating    Dizziness    Nausea, throwing up and dry mouth    What else should I know about opioids?    Opioids may lead to dependence, tolerance, or addiction.      Dependence means that if you stop or reduce the medicine too quickly, you will have withdrawal symptoms. These include loose poop (diarrhea), jitters, flu-like symptoms,  nervousness and tremors. Dependence is not the same as addiction.                       Tolerance means needing higher doses over time to get the same effect. This may increase the chance of serious side effects.      Addiction is when people improperly use a substance that harms their body, their mind or their relations with others. Use of opiates can cause a relapse of addiction if you have a history of drug or alcohol abuse.      People who have used opioids for a long time may have a lower quality of life, worse depression, higher levels of pain and more visits to doctors.    You can overdose on opioids. Take these steps to lower your risk of overdose:    1. Recognize the signs:  Signs of overdose include decrease or loss of consciousness (blackout), slowed breathing, trouble waking up and blue lips. If someone is worried about overdose, they should call 911.    2. Talk to your doctor about Narcan (naloxone).   If you are at risk for overdose, you may be given a prescription for Narcan. This medicine very quickly reverses the effects of opioids.   If you overdose, a friend or family member can give you Narcan while waiting for the ambulance. They need to know the signs of overdose and how to give Narcan.     3. Don't use alcohol or street drugs.   Taking them with opioids can cause death.    4. Do not take any of these medicines unless your doctor says it s OK. Taking these with opioids can cause death:    Benzodiazepines, such as lorazepam (Ativan), alprazolam (Xanax) or diazepam (Valium)    Muscle relaxers, such as cyclobenzaprine (Flexeril)    Sleeping pills like zolpidem (Ambien)     Other opioids      How to keep you and other people safe while taking opioids:    1. Never share your opioids with others.  Opioid medicines are regulated by the Drug Enforcement Agency (JESSICA). Selling or sharing medications is a criminal act.    2. Be sure to store opioids in a secure place, locked up if possible. Young children  can easily swallow them and overdose.    3. When you are traveling with your medicines, keep them in the original bottles. If you use a pill box, be sure you also carry a copy of your medicine list from your clinic or pharmacy.    4. Safe disposal of opioids    Most pharmacies have places to get rid of medicine, called disposal kiosks. Medicine disposal options are also available in every [facility]. Search your county and  medication disposal  to find more options. You can find more details at:  [email or web address]     I agree that my provider, clinic care team, and pharmacy may work with any city, state or federal law enforcement agency that investigates the misuse, sale, or other diversion of my controlled medicine. I will allow my provider to discuss my care with, or share a copy of, this agreement with any other treating provider, pharmacy or emergency room where I receive care.    I have read this agreement and have asked questions about anything I did not understand.    _______________________________________________________  Patient Signature - Annalise Wallace _____________________                   Date     _______________________________________________________  Provider Signature - Ruth Monroe MD   _____________________                   Date     _______________________________________________________  Witness Signature (required if provider not present while patient signing)   _____________________                   Date

## 2022-01-27 NOTE — PROGRESS NOTES
"  Assessment & Plan     Medicare annual wellness visit, subsequent  - Comprehensive metabolic panel (BMP + Alb, Alk Phos, ALT, AST, Total. Bili, TP); Future  - Comprehensive metabolic panel (BMP + Alb, Alk Phos, ALT, AST, Total. Bili, TP)    Lipid screening  - Lipid panel reflex to direct LDL Fasting; Future  - Lipid panel reflex to direct LDL Fasting    Other chronic pain  - oxyCODONE-acetaminophen (PERCOCET) 5-325 MG tablet; Take 1 tablet by mouth every 6 hours as needed for severe pain    Controlled substance agreement signed  - new CSA signed with patient     Neck pain  - chronic issue- manageable   - oxyCODONE-acetaminophen (PERCOCET) 5-325 MG tablet; Take 1 tablet by mouth every 6 hours as needed for severe pain      {Provider  Link to [facility] Help Grid :248667}     BMI:   Estimated body mass index is 27.23 kg/m  as calculated from the following:    Height as of this encounter: 1.676 m (5' 6\").    Weight as of this encounter: 76.5 kg (168 lb 11.2 oz).       See Patient Instructions    Return in about 6 months (around 7/27/2022) for medication recheck, in person, .    Ruth Monroe MD  [facility]    Ganga Woody is a 66 year old who presents for the following health issues     History of Present Illness       She eats 2-3 servings of fruits and vegetables daily.She consumes 1 sweetened beverage(s) daily.She exercises with enough effort to increase her heart rate 9 or less minutes per day.  She exercises with enough effort to increase her heart rate 3 or less days per week.   She is taking medications regularly.       Medication Followup     Taking Medication as prescribed: yes    Side Effects:  None    Medication Helping Symptoms:  yes     PCP retiring and here to establish care.   Annual Wellness Visit    Patient has been advised of split billing requirements and indicates understanding: Yes     Are you in the first 12 months of your Medicare Part B " "coverage?  No    Physical Health:    In general, how would you rate your overall physical health? good    Outside of work, how many days during the week do you exercise?4-5 days/week    Outside of work, approximately how many minutes a day do you exercise?15-30 minutes    If you drink alcohol do you typically have >3 drinks per day or >7 drinks per week? No    Do you usually eat at least 4 servings of fruit and vegetables a day, include whole grains & fiber and avoid regularly eating high fat or \"junk\" foods? Yes    Do you have any problems taking medications regularly? No    Do you have any side effects from medications? none    Needs assistance for the following daily activities: no assistance needed    Which of the following safety concerns are present in your home?  none identified     Hearing impairment: No    In the past 6 months, have you been bothered by leaking of urine? no    Mental Health:    In general, how would you rate your overall mental or emotional health? good  PHQ-2 Score: (P) 0    Do you feel safe in your environment? Yes    Have you ever done Advance Care Planning? (For example, a Health Directive, POLST, or a discussion with a medical provider or your loved ones about your wishes)? No, advance care planning information given to patient to review.  Patient declined advance care planning discussion at this time.    Fall risk:  Fallen 2 or more times in the past year?: No  Any fall with injury in the past year?: No    Cognitive Screenin) Repeat 3 items (Leader, Season, Table)    2) Clock draw: NORMAL  3) 3 item recall: Recalls 3 objects  Results: 3 items recalled: COGNITIVE IMPAIRMENT LESS LIKELY    Mini-CogTM Copyright VENANCIO Quinones. Licensed by the author for use in [facility]; reprinted with permission (tessa@.[facility]). All rights reserved.      Do you have sleep apnea, excessive snoring or daytime drowsiness?: no    Current providers sharing in care for this patient include: " "  Patient Care Team:  Abelino Flores MD as PCP - Juanjose Duffy MD as MD (Orthopaedic Surgery)  Abelino Flores MD as Assigned PCP    Patient has been advised of split billing requirements and indicates understanding: Yes    Review of Systems   Constitutional, HEENT, cardiovascular, pulmonary, GI, , musculoskeletal, neuro, skin, endocrine and psych systems are negative, except as otherwise noted.      Objective    BP (!) 148/79 (BP Location: Right arm, Patient Position: Chair, Cuff Size: Adult Regular)   Pulse 70   Temp 97.4  F (36.3  C) (Oral)   Resp 18   Ht 1.676 m (5' 6\")   Wt 76.5 kg (168 lb 11.2 oz)   SpO2 97%   BMI 27.23 kg/m    Body mass index is 27.23 kg/m .  Physical Exam   GENERAL: healthy, alert and no distress  EYES: Eyes grossly normal to inspection, PERRL and conjunctivae and sclerae normal  HENT: ear canals and TM's normal, nose and mouth without ulcers or lesions  NECK: no adenopathy, no asymmetry, masses, or scars and thyroid normal to palpation  RESP: lungs clear to auscultation - no rales, rhonchi or wheezes  CV: regular rate and rhythm, normal S1 S2, no S3 or S4, no murmur, click or rub, no peripheral edema and peripheral pulses strong  ABDOMEN: soft, nontender, no hepatosplenomegaly, no masses and bowel sounds normal  MS: no edema  PSYCH: mentation appears normal, affect normal/bright            "

## 2022-01-27 NOTE — PATIENT INSTRUCTIONS
Patient Education     Prevention Guidelines, Women Ages 65 and Older  Screening tests and vaccines are an important part of managing your health. A screening test is done to find possible disorders or diseases in people who don't have any symptoms. The goal is to find a disease early so lifestyle changes can be made and you can be watched more closely to reduce the risk of disease, or to detect it early enough to treat it most effectively. Screening tests are not considered diagnostic, but are used to determine if more testing is needed. Health counseling is essential, too. Below are guidelines for these, for women ages 65 and older. Talk with your healthcare provider to make sure you re up to date on what you need.  Screening Who needs it How often   Type 2 diabetes or prediabetes All women beginning at age 45 and women without symptoms at any age who are overweight or obese and have 1 or more additional risk factors for diabetes At least every 3 years   Type 2 diabetes All women with prediabetes Every year   Unhealthy alcohol use All women in this age group At routine exams   Blood pressure All women in this age group Yearly checkup if your blood pressure is normal  Normal blood pressure is less than 120/80 mm Hg  If your blood pressure reading is higher than normal, follow the advice of your healthcare provider   Breast cancer All women of average risk Mammograms should be done every 1 or 2 years. Talk with your healthcare provider about your risk factors and how often you need the test and for how long.   Cervical cancer Only women who had abnormal screening results before age 65 Talk with your healthcare provider   Chlamydia Women at increased risk for infection At routine exams   Colorectal cancer All women at average risk in this age group through age 75 who are in good health. For women ages 76 to 85, talk with your healthcare provider about whether to continue screening. For women 85 and older, screening  is not needed. Multiple tests are available and are used at different times. Possible tests include:    Flexible sigmoidoscopy every 5 years, or    Colonoscopy every 10 years, or    CT colonography (virtual colonoscopy) every 5 years, or    Yearly fecal occult blood test, or    Yearly fecal immunochemical test every year, or    Stool DNA test, every 3 years  If you choose a test other than a colonoscopy and have an abnormal test result, you will need to follow-up with a colonoscopy. Talk with your healthcare provider which test is best for you.  Some people should be screened using a different schedule because of their personal or family health history. Talk with your healthcare provider about your health history.   Depression All women in this age group At routine exams   Gonorrhea Sexually active women at increased risk for infection At yearly routine exams   Hepatitis C Anyone at increased risk; 1 time for those born between 1945 and 1965 At routine exams   High cholesterol or triglycerides All women in this age group who are at risk for coronary artery disease At least every 5 years; talk with your healthcare provider about your risk   HIV Women at increased risk for infection At routine exams; talk with your healthcare provider about your risk   Lung cancer Adults ages 55 to 74 who are in fairly good health and are at higher risk for lung cancer    Currently smoke or have quit within the past 15 years    30-pack year smoking history  Eligibility criteria and age limit (possibly up to age 80) may vary across major organizations Yearly lung cancer screening with a low dose CT scan (LDCT); talk with your healthcare provider   Obesity All women in this age group At yearly routine exams   Osteoporosis All women in this age group Routinely done every 2 years, but repeat as advised by your healthcare provider   Syphilis Women at increased risk for infection At routine exams; talk with your healthcare provider    Thyroid-stimulating hormone (TSH) Only women in this age group with symptoms of thyroid dysfunction Talk with your healthcare provider   Tuberculosis Women at increased risk for infection Talk with your healthcare provider   Vision All women in this age group Every 1 to 2 years; if you have a chronic health condition, ask your healthcare provider if you need exams more often   Vaccine Who needs it How often   Chickenpox (varicella) All women in this age group who have no record of this infection or vaccine 2 doses; second dose should be given at least 4 weeks after the first dose   Hepatitis A Women at increased risk for infection 2 or 3 doses (depending on the vaccine) given at least 6 months apart; talk with your healthcare provider   Hepatitis B Women at increased risk for infection 2 or 3 doses (depending on the vaccine); second dose should be given 1 month after the first dose; if a the third dose, it should be given at least 2 months after the second dose and at least 4 months after the first dose   Haemophilus influenza type B (HIB) Women at increased risk for infection 1 to 3 doses; talk with your healthcare provider   Influenza (flu) All women in this age group Once a year   Pneumococcal conjugate vaccine (PCV13) and pneumococcal polysaccharide vaccine (PPSV23) All women in this age group  PPSV 23: women who have not been vaccinated or have not had infection  PCV 13: women at increased risk for infection PPSV: 1 dose at age 65 or older  PCV 13: 1 dose at age 65 or older; talk with your healthcare provider   Tetanus/diphtheria/pertussis (Td/Tdap) booster All women in this age group Td every 10 years, or a 1-time dose of Tdap instead of a Td booster after age 18, then Td every 10 years   Zoster (shingles) All women in this age group 2 vaccines are available:    Recombinant zoster vaccine (RZV; Shigrix) is recommended as the preferred shingles vaccine. It's given in a series of 2 doses. The 2nd dose is given  2 to 6 months after the first. This is given even if you've had shingles before or had a previous zoster live vaccine.    Zoster live vaccine live (ZVL; Zostavax) may be given to healthy adults over age 60. It's given in one dose.   Counseling Who needs it How often   Diet and exercise Women who are overweight or obese When diagnosed, and then at routine exams   Fall prevention (exercise and vitamin D supplements) All women in this age group At yearly routine exams   Sexually transmitted infection prevention Women at increased risk for infection-talk with your healthcare provider At routine exams   Use of daily aspirin Women up to age 70 who are at high risk for cardiovascular problems and not at increased risk for bleeding as identified by your healthcare provider When your risk is known   Use of tobacco and the health effects it can cause All women in this age group Every exam   Artie last reviewed this educational content on 7/1/2020 2000-2021 [employer]. All rights reserved. This information is not intended as a substitute for professional medical care. Always follow your healthcare professional's instructions.           Patient Education   Personalized Prevention Plan  You are due for the preventive services outlined below.  Your care team is available to assist you in scheduling these services.  If you have already completed any of these items, please share that information with your care team to update in your medical record.  Health Maintenance Due   Topic Date Due     LUNG CANCER SCREENING  09/24/2008     Colorectal Cancer Screening  05/13/2018

## 2022-01-28 LAB
ALBUMIN SERPL-MCNC: 3.8 G/DL (ref 3.4–5)
ALP SERPL-CCNC: 100 U/L (ref 40–150)
ALT SERPL W P-5'-P-CCNC: 19 U/L (ref 0–50)
ANION GAP SERPL CALCULATED.3IONS-SCNC: 2 MMOL/L (ref 3–14)
AST SERPL W P-5'-P-CCNC: 11 U/L (ref 0–45)
BILIRUB SERPL-MCNC: 0.3 MG/DL (ref 0.2–1.3)
BUN SERPL-MCNC: 18 MG/DL (ref 7–30)
CALCIUM SERPL-MCNC: 9.2 MG/DL (ref 8.5–10.1)
CHLORIDE BLD-SCNC: 107 MMOL/L (ref 94–109)
CHOLEST SERPL-MCNC: 269 MG/DL
CO2 SERPL-SCNC: 28 MMOL/L (ref 20–32)
CREAT SERPL-MCNC: 0.88 MG/DL (ref 0.52–1.04)
FASTING STATUS PATIENT QL REPORTED: YES
GFR SERPL CREATININE-BSD FRML MDRD: 71 ML/MIN/1.73M2
GLUCOSE BLD-MCNC: 94 MG/DL (ref 70–99)
HDLC SERPL-MCNC: 73 MG/DL
LDLC SERPL CALC-MCNC: 170 MG/DL
NONHDLC SERPL-MCNC: 196 MG/DL
POTASSIUM BLD-SCNC: 4.7 MMOL/L (ref 3.4–5.3)
PROT SERPL-MCNC: 7.8 G/DL (ref 6.8–8.8)
SODIUM SERPL-SCNC: 137 MMOL/L (ref 133–144)
TRIGL SERPL-MCNC: 128 MG/DL

## 2022-02-24 ENCOUNTER — MYC REFILL (OUTPATIENT)
Dept: FAMILY MEDICINE | Facility: CLINIC | Age: 67
End: 2022-02-24
Payer: COMMERCIAL

## 2022-02-24 DIAGNOSIS — G89.29 OTHER CHRONIC PAIN: ICD-10-CM

## 2022-02-24 DIAGNOSIS — M54.2 NECK PAIN: ICD-10-CM

## 2022-02-24 RX ORDER — OXYCODONE AND ACETAMINOPHEN 5; 325 MG/1; MG/1
1 TABLET ORAL EVERY 6 HOURS PRN
Qty: 120 TABLET | Refills: 0 | Status: SHIPPED | OUTPATIENT
Start: 2022-02-24 | End: 2022-03-23

## 2022-03-05 ENCOUNTER — OFFICE VISIT (OUTPATIENT)
Dept: URGENT CARE | Facility: URGENT CARE | Age: 67
End: 2022-03-05
Payer: COMMERCIAL

## 2022-03-05 VITALS
DIASTOLIC BLOOD PRESSURE: 66 MMHG | HEIGHT: 66 IN | HEART RATE: 94 BPM | SYSTOLIC BLOOD PRESSURE: 116 MMHG | WEIGHT: 168 LBS | TEMPERATURE: 97.4 F | OXYGEN SATURATION: 97 % | RESPIRATION RATE: 16 BRPM | BODY MASS INDEX: 27 KG/M2

## 2022-03-05 DIAGNOSIS — J01.00 ACUTE NON-RECURRENT MAXILLARY SINUSITIS: Primary | ICD-10-CM

## 2022-03-05 PROCEDURE — 99213 OFFICE O/P EST LOW 20 MIN: CPT | Performed by: PHYSICIAN ASSISTANT

## 2022-03-05 RX ORDER — AMOXICILLIN 500 MG/1
1000 CAPSULE ORAL 2 TIMES DAILY
Qty: 40 CAPSULE | Refills: 0 | Status: SHIPPED | OUTPATIENT
Start: 2022-03-05 | End: 2022-03-15

## 2022-03-05 ASSESSMENT — ENCOUNTER SYMPTOMS
SINUS PRESSURE: 1
DIARRHEA: 0
FEVER: 0
VOMITING: 0
COUGH: 0
SINUS PAIN: 1

## 2022-03-22 ENCOUNTER — MYC REFILL (OUTPATIENT)
Dept: FAMILY MEDICINE | Facility: CLINIC | Age: 67
End: 2022-03-22
Payer: COMMERCIAL

## 2022-03-22 DIAGNOSIS — G89.29 OTHER CHRONIC PAIN: ICD-10-CM

## 2022-03-22 DIAGNOSIS — G47.00 PERSISTENT DISORDER OF INITIATING OR MAINTAINING SLEEP: ICD-10-CM

## 2022-03-22 DIAGNOSIS — M54.2 NECK PAIN: ICD-10-CM

## 2022-03-23 RX ORDER — OXYCODONE AND ACETAMINOPHEN 5; 325 MG/1; MG/1
1 TABLET ORAL EVERY 6 HOURS PRN
Qty: 120 TABLET | Refills: 0 | Status: SHIPPED | OUTPATIENT
Start: 2022-03-23 | End: 2022-04-21

## 2022-03-23 RX ORDER — OXYCODONE AND ACETAMINOPHEN 5; 325 MG/1; MG/1
1 TABLET ORAL EVERY 6 HOURS PRN
Qty: 120 TABLET | Refills: 0 | OUTPATIENT
Start: 2022-03-23

## 2022-03-23 NOTE — TELEPHONE ENCOUNTER
Patient calling and states her new primary is Dr. Monroe.  States Percocet discussed at 1/27/22 visit and signed CSA and was told to RTC in 6 months and medications would not be a problem.  Please advise.  Let her know if issue with filling this.  Rosaura Miller RN

## 2022-03-23 NOTE — TELEPHONE ENCOUNTER
Received call from pt she is wanting to know the status of the refill of her percocet refill  Advised to pt that her message has been sent to her new PCP    Pt verbalized understanding and agrees to the plan    Thank you  Nirmal Holden RN on 3/23/2022 at 3:21 PM

## 2022-03-24 NOTE — TELEPHONE ENCOUNTER
Routing refill request to provider for review/approval because:  Drug interaction warning    Thalia Nam RN

## 2022-03-25 NOTE — TELEPHONE ENCOUNTER
Patient has been on this medication for a long time and is now all out.    She does not report any issues with a drug interaction.    If approved, please send a refill asap.    Thanks!  Patricia Fay CPhT  [facility]  [phone]

## 2022-03-28 RX ORDER — TRAZODONE HYDROCHLORIDE 100 MG/1
TABLET ORAL
Qty: 90 TABLET | Refills: 0 | Status: SHIPPED | OUTPATIENT
Start: 2022-03-28 | End: 2022-06-10

## 2022-03-28 NOTE — TELEPHONE ENCOUNTER
Patient  Is calling checking the status of the refill, she states Dr. Monroe is her primary provider now since Dr. Flores is gone. Please call patient back at [phone] ASAP.  Nelda Toro   [facility]  Central Scheduler

## 2022-04-21 ENCOUNTER — MYC REFILL (OUTPATIENT)
Dept: FAMILY MEDICINE | Facility: CLINIC | Age: 67
End: 2022-04-21
Payer: COMMERCIAL

## 2022-04-21 DIAGNOSIS — M54.2 NECK PAIN: ICD-10-CM

## 2022-04-21 DIAGNOSIS — G89.29 OTHER CHRONIC PAIN: ICD-10-CM

## 2022-04-22 ENCOUNTER — MYC REFILL (OUTPATIENT)
Dept: FAMILY MEDICINE | Facility: CLINIC | Age: 67
End: 2022-04-22
Payer: COMMERCIAL

## 2022-04-22 ENCOUNTER — TELEPHONE (OUTPATIENT)
Dept: FAMILY MEDICINE | Facility: CLINIC | Age: 67
End: 2022-04-22
Payer: COMMERCIAL

## 2022-04-22 DIAGNOSIS — M54.2 NECK PAIN: ICD-10-CM

## 2022-04-22 DIAGNOSIS — G89.29 OTHER CHRONIC PAIN: ICD-10-CM

## 2022-04-22 RX ORDER — OXYCODONE AND ACETAMINOPHEN 5; 325 MG/1; MG/1
1 TABLET ORAL EVERY 6 HOURS PRN
Qty: 120 TABLET | Refills: 0 | Status: CANCELLED | OUTPATIENT
Start: 2022-04-22

## 2022-04-25 RX ORDER — OXYCODONE AND ACETAMINOPHEN 5; 325 MG/1; MG/1
1 TABLET ORAL EVERY 6 HOURS PRN
Qty: 120 TABLET | Refills: 0 | Status: SHIPPED | OUTPATIENT
Start: 2022-04-25 | End: 2022-05-23

## 2022-04-25 NOTE — TELEPHONE ENCOUNTER
Pt calls to check status, discussed at length, will send another message to NWD, tried to update PCP, route to INFORM pt when sent  Annalise Clifford RN, BSN  [facility]

## 2022-04-25 NOTE — TELEPHONE ENCOUNTER
Patient calling to check on medication refill. Patient has been out of medication since Saturday. Please review and advise. Are you taking over script?     Lavon NEVES RN

## 2022-04-25 NOTE — TELEPHONE ENCOUNTER
Destiny Bazzi    12:43 PM  Note  Pt called in asking for status on Medication Refill. She states that she requested the refill last Thursday and she's been out of the medication for 2 days now. Pt would like a callback when refill has been approved or if there's something else she needs to do to get these meds.     Pt can be contacted @ [phone]     Routed to Triage.        Destiny Bazzi/EMT-B  [facility] [place]         Lavon Garcia RN         10:20 AM  Note  Patient calling to check on medication refill. Patient has been out of medication since Saturday. Please review and advise. Are you taking over script?      Lavon NEVES RN

## 2022-04-25 NOTE — TELEPHONE ENCOUNTER
Pt wants to know why her request was denied.   -Explained we received a duplicate request.   -First request was sent to provider.     Bhumika Khan RN, BSN, PHN  [facility]

## 2022-04-25 NOTE — TELEPHONE ENCOUNTER
Called pt, LM to follow up with pharmacy, call only if questions  Annalise Clifford RN, BSN  [facility]

## 2022-04-25 NOTE — TELEPHONE ENCOUNTER
Pt called in asking for status on Medication Refill. She states that she requested the refill last Thursday and she's been out of the medication for 2 days now. Pt would like a callback when refill has been approved or if there's something else she needs to do to get these meds.    Pt can be contacted @ [phone]    Routed to Triage.      Destiny Bazzi/EMT-B  [facility] [place]

## 2022-05-23 ENCOUNTER — MYC REFILL (OUTPATIENT)
Dept: FAMILY MEDICINE | Facility: CLINIC | Age: 67
End: 2022-05-23
Payer: COMMERCIAL

## 2022-05-23 DIAGNOSIS — M54.2 NECK PAIN: ICD-10-CM

## 2022-05-23 DIAGNOSIS — G89.29 OTHER CHRONIC PAIN: ICD-10-CM

## 2022-05-23 NOTE — TELEPHONE ENCOUNTER
Routing refill request to provider for review/approval because:  Drug not on the [facility] refill protocol     Yandy Paul RN on 5/23/2022 at 11:55 AM         none

## 2022-05-24 RX ORDER — OXYCODONE AND ACETAMINOPHEN 5; 325 MG/1; MG/1
1 TABLET ORAL EVERY 6 HOURS PRN
Qty: 120 TABLET | Refills: 0 | Status: SHIPPED | OUTPATIENT
Start: 2022-05-24 | End: 2022-06-22

## 2022-06-10 DIAGNOSIS — G47.00 PERSISTENT DISORDER OF INITIATING OR MAINTAINING SLEEP: ICD-10-CM

## 2022-06-10 NOTE — TELEPHONE ENCOUNTER
Routing refill request to provider for review/approval because:  Drug interaction warning    Lavon NEVES RN

## 2022-06-13 RX ORDER — TRAZODONE HYDROCHLORIDE 100 MG/1
TABLET ORAL
Qty: 90 TABLET | Refills: 0 | Status: SHIPPED | OUTPATIENT
Start: 2022-06-13 | End: 2022-09-14

## 2022-06-22 ENCOUNTER — MYC REFILL (OUTPATIENT)
Dept: FAMILY MEDICINE | Facility: CLINIC | Age: 67
End: 2022-06-22

## 2022-06-22 DIAGNOSIS — G89.29 OTHER CHRONIC PAIN: ICD-10-CM

## 2022-06-22 DIAGNOSIS — M54.2 NECK PAIN: ICD-10-CM

## 2022-06-22 RX ORDER — OXYCODONE AND ACETAMINOPHEN 5; 325 MG/1; MG/1
1 TABLET ORAL EVERY 6 HOURS PRN
Qty: 120 TABLET | Refills: 0 | Status: SHIPPED | OUTPATIENT
Start: 2022-06-22 | End: 2022-07-20

## 2022-07-20 ENCOUNTER — MYC REFILL (OUTPATIENT)
Dept: FAMILY MEDICINE | Facility: CLINIC | Age: 67
End: 2022-07-20

## 2022-07-20 DIAGNOSIS — G89.29 OTHER CHRONIC PAIN: ICD-10-CM

## 2022-07-20 DIAGNOSIS — M54.2 NECK PAIN: ICD-10-CM

## 2022-07-21 RX ORDER — OXYCODONE AND ACETAMINOPHEN 5; 325 MG/1; MG/1
1 TABLET ORAL EVERY 6 HOURS PRN
Qty: 120 TABLET | Refills: 0 | Status: SHIPPED | OUTPATIENT
Start: 2022-07-21 | End: 2022-08-18

## 2022-08-18 ENCOUNTER — MYC REFILL (OUTPATIENT)
Dept: FAMILY MEDICINE | Facility: CLINIC | Age: 67
End: 2022-08-18

## 2022-08-18 DIAGNOSIS — G89.29 OTHER CHRONIC PAIN: ICD-10-CM

## 2022-08-18 DIAGNOSIS — M54.2 NECK PAIN: ICD-10-CM

## 2022-08-18 RX ORDER — OXYCODONE AND ACETAMINOPHEN 5; 325 MG/1; MG/1
1 TABLET ORAL EVERY 6 HOURS PRN
Qty: 120 TABLET | Refills: 0 | Status: SHIPPED | OUTPATIENT
Start: 2022-08-20 | End: 2022-09-15

## 2022-08-25 ENCOUNTER — OFFICE VISIT (OUTPATIENT)
Dept: FAMILY MEDICINE | Facility: CLINIC | Age: 67
End: 2022-08-25
Payer: COMMERCIAL

## 2022-08-25 VITALS
WEIGHT: 172.8 LBS | BODY MASS INDEX: 27.77 KG/M2 | HEART RATE: 81 BPM | TEMPERATURE: 98.3 F | SYSTOLIC BLOOD PRESSURE: 132 MMHG | DIASTOLIC BLOOD PRESSURE: 64 MMHG | RESPIRATION RATE: 12 BRPM | OXYGEN SATURATION: 95 % | HEIGHT: 66 IN

## 2022-08-25 DIAGNOSIS — K44.9 DIAPHRAGMATIC HERNIA WITHOUT OBSTRUCTION AND WITHOUT GANGRENE: ICD-10-CM

## 2022-08-25 DIAGNOSIS — R10.11 ABDOMINAL PAIN, RIGHT UPPER QUADRANT: ICD-10-CM

## 2022-08-25 DIAGNOSIS — R20.0 NUMBNESS AND TINGLING IN LEFT HAND: Primary | ICD-10-CM

## 2022-08-25 DIAGNOSIS — G89.29 OTHER CHRONIC PAIN: ICD-10-CM

## 2022-08-25 DIAGNOSIS — R20.2 NUMBNESS AND TINGLING IN LEFT HAND: Primary | ICD-10-CM

## 2022-08-25 LAB — CREAT UR-MCNC: 129 MG/DL

## 2022-08-25 PROCEDURE — 99214 OFFICE O/P EST MOD 30 MIN: CPT | Performed by: FAMILY MEDICINE

## 2022-08-25 PROCEDURE — 80307 DRUG TEST PRSMV CHEM ANLYZR: CPT | Performed by: FAMILY MEDICINE

## 2022-08-25 RX ORDER — OMEPRAZOLE 40 MG/1
CAPSULE, DELAYED RELEASE ORAL
Qty: 90 CAPSULE | Refills: 3 | Status: ON HOLD | OUTPATIENT
Start: 2022-08-25 | End: 2022-12-27

## 2022-08-25 NOTE — PROGRESS NOTES
"  Assessment & Plan     Numbness and tingling in left hand  - will investigate further with EMG. Also advised to wear wrist brace especially at night   - EMG; Future  - Wrist/Arm/Hand Supplies Order for DME - ONLY FOR DME    ABDOMINAL PAIN RUQ [789.01]  Diaphragmatic hernia without obstruction and without gangrene  - continue on PPI as prescribed   - omeprazole (PRILOSEC) 40 MG DR capsule; TAKE ONE CAPSULE BY MOUTH ONCE DAILY    Other chronic pain  - [id number] - Urine Drug Confirmation Panel (Comprehensive); Future  - [id number] - Urine Drug Confirmation Panel (Comprehensive)             BMI:   Estimated body mass index is 27.89 kg/m  as calculated from the following:    Height as of this encounter: 1.676 m (5' 6\").    Weight as of this encounter: 78.4 kg (172 lb 12.8 oz).       See Patient Instructions    No follow-ups on file.   Follow-up Visit   Expected date:  Jan 27, 2023 (Approximate)      Follow Up Appointment Details:     Follow-up with whom?: Me    Follow-Up for what?: Medicare Wellness    Welcome or Annual?: Annual Wellness    How?: In Person                    Ruth Monroe MD  [employer] [facility]    Ganga Woody is a 66 year old, presenting for the following health issues:  No chief complaint on file.      History of Present Illness       Reason for visit:  Tingling and numbness in left arm and checking hernia    She eats 0-1 servings of fruits and vegetables daily.She consumes 0 sweetened beverage(s) daily.She exercises with enough effort to increase her heart rate 10 to 19 minutes per day.  She exercises with enough effort to increase her heart rate 3 or less days per week.   She is taking medications regularly.       Concern - Numbness and tingling  Onset: 1 month  Description: Pt states she been having some numbness and tingling in her left arm down to her hand. . Denies any chest pain. Pt states she has had some nerve problems on that side as well as many back and " "neck surgeries.  Intensity: moderate  Progression of Symptoms:  worsening     Reports her left arm has always been weak because she had a lot of nerve damage. For the last month she has noticed numbness and tingling of left hand. Notes it worsens with some positions especially when sleeping.   Denies dropping of objects from her hand. She also denies any neck pain at this time.   Per patient, because of her history of neck surgeries and degenerative disc disease she wants to make sure the numbness and tingling in her hand is not something to worry about. Denies dropping of objects from her hand. She also denies any neck pain at this time.       Review of Systems   Constitutional, HEENT, cardiovascular, pulmonary, GI, , musculoskeletal, neuro, skin, endocrine and psych systems are negative, except as otherwise noted.      Objective    /64   Pulse 81   Temp 98.3  F (36.8  C) (Oral)   Resp 12   Ht 1.676 m (5' 6\")   Wt 78.4 kg (172 lb 12.8 oz)   SpO2 95%   BMI 27.89 kg/m    Body mass index is 27.89 kg/m .  Physical Exam   GENERAL: healthy, alert and no distress  RESP: lungs clear to auscultation - no rales, rhonchi or wheezes  CV: regular rate and rhythm, normal S1 S2, no S3 or S4, no murmur, click or rub  ABDOMEN: soft, nontender, small umbilical hernia noted   MS: left hand- positive phalen's, sensation intact, strength 5/5   PSYCH: mentation appears normal, affect normal/bright                .  ..  "

## 2022-08-30 LAB
OXYCODONE UR CFM-MCNC: 5780 NG/ML
OXYCODONE/CREAT UR: 4481 NG/MG {CREAT}
OXYMORPHONE UR CFM-MCNC: >7000 NG/ML
OXYMORPHONE/CREAT UR: ABNORMAL

## 2022-09-12 DIAGNOSIS — G47.00 PERSISTENT DISORDER OF INITIATING OR MAINTAINING SLEEP: ICD-10-CM

## 2022-09-12 NOTE — TELEPHONE ENCOUNTER
Routing refill request to provider for review/approval because:  Drug interaction warning    Anna GARZA RN, BSN, PHN  [facility]

## 2022-09-14 RX ORDER — TRAZODONE HYDROCHLORIDE 100 MG/1
TABLET ORAL
Qty: 90 TABLET | Refills: 0 | Status: SHIPPED | OUTPATIENT
Start: 2022-09-14 | End: 2022-12-14

## 2022-09-15 ENCOUNTER — MYC REFILL (OUTPATIENT)
Dept: FAMILY MEDICINE | Facility: CLINIC | Age: 67
End: 2022-09-15

## 2022-09-15 DIAGNOSIS — G89.29 OTHER CHRONIC PAIN: ICD-10-CM

## 2022-09-15 DIAGNOSIS — M54.2 NECK PAIN: ICD-10-CM

## 2022-09-15 RX ORDER — OXYCODONE AND ACETAMINOPHEN 5; 325 MG/1; MG/1
1 TABLET ORAL EVERY 6 HOURS PRN
Qty: 120 TABLET | Refills: 0 | Status: SHIPPED | OUTPATIENT
Start: 2022-09-15 | End: 2022-10-12

## 2022-09-27 ENCOUNTER — OFFICE VISIT (OUTPATIENT)
Dept: NEUROLOGY | Facility: CLINIC | Age: 67
End: 2022-09-27
Attending: FAMILY MEDICINE
Payer: COMMERCIAL

## 2022-09-27 DIAGNOSIS — R20.0 NUMBNESS AND TINGLING IN LEFT HAND: ICD-10-CM

## 2022-09-27 DIAGNOSIS — R20.2 NUMBNESS AND TINGLING IN LEFT HAND: ICD-10-CM

## 2022-09-27 PROCEDURE — 95908 NRV CNDJ TST 3-4 STUDIES: CPT | Performed by: PSYCHIATRY & NEUROLOGY

## 2022-09-27 PROCEDURE — 95886 MUSC TEST DONE W/N TEST COMP: CPT | Performed by: PSYCHIATRY & NEUROLOGY

## 2022-09-27 NOTE — LETTER
2022         RE: Annalise Wallace  [address]  [facility] [address]        Dear Colleague,    Thank you for referring your patient, Annalise Wallace, to the [facility] NEUROLOG[facility]. Please see a copy of my visit note below.                            [facility]  Electrodiagnostic Laboratory                 Department of Neurology                                                                                                         Test Date:  2022    Patient: Annalise Wallace : 1955 Physician: Bhumika Beltran MD   Sex: Female AGE: 66 year Ref Phys: Dr Natanael Valdez   ID#: [id number]   Technician: Kristy Behling     Clinical Information:  The patient is presenting for an EMG of the left upper extremity.  She reports multiple months worth of paresthesias that can be present in the hand but also can radiate down from the pectoral muscles in the chest and also from the scapular region.  She has history of numerous neck surgeries in the past.  Wrist brace has not helped symptoms.    Techniques:  Motor and sensory conduction studies were done with surface recording electrodes. EMG was done with a concentric needle electrode.     Results:  Motor nerve conduction studies:  Left median motor and ulnar motor studies are within normal limits.  Lumbrical studies show no significant difference in distal latency comparing ulnar stimulation to median stimulation.    Sensory nerve studies:  Antidromic median and ulnar sensory studies are within normal limits.  Left palmar studies showed no significant difference between peak latencies.    Left ulnar F wave is within normal limits.    Needle examination of the left upper extremity reveals some chronic neurogenic changes seen in the left pronator teres, FDI, EIP, ADM and APB all with a mild reduction in recruitment.  No abnormal insertional activity was seen.    Interpretation:  This is an abnormal EMG.  Findings are  consistent with a chronic and active left-sided C8 and T1 radiculopathy.  There may be some C7 nerve root involvement as well.      ___________________________  Bhumika Beltran MD        Nerve Conduction Studies  Motor Sites      Latency Amplitude Neg. Amp Diff Segment Distance Velocity Neg. Dur Neg Area Diff Temperature Comment   Site (ms) Norm (mV) Norm %  cm m/s Norm ms %  C    Left Median (APB) Motor   Wrist 3.1  < 4.4 6.2  > 5.0  Wrist-APB 8   5.9  33    Elbow 7.1 - 5.7 - -8.1 Elbow-Wrist 20 50  > 48 5.8 0 33    Left Median/Ulnar (Lumb-Dors Int II) Motor        Median (Lumb I)   Wrist 2.5 - 1.24 -      5.2  32.6         Ulnar (Dorsal Int (manus))   Wrist 2.7 - 5.5 -      3.9  32.7    Left Ulnar (ADM) Motor   Wrist 2.1  < 3.5 7.8  > 5.0  Wrist-ADM 8   6.0  32.9    Bel Elbow 5.5 - 6.2 - -20.5 Bel Elbow-Wrist 20 59  > 48 6.2 -12.7 33    Abv Elbow 6.9 - 6.8 - 9.7 Abv Elbow-Bel Elbow 8 57  > 48 6.7 12.5 33      Sensory Sites      Onset Lat Peak Lat Amp (O-P) Amp (P-P) Segment Distance Velocity Temperature Comment   Site ms ms  V Norm  V  cm m/s Norm  C    Left Median Sensory   Wrist-Dig II 2.4 3.1 34  > 10 29 Wrist-Dig II 14 58  > 48 30    Left Median-Ulnar Palmar Sensory        Median   Palm-Wrist 1.35 1.83 74 - 84 Palm-Wrist - - - 32.9         Ulnar   Palm-Wrist 1.18 1.68 30 - 31 Palm-Wrist - - - 32.9    Left Ulnar Sensory   Wrist-Dig V 2.0 2.9 31  > 8 25 Wrist-Dig V 12.5 63  > 48 31.9      Inter-Nerve Comparisons     Nerve 1 Value 1 Nerve 2 Value 2 Parameter Result Normal   Sensory Sites   L Median Palm-Wrist 1.8 ms L Ulnar Palm-Wrist 1.7 ms Peak Lat Diff 0.15 ms <0.40     F Wave Studies     Min-F Max-F Dispersion Persistence Mean-F F-Norm L-R Mean-F L-R Mean-F Norm F/M Ratio F-M Lat (ms)   Left Ulnar (Abd Dig Min)  32.9  C   26.17 29.45 3.28 100.00 27.28 <36  <2.5 1.69 23.28       Electromyography     Side Muscle Ins Act Fibs/PSW Fasc HF Amp Dur Poly Recrt Int Pat   Left Deltoid Nml None Nml 0 Nml Nml 0 Nml Nml    Left Biceps Nml None Nml 0 Nml Nml 0 Nml Nml   Left Triceps Nml None Nml 0 Nml Nml 0 Nml Nml   Left Pronator Teres Nml None Nml 0 1+ 1+ 0 Kevin Nml   Left FDI Nml None Nml 0 1+ 1+ 0 Kevin Nml   Left EIP Nml None Nml 0 1+ 1+ 0 Kevin Nml   Left ADM Nml None Nml 0 1+ 1+ 1+ Kevin Nml   Left APB Nml None Nml 0 1+ 1+ 0 Kevin Nml         NCS Waveforms:    Motor           Sensory              Ultrasound Images:                              [facility]  Electrodiagnostic Laboratory                 Department of Neurology                                                                                                         Test Date:  2022    Patient: Annalise Wallace : 1955 Physician: Bhumika Beltran MD   Sex: Female AGE: 66 year Ref Phys:    ID#: [id number]   Technician: Kristy Behling     Clinical Information:      Techniques:  Motor and sensory conduction studies were done with surface recording electrodes. EMG was done with a concentric needle electrode.     Results:  All F Wave latencies were within normal limits.      Needle evaluation of the left Pronator Teres, the left First Dorsal Interosseous, the left Extensor Indicis Proprius, and the left Abductor Pollicis Brevis muscles showed slightly increased motor unit amplitude, slightly increased motor unit duration, and recruitment.  The left Abductor Digiti Minimi muscle showed slightly increased motor unit amplitude, slightly increased motor unit duration, slightly increased polyphasic potentials, and recruitment.  All remaining muscles (as indicated in the following table) showed no evidence of electrical instability.        Interpretation:        ___________________________  Bhumika Beltran MD        Nerve Conduction Studies  Motor Sites      Latency Amplitude Neg. Amp Diff Segment Distance Velocity Neg. Dur Neg Area Diff Temperature Comment   Site (ms) Norm (mV) Norm %  cm m/s Norm ms %  C    Left Median (APB) Motor   Wrist 3.1  < 4.4  6.2  > 5.0  Wrist-APB 8   5.9  33    Elbow 7.1 - 5.7 - -8.1 Elbow-Wrist 20 50  > 48 5.8 0 33    Left Median/Ulnar (Lumb-Dors Int II) Motor        Median (Lumb I)   Wrist 2.5 - 1.24 -      5.2  32.6         Ulnar (Dorsal Int (manus))   Wrist 2.7 - 5.5 -      3.9  32.7    Left Ulnar (ADM) Motor   Wrist 2.1  < 3.5 7.8  > 5.0  Wrist-ADM 8   6.0  32.9    Bel Elbow 5.5 - 6.2 - -20.5 Bel Elbow-Wrist 20 59  > 48 6.2 -12.7 33    Abv Elbow 6.9 - 6.8 - 9.7 Abv Elbow-Bel Elbow 8 57  > 48 6.7 12.5 33      Sensory Sites      Onset Lat Peak Lat Amp (O-P) Amp (P-P) Segment Distance Velocity Temperature Comment   Site ms ms  V Norm  V  cm m/s Norm  C    Left Median Sensory   Wrist-Dig II 2.4 3.1 34  > 10 29 Wrist-Dig II 14 58  > 48 30    Left Median-Ulnar Palmar Sensory        Median   Palm-Wrist 1.35 1.83 74 - 84 Palm-Wrist - - - 32.9         Ulnar   Palm-Wrist 1.18 1.68 30 - 31 Palm-Wrist - - - 32.9    Left Ulnar Sensory   Wrist-Dig V 2.0 2.9 31  > 8 25 Wrist-Dig V 12.5 63  > 48 31.9      Inter-Nerve Comparisons     Nerve 1 Value 1 Nerve 2 Value 2 Parameter Result Normal   Sensory Sites   L Median Palm-Wrist 1.8 ms L Ulnar Palm-Wrist 1.7 ms Peak Lat Diff 0.15 ms <0.40     F Wave Studies     Min-F Max-F Dispersion Persistence Mean-F F-Norm L-R Mean-F L-R Mean-F Norm F/M Ratio F-M Lat (ms)   Left Ulnar (Abd Dig Min)  32.9  C   26.17 29.45 3.28 100.00 27.28 <36  <2.5 1.69 23.28       Electromyography     Side Muscle Ins Act Fibs/PSW Fasc HF Amp Dur Poly Recrt Int Pat   Left Deltoid Nml None Nml 0 Nml Nml 0 Nml Nml   Left Biceps Nml None Nml 0 Nml Nml 0 Nml Nml   Left Triceps Nml None Nml 0 Nml Nml 0 Nml Nml   Left Pronator Teres Nml None Nml 0 1+ 1+ 0 Kevin Nml   Left FDI Nml None Nml 0 1+ 1+ 0 Kevin Nml   Left EIP Nml None Nml 0 1+ 1+ 0 Kevin Nml   Left ADM Nml None Nml 0 1+ 1+ 1+ Kevin Nml   Left APB Nml None Nml 0 1+ 1+ 0 Kevin Nml         NCS Waveforms:    Motor           Sensory              Ultrasound  Images:                                [facility]  Electrodiagnostic Laboratory                 Department of Neurology                                                                                                         Test Date:  2022    Patient: Annalise Wallace : 1955 Physician: Bhumika Beltran MD   Sex: Female AGE: 66 year Ref Phys:    ID#: [id number]   Technician: Kristy Behling     Clinical Information:      Techniques:  Motor and sensory conduction studies were done with surface recording electrodes. EMG was done with a concentric needle electrode.     Results:  All F Wave latencies were within normal limits.      Needle evaluation of the left Pronator Teres, the left First Dorsal Interosseous, the left Extensor Indicis Proprius, and the left Abductor Pollicis Brevis muscles showed slightly increased motor unit amplitude, slightly increased motor unit duration, and recruitment.  The left Abductor Digiti Minimi muscle showed slightly increased motor unit amplitude, slightly increased motor unit duration, slightly increased polyphasic potentials, and recruitment.  All remaining muscles (as indicated in the following table) showed no evidence of electrical instability.        Interpretation:        ___________________________  Bhumika Beltran MD        Nerve Conduction Studies  Motor Sites      Latency Amplitude Neg. Amp Diff Segment Distance Velocity Neg. Dur Neg Area Diff Temperature Comment   Site (ms) Norm (mV) Norm %  cm m/s Norm ms %  C    Left Median (APB) Motor   Wrist 3.1  < 4.4 6.2  > 5.0  Wrist-APB 8   5.9  33    Elbow 7.1 - 5.7 - -8.1 Elbow-Wrist 20 50  > 48 5.8 0 33    Left Median/Ulnar (Lumb-Dors Int II) Motor        Median (Lumb I)   Wrist 2.5 - 1.24 -      5.2  32.6         Ulnar (Dorsal Int (manus))   Wrist 2.7 - 5.5 -      3.9  32.7    Left Ulnar (ADM) Motor   Wrist 2.1  < 3.5 7.8  > 5.0  Wrist-ADM 8   6.0  32.9    Bel Elbow 5.5 - 6.2 - -20.5 Bel Elbow-Wrist 20 59  > 48  6.2 -12.7 33    Abv Elbow 6.9 - 6.8 - 9.7 Abv Elbow-Bel Elbow 8 57  > 48 6.7 12.5 33      Sensory Sites      Onset Lat Peak Lat Amp (O-P) Amp (P-P) Segment Distance Velocity Temperature Comment   Site ms ms  V Norm  V  cm m/s Norm  C    Left Median Sensory   Wrist-Dig II 2.4 3.1 34  > 10 29 Wrist-Dig II 14 58  > 48 30    Left Median-Ulnar Palmar Sensory        Median   Palm-Wrist 1.35 1.83 74 - 84 Palm-Wrist - - - 32.9         Ulnar   Palm-Wrist 1.18 1.68 30 - 31 Palm-Wrist - - - 32.9    Left Ulnar Sensory   Wrist-Dig V 2.0 2.9 31  > 8 25 Wrist-Dig V 12.5 63  > 48 31.9      Inter-Nerve Comparisons     Nerve 1 Value 1 Nerve 2 Value 2 Parameter Result Normal   Sensory Sites   L Median Palm-Wrist 1.8 ms L Ulnar Palm-Wrist 1.7 ms Peak Lat Diff 0.15 ms <0.40     F Wave Studies     Min-F Max-F Dispersion Persistence Mean-F F-Norm L-R Mean-F L-R Mean-F Norm F/M Ratio F-M Lat (ms)   Left Ulnar (Abd Dig Min)  32.9  C   26.17 29.45 3.28 100.00 27.28 <36  <2.5 1.69 23.28       Electromyography     Side Muscle Ins Act Fibs/PSW Fasc HF Amp Dur Poly Recrt Int Pat   Left Deltoid Nml None Nml 0 Nml Nml 0 Nml Nml   Left Biceps Nml None Nml 0 Nml Nml 0 Nml Nml   Left Triceps Nml None Nml 0 Nml Nml 0 Nml Nml   Left Pronator Teres Nml None Nml 0 1+ 1+ 0 Kevin Nml   Left FDI Nml None Nml 0 1+ 1+ 0 Kevin Nml   Left EIP Nml None Nml 0 1+ 1+ 0 Kevin Nml   Left ADM Nml None Nml 0 1+ 1+ 1+ Kevin Nml   Left APB Nml None Nml 0 1+ 1+ 0 Kevin Nml         NCS Waveforms:    Motor           Sensory                      Again, thank you for allowing me to participate in the care of your patient.        Sincerely,        Bhumika Beltran MD

## 2022-09-27 NOTE — PROGRESS NOTES
[facility]  Electrodiagnostic Laboratory                 Department of Neurology                                                                                                         Test Date:  2022    Patient: Annalise Wallace : 1955 Physician: Bhumika Beltran MD   Sex: Female AGE: 66 year Ref Phys: Dr Natanael Valdez   ID#: [id number]   Technician: Kristy Behling     Clinical Information:  The patient is presenting for an EMG of the left upper extremity.  She reports multiple months worth of paresthesias that can be present in the hand but also can radiate down from the pectoral muscles in the chest and also from the scapular region.  She has history of numerous neck surgeries in the past.  Wrist brace has not helped symptoms.    Techniques:  Motor and sensory conduction studies were done with surface recording electrodes. EMG was done with a concentric needle electrode.     Results:  Motor nerve conduction studies:  Left median motor and ulnar motor studies are within normal limits.  Lumbrical studies show no significant difference in distal latency comparing ulnar stimulation to median stimulation.    Sensory nerve studies:  Antidromic median and ulnar sensory studies are within normal limits.  Left palmar studies showed no significant difference between peak latencies.    Left ulnar F wave is within normal limits.    Needle examination of the left upper extremity reveals some chronic neurogenic changes seen in the left pronator teres, FDI, EIP, ADM and APB all with a mild reduction in recruitment.  No abnormal insertional activity was seen.    Interpretation:  This is an abnormal EMG.  Findings are consistent with a chronic and active left-sided C8 and T1 radiculopathy.  There may be some C7 nerve root involvement as well.      ___________________________  Bhumika Beltran MD        Nerve Conduction Studies  Motor Sites      Latency Amplitude Neg. Amp Diff Segment  Distance Velocity Neg. Dur Neg Area Diff Temperature Comment   Site (ms) Norm (mV) Norm %  cm m/s Norm ms %  C    Left Median (APB) Motor   Wrist 3.1  < 4.4 6.2  > 5.0  Wrist-APB 8   5.9  33    Elbow 7.1 - 5.7 - -8.1 Elbow-Wrist 20 50  > 48 5.8 0 33    Left Median/Ulnar (Lumb-Dors Int II) Motor        Median (Lumb I)   Wrist 2.5 - 1.24 -      5.2  32.6         Ulnar (Dorsal Int (manus))   Wrist 2.7 - 5.5 -      3.9  32.7    Left Ulnar (ADM) Motor   Wrist 2.1  < 3.5 7.8  > 5.0  Wrist-ADM 8   6.0  32.9    Bel Elbow 5.5 - 6.2 - -20.5 Bel Elbow-Wrist 20 59  > 48 6.2 -12.7 33    Abv Elbow 6.9 - 6.8 - 9.7 Abv Elbow-Bel Elbow 8 57  > 48 6.7 12.5 33      Sensory Sites      Onset Lat Peak Lat Amp (O-P) Amp (P-P) Segment Distance Velocity Temperature Comment   Site ms ms  V Norm  V  cm m/s Norm  C    Left Median Sensory   Wrist-Dig II 2.4 3.1 34  > 10 29 Wrist-Dig II 14 58  > 48 30    Left Median-Ulnar Palmar Sensory        Median   Palm-Wrist 1.35 1.83 74 - 84 Palm-Wrist - - - 32.9         Ulnar   Palm-Wrist 1.18 1.68 30 - 31 Palm-Wrist - - - 32.9    Left Ulnar Sensory   Wrist-Dig V 2.0 2.9 31  > 8 25 Wrist-Dig V 12.5 63  > 48 31.9      Inter-Nerve Comparisons     Nerve 1 Value 1 Nerve 2 Value 2 Parameter Result Normal   Sensory Sites   L Median Palm-Wrist 1.8 ms L Ulnar Palm-Wrist 1.7 ms Peak Lat Diff 0.15 ms <0.40     F Wave Studies     Min-F Max-F Dispersion Persistence Mean-F F-Norm L-R Mean-F L-R Mean-F Norm F/M Ratio F-M Lat (ms)   Left Ulnar (Abd Dig Min)  32.9  C   26.17 29.45 3.28 100.00 27.28 <36  <2.5 1.69 23.28       Electromyography     Side Muscle Ins Act Fibs/PSW Fasc HF Amp Dur Poly Recrt Int Pat   Left Deltoid Nml None Nml 0 Nml Nml 0 Nml Nml   Left Biceps Nml None Nml 0 Nml Nml 0 Nml Nml   Left Triceps Nml None Nml 0 Nml Nml 0 Nml Nml   Left Pronator Teres Nml None Nml 0 1+ 1+ 0 Kevin Nml   Left FDI Nml None Nml 0 1+ 1+ 0 Kevin Nml   Left EIP Nml None Nml 0 1+ 1+ 0 Kevin Nml   Left ADM Nml None Nml 0  1+ 1+ 1+ Kevin Nml   Left APB Nml None Nml 0 1+ 1+ 0 Kevin Nml         NCS Waveforms:    Motor           Sensory              Ultrasound Images:                              [facility]  Electrodiagnostic Laboratory                 Department of Neurology                                                                                                         Test Date:  2022    Patient: Annalise Wallace : 1955 Physician: Bhumika Beltran MD   Sex: Female AGE: 66 year Ref Phys:    ID#: [id number]   Technician: Kristy Behling     Clinical Information:      Techniques:  Motor and sensory conduction studies were done with surface recording electrodes. EMG was done with a concentric needle electrode.     Results:  All F Wave latencies were within normal limits.      Needle evaluation of the left Pronator Teres, the left First Dorsal Interosseous, the left Extensor Indicis Proprius, and the left Abductor Pollicis Brevis muscles showed slightly increased motor unit amplitude, slightly increased motor unit duration, and recruitment.  The left Abductor Digiti Minimi muscle showed slightly increased motor unit amplitude, slightly increased motor unit duration, slightly increased polyphasic potentials, and recruitment.  All remaining muscles (as indicated in the following table) showed no evidence of electrical instability.        Interpretation:        ___________________________  Bhumika Beltran MD        Nerve Conduction Studies  Motor Sites      Latency Amplitude Neg. Amp Diff Segment Distance Velocity Neg. Dur Neg Area Diff Temperature Comment   Site (ms) Norm (mV) Norm %  cm m/s Norm ms %  C    Left Median (APB) Motor   Wrist 3.1  < 4.4 6.2  > 5.0  Wrist-APB 8   5.9  33    Elbow 7.1 - 5.7 - -8.1 Elbow-Wrist 20 50  > 48 5.8 0 33    Left Median/Ulnar (Lumb-Dors Int II) Motor        Median (Lumb I)   Wrist 2.5 - 1.24 -      5.2  32.6         Ulnar (Dorsal Int (manus))   Wrist 2.7 - 5.5 -      3.9  32.7     Left Ulnar (ADM) Motor   Wrist 2.1  < 3.5 7.8  > 5.0  Wrist-ADM 8   6.0  32.9    Bel Elbow 5.5 - 6.2 - -20.5 Bel Elbow-Wrist 20 59  > 48 6.2 -12.7 33    Abv Elbow 6.9 - 6.8 - 9.7 Abv Elbow-Bel Elbow 8 57  > 48 6.7 12.5 33      Sensory Sites      Onset Lat Peak Lat Amp (O-P) Amp (P-P) Segment Distance Velocity Temperature Comment   Site ms ms  V Norm  V  cm m/s Norm  C    Left Median Sensory   Wrist-Dig II 2.4 3.1 34  > 10 29 Wrist-Dig II 14 58  > 48 30    Left Median-Ulnar Palmar Sensory        Median   Palm-Wrist 1.35 1.83 74 - 84 Palm-Wrist - - - 32.9         Ulnar   Palm-Wrist 1.18 1.68 30 - 31 Palm-Wrist - - - 32.9    Left Ulnar Sensory   Wrist-Dig V 2.0 2.9 31  > 8 25 Wrist-Dig V 12.5 63  > 48 31.9      Inter-Nerve Comparisons     Nerve 1 Value 1 Nerve 2 Value 2 Parameter Result Normal   Sensory Sites   L Median Palm-Wrist 1.8 ms L Ulnar Palm-Wrist 1.7 ms Peak Lat Diff 0.15 ms <0.40     F Wave Studies     Min-F Max-F Dispersion Persistence Mean-F F-Norm L-R Mean-F L-R Mean-F Norm F/M Ratio F-M Lat (ms)   Left Ulnar (Abd Dig Min)  32.9  C   26.17 29.45 3.28 100.00 27.28 <36  <2.5 1.69 23.28       Electromyography     Side Muscle Ins Act Fibs/PSW Fasc HF Amp Dur Poly Recrt Int Pat   Left Deltoid Nml None Nml 0 Nml Nml 0 Nml Nml   Left Biceps Nml None Nml 0 Nml Nml 0 Nml Nml   Left Triceps Nml None Nml 0 Nml Nml 0 Nml Nml   Left Pronator Teres Nml None Nml 0 1+ 1+ 0 Kevin Nml   Left FDI Nml None Nml 0 1+ 1+ 0 Kevin Nml   Left EIP Nml None Nml 0 1+ 1+ 0 Kevin Nml   Left ADM Nml None Nml 0 1+ 1+ 1+ Kevin Nml   Left APB Nml None Nml 0 1+ 1+ 0 Kevin Nml         NCS Waveforms:    Motor           Sensory              Ultrasound Images:                                [facility]  Electrodiagnostic Laboratory                 Department of Neurology                                                                                                         Test Date:  9/27/2022    Patient: Annalise Wallace  : 1955 Physician: Bhumika Beltran MD   Sex: Female AGE: 66 year Ref Phys:    ID#: [id number]   Technician: Kristy Behling     Clinical Information:      Techniques:  Motor and sensory conduction studies were done with surface recording electrodes. EMG was done with a concentric needle electrode.     Results:  All F Wave latencies were within normal limits.      Needle evaluation of the left Pronator Teres, the left First Dorsal Interosseous, the left Extensor Indicis Proprius, and the left Abductor Pollicis Brevis muscles showed slightly increased motor unit amplitude, slightly increased motor unit duration, and recruitment.  The left Abductor Digiti Minimi muscle showed slightly increased motor unit amplitude, slightly increased motor unit duration, slightly increased polyphasic potentials, and recruitment.  All remaining muscles (as indicated in the following table) showed no evidence of electrical instability.        Interpretation:        ___________________________  Bhumika Beltran MD        Nerve Conduction Studies  Motor Sites      Latency Amplitude Neg. Amp Diff Segment Distance Velocity Neg. Dur Neg Area Diff Temperature Comment   Site (ms) Norm (mV) Norm %  cm m/s Norm ms %  C    Left Median (APB) Motor   Wrist 3.1  < 4.4 6.2  > 5.0  Wrist-APB 8   5.9  33    Elbow 7.1 - 5.7 - -8.1 Elbow-Wrist 20 50  > 48 5.8 0 33    Left Median/Ulnar (Lumb-Dors Int II) Motor        Median (Lumb I)   Wrist 2.5 - 1.24 -      5.2  32.6         Ulnar (Dorsal Int (manus))   Wrist 2.7 - 5.5 -      3.9  32.7    Left Ulnar (ADM) Motor   Wrist 2.1  < 3.5 7.8  > 5.0  Wrist-ADM 8   6.0  32.9    Bel Elbow 5.5 - 6.2 - -20.5 Bel Elbow-Wrist 20 59  > 48 6.2 -12.7 33    Abv Elbow 6.9 - 6.8 - 9.7 Abv Elbow-Bel Elbow 8 57  > 48 6.7 12.5 33      Sensory Sites      Onset Lat Peak Lat Amp (O-P) Amp (P-P) Segment Distance Velocity Temperature Comment   Site ms ms  V Norm  V  cm m/s Norm  C    Left Median Sensory   Wrist-Dig II 2.4 3.1 34  >  10 29 Wrist-Dig II 14 58  > 48 30    Left Median-Ulnar Palmar Sensory        Median   Palm-Wrist 1.35 1.83 74 - 84 Palm-Wrist - - - 32.9         Ulnar   Palm-Wrist 1.18 1.68 30 - 31 Palm-Wrist - - - 32.9    Left Ulnar Sensory   Wrist-Dig V 2.0 2.9 31  > 8 25 Wrist-Dig V 12.5 63  > 48 31.9      Inter-Nerve Comparisons     Nerve 1 Value 1 Nerve 2 Value 2 Parameter Result Normal   Sensory Sites   L Median Palm-Wrist 1.8 ms L Ulnar Palm-Wrist 1.7 ms Peak Lat Diff 0.15 ms <0.40     F Wave Studies     Min-F Max-F Dispersion Persistence Mean-F F-Norm L-R Mean-F L-R Mean-F Norm F/M Ratio F-M Lat (ms)   Left Ulnar (Abd Dig Min)  32.9  C   26.17 29.45 3.28 100.00 27.28 <36  <2.5 1.69 23.28       Electromyography     Side Muscle Ins Act Fibs/PSW Fasc HF Amp Dur Poly Recrt Int Pat   Left Deltoid Nml None Nml 0 Nml Nml 0 Nml Nml   Left Biceps Nml None Nml 0 Nml Nml 0 Nml Nml   Left Triceps Nml None Nml 0 Nml Nml 0 Nml Nml   Left Pronator Teres Nml None Nml 0 1+ 1+ 0 Kevin Nml   Left FDI Nml None Nml 0 1+ 1+ 0 Kevin Nml   Left EIP Nml None Nml 0 1+ 1+ 0 Kevin Nml   Left ADM Nml None Nml 0 1+ 1+ 1+ Kevin Nml   Left APB Nml None Nml 0 1+ 1+ 0 Kevin Nml         NCS Waveforms:    Motor           Sensory

## 2022-10-04 ENCOUNTER — MYC MEDICAL ADVICE (OUTPATIENT)
Dept: FAMILY MEDICINE | Facility: CLINIC | Age: 67
End: 2022-10-04

## 2022-10-04 DIAGNOSIS — M54.12 CERVICAL RADICULOPATHY: ICD-10-CM

## 2022-10-04 DIAGNOSIS — M54.2 NECK PAIN: Primary | ICD-10-CM

## 2022-10-04 DIAGNOSIS — M48.02 SPINAL STENOSIS IN CERVICAL REGION: ICD-10-CM

## 2022-10-05 DIAGNOSIS — M54.12 CERVICAL RADICULOPATHY: ICD-10-CM

## 2022-10-05 DIAGNOSIS — M48.02 SPINAL STENOSIS IN CERVICAL REGION: ICD-10-CM

## 2022-10-05 DIAGNOSIS — M54.2 NECK PAIN: Primary | ICD-10-CM

## 2022-10-12 ENCOUNTER — MYC REFILL (OUTPATIENT)
Dept: FAMILY MEDICINE | Facility: CLINIC | Age: 67
End: 2022-10-12

## 2022-10-12 DIAGNOSIS — M54.2 NECK PAIN: ICD-10-CM

## 2022-10-12 DIAGNOSIS — G89.29 OTHER CHRONIC PAIN: ICD-10-CM

## 2022-10-13 ENCOUNTER — TRANSFERRED RECORDS (OUTPATIENT)
Dept: HEALTH INFORMATION MANAGEMENT | Facility: CLINIC | Age: 67
End: 2022-10-13

## 2022-10-13 LAB — HEMOCCULT STL QL IA: NEGATIVE

## 2022-10-13 RX ORDER — OXYCODONE AND ACETAMINOPHEN 5; 325 MG/1; MG/1
1 TABLET ORAL EVERY 6 HOURS PRN
Qty: 120 TABLET | Refills: 0 | Status: SHIPPED | OUTPATIENT
Start: 2022-10-13 | End: 2022-11-07

## 2022-10-21 ENCOUNTER — HOSPITAL ENCOUNTER (OUTPATIENT)
Dept: MRI IMAGING | Facility: CLINIC | Age: 67
Discharge: HOME | End: 2022-10-21
Attending: FAMILY MEDICINE | Admitting: FAMILY MEDICINE
Payer: COMMERCIAL

## 2022-10-21 DIAGNOSIS — M54.2 NECK PAIN: ICD-10-CM

## 2022-10-21 DIAGNOSIS — M54.12 CERVICAL RADICULOPATHY: ICD-10-CM

## 2022-10-21 DIAGNOSIS — M48.02 SPINAL STENOSIS IN CERVICAL REGION: ICD-10-CM

## 2022-10-21 PROCEDURE — 72141 MRI NECK SPINE W/O DYE: CPT

## 2022-10-26 ENCOUNTER — MYC MEDICAL ADVICE (OUTPATIENT)
Dept: FAMILY MEDICINE | Facility: CLINIC | Age: 67
End: 2022-10-26

## 2022-10-26 DIAGNOSIS — M54.12 CERVICAL RADICULOPATHY: Primary | ICD-10-CM

## 2022-10-26 DIAGNOSIS — M54.2 NECK PAIN: ICD-10-CM

## 2022-10-26 DIAGNOSIS — M48.02 SPINAL STENOSIS IN CERVICAL REGION: ICD-10-CM

## 2022-10-30 ENCOUNTER — HEALTH MAINTENANCE LETTER (OUTPATIENT)
Age: 67
End: 2022-10-30

## 2022-10-31 ENCOUNTER — HOSPITAL ENCOUNTER (EMERGENCY)
Facility: CLINIC | Age: 67
Discharge: HOME | End: 2022-10-31
Attending: PHYSICIAN ASSISTANT | Admitting: PHYSICIAN ASSISTANT
Payer: COMMERCIAL

## 2022-10-31 ENCOUNTER — MYC MEDICAL ADVICE (OUTPATIENT)
Dept: FAMILY MEDICINE | Facility: CLINIC | Age: 67
End: 2022-10-31

## 2022-10-31 ENCOUNTER — TELEPHONE (OUTPATIENT)
Dept: NEUROSURGERY | Facility: CLINIC | Age: 67
End: 2022-10-31

## 2022-10-31 VITALS
DIASTOLIC BLOOD PRESSURE: 77 MMHG | OXYGEN SATURATION: 95 % | HEART RATE: 78 BPM | TEMPERATURE: 99 F | RESPIRATION RATE: 16 BRPM | SYSTOLIC BLOOD PRESSURE: 144 MMHG

## 2022-10-31 DIAGNOSIS — M54.2 NECK PAIN: ICD-10-CM

## 2022-10-31 PROCEDURE — 96374 THER/PROPH/DIAG INJ IV PUSH: CPT

## 2022-10-31 PROCEDURE — 99285 EMERGENCY DEPT VISIT HI MDM: CPT | Mod: 25

## 2022-10-31 PROCEDURE — 96376 TX/PRO/DX INJ SAME DRUG ADON: CPT

## 2022-10-31 PROCEDURE — 250N000011 HC RX IP 250 OP 636: Performed by: PHYSICIAN ASSISTANT

## 2022-10-31 PROCEDURE — 96375 TX/PRO/DX INJ NEW DRUG ADDON: CPT

## 2022-10-31 RX ORDER — KETOROLAC TROMETHAMINE 10 MG/1
10 TABLET, FILM COATED ORAL EVERY 6 HOURS PRN
Qty: 20 TABLET | Refills: 0 | Status: SHIPPED | OUTPATIENT
Start: 2022-10-31 | End: 2022-11-28

## 2022-10-31 RX ORDER — KETOROLAC TROMETHAMINE 15 MG/ML
15 INJECTION, SOLUTION INTRAMUSCULAR; INTRAVENOUS ONCE
Status: COMPLETED | OUTPATIENT
Start: 2022-10-31 | End: 2022-10-31

## 2022-10-31 RX ORDER — HYDROMORPHONE HYDROCHLORIDE 1 MG/ML
0.5 INJECTION, SOLUTION INTRAMUSCULAR; INTRAVENOUS; SUBCUTANEOUS ONCE
Status: COMPLETED | OUTPATIENT
Start: 2022-10-31 | End: 2022-10-31

## 2022-10-31 RX ORDER — METHYLPREDNISOLONE 4 MG
TABLET, DOSE PACK ORAL
Qty: 21 TABLET | Refills: 0 | Status: SHIPPED | OUTPATIENT
Start: 2022-10-31 | End: 2022-11-28

## 2022-10-31 RX ORDER — METHYLPREDNISOLONE SODIUM SUCCINATE 125 MG/2ML
125 INJECTION, POWDER, LYOPHILIZED, FOR SOLUTION INTRAMUSCULAR; INTRAVENOUS ONCE
Status: COMPLETED | OUTPATIENT
Start: 2022-10-31 | End: 2022-10-31

## 2022-10-31 RX ORDER — GABAPENTIN 300 MG/1
300 CAPSULE ORAL 3 TIMES DAILY
Qty: 21 CAPSULE | Refills: 0 | Status: SHIPPED | OUTPATIENT
Start: 2022-10-31 | End: 2022-11-28

## 2022-10-31 RX ADMIN — METHYLPREDNISOLONE SODIUM SUCCINATE 125 MG: 125 INJECTION, POWDER, FOR SOLUTION INTRAMUSCULAR; INTRAVENOUS at 18:41

## 2022-10-31 RX ADMIN — HYDROMORPHONE HYDROCHLORIDE 0.5 MG: 1 INJECTION, SOLUTION INTRAMUSCULAR; INTRAVENOUS; SUBCUTANEOUS at 19:49

## 2022-10-31 RX ADMIN — KETOROLAC TROMETHAMINE 15 MG: 15 INJECTION, SOLUTION INTRAMUSCULAR; INTRAVENOUS at 18:41

## 2022-10-31 RX ADMIN — HYDROMORPHONE HYDROCHLORIDE 1 MG: 1 INJECTION, SOLUTION INTRAMUSCULAR; INTRAVENOUS; SUBCUTANEOUS at 18:41

## 2022-10-31 ASSESSMENT — ENCOUNTER SYMPTOMS
FACIAL ASYMMETRY: 0
VOMITING: 0
NUMBNESS: 1
FEVER: 0
SHORTNESS OF BREATH: 0
HEADACHES: 0
NAUSEA: 0
CHEST TIGHTNESS: 0
SPEECH DIFFICULTY: 0
WEAKNESS: 0
DIZZINESS: 0
NECK PAIN: 1

## 2022-10-31 ASSESSMENT — ACTIVITIES OF DAILY LIVING (ADL): ADLS_ACUITY_SCORE: 37

## 2022-10-31 NOTE — TELEPHONE ENCOUNTER
Patient had EMG 3 weeks ago- MRI a week ago- scheduled to see Dr. Ma on 111-18.  Pain is severe. PCP gave script to Oxy, which isn't cutting the pain.     Patient states she can't stand the pain level and wonders if going to the ED would be okay

## 2022-10-31 NOTE — TELEPHONE ENCOUNTER
Last Visit: None patient is new to our clinic     Next Visit: 11/18/22    Name of Provider:  Dr. Ma     Assessment: patient is scheduled with Dr. Ma 11/18/22 as a NEW patient.     We have not seen this patient yet to establish care.     Recommendation given: Advised patient to contact her PCP for guidance and recommendations. She verbalized understanding.

## 2022-10-31 NOTE — ED TRIAGE NOTES
Pt here with increased on chronic neck pain, Pt had an EMG 3 wks ago and an MRI last week. Pt unable to get into neuro, pt here for pain management until she is able to get an appt.      Triage Assessment     Row Name 10/31/22 3121       Triage Assessment (Adult)    Airway WDL WDL       Respiratory WDL    Respiratory WDL WDL       Skin Circulation/Temperature WDL    Skin Circulation/Temperature WDL WDL       Cardiac WDL    Cardiac WDL WDL       Peripheral/Neurovascular WDL    Peripheral Neurovascular WDL WDL       Cognitive/Neuro/Behavioral WDL    Cognitive/Neuro/Behavioral WDL WDL

## 2022-10-31 NOTE — ED PROVIDER NOTES
History     Chief Complaint:  Neck Pain       HPI   Annalise Wallace is a 66 year old female who presents to the emergency department with gradually worsening chronic neck pain. The pain has worsened gradually over for the last week.  1 week ago on 10/21/2022 she had an MRI of her spine which showed multiple disc height loss and degenerative arthritic changes.  She has been utilizing Percocet at home without any pain relief.  She does have posterior bulge at C3-C4 with spinal canal narrowing.  She reports she has had multiple neck surgeries and back surgeries in the past.  She denies any recent trauma. Had EMG (9/27/22) with no evidence of electrical instability. She reports the pain since MRI  that she had has been progressively getting worse and she is having pain shooting from her neck down her left arm and tingling sensation.  No distal weakness of her arms or legs.  It is worse with moving her arm and neck.  She denies any vomiting, shortness of breath, chest pain, fevers, vision changes, headache.    ROS:  Review of Systems   Constitutional: Negative for fever.   Eyes: Negative for visual disturbance.   Respiratory: Negative for chest tightness and shortness of breath.    Cardiovascular: Negative for chest pain.   Gastrointestinal: Negative for nausea and vomiting.   Musculoskeletal: Positive for neck pain.   Neurological: Positive for numbness. Negative for dizziness, facial asymmetry, speech difficulty, weakness and headaches.         Allergies:  Sulfa Drugs     Medications:    gabapentin (NEURONTIN) 300 MG capsule  ketorolac (TORADOL) 10 MG tablet  methylPREDNISolone (MEDROL DOSEPAK) 4 MG tablet therapy pack  citalopram (CELEXA) 20 MG tablet  omeprazole (PRILOSEC) 40 MG DR capsule  oxyCODONE-acetaminophen (PERCOCET) 5-325 MG tablet  tiZANidine (ZANAFLEX) 4 MG tablet  traZODone (DESYREL) 100 MG tablet        Past Medical History:    Past Medical History:   Diagnosis Date     Abnormal Papanicolaou smear of  cervix and cervical HPV      Degenerative disc disease      Depressive disorder, not elsewhere classified      Displacement of cervical intervertebral disc without myelopathy      Gastro-oesophageal reflux disease      H/O hysterectomy for benign disease      TOBACCO ABUSE-CONTINUOUS      Patient Active Problem List   Diagnosis     Carcinoma in situ of cervix uteri     Acute maxillary sinusitis     Symptomatic menopausal or female climacteric states     CARDIOVASCULAR SCREENING; LDL GOAL LESS THAN 160     Health Care Home     Depression with anxiety     Benign neoplasm of skin     Adrenal adenoma     Ganglion cyst     Other chronic pain     Inflamed seborrheic keratosis     Persistent disorder of initiating or maintaining sleep     S/P cervical spinal fusion     Primary insomnia     Diaphragmatic hernia without obstruction and without gangrene     Spinal stenosis in cervical region     Controlled substance agreement signed        Past Surgical History:    Past Surgical History:   Procedure Laterality Date     BACK SURGERY       DISCECTOMY LUMBAR MINIMALLY INVASIVE ONE LEVEL  2/13/2014    Procedure: DISCECTOMY LUMBAR MINIMALLY INVASIVE ONE LEVEL;  Minimally Invasive Discectomy L2-3 Left ;  Surgeon: Juanjose Pitt MD;  Location: [facility] OR     DISCECTOMY LUMBAR MINIMALLY INVASIVE ONE LEVEL       FUSION SPINE POSTERIOR MINIMALLY INVASIVE ONE LEVEL  5/15/2014    Procedure: FUSION SPINE POSTERIOR MINIMALLY INVASIVE ONE LEVEL;  Surgeon: Juanjose Pitt MD;  Location: [facility] OR     HYSTERECTOMY VAGINAL, BILATERAL SALPINGO-OOPHERECTOMY, COMBINED       LAPAROSCOPIC HYSTERECTOMY TOTAL       ZZC NONSPECIFIC PROCEDURE      Lump removed L. breast - benign     ZZC NONSPECIFIC PROCEDURE      Cervical disk     ZZC NONSPECIFIC PROCEDURE      C-spine - Nany        Family History:    family history includes Cancer in her father and mother; Diabetes in her paternal grandmother.    Social History:   reports that she quit smoking  about 5 years ago. Her smoking use included cigarettes. She has a 30.00 pack-year smoking history. She has never used smokeless tobacco. She reports that she does not drink alcohol and does not use drugs.  PCP: Ruth Monroe     Physical Exam     Patient Vitals for the past 24 hrs:   BP Temp Temp src Pulse Resp SpO2   10/31/22 1958 -- -- -- -- 16 --   10/31/22 1557 (!) 144/77 99  F (37.2  C) Temporal 78 18 95 %        Physical Exam  Vitals and nursing note reviewed.   Constitutional:       Appearance: She is not diaphoretic.   HENT:      Nose: Nose normal.      Mouth/Throat:      Pharynx: Oropharynx is clear.   Eyes:      General: Lids are normal. No scleral icterus.        Right eye: No discharge.         Left eye: No discharge.      Extraocular Movements: Extraocular movements intact.      Right eye: Normal extraocular motion.      Left eye: Normal extraocular motion.      Conjunctiva/sclera: Conjunctivae normal.      Pupils: Pupils are equal, round, and reactive to light. Pupils are equal.      Right eye: Pupil is round, reactive and not sluggish.      Left eye: Pupil is round, reactive and not sluggish.      Comments: No ptosis of either eyelids   Neck:      Trachea: Phonation normal.        Comments: Radiculopathy is worsened with lifting her left arm above her head and laterally rotating her head towards the left arm.  Cardiovascular:      Rate and Rhythm: Normal rate and regular rhythm.      Heart sounds: Normal heart sounds, S1 normal and S2 normal.   Pulmonary:      Effort: Pulmonary effort is normal.      Breath sounds: Normal breath sounds and air entry.   Musculoskeletal:      Right shoulder: Normal range of motion.      Left shoulder: Normal range of motion.      Right elbow: Normal range of motion.      Left elbow: Normal range of motion.      Right wrist: Normal range of motion.      Left wrist: Normal range of motion.      Right hand: Normal strength. Normal sensation. Normal capillary  refill.      Left hand: Decreased strength (). Normal strength of wrist extension. Normal sensation. Normal capillary refill. Normal pulse.      Cervical back: Neck supple. Tenderness present. No crepitus. Pain with movement and muscular tenderness (Left lateral) present. No spinous process tenderness. Decreased range of motion.      Comments:   Good equal strength with wrist and elbow ROM  No left wrist drop   Lymphadenopathy:      Cervical: No cervical adenopathy.   Neurological:      Mental Status: She is alert and oriented to person, place, and time. Mental status is at baseline.      Cranial Nerves: No cranial nerve deficit, dysarthria or facial asymmetry.      Sensory: Sensation is intact.   Psychiatric:         Attention and Perception: Attention and perception normal.         Mood and Affect: Mood and affect normal.           Emergency Department Course     Imaging:  No orders to display      Laboratory:  Labs Ordered and Resulted from Time of ED Arrival to Time of ED Departure - No data to display     Procedures     Emergency Department Course:         Reviewed:  I reviewed nursing notes, vitals and past medical history    Assessments/Consults:   ED Course as of 11/01/22 1106   Mon Oct 31, 2022   1930 Pain as greatly improved and she feels better         Interventions:  Medications   HYDROmorphone (DILAUDID) injection 1 mg (1 mg Intravenous Given 10/31/22 1841)   methylPREDNISolone sodium succinate (solu-MEDROL) injection 125 mg (125 mg Intravenous Given 10/31/22 1841)   ketorolac (TORADOL) injection 15 mg (15 mg Intravenous Given 10/31/22 1841)   HYDROmorphone (PF) (DILAUDID) injection 0.5 mg (0.5 mg Intravenous Given 10/31/22 1949)        Disposition:  The patient was discharged to home.     Impression & Plan    CMS Diagnoses: None    Medical Decision Making:      This is a 66-year-old female who presents to the emergency department with increasing neck pain over the last week.  She has known  underlying degenerative joint changes and disc bulging in her neck.  She had recent MRI within this last week.  At this time based on her physical exam findings, history of present illness, and vital signs my diagnosis impression is below.  I do not feel based on her physical exam findings she requires any emergent MRI.  She is not exhibiting any severe distal weakness or wrist drop.  I do not feel the pain is is referred from the chest or abdomen.  This pain is clearly provoked with range of motion of the neck.  I suspect the patient has some underlying narcotic tolerance given her usage of narcotics to control her pain.  Patient was given IV Dilaudid and Toradol and Solu-Medrol.  Patient reported that her pain was improved.  Patient asked for 1 more dose of pain medicine prior to discharge with concerns of potential coverage until she can get home and utilize her further pain medicine she has at home.  I think this is reasonable request and this was given.  I suspect that the patient got behind on her pain control.  I will provide her with anti-inflammatory prescription of Toradol and Medrol Dosepak.  She has 30 days worth of Percocet at home and I do not think she requires any for her prescriptions or narcotics at this time.  I like to focus more on anti-inflammatory relief and neuropathic pain control.  Of also given her prescription for gabapentin.  I encouraged her to follow-up with her primary care doctor in the interim until she can follow up and see the neurosurgeon.  She should return back to the emergency department if she develops increased weakness of the left arm or legs or worsening pain, nausea, vomiting shortness of breath, chest pain, or other or new concerning symptoms or condition.      My impression of today's diagnosis is:     ICD-10-CM    1. Neck pain  M54.2           Discharge Medications:  Discharge Medication List as of 10/31/2022  7:30 PM      START taking these medications    Details    gabapentin (NEURONTIN) 300 MG capsule Take 1 capsule (300 mg) by mouth 3 times daily for 7 days, Disp-21 capsule, R-0, Local Print      ketorolac (TORADOL) 10 MG tablet Take 1 tablet (10 mg) by mouth every 6 hours as needed for moderate pain, Disp-20 tablet, R-0, Local Print      methylPREDNISolone (MEDROL DOSEPAK) 4 MG tablet therapy pack Follow Package Directions, Disp-21 tablet, R-0, Local Print              10/31/2022   Akash Schwartz, Akash Gonzalez PA-C  11/01/22 1118

## 2022-11-01 NOTE — TELEPHONE ENCOUNTER
See [employer] message, pt went to ER, confirming f/u plan, will proceed after pt replies  Annalise Clifford RN, BSN  [facility]

## 2022-11-01 NOTE — TELEPHONE ENCOUNTER
See Mychart update, routed FYI to NWD, pt went to ER for neck, see update, no action needed now  Annalise Clifford RN, BSN  [facility]

## 2022-11-07 ENCOUNTER — MYC MEDICAL ADVICE (OUTPATIENT)
Dept: FAMILY MEDICINE | Facility: CLINIC | Age: 67
End: 2022-11-07

## 2022-11-07 DIAGNOSIS — M54.2 NECK PAIN: ICD-10-CM

## 2022-11-07 DIAGNOSIS — G89.29 OTHER CHRONIC PAIN: ICD-10-CM

## 2022-11-07 RX ORDER — OXYCODONE AND ACETAMINOPHEN 5; 325 MG/1; MG/1
1 TABLET ORAL EVERY 6 HOURS PRN
Qty: 120 TABLET | Refills: 0 | Status: SHIPPED | OUTPATIENT
Start: 2022-11-07 | End: 2022-11-08

## 2022-11-07 NOTE — TELEPHONE ENCOUNTER
Annalise Wallace  to [facility] Triage (supporting Fer, Ruth Paz MD)      3:28 PM  Regarding pain medication   If you don t change something on the script,  I can t get it filled until the 11th, really wanted to fill today. I m in too much pain to wait till Friday!!

## 2022-11-07 NOTE — TELEPHONE ENCOUNTER
Dr. Monroe- see [employer] message below.  Last fill on med list 10/13/22 #120.  Please advise.  Rosaura Miller RN    Routing refill request to provider for review/approval because:  Drug not on the [facility] refill protocol   Early refill request    Rosaura Miller RN

## 2022-11-07 NOTE — TELEPHONE ENCOUNTER
Patient calling and states pharmacy has refill but said provider would need to give OK for it to be filled today.  Are you OK with this being filled today.  Is early request.  Please advise.  Rosaura Miller RN

## 2022-11-08 RX ORDER — OXYCODONE AND ACETAMINOPHEN 5; 325 MG/1; MG/1
TABLET ORAL
Qty: 120 TABLET | Refills: 0 | Status: SHIPPED | OUTPATIENT
Start: 2022-11-08 | End: 2022-12-05

## 2022-11-08 NOTE — TELEPHONE ENCOUNTER
"Pharmacy said the script must state. \"Ok for early refill\" and it needs to have an updated sig reflecting patient's increased use,    Ie: take 1-2 tablets by mouth q6h as needed for severe pain.    Order pended for provider review and consideration.    Zandra Wallace RN   "

## 2022-11-08 NOTE — TELEPHONE ENCOUNTER
Patient calling to see what is happening with her request as she is in severe pain with her neck. Advised of status update and that her provider needs to approve the request.       Patient requesting call back if has been filled. Thank you     CONNER Barajas on 11/8/2022 at 11:12 AM

## 2022-11-08 NOTE — TELEPHONE ENCOUNTER
Patient calling back to see about status of her request. Patient is unsure what specifically needs to be changed on the prescription.    Will connect with [facility] pharmacy, when they open for business, to learn what needs to be changed on the Percocet prescription.     Zandra Wallace RN

## 2022-11-18 ENCOUNTER — OFFICE VISIT (OUTPATIENT)
Dept: NEUROSURGERY | Facility: CLINIC | Age: 67
End: 2022-11-18
Attending: FAMILY MEDICINE
Payer: COMMERCIAL

## 2022-11-18 VITALS
HEIGHT: 66 IN | OXYGEN SATURATION: 97 % | DIASTOLIC BLOOD PRESSURE: 79 MMHG | HEART RATE: 95 BPM | BODY MASS INDEX: 27.64 KG/M2 | WEIGHT: 172 LBS | SYSTOLIC BLOOD PRESSURE: 159 MMHG

## 2022-11-18 DIAGNOSIS — Z98.1 S/P CERVICAL SPINAL FUSION: Primary | ICD-10-CM

## 2022-11-18 DIAGNOSIS — M54.12 CERVICAL RADICULOPATHY: ICD-10-CM

## 2022-11-18 DIAGNOSIS — M47.22 CERVICAL SPONDYLOSIS WITH RADICULOPATHY: ICD-10-CM

## 2022-11-18 DIAGNOSIS — M54.2 NECK PAIN: ICD-10-CM

## 2022-11-18 DIAGNOSIS — M48.02 SPINAL STENOSIS IN CERVICAL REGION: ICD-10-CM

## 2022-11-18 PROCEDURE — 99204 OFFICE O/P NEW MOD 45 MIN: CPT | Performed by: NEUROLOGICAL SURGERY

## 2022-11-18 ASSESSMENT — PAIN SCALES - GENERAL: PAINLEVEL: WORST PAIN (10)

## 2022-11-18 NOTE — PATIENT INSTRUCTIONS
Patient Next Steps:    Order placed for epidural steroid injection  The steroid can take 10-14 days to reach max effect  Please call our clinic if symptoms persist after this timeframe.  You can call to schedule injection at [phone] ([facility])    Order placed for Cervical Xray, Cervical CT. You can schedule at our  today, or central scheduling will call you to make the appointment. If you do not hear from them within 1-2 business days you can call the numbers below.   [phone]   Dr. Ma would like to see you in clinic after the images and JACKELIN. Please call the number below to schedule.     Please call us if you have any further questions or concerns.    [facility]   Phone: [phone]  Fax: [phone]

## 2022-11-18 NOTE — PROGRESS NOTES
It was a pleasure to see Annalise Wallace today in Neurosurgery Clinic. She is a 67 year old female who has a history of multiple previous spinal operations including previous spinal fusion from C4-C7.  She is here today after being evaluated in the emergency room on October 31.  Her primary care provider at also sent her for an EMG nerve conduction study because of worsening numbness in the left upper extremity.  She feels that her symptoms are worsened over the last month with pain in the neck radiating down the left arm and what sounds like a C8 distribution.  She also has a significant component of upper neck pain.  She has numbness and tingling all the time.  She denies any symptoms down the right upper extremity or in the legs.  She describes some weakness of the left hand.    EMG nerve conduction study performed in September shows evidence of C8/T1 radiculopathy.  She is on multiple medications without significant relief.    Past Medical History:   Diagnosis Date     Abnormal Papanicolaou smear of cervix and cervical HPV      Degenerative disc disease      Depressive disorder, not elsewhere classified      Displacement of cervical intervertebral disc without myelopathy      Gastro-oesophageal reflux disease      H/O hysterectomy for benign disease      TOBACCO ABUSE-CONTINUOUS      Past Medical History reviewed with patient during visit.  Past Surgical History:   Procedure Laterality Date     BACK SURGERY       DISCECTOMY LUMBAR MINIMALLY INVASIVE ONE LEVEL  2/13/2014    Procedure: DISCECTOMY LUMBAR MINIMALLY INVASIVE ONE LEVEL;  Minimally Invasive Discectomy L2-3 Left ;  Surgeon: Juanjose Pitt MD;  Location: [facility] OR     DISCECTOMY LUMBAR MINIMALLY INVASIVE ONE LEVEL       FUSION SPINE POSTERIOR MINIMALLY INVASIVE ONE LEVEL  5/15/2014    Procedure: FUSION SPINE POSTERIOR MINIMALLY INVASIVE ONE LEVEL;  Surgeon: Juanjose Pitt MD;  Location: [facility] OR     HYSTERECTOMY VAGINAL, BILATERAL  SALPINGO-OOPHERECTOMY, COMBINED       LAPAROSCOPIC HYSTERECTOMY TOTAL       ZZC NONSPECIFIC PROCEDURE      Lump removed L. breast - benign     ZZC NONSPECIFIC PROCEDURE      Cervical disk     ZZC NONSPECIFIC PROCEDURE      C-spine - Nany     Past Surgical History reviewed with patient during visit.  Allergies   Allergen Reactions     Sulfa Drugs Other (See Comments)     Headaches       Current Outpatient Medications:      citalopram (CELEXA) 20 MG tablet, TAKE 1 TABLET (20 MG) BY MOUTH DAILY, Disp: 90 tablet, Rfl: 0     omeprazole (PRILOSEC) 40 MG DR capsule, TAKE ONE CAPSULE BY MOUTH ONCE DAILY, Disp: 90 capsule, Rfl: 3     oxyCODONE-acetaminophen (PERCOCET) 5-325 MG tablet, Take one to two tablets by mouth every 6 hours as needed for severe pain., Disp: 120 tablet, Rfl: 0     tiZANidine (ZANAFLEX) 4 MG tablet, TAKE 1 TABLET (4 MG) BY MOUTH 2 TIMES DAILY AS NEEDED FOR MUSCLE SPASMS, Disp: 60 tablet, Rfl: 3     traZODone (DESYREL) 100 MG tablet, TAKE ONE TABLET BY MOUTH ONCE DAILY, Disp: 90 tablet, Rfl: 0     gabapentin (NEURONTIN) 300 MG capsule, Take 1 capsule (300 mg) by mouth 3 times daily for 7 days, Disp: 21 capsule, Rfl: 0     ketorolac (TORADOL) 10 MG tablet, Take 1 tablet (10 mg) by mouth every 6 hours as needed for moderate pain, Disp: 20 tablet, Rfl: 0     methylPREDNISolone (MEDROL DOSEPAK) 4 MG tablet therapy pack, Follow Package Directions, Disp: 21 tablet, Rfl: 0  Social History     Socioeconomic History     Marital status: Single     Spouse name: None     Number of children: None     Years of education: None     Highest education level: None   Tobacco Use     Smoking status: Former     Packs/day: 1.00     Years: 30.00     Pack years: 30.00     Types: Cigarettes     Quit date: 2017     Years since quittin.4     Smokeless tobacco: Never   Vaping Use     Vaping Use: Never used   Substance and Sexual Activity     Alcohol use: No     Alcohol/week: 0.0 standard drinks     Drug use: No     Sexual  "activity: Not Currently     Partners: Male   Other Topics Concern     Caffeine Concern Yes     Comment: 3 diet pepsi     Special Diet Yes     Comment: tries to eat a lot of frts and vegs, little eating out     Exercise Yes     Comment: Very active job, sit-ups at night     Seat Belt Yes     Self-Exams Yes     Parent/sibling w/ CABG, MI or angioplasty before 65F 55M? No     Family History   Problem Relation Age of Onset     Cancer Mother      Cancer Father         throat     Diabetes Paternal Grandmother         ROS: 10 point ROS neg other than the symptoms noted above in the HPI.    Vitals:    11/18/22 0937 11/18/22 0952   BP: (!) 153/81 (!) 159/79   Pulse: 94 95   SpO2: 96% 97%   Weight: 78 kg (172 lb)    Height: 1.676 m (5' 6\")      Estimated body mass index is 27.76 kg/m  as calculated from the following:    Height as of this encounter: 1.676 m (5' 6\").    Weight as of this encounter: 78 kg (172 lb).  Worst Pain (10)    Neck Disability Index  Neck Disability Index (  Helio H. and Ksenia C. 1991. All rights reserved.; used with permission) 11/18/2022   SECTION 1 - PAIN INTENSITY 5   SECTION 3 - LIFTING 4   SECTION 4 - READING 4   SECTION 5 - HEADACHES 3   SECTION 6 - CONCENTRATION 4   SECTION 7 - WORK 4   SECTION 8 - DRIVING 3   SECTION 9 - SLEEPING 4   SECTION 10 - RECREATION 5   Count 9   Sum 36   Raw Score: /50 40   Neck Disability Index Score: (%) 80       Visual Analog Scale (VAS) Questionnaire    No flowsheet data found.       Awake alert and oriented.  Well-healed anterior and posterior cervical incisions.  Bilateral upper and lower extremity strength is 5 out of 5 in all muscle groups except for 4 out of 5 left hand  and intrinsics.  Reflexes symmetric and normal.  Decreased sensation to light touch in left C8 distribution.    Imaging: MRI of the cervical spine shows her previous fusion and degenerative changes at C3-4 with left foraminal stenosis as well as what appears to be foraminal stenosis on " the left at C7-T1 although the report does not make significant remarks about this.  Imaging was reviewed with the patient shown to the patient in clinic today.    Assessment: Cervical radiculopathy status post previous cervical fusions.    Plan: I would like to obtain AP lateral flexion-extension x-rays of the cervical spine along with a CT of the cervical spine.  We will order an epidural steroid injection to see if this will relieve her symptoms in the short-term.  However based on the imaging results we will likely consider surgery.  I think that she may benefit from C2 to T2 fusion with left-sided foraminotomies.

## 2022-11-18 NOTE — NURSING NOTE
"Annalise Wallace is a 67 year old female who presents for:  Chief Complaint   Patient presents with     Referral     Cervical radiculopathy   Spinal stenosis in cervical region, Worsening Neck pain        Initial Vitals:  BP (!) 159/79   Pulse 95   Ht 5' 6\" (1.676 m)   Wt 172 lb (78 kg)   SpO2 97%   BMI 27.76 kg/m   Estimated body mass index is 27.76 kg/m  as calculated from the following:    Height as of this encounter: 5' 6\" (1.676 m).    Weight as of this encounter: 172 lb (78 kg).. Body surface area is 1.91 meters squared. BP completed using cuff size: regular  Worst Pain (10)        Yoni Saeed    "

## 2022-11-18 NOTE — LETTER
11/18/2022         RE: Annalise Wallace  [address]  [place] [address]        Dear Colleague,    Thank you for referring your patient, Annalise Wallace, to the [facility] NEUROLOGY [facility]. Please see a copy of my visit note below.    It was a pleasure to see Annalise Wallace today in Neurosurgery Clinic. She is a 67 year old female who has a history of multiple previous spinal operations including previous spinal fusion from C4-C7.  She is here today after being evaluated in the emergency room on October 31.  Her primary care provider at also sent her for an EMG nerve conduction study because of worsening numbness in the left upper extremity.  She feels that her symptoms are worsened over the last month with pain in the neck radiating down the left arm and what sounds like a C8 distribution.  She also has a significant component of upper neck pain.  She has numbness and tingling all the time.  She denies any symptoms down the right upper extremity or in the legs.  She describes some weakness of the left hand.    EMG nerve conduction study performed in September shows evidence of C8/T1 radiculopathy.  She is on multiple medications without significant relief.    Past Medical History:   Diagnosis Date     Abnormal Papanicolaou smear of cervix and cervical HPV      Degenerative disc disease      Depressive disorder, not elsewhere classified      Displacement of cervical intervertebral disc without myelopathy      Gastro-oesophageal reflux disease      H/O hysterectomy for benign disease      TOBACCO ABUSE-CONTINUOUS      Past Medical History reviewed with patient during visit.  Past Surgical History:   Procedure Laterality Date     BACK SURGERY       DISCECTOMY LUMBAR MINIMALLY INVASIVE ONE LEVEL  2/13/2014    Procedure: DISCECTOMY LUMBAR MINIMALLY INVASIVE ONE LEVEL;  Minimally Invasive Discectomy L2-3 Left ;  Surgeon: Juanjose Pitt MD;  Location: [facility] OR     DISCECTOMY LUMBAR  MINIMALLY INVASIVE ONE LEVEL       FUSION SPINE POSTERIOR MINIMALLY INVASIVE ONE LEVEL  5/15/2014    Procedure: FUSION SPINE POSTERIOR MINIMALLY INVASIVE ONE LEVEL;  Surgeon: Juanjose Pitt MD;  Location: [facility] OR     HYSTERECTOMY VAGINAL, BILATERAL SALPINGO-OOPHERECTOMY, COMBINED       LAPAROSCOPIC HYSTERECTOMY TOTAL       ZZC NONSPECIFIC PROCEDURE      Lump removed L. breast - benign     ZZC NONSPECIFIC PROCEDURE      Cervical disk     ZZC NONSPECIFIC PROCEDURE      C-spine - Nany     Past Surgical History reviewed with patient during visit.  Allergies   Allergen Reactions     Sulfa Drugs Other (See Comments)     Headaches       Current Outpatient Medications:      citalopram (CELEXA) 20 MG tablet, TAKE 1 TABLET (20 MG) BY MOUTH DAILY, Disp: 90 tablet, Rfl: 0     omeprazole (PRILOSEC) 40 MG DR capsule, TAKE ONE CAPSULE BY MOUTH ONCE DAILY, Disp: 90 capsule, Rfl: 3     oxyCODONE-acetaminophen (PERCOCET) 5-325 MG tablet, Take one to two tablets by mouth every 6 hours as needed for severe pain., Disp: 120 tablet, Rfl: 0     tiZANidine (ZANAFLEX) 4 MG tablet, TAKE 1 TABLET (4 MG) BY MOUTH 2 TIMES DAILY AS NEEDED FOR MUSCLE SPASMS, Disp: 60 tablet, Rfl: 3     traZODone (DESYREL) 100 MG tablet, TAKE ONE TABLET BY MOUTH ONCE DAILY, Disp: 90 tablet, Rfl: 0     gabapentin (NEURONTIN) 300 MG capsule, Take 1 capsule (300 mg) by mouth 3 times daily for 7 days, Disp: 21 capsule, Rfl: 0     ketorolac (TORADOL) 10 MG tablet, Take 1 tablet (10 mg) by mouth every 6 hours as needed for moderate pain, Disp: 20 tablet, Rfl: 0     methylPREDNISolone (MEDROL DOSEPAK) 4 MG tablet therapy pack, Follow Package Directions, Disp: 21 tablet, Rfl: 0  Social History     Socioeconomic History     Marital status: Single     Spouse name: None     Number of children: None     Years of education: None     Highest education level: None   Tobacco Use     Smoking status: Former     Packs/day: 1.00     Years: 30.00     Pack years: 30.00     Types:  "Cigarettes     Quit date: 2017     Years since quittin.4     Smokeless tobacco: Never   Vaping Use     Vaping Use: Never used   Substance and Sexual Activity     Alcohol use: No     Alcohol/week: 0.0 standard drinks     Drug use: No     Sexual activity: Not Currently     Partners: Male   Other Topics Concern     Caffeine Concern Yes     Comment: 3 diet pepsi     Special Diet Yes     Comment: tries to eat a lot of frts and vegs, little eating out     Exercise Yes     Comment: Very active job, sit-ups at night     Seat Belt Yes     Self-Exams Yes     Parent/sibling w/ CABG, MI or angioplasty before 65F 55M? No     Family History   Problem Relation Age of Onset     Cancer Mother      Cancer Father         throat     Diabetes Paternal Grandmother         ROS: 10 point ROS neg other than the symptoms noted above in the HPI.    Vitals:    22 0937 22 0952   BP: (!) 153/81 (!) 159/79   Pulse: 94 95   SpO2: 96% 97%   Weight: 78 kg (172 lb)    Height: 1.676 m (5' 6\")      Estimated body mass index is 27.76 kg/m  as calculated from the following:    Height as of this encounter: 1.676 m (5' 6\").    Weight as of this encounter: 78 kg (172 lb).  Worst Pain (10)    Neck Disability Index  Neck Disability Index (  Helio H. and Ksenia C. 1991. All rights reserved.; used with permission) 2022   SECTION 1 - PAIN INTENSITY 5   SECTION 3 - LIFTING 4   SECTION 4 - READING 4   SECTION 5 - HEADACHES 3   SECTION 6 - CONCENTRATION 4   SECTION 7 - WORK 4   SECTION 8 - DRIVING 3   SECTION 9 - SLEEPING 4   SECTION 10 - RECREATION 5   Count 9   Sum 36   Raw Score: /50 40   Neck Disability Index Score: (%) 80       Visual Analog Scale (VAS) Questionnaire    No flowsheet data found.       Awake alert and oriented.  Well-healed anterior and posterior cervical incisions.  Bilateral upper and lower extremity strength is 5 out of 5 in all muscle groups except for 4 out of 5 left hand  and intrinsics.  Reflexes symmetric " and normal.  Decreased sensation to light touch in left C8 distribution.    Imaging: MRI of the cervical spine shows her previous fusion and degenerative changes at C3-4 with left foraminal stenosis as well as what appears to be foraminal stenosis on the left at C7-T1 although the report does not make significant remarks about this.  Imaging was reviewed with the patient shown to the patient in clinic today.    Assessment: Cervical radiculopathy status post previous cervical fusions.    Plan: I would like to obtain AP lateral flexion-extension x-rays of the cervical spine along with a CT of the cervical spine.  We will order an epidural steroid injection to see if this will relieve her symptoms in the short-term.  However based on the imaging results we will likely consider surgery.  I think that she may benefit from C2 to T2 fusion with left-sided foraminotomies.         Again, thank you for allowing me to participate in the care of your patient.        Sincerely,        Layton Ma MD

## 2022-11-21 ENCOUNTER — HOSPITAL ENCOUNTER (OUTPATIENT)
Dept: GENERAL RADIOLOGY | Facility: CLINIC | Age: 67
Discharge: HOME | End: 2022-11-21
Attending: NEUROLOGICAL SURGERY
Payer: COMMERCIAL

## 2022-11-21 ENCOUNTER — HOSPITAL ENCOUNTER (OUTPATIENT)
Dept: CT IMAGING | Facility: CLINIC | Age: 67
Discharge: HOME | End: 2022-11-21
Attending: NEUROLOGICAL SURGERY
Payer: COMMERCIAL

## 2022-11-21 ENCOUNTER — TELEPHONE (OUTPATIENT)
Dept: NEUROSURGERY | Facility: CLINIC | Age: 67
End: 2022-11-21

## 2022-11-21 DIAGNOSIS — M48.02 SPINAL STENOSIS IN CERVICAL REGION: ICD-10-CM

## 2022-11-21 DIAGNOSIS — M54.12 CERVICAL RADICULOPATHY: ICD-10-CM

## 2022-11-21 DIAGNOSIS — Z98.1 S/P CERVICAL SPINAL FUSION: ICD-10-CM

## 2022-11-21 DIAGNOSIS — M54.2 NECK PAIN: ICD-10-CM

## 2022-11-21 PROCEDURE — 72050 X-RAY EXAM NECK SPINE 4/5VWS: CPT

## 2022-11-21 PROCEDURE — 72125 CT NECK SPINE W/O DYE: CPT

## 2022-11-21 NOTE — TELEPHONE ENCOUNTER
Arlene from [facility] called asking to get a referral sent to them regarding mutual patient Rodrigo Woody. The fax number is [phone]. Thank you!  
06-Aug-2020 21:05

## 2022-11-25 ENCOUNTER — TELEPHONE (OUTPATIENT)
Dept: NEUROSURGERY | Facility: CLINIC | Age: 67
End: 2022-11-25

## 2022-11-25 DIAGNOSIS — G89.29 OTHER CHRONIC PAIN: ICD-10-CM

## 2022-11-25 NOTE — TELEPHONE ENCOUNTER
Patient states referral for injection needs to be resubmitted and sent to [facility] in [place] and not to the [facility] location as initially referred.  Fax number for [place] location is [phone]

## 2022-11-25 NOTE — TELEPHONE ENCOUNTER
Routed to MA/VF team to fax referral to [facility].     A message was also sent to MA/VF team 11/21 to fax order.

## 2022-11-28 ENCOUNTER — OFFICE VISIT (OUTPATIENT)
Dept: NEUROSURGERY | Facility: CLINIC | Age: 67
End: 2022-11-28
Attending: NEUROLOGICAL SURGERY
Payer: COMMERCIAL

## 2022-11-28 VITALS — HEART RATE: 92 BPM | SYSTOLIC BLOOD PRESSURE: 141 MMHG | OXYGEN SATURATION: 96 % | DIASTOLIC BLOOD PRESSURE: 81 MMHG

## 2022-11-28 DIAGNOSIS — M47.22 CERVICAL SPONDYLOSIS WITH RADICULOPATHY: Primary | ICD-10-CM

## 2022-11-28 DIAGNOSIS — Z98.1 S/P CERVICAL SPINAL FUSION: ICD-10-CM

## 2022-11-28 DIAGNOSIS — Z01.818 PREOP TESTING: ICD-10-CM

## 2022-11-28 PROCEDURE — 99214 OFFICE O/P EST MOD 30 MIN: CPT | Performed by: NEUROLOGICAL SURGERY

## 2022-11-28 PROCEDURE — G0463 HOSPITAL OUTPT CLINIC VISIT: HCPCS

## 2022-11-28 ASSESSMENT — PAIN SCALES - GENERAL: PAINLEVEL: WORST PAIN (10)

## 2022-11-28 NOTE — PATIENT INSTRUCTIONS
Patient Next Steps:    Order placed for C7-T1 interlaminar epidural steroid injection   The steroid can take 10-14 days to reach max effect  Please call our clinic if symptoms persist after this timeframe.  You can call [facility] (previously known as [facility]) to schedule your injection at [phone]    Please call us if you have any further questions or concerns.    [facility]   Phone: [phone]  Fax: [phone]              Patient Instructions    Surgery scheduled at [facility] for Cervical 2 to Thoracic 2 posterior segmental instrumentation. Left Cervical 3 to Cervical 4 and Cervical 7 to Thoracic 2 foraminotomies. Posterior arthrodesis Cervical 2 to Thoracic 2 with Dr. Ma.    Pre-Operative  Surgical risks: blood clots in the leg or lung, problems urinating, nerve damage, drainage from the incision, infection,stiffness  Pre-operative physical with primary care physician within 30 days of surgical date.   2-3 night hospitalization sta  Shower procedure  Please shower with antimicrobial soap the night before surgery and morning of surgery. Please refer to showering instruction sheet in folder.  Eating/Drinking  Stop all solid foods 8 hours before surgery.  Keep drinking clear liquids until 4 hours before surgery  Clear liquids include water, clear juice, black coffee, or clear tea without milk, Gatorade, clear soda.   Medications  Hold Aspirin, NSAIDs (Advil/Ibuprofen, Indocin, Naproxen,Nuprin,Relafen/Nabumetone, Diclofenac,Meloxicam, Aleve, Celebrex) x 7 days prior to surgical date  You can take Tylenol (Acetaminophen) for pain, 1000 mg  Do not exceed 3,000 mg per day   Any other medications prescribed, please discuss with your primary care provider at your pre-operative physical   As part of preparation for your upcoming procedure you are required to have a test for the novel Coronavirus, COVID-19.  Please call testing number to schedule  your appointment. The test needs to be completed within 4 days (96) hours of surgery.   Scheduling Number: [phone]  You may NOT receive the COVID-19 vaccine 72 hours before or after surgery.    Pain Management  Dealing with pain  As your body heals, you might feel a stabbing, burning, or aching pain. You may also have some numbness.  Everyone feels pain differently, we may ask you to rate your pain using a pain scale. This will let us know how much pain you feel.   Keep in mind that medicine won't take away all of your pain. It helps to try other ways to relax and ease pain.   Things to help with pain  After surgery, we will give you medicine for your pain. These medications work well, but they can make you drowsy, itchy, or sick to your stomach. If we give you narcotics for pain, try to take the pills with food.   For mild to moderate pain, you can take medication such as Tylenol. These can be used with narcotics, but make sure that your narcotic does not contain Tylenol.   Do NOT drive while taking narcotic pain medication  Do NOT drink alcohol while using any pain medication  You can utilize ice as needed (no longer than 20 minutes at one time)  You may resume taking NSAIDs (ex. Ibuprofen, aleve, naproxen) 12 weeks after surgery as it may cause bleeding and interfere with bone healing     Incision Care  No submerging incision in water such as pools, hot tubs, baths for at least 8 weeks or until incision is healed  You may get your incision wet in the shower. Allow water to run over incision, and gently pat dry.   Remove dressing as instructed upon discharge  Watch for signs of infection  Redness, swelling, warmth, drainage, and fever of 101 degrees or higher  Notify clinic [phone].    Activity Restrictions  For the first 6-8 weeks, no lifting > 10 pounds, no bending, twisting, or overhead reaching.  Take stairs in moderation   Ok to walk as tolerated, take short frequent walks. You may gradually  increase the distance as tolerated.   Avoid bed rest and prolonged sitting for longer than 30 minutes (change positions frequently while awake)  No contact sports until after follow up visit  No high impact activities such as; running/jogging, snowmobile or 4 stallworth riding or any other recreational vehicles  Please call the clinic if you develop any of the following symptoms:  Swelling and/or warmth in one or both legs  Pain or tenderness in your leg, ankle, foot, or arm   Red or discolored skin     Post-Op Follow Up Appointments  2 week staple/suture removal with RN  6 week post op with xray prior   3 months post op with xray prior   1 year post op with xray prior  Please call to schedule follow up and xray appointments at [phone]    Resources  If you are currently employed, you will need to be off work for 4-6 weeks for post op recovery and healing.  Please fax any FMLA/short term disability paperwork to [phone]  You may call our clinic when you'd like to return to work and we can provide a work letter  To allow staff adequate time to complete paperwork, please send as soon as possible     [facility]  Phone: [phone]  Fax: [phone]

## 2022-11-28 NOTE — NURSING NOTE
New order placed with [facility] per Dr Ma for C7-T1 interlaminar JACKELIN. Patient would like order sent to [facility] location.     Faxed Injection order to [facility]  November 28, 2022 to fax number [phone]    Right Fax confirmed at 0950 AM

## 2022-11-28 NOTE — NURSING NOTE
Reviewed pre- and post-operative instructions as outlined in the After Visit Summary/Patient Instructions with patient.   Surgery folder was given to patient    Patient Education Topic: Procedure with Dr. Ma      Learner(s): Patient    Knowledge Level: Basic    Readiness to Learn: Ready    Method:  Verbal explanation    Outcome: Able to verbalize instructions    Barriers to Learning: No barrier    Covid Testing: patient educated they will need to have a test for the novel Coronavirus, COVID-19.The PCR test needs to be completed within 4 days (96) hours of surgery. . Order Placed.    Scheduling Number: [phone]    NDI/OLVIN: Confirmation of completion within last 6 months    Patient had the opportunity for questions to be answered. Advised Patient to call clinic with any questions/concerns. Gave patient antibacterial soap for pre-surgery skin preparation.

## 2022-11-28 NOTE — LETTER
"    11/28/2022         RE: Annalise Wallace  [address]  [place] [address]        Dear Colleague,    Thank you for referring your patient, Annalise Wallace, to the [facility] NEUROSURGERY CLINIC [place]. Please see a copy of my visit note below.    It was a pleasure to see Annalise Wallace today in Neurosurgery Clinic. She is a 67 year old female who is here for follow-up of her imaging studies.    She continues to have neck and left upper extremity pain.    Vitals:    11/28/22 0847   BP: (!) 141/81   Pulse: 92   SpO2: 96%     Estimated body mass index is 27.76 kg/m  as calculated from the following:    Height as of 11/18/22: 1.676 m (5' 6\").    Weight as of 11/18/22: 78 kg (172 lb).  Worst Pain (10)    Exam stable    Imaging: CT of the cervical spine shows solid fusion from C4-C7 there is significant degeneration at C7-T1 and on the left at C7-T1 and C3-4 there are significant facet hypertrophy causing foraminal compromise.  Upright x-rays do not show any major alignment issues.  The imaging was reviewed with the patient shown to the patient clinic today.    Assessment: Status post cervical fusion, cervical radiculopathy from foraminal stenosis.    Plan: Given her symptoms I have recommended extending her fusion from C2-T2 with foraminotomies on the left at C3-4 and from C7-T2. The risks benefits and alternatives to the procedure were discussed. Risks include, but are not limited to, bleeding, infection, neurologic injury such as spinal cord or nerve root injury, CSF leak, need for further surgery or revision of any implanted hardware, failure for symptoms to improve. Questions were solicited and answered, and the patient wishes to proceed with surgery.             Again, thank you for allowing me to participate in the care of your patient.        Sincerely,        Layton Ma MD    "

## 2022-12-05 ENCOUNTER — MYC REFILL (OUTPATIENT)
Dept: FAMILY MEDICINE | Facility: CLINIC | Age: 67
End: 2022-12-05

## 2022-12-05 DIAGNOSIS — G89.29 OTHER CHRONIC PAIN: ICD-10-CM

## 2022-12-05 DIAGNOSIS — M54.2 NECK PAIN: ICD-10-CM

## 2022-12-07 NOTE — TELEPHONE ENCOUNTER
Patient calling to inquire about status of refill request for oxycodone.    Has surgery on 12/23/22.    Zandra Wallace RN

## 2022-12-07 NOTE — TELEPHONE ENCOUNTER
Patient calling to check on refill.  Wants to know why it is taking so long.  Advised to allow up to 72 business hours for refills to be completed.  Also discussed last 11/8/22 so earliest refill would be due is 12/8/22.  Rosaura Miller RN

## 2022-12-08 RX ORDER — OXYCODONE AND ACETAMINOPHEN 5; 325 MG/1; MG/1
TABLET ORAL
Qty: 120 TABLET | Refills: 0 | Status: ON HOLD | OUTPATIENT
Start: 2022-12-08 | End: 2022-12-27

## 2022-12-12 DIAGNOSIS — N95.1 SYMPTOMATIC MENOPAUSAL OR FEMALE CLIMACTERIC STATES: ICD-10-CM

## 2022-12-12 DIAGNOSIS — G47.00 PERSISTENT DISORDER OF INITIATING OR MAINTAINING SLEEP: ICD-10-CM

## 2022-12-14 RX ORDER — CITALOPRAM HYDROBROMIDE 20 MG/1
20 TABLET ORAL DAILY
Qty: 90 TABLET | Refills: 0 | Status: ON HOLD | OUTPATIENT
Start: 2022-12-14 | End: 2022-12-27

## 2022-12-14 RX ORDER — TRAZODONE HYDROCHLORIDE 100 MG/1
TABLET ORAL
Qty: 90 TABLET | Refills: 0 | Status: ON HOLD | OUTPATIENT
Start: 2022-12-14 | End: 2022-12-27

## 2022-12-14 NOTE — TELEPHONE ENCOUNTER
Prescription approved per [facility] Refill Protocol.      Zandra Wallace RN

## 2022-12-18 SDOH — ECONOMIC STABILITY: FOOD INSECURITY: WITHIN THE PAST 12 MONTHS, THE FOOD YOU BOUGHT JUST DIDN'T LAST AND YOU DIDN'T HAVE MONEY TO GET MORE.: SOMETIMES TRUE

## 2022-12-18 SDOH — ECONOMIC STABILITY: FOOD INSECURITY: WITHIN THE PAST 12 MONTHS, YOU WORRIED THAT YOUR FOOD WOULD RUN OUT BEFORE YOU GOT MONEY TO BUY MORE.: SOMETIMES TRUE

## 2022-12-18 SDOH — ECONOMIC STABILITY: INCOME INSECURITY: HOW HARD IS IT FOR YOU TO PAY FOR THE VERY BASICS LIKE FOOD, HOUSING, MEDICAL CARE, AND HEATING?: SOMEWHAT HARD

## 2022-12-18 SDOH — ECONOMIC STABILITY: TRANSPORTATION INSECURITY
IN THE PAST 12 MONTHS, HAS LACK OF TRANSPORTATION KEPT YOU FROM MEETINGS, WORK, OR FROM GETTING THINGS NEEDED FOR DAILY LIVING?: NO

## 2022-12-18 SDOH — ECONOMIC STABILITY: TRANSPORTATION INSECURITY
IN THE PAST 12 MONTHS, HAS THE LACK OF TRANSPORTATION KEPT YOU FROM MEDICAL APPOINTMENTS OR FROM GETTING MEDICATIONS?: NO

## 2022-12-18 SDOH — ECONOMIC STABILITY: INCOME INSECURITY: IN THE LAST 12 MONTHS, WAS THERE A TIME WHEN YOU WERE NOT ABLE TO PAY THE MORTGAGE OR RENT ON TIME?: NO

## 2022-12-18 SDOH — HEALTH STABILITY: PHYSICAL HEALTH: ON AVERAGE, HOW MANY DAYS PER WEEK DO YOU ENGAGE IN MODERATE TO STRENUOUS EXERCISE (LIKE A BRISK WALK)?: 3 DAYS

## 2022-12-18 SDOH — HEALTH STABILITY: PHYSICAL HEALTH: ON AVERAGE, HOW MANY MINUTES DO YOU ENGAGE IN EXERCISE AT THIS LEVEL?: 20 MIN

## 2022-12-18 ASSESSMENT — LIFESTYLE VARIABLES
SKIP TO QUESTIONS 9-10: 1
HOW OFTEN DO YOU HAVE A DRINK CONTAINING ALCOHOL: MONTHLY OR LESS
AUDIT-C TOTAL SCORE: 1
HOW OFTEN DO YOU HAVE SIX OR MORE DRINKS ON ONE OCCASION: NEVER
HOW MANY STANDARD DRINKS CONTAINING ALCOHOL DO YOU HAVE ON A TYPICAL DAY: PATIENT DOES NOT DRINK

## 2022-12-18 ASSESSMENT — SOCIAL DETERMINANTS OF HEALTH (SDOH)
DO YOU BELONG TO ANY CLUBS OR ORGANIZATIONS SUCH AS CHURCH GROUPS UNIONS, FRATERNAL OR ATHLETIC GROUPS, OR SCHOOL GROUPS?: NO
HOW OFTEN DO YOU GET TOGETHER WITH FRIENDS OR RELATIVES?: TWICE A WEEK
HOW OFTEN DO YOU ATTEND CHURCH OR RELIGIOUS SERVICES?: NEVER
IN A TYPICAL WEEK, HOW MANY TIMES DO YOU TALK ON THE PHONE WITH FAMILY, FRIENDS, OR NEIGHBORS?: MORE THAN THREE TIMES A WEEK

## 2022-12-19 ENCOUNTER — LAB (OUTPATIENT)
Dept: LAB | Facility: CLINIC | Age: 67
End: 2022-12-19
Payer: COMMERCIAL

## 2022-12-19 DIAGNOSIS — Z01.818 PREOP TESTING: ICD-10-CM

## 2022-12-19 LAB — SARS-COV-2 RNA RESP QL NAA+PROBE: NEGATIVE

## 2022-12-19 PROCEDURE — U0005 INFEC AGEN DETEC AMPLI PROBE: HCPCS

## 2022-12-19 PROCEDURE — U0003 INFECTIOUS AGENT DETECTION BY NUCLEIC ACID (DNA OR RNA); SEVERE ACUTE RESPIRATORY SYNDROME CORONAVIRUS 2 (SARS-COV-2) (CORONAVIRUS DISEASE [COVID-19]), AMPLIFIED PROBE TECHNIQUE, MAKING USE OF HIGH THROUGHPUT TECHNOLOGIES AS DESCRIBED BY CMS-2020-01-R: HCPCS

## 2022-12-21 ENCOUNTER — OFFICE VISIT (OUTPATIENT)
Dept: FAMILY MEDICINE | Facility: CLINIC | Age: 67
End: 2022-12-21
Payer: COMMERCIAL

## 2022-12-21 ENCOUNTER — ANCILLARY PROCEDURE (OUTPATIENT)
Dept: GENERAL RADIOLOGY | Facility: CLINIC | Age: 67
End: 2022-12-21
Attending: FAMILY MEDICINE
Payer: COMMERCIAL

## 2022-12-21 VITALS
HEART RATE: 101 BPM | DIASTOLIC BLOOD PRESSURE: 84 MMHG | RESPIRATION RATE: 13 BRPM | SYSTOLIC BLOOD PRESSURE: 134 MMHG | TEMPERATURE: 98 F | HEIGHT: 66 IN | WEIGHT: 168.8 LBS | BODY MASS INDEX: 27.13 KG/M2 | OXYGEN SATURATION: 97 %

## 2022-12-21 DIAGNOSIS — Z01.818 PREOP GENERAL PHYSICAL EXAM: ICD-10-CM

## 2022-12-21 DIAGNOSIS — Z98.1 S/P CERVICAL SPINAL FUSION: ICD-10-CM

## 2022-12-21 DIAGNOSIS — M50.30 DDD (DEGENERATIVE DISC DISEASE), CERVICAL: ICD-10-CM

## 2022-12-21 DIAGNOSIS — Z12.11 SCREEN FOR COLON CANCER: Primary | ICD-10-CM

## 2022-12-21 DIAGNOSIS — M48.02 SPINAL STENOSIS IN CERVICAL REGION: ICD-10-CM

## 2022-12-21 LAB
ERYTHROCYTE [DISTWIDTH] IN BLOOD BY AUTOMATED COUNT: 13.8 % (ref 10–15)
HCT VFR BLD AUTO: 43.3 % (ref 35–47)
HGB BLD-MCNC: 14.3 G/DL (ref 11.7–15.7)
MCH RBC QN AUTO: 30.2 PG (ref 26.5–33)
MCHC RBC AUTO-ENTMCNC: 33 G/DL (ref 31.5–36.5)
MCV RBC AUTO: 92 FL (ref 78–100)
PLATELET # BLD AUTO: 264 10E3/UL (ref 150–450)
RBC # BLD AUTO: 4.73 10E6/UL (ref 3.8–5.2)
WBC # BLD AUTO: 12.7 10E3/UL (ref 4–11)

## 2022-12-21 PROCEDURE — 71046 X-RAY EXAM CHEST 2 VIEWS: CPT | Mod: TC | Performed by: RADIOLOGY

## 2022-12-21 PROCEDURE — 36415 COLL VENOUS BLD VENIPUNCTURE: CPT | Performed by: FAMILY MEDICINE

## 2022-12-21 PROCEDURE — 85027 COMPLETE CBC AUTOMATED: CPT | Performed by: FAMILY MEDICINE

## 2022-12-21 PROCEDURE — 99214 OFFICE O/P EST MOD 30 MIN: CPT | Performed by: FAMILY MEDICINE

## 2022-12-21 NOTE — PROGRESS NOTES
[facility]  [address] [place] [id number]  Phone: [phone]  Primary Provider: Ruth Monroe  Pre-op Performing Provider: HITESH GODINEZ      PREOPERATIVE EVALUATION:  Today's date: 12/21/2022    Annalise Wallace is a 67 year old female who presents for a preoperative evaluation.    Surgical Information:  Surgery/Procedure: Cervical 2 to Thoracic 2 posterior segmental instrumentation. Left Cervical 3 to Cervical 4 and Cervical 7 to Thoracic 2 foraminotomies. Posterior arthrodesis Cervical 2 to Thoracic 2 using allograft cancellous chips. Horan wells tong placement.  Surgery Location: [facility]  Surgeon: Layton Ma MD  Surgery Date: 12/23/2022  Time of Surgery: 7:30am  Where patient plans to recover: Other: patient will be in hopsital 3-4 days after home with family  Fax number for surgical facility: Note does not need to be faxed, will be available electronically in Epic.    Type of Anesthesia Anticipated: General    Assessment & Plan     The proposed surgical procedure is considered INTERMEDIATE risk.    Screen for colon cancer  Will discuss with primary next month - did home test and will bring results to her visit    Preop general physical exam  Fit for surgery   - XR Chest 2 Views  - CBC with platelets    S/P cervical spinal fusion  In the past no issues with surgeries    Spinal stenosis in cervical region  Reason for surgery     DDD (degenerative disc disease), cervical  Reason for surgery              Risks and Recommendations:  The patient has the following additional risks and recommendations for perioperative complications:   - No identified additional risk factors other than previously addressed    Medication Instructions:  Patient is to take all scheduled medications on the day of surgery    RECOMMENDATION:  APPROVAL GIVEN to proceed with proposed procedure, without further diagnostic evaluation.      22  minutes spent on the date of the encounter doing chart review, history and exam, documentation and further activities per the note      Subjective     HPI related to upcoming procedure: Annalise Wallace is a 67 year old woman here for preop before neck surgery.   She states that she has degenerative disc disease.   This will be her 6th surgery in the region.   She is not sick and does not have any concerns.  She forgot her paperwork for the preop from ortho.       Preop Questions 12/18/2022   1. Have you ever had a heart attack or stroke? No   2. Have you ever had surgery on your heart or blood vessels, such as a stent placement, a coronary artery bypass, or surgery on an artery in your head, neck, heart, or legs? No   3. Do you have chest pain with activity? No   4. Do you have a history of  heart failure? No   5. Do you currently have a cold, bronchitis or symptoms of other infection? No   6. Do you have a cough, shortness of breath, or wheezing? No   7. Do you or anyone in your family have previous history of blood clots? No   8. Do you or does anyone in your family have a serious bleeding problem such as prolonged bleeding following surgeries or cuts? No   9. Have you ever had problems with anemia or been told to take iron pills? No   10. Have you had any abnormal blood loss such as black, tarry or bloody stools, or abnormal vaginal bleeding? No   11. Have you ever had a blood transfusion? No   12. Are you willing to have a blood transfusion if it is medically needed before, during, or after your surgery? Yes   13. Have you or any of your relatives ever had problems with anesthesia? No   14. Do you have sleep apnea, excessive snoring or daytime drowsiness? No   15. Do you have any artifical heart valves or other implanted medical devices like a pacemaker, defibrillator, or continuous glucose monitor? No   16. Do you have artificial joints? No   17. Are you allergic to latex? No       Health Care  Directive:  Patient does not have a Health Care Directive or Living Will: Discussed advance care planning with patient; information given to patient to review.    Preoperative Review of :   reviewed - controlled substances reflected in medication list.      Past Medical History:   Diagnosis Date     Abnormal Papanicolaou smear of cervix and cervical HPV      Degenerative disc disease      Depressive disorder, not elsewhere classified      Displacement of cervical intervertebral disc without myelopathy      Gastro-oesophageal reflux disease      H/O hysterectomy for benign disease      TOBACCO ABUSE-CONTINUOUS        Past Surgical History:   Procedure Laterality Date     COLONOSCOPY  2008    normal     DISCECTOMY LUMBAR MINIMALLY INVASIVE ONE LEVEL  02/13/2014    Procedure: DISCECTOMY LUMBAR MINIMALLY INVASIVE ONE LEVEL;  Minimally Invasive Discectomy L2-3 Left ;  Surgeon: Juanjose Pitt MD;  Location: [facility] OR     DISCECTOMY LUMBAR MINIMALLY INVASIVE ONE LEVEL       FECAL COLORECTAL CANCER SCREEN FIT  10/2022    did home test     FUSION SPINE POSTERIOR MINIMALLY INVASIVE ONE LEVEL  05/15/2014    Procedure: FUSION SPINE POSTERIOR MINIMALLY INVASIVE ONE LEVEL;  Surgeon: Juanjose Pitt MD;  Location: [facility] OR     HYSTERECTOMY VAGINAL, BILATERAL SALPINGO-OOPHERECTOMY, COMBINED      done for precancer cells - did not need chemo or radiation     [facility] NONSPECIFIC PROCEDURE      Lump removed L. breast - benign     Z NONSPECIFIC PROCEDURE      6 surgeries over time - first was early 2000's - 3 in front and 2 in back     ZZ NONSPECIFIC PROCEDURE      C-spine - Nany       MEDICATIONS:  Current Outpatient Medications   Medication     citalopram (CELEXA) 20 MG tablet     omeprazole (PRILOSEC) 40 MG DR capsule     oxyCODONE-acetaminophen (PERCOCET) 5-325 MG tablet     tiZANidine (ZANAFLEX) 4 MG tablet     traZODone (DESYREL) 100 MG tablet     No current facility-administered medications for this visit.        SOCIAL HISTORY:  Social History     Tobacco Use     Smoking status: Former     Packs/day: 1.00     Years: 30.00     Pack years: 30.00     Types: Cigarettes     Quit date: 2017     Years since quittin.5     Smokeless tobacco: Never   Substance Use Topics     Alcohol use: No       Family History   Problem Relation Age of Onset     Cancer Mother         had spread everywhere     Cancer Father         throat     Diabetes Paternal Grandmother          Review of Systems  Constitutional, neuro, ENT, endocrine, pulmonary, cardiac, gastrointestinal, genitourinary, musculoskeletal, integument and psychiatric systems are negative, except as otherwise noted.    Patient Active Problem List    Diagnosis Date Noted     Cervical spondylosis with radiculopathy 2022     Priority: Medium     Controlled substance agreement signed 2022     Priority: Medium     Diaphragmatic hernia without obstruction and without gangrene 2019     Priority: Medium     Primary insomnia 2018     Priority: Medium     Persistent disorder of initiating or maintaining sleep 2017     Priority: Medium     S/P cervical spinal fusion 2017     Priority: Medium     Inflamed seborrheic keratosis 2015     Priority: Medium     Other chronic pain 2015     Priority: Medium     Patient is followed by Abelino Flores for ongoing prescription of pain medication.  All refills should be approved by this provider, or covering partner.    Medication(s): oxyCODONE-acetaminophen (PERCOCET) 5-325 MG per tablet    .   Maximum quantity per month: 120  Clinic visit frequency required: Q 3 months     Controlled substance agreement on file: YES    Pain Clinic evaluation in the past: Yes       Date/Location:   [facility] Dr. Barrett   DIRE Total Score(s):  No flowsheet data found.    Last [facility] website verification: 3/23/2020   [email or web address]         Benign neoplasm of skin 2015     Priority: Medium     Problem  list name updated by automated process. Provider to review       Adrenal adenoma 09/21/2015     Priority: Medium     Ganglion cyst 09/21/2015     Priority: Medium     Depression with anxiety 04/13/2015     Priority: Medium     Health Care Home 02/27/2014     Priority: Medium       Status:  N/A - no active care coordination needs   Care Coordinator:  Jennifer Sibley [phone]  See Letters for [facility] Emergency Care Plan           CARDIOVASCULAR SCREENING; LDL GOAL LESS THAN 160 10/31/2010     Priority: Medium     Symptomatic menopausal or female climacteric states 05/07/2007     Priority: Medium     Acute maxillary sinusitis 02/22/2007     Priority: Medium     Spinal stenosis in cervical region 02/17/2006     Priority: Medium     Formatting of this note might be different from the original.  C6-7  Formatting of this note might be different from the original.  Residual left C6-7 foraminal stenosis       Carcinoma in situ of cervix uteri 08/21/2003     Priority: Medium      Past Medical History:   Diagnosis Date     Abnormal Papanicolaou smear of cervix and cervical HPV      Degenerative disc disease      Depressive disorder      Depressive disorder, not elsewhere classified      Displacement of cervical intervertebral disc without myelopathy      Gastro-oesophageal reflux disease      H/O hysterectomy for benign disease      TOBACCO ABUSE-CONTINUOUS      Past Surgical History:   Procedure Laterality Date     BACK SURGERY       COLONOSCOPY       DISCECTOMY LUMBAR MINIMALLY INVASIVE ONE LEVEL  02/13/2014    Procedure: DISCECTOMY LUMBAR MINIMALLY INVASIVE ONE LEVEL;  Minimally Invasive Discectomy L2-3 Left ;  Surgeon: Juanjose Pitt MD;  Location:  OR     DISCECTOMY LUMBAR MINIMALLY INVASIVE ONE LEVEL       FUSION SPINE POSTERIOR MINIMALLY INVASIVE ONE LEVEL  05/15/2014    Procedure: FUSION SPINE POSTERIOR MINIMALLY INVASIVE ONE LEVEL;  Surgeon: Juanjose Pitt MD;  Location:  OR     HEAD & NECK  "SURGERY       HYSTERECTOMY VAGINAL, BILATERAL SALPINGO-OOPHERECTOMY, COMBINED       LAPAROSCOPIC HYSTERECTOMY TOTAL       ZZC NONSPECIFIC PROCEDURE      Lump removed L. breast - benign     ZZC NONSPECIFIC PROCEDURE      Cervical disk     ZZC NONSPECIFIC PROCEDURE      C-spine - Nany     Current Outpatient Medications   Medication Sig Dispense Refill     citalopram (CELEXA) 20 MG tablet TAKE 1 TABLET (20 MG) BY MOUTH DAILY 90 tablet 0     omeprazole (PRILOSEC) 40 MG DR capsule TAKE ONE CAPSULE BY MOUTH ONCE DAILY 90 capsule 3     oxyCODONE-acetaminophen (PERCOCET) 5-325 MG tablet Take one to two tablets by mouth every 6 hours as needed for severe pain. 120 tablet 0     tiZANidine (ZANAFLEX) 4 MG tablet TAKE 1 TABLET (4 MG) BY MOUTH 2 TIMES DAILY AS NEEDED FOR MUSCLE SPASMS 60 tablet 3     traZODone (DESYREL) 100 MG tablet TAKE ONE TABLET BY MOUTH ONCE DAILY 90 tablet 0       Allergies   Allergen Reactions     Sulfa Drugs Other (See Comments)     Headaches        Social History     Tobacco Use     Smoking status: Former     Packs/day: 1.00     Years: 30.00     Pack years: 30.00     Types: Cigarettes     Quit date: 2017     Years since quittin.5     Smokeless tobacco: Never   Substance Use Topics     Alcohol use: No     Family History   Problem Relation Age of Onset     Cancer Mother         had spread everywhere     Cancer Father         throat     Diabetes Paternal Grandmother      History   Drug Use No         Objective     /84 (BP Location: Right arm, Patient Position: Sitting, Cuff Size: Adult Regular)   Pulse 101   Temp 98  F (36.7  C) (Oral)   Resp 13   Ht 1.676 m (5' 6\")   Wt 76.6 kg (168 lb 12.8 oz)   SpO2 97%   BMI 27.25 kg/m      Physical Exam    GENERAL APPEARANCE: healthy, alert and no distress     EYES: EOMI, PERRL     HENT: ear canals and TM's normal and nose and mouth without ulcers or lesions     NECK: no adenopathy, no asymmetry, masses, or scars and thyroid normal to " palpation     RESP: lungs clear to auscultation - no rales, rhonchi or wheezes     CV: regular rates and rhythm, normal S1 S2, no S3 or S4 and no murmur, click or rub     ABDOMEN:  soft, nontender, no HSM or masses and bowel sounds normal     MS: extremities normal- no gross deformities noted, no evidence of inflammation in joints, FROM in all extremities.     SKIN: no suspicious lesions or rashes     NEURO: Normal strength and tone, sensory exam grossly normal, mentation intact and speech normal     PSYCH: mentation appears normal. and affect normal/bright     LYMPHATICS: No cervical adenopathy    Recent Labs   Lab Test 01/27/22  1155      POTASSIUM 4.7   CR 0.88        Diagnostics:  Labs pending at this time.  Results will be reviewed when available.   No EKG required for low risk surgery (cataract, skin procedure, breast biopsy, etc).   CXR done due to hs of smoking - appears clear but on lateral behind breasts in in cardiac shadow is wedge like increased density -  - did not appear to be there in 2009 cxr  - radiology read as normal    Revised Cardiac Risk Index (RCRI):  The patient has the following serious cardiovascular risks for perioperative complications:   - No serious cardiac risks = 0 points     RCRI Interpretation: 0 points: Class I (very low risk - 0.4% complication rate)           Signed Electronically by: Cyndi Chirinos MD  Copy of this evaluation report is provided to requesting physician.

## 2022-12-22 ENCOUNTER — ANESTHESIA EVENT (OUTPATIENT)
Dept: SURGERY | Facility: CLINIC | Age: 67
DRG: 460 | End: 2022-12-22
Payer: COMMERCIAL

## 2022-12-22 RX ORDER — LORATADINE 10 MG/1
10 TABLET ORAL DAILY
Status: ON HOLD | COMMUNITY
End: 2022-12-27

## 2022-12-22 RX ORDER — ACETAMINOPHEN 500 MG
500-1000 TABLET ORAL EVERY 6 HOURS PRN
Status: ON HOLD | COMMUNITY
End: 2022-12-27

## 2022-12-22 RX ORDER — FLUTICASONE PROPIONATE 50 MCG
1 SPRAY, SUSPENSION (ML) NASAL PRN
Status: ON HOLD | COMMUNITY
End: 2022-12-27

## 2022-12-22 ASSESSMENT — LIFESTYLE VARIABLES: TOBACCO_USE: 1

## 2022-12-22 NOTE — PROGRESS NOTES
PTA medications updated by Medication Scribe prior to surgery via phone call with patient (last doses completed by Nurse)     Medication history sources: Patient, Surescripts and H&P  In the past week, patient estimated taking medication this percent of the time: Greater than 90%  Adherence assessment: N/A Not Observed    Significant changes made to the medication list:  None      Additional medication history information:   Patient was advised to bring: Flonase Nasal [place]    Medication reconciliation completed by provider prior to medication history? No    Time spent in this activity: 35    The information provided in this note is only as accurate as the sources available at the time of update(s)    Prior to Admission medications    Medication Sig Last Dose Taking? Auth Provider Long Term End Date   acetaminophen (TYLENOL) 500 MG tablet Take 500-1,000 mg by mouth every 6 hours as needed for mild pain  at prn Yes Reported, Patient     citalopram (CELEXA) 20 MG tablet TAKE 1 TABLET (20 MG) BY MOUTH DAILY  at am Yes Ruth Monroe MD Yes    fluticasone (FLONASE) 50 MCG/ACT nasal spray Spray 1 spray into both nostrils as needed for rhinitis or allergies  at prn Yes Reported, Patient     loratadine (CLARITIN) 10 MG tablet Take 10 mg by mouth daily 12/22/2022 at am Yes Reported, Patient     Magnesium Hydroxide (MILK OF MAGNESIA PO) Take 1 Tablespoonful by mouth as needed Unknown at prn Yes Reported, Patient     omeprazole (PRILOSEC) 40 MG DR capsule TAKE ONE CAPSULE BY MOUTH ONCE DAILY  at am Yes Ruth Monroe MD     oxyCODONE-acetaminophen (PERCOCET) 5-325 MG tablet Take one to two tablets by mouth every 6 hours as needed for severe pain.  at prn Yes Ruth Monroe MD     tiZANidine (ZANAFLEX) 4 MG tablet TAKE 1 TABLET (4 MG) BY MOUTH 2 TIMES DAILY AS NEEDED FOR MUSCLE SPASMS  at prn Yes Ruth Monroe MD     traZODone (DESYREL) 100 MG tablet TAKE ONE TABLET BY  MOUTH ONCE DAILY 12/22/2022 at pm Yes Ruth Monroe MD Yes      Medication history completed by:    Dg Zapata CPhT  Medication [facility]

## 2022-12-23 ENCOUNTER — APPOINTMENT (OUTPATIENT)
Dept: GENERAL RADIOLOGY | Facility: CLINIC | Age: 67
DRG: 460 | End: 2022-12-23
Attending: NEUROLOGICAL SURGERY
Payer: COMMERCIAL

## 2022-12-23 ENCOUNTER — ANESTHESIA (OUTPATIENT)
Dept: SURGERY | Facility: CLINIC | Age: 67
DRG: 460 | End: 2022-12-23
Payer: COMMERCIAL

## 2022-12-23 ENCOUNTER — HOSPITAL ENCOUNTER (INPATIENT)
Facility: CLINIC | Age: 67
LOS: 4 days | Discharge: HOME | DRG: 460 | End: 2022-12-27
Attending: NEUROLOGICAL SURGERY | Admitting: NEUROLOGICAL SURGERY
Payer: COMMERCIAL

## 2022-12-23 DIAGNOSIS — Z98.1 S/P CERVICAL SPINAL FUSION: Primary | ICD-10-CM

## 2022-12-23 LAB
ABO/RH(D): NORMAL
ANTIBODY SCREEN: NEGATIVE
APTT PPP: 28 SECONDS (ref 22–38)
BASOPHILS # BLD AUTO: 0.1 10E3/UL (ref 0–0.2)
BASOPHILS NFR BLD AUTO: 1 %
EOSINOPHIL # BLD AUTO: 0.1 10E3/UL (ref 0–0.7)
EOSINOPHIL NFR BLD AUTO: 1 %
ERYTHROCYTE [DISTWIDTH] IN BLOOD BY AUTOMATED COUNT: 13.1 % (ref 10–15)
HCT VFR BLD AUTO: 43.4 % (ref 35–47)
HGB BLD-MCNC: 14.4 G/DL (ref 11.7–15.7)
IMM GRANULOCYTES # BLD: 0 10E3/UL
IMM GRANULOCYTES NFR BLD: 0 %
INR PPP: 0.97 (ref 0.85–1.15)
LYMPHOCYTES # BLD AUTO: 2.8 10E3/UL (ref 0.8–5.3)
LYMPHOCYTES NFR BLD AUTO: 23 %
MCH RBC QN AUTO: 30 PG (ref 26.5–33)
MCHC RBC AUTO-ENTMCNC: 33.2 G/DL (ref 31.5–36.5)
MCV RBC AUTO: 90 FL (ref 78–100)
MONOCYTES # BLD AUTO: 1 10E3/UL (ref 0–1.3)
MONOCYTES NFR BLD AUTO: 8 %
NEUTROPHILS # BLD AUTO: 8.3 10E3/UL (ref 1.6–8.3)
NEUTROPHILS NFR BLD AUTO: 67 %
NRBC # BLD AUTO: 0 10E3/UL
NRBC BLD AUTO-RTO: 0 /100
PLATELET # BLD AUTO: 274 10E3/UL (ref 150–450)
RBC # BLD AUTO: 4.8 10E6/UL (ref 3.8–5.2)
SPECIMEN EXPIRATION DATE: NORMAL
WBC # BLD AUTO: 12.2 10E3/UL (ref 4–11)

## 2022-12-23 PROCEDURE — 85730 THROMBOPLASTIN TIME PARTIAL: CPT | Performed by: PHYSICIAN ASSISTANT

## 2022-12-23 PROCEDURE — 258N000003 HC RX IP 258 OP 636: Performed by: PHYSICIAN ASSISTANT

## 2022-12-23 PROCEDURE — 22600 ARTHRD PST TQ 1NTRSPC CRV: CPT | Performed by: NEUROLOGICAL SURGERY

## 2022-12-23 PROCEDURE — 250N000006 HC OR RX SURGIFLO W/THROMBIN KIT 2ML 1991 OPNP: Performed by: NEUROLOGICAL SURGERY

## 2022-12-23 PROCEDURE — P9045 ALBUMIN (HUMAN), 5%, 250 ML: HCPCS | Performed by: NURSE ANESTHETIST, CERTIFIED REGISTERED

## 2022-12-23 PROCEDURE — C1762 CONN TISS, HUMAN(INC FASCIA): HCPCS | Performed by: NEUROLOGICAL SURGERY

## 2022-12-23 PROCEDURE — 360N000078 HC SURGERY LEVEL 5, PER MIN: Performed by: NEUROLOGICAL SURGERY

## 2022-12-23 PROCEDURE — 272N000001 HC OR GENERAL SUPPLY STERILE: Performed by: NEUROLOGICAL SURGERY

## 2022-12-23 PROCEDURE — 250N000011 HC RX IP 250 OP 636: Performed by: ANESTHESIOLOGY

## 2022-12-23 PROCEDURE — 22843 INSERT SPINE FIXATION DEVICE: CPT | Performed by: NEUROLOGICAL SURGERY

## 2022-12-23 PROCEDURE — 999N000179 XR SURGERY CARM FLUORO LESS THAN 5 MIN W STILLS

## 2022-12-23 PROCEDURE — 0RG60K1 FUSION OF THORACIC VERTEBRAL JOINT WITH NONAUTOLOGOUS TISSUE SUBSTITUTE, POSTERIOR APPROACH, POSTERIOR COLUMN, OPEN APPROACH: ICD-10-PCS | Performed by: NEUROLOGICAL SURGERY

## 2022-12-23 PROCEDURE — C1713 ANCHOR/SCREW BN/BN,TIS/BN: HCPCS | Performed by: NEUROLOGICAL SURGERY

## 2022-12-23 PROCEDURE — 63048 LAM FACETEC &FORAMOT EA ADDL: CPT | Performed by: NEUROLOGICAL SURGERY

## 2022-12-23 PROCEDURE — 250N000011 HC RX IP 250 OP 636: Performed by: NURSE ANESTHETIST, CERTIFIED REGISTERED

## 2022-12-23 PROCEDURE — 258N000003 HC RX IP 258 OP 636: Performed by: NURSE ANESTHETIST, CERTIFIED REGISTERED

## 2022-12-23 PROCEDURE — 36415 COLL VENOUS BLD VENIPUNCTURE: CPT | Performed by: PHYSICIAN ASSISTANT

## 2022-12-23 PROCEDURE — 250N000011 HC RX IP 250 OP 636: Performed by: PHYSICIAN ASSISTANT

## 2022-12-23 PROCEDURE — 61783 SCAN PROC SPINAL: CPT | Mod: 59 | Performed by: NEUROLOGICAL SURGERY

## 2022-12-23 PROCEDURE — 63045 LAM FACETEC & FORAMOT CRV: CPT | Mod: 51 | Performed by: NEUROLOGICAL SURGERY

## 2022-12-23 PROCEDURE — 250N000011 HC RX IP 250 OP 636: Performed by: NEUROLOGICAL SURGERY

## 2022-12-23 PROCEDURE — 250N000009 HC RX 250: Performed by: NURSE ANESTHETIST, CERTIFIED REGISTERED

## 2022-12-23 PROCEDURE — 250N000013 HC RX MED GY IP 250 OP 250 PS 637: Performed by: PHYSICIAN ASSISTANT

## 2022-12-23 PROCEDURE — 85025 COMPLETE CBC W/AUTO DIFF WBC: CPT | Performed by: PHYSICIAN ASSISTANT

## 2022-12-23 PROCEDURE — 250N000009 HC RX 250: Performed by: NEUROLOGICAL SURGERY

## 2022-12-23 PROCEDURE — 710N000009 HC RECOVERY PHASE 1, LEVEL 1, PER MIN: Performed by: NEUROLOGICAL SURGERY

## 2022-12-23 PROCEDURE — 258N000003 HC RX IP 258 OP 636: Performed by: ANESTHESIOLOGY

## 2022-12-23 PROCEDURE — 2W62X0Z TRACTION OF NECK USING TRACTION APPARATUS: ICD-10-PCS | Performed by: NEUROLOGICAL SURGERY

## 2022-12-23 PROCEDURE — 120N000001 HC R&B MED SURG/OB

## 2022-12-23 PROCEDURE — 250N000025 HC SEVOFLURANE, PER MIN: Performed by: NEUROLOGICAL SURGERY

## 2022-12-23 PROCEDURE — 01N10ZZ RELEASE CERVICAL NERVE, OPEN APPROACH: ICD-10-PCS | Performed by: NEUROLOGICAL SURGERY

## 2022-12-23 PROCEDURE — 86901 BLOOD TYPING SEROLOGIC RH(D): CPT | Performed by: PHYSICIAN ASSISTANT

## 2022-12-23 PROCEDURE — 0RG40K1 FUSION OF CERVICOTHORACIC VERTEBRAL JOINT WITH NONAUTOLOGOUS TISSUE SUBSTITUTE, POSTERIOR APPROACH, POSTERIOR COLUMN, OPEN APPROACH: ICD-10-PCS | Performed by: NEUROLOGICAL SURGERY

## 2022-12-23 PROCEDURE — 370N000017 HC ANESTHESIA TECHNICAL FEE, PER MIN: Performed by: NEUROLOGICAL SURGERY

## 2022-12-23 PROCEDURE — 20930 SP BONE ALGRFT MORSEL ADD-ON: CPT | Performed by: NEUROLOGICAL SURGERY

## 2022-12-23 PROCEDURE — 93010 ELECTROCARDIOGRAM REPORT: CPT | Performed by: INTERNAL MEDICINE

## 2022-12-23 PROCEDURE — 85610 PROTHROMBIN TIME: CPT | Performed by: PHYSICIAN ASSISTANT

## 2022-12-23 PROCEDURE — 8E09XBZ COMPUTER ASSISTED PROCEDURE OF HEAD AND NECK REGION: ICD-10-PCS | Performed by: NEUROLOGICAL SURGERY

## 2022-12-23 PROCEDURE — 22614 ARTHRD PST TQ 1NTRSPC EA ADD: CPT | Performed by: NEUROLOGICAL SURGERY

## 2022-12-23 PROCEDURE — 93005 ELECTROCARDIOGRAM TRACING: CPT

## 2022-12-23 PROCEDURE — 999N000141 HC STATISTIC PRE-PROCEDURE NURSING ASSESSMENT: Performed by: NEUROLOGICAL SURGERY

## 2022-12-23 PROCEDURE — 250N000009 HC RX 250: Performed by: PHYSICIAN ASSISTANT

## 2022-12-23 PROCEDURE — 86850 RBC ANTIBODY SCREEN: CPT | Performed by: PHYSICIAN ASSISTANT

## 2022-12-23 PROCEDURE — 0RG20K1 FUSION OF 2 OR MORE CERVICAL VERTEBRAL JOINTS WITH NONAUTOLOGOUS TISSUE SUBSTITUTE, POSTERIOR APPROACH, POSTERIOR COLUMN, OPEN APPROACH: ICD-10-PCS | Performed by: NEUROLOGICAL SURGERY

## 2022-12-23 DEVICE — BONE CHIP CANCELLOUS 30CC 100030: Type: IMPLANTABLE DEVICE | Site: SPINE CERVICAL | Status: FUNCTIONAL

## 2022-12-23 DEVICE — IMPLANTABLE DEVICE: Type: IMPLANTABLE DEVICE | Site: SPINE CERVICAL | Status: FUNCTIONAL

## 2022-12-23 RX ORDER — PROCHLORPERAZINE MALEATE 5 MG
5 TABLET ORAL EVERY 6 HOURS PRN
Status: DISCONTINUED | OUTPATIENT
Start: 2022-12-23 | End: 2022-12-27 | Stop reason: HOSPADM

## 2022-12-23 RX ORDER — HYDROMORPHONE HCL IN WATER/PF 6 MG/30 ML
0.4 PATIENT CONTROLLED ANALGESIA SYRINGE INTRAVENOUS EVERY 5 MIN PRN
Status: DISCONTINUED | OUTPATIENT
Start: 2022-12-23 | End: 2022-12-23

## 2022-12-23 RX ORDER — NALOXONE HYDROCHLORIDE 0.4 MG/ML
0.2 INJECTION, SOLUTION INTRAMUSCULAR; INTRAVENOUS; SUBCUTANEOUS
Status: DISCONTINUED | OUTPATIENT
Start: 2022-12-23 | End: 2022-12-27 | Stop reason: HOSPADM

## 2022-12-23 RX ORDER — LORATADINE 10 MG/1
10 TABLET ORAL DAILY
Status: DISCONTINUED | OUTPATIENT
Start: 2022-12-24 | End: 2022-12-27 | Stop reason: HOSPADM

## 2022-12-23 RX ORDER — SODIUM CHLORIDE 9 MG/ML
INJECTION, SOLUTION INTRAVENOUS CONTINUOUS
Status: DISCONTINUED | OUTPATIENT
Start: 2022-12-23 | End: 2022-12-27 | Stop reason: HOSPADM

## 2022-12-23 RX ORDER — FLUTICASONE PROPIONATE 50 MCG
1 SPRAY, SUSPENSION (ML) NASAL DAILY PRN
Status: DISCONTINUED | OUTPATIENT
Start: 2022-12-23 | End: 2022-12-27 | Stop reason: HOSPADM

## 2022-12-23 RX ORDER — FENTANYL CITRATE 0.05 MG/ML
50 INJECTION, SOLUTION INTRAMUSCULAR; INTRAVENOUS
Status: DISCONTINUED | OUTPATIENT
Start: 2022-12-23 | End: 2022-12-23 | Stop reason: HOSPADM

## 2022-12-23 RX ORDER — KETAMINE HYDROCHLORIDE 10 MG/ML
INJECTION INTRAMUSCULAR; INTRAVENOUS PRN
Status: DISCONTINUED | OUTPATIENT
Start: 2022-12-23 | End: 2022-12-23

## 2022-12-23 RX ORDER — ONDANSETRON 4 MG/1
4 TABLET, ORALLY DISINTEGRATING ORAL EVERY 6 HOURS PRN
Status: DISCONTINUED | OUTPATIENT
Start: 2022-12-23 | End: 2022-12-27 | Stop reason: HOSPADM

## 2022-12-23 RX ORDER — ONDANSETRON 2 MG/ML
4 INJECTION INTRAMUSCULAR; INTRAVENOUS EVERY 6 HOURS PRN
Status: DISCONTINUED | OUTPATIENT
Start: 2022-12-23 | End: 2022-12-27 | Stop reason: HOSPADM

## 2022-12-23 RX ORDER — TRAZODONE HYDROCHLORIDE 100 MG/1
100 TABLET ORAL AT BEDTIME
Status: DISCONTINUED | OUTPATIENT
Start: 2022-12-23 | End: 2022-12-27 | Stop reason: HOSPADM

## 2022-12-23 RX ORDER — SODIUM CHLORIDE, SODIUM LACTATE, POTASSIUM CHLORIDE, CALCIUM CHLORIDE 600; 310; 30; 20 MG/100ML; MG/100ML; MG/100ML; MG/100ML
INJECTION, SOLUTION INTRAVENOUS CONTINUOUS
Status: DISCONTINUED | OUTPATIENT
Start: 2022-12-23 | End: 2022-12-23 | Stop reason: HOSPADM

## 2022-12-23 RX ORDER — BUPIVACAINE HYDROCHLORIDE AND EPINEPHRINE 2.5; 5 MG/ML; UG/ML
INJECTION, SOLUTION EPIDURAL; INFILTRATION; INTRACAUDAL; PERINEURAL
Status: DISCONTINUED
Start: 2022-12-23 | End: 2022-12-23 | Stop reason: HOSPADM

## 2022-12-23 RX ORDER — ACETAMINOPHEN 325 MG/1
975 TABLET ORAL EVERY 8 HOURS
Status: COMPLETED | OUTPATIENT
Start: 2022-12-23 | End: 2022-12-26

## 2022-12-23 RX ORDER — ACETAMINOPHEN 500 MG
500-1000 TABLET ORAL EVERY 6 HOURS PRN
Status: DISCONTINUED | OUTPATIENT
Start: 2022-12-23 | End: 2022-12-23

## 2022-12-23 RX ORDER — AMOXICILLIN 250 MG
1 CAPSULE ORAL 2 TIMES DAILY
Status: DISCONTINUED | OUTPATIENT
Start: 2022-12-23 | End: 2022-12-27 | Stop reason: HOSPADM

## 2022-12-23 RX ORDER — CITALOPRAM HYDROBROMIDE 20 MG/1
20 TABLET ORAL DAILY
Status: DISCONTINUED | OUTPATIENT
Start: 2022-12-24 | End: 2022-12-27 | Stop reason: HOSPADM

## 2022-12-23 RX ORDER — FENTANYL CITRATE 50 UG/ML
INJECTION, SOLUTION INTRAMUSCULAR; INTRAVENOUS PRN
Status: DISCONTINUED | OUTPATIENT
Start: 2022-12-23 | End: 2022-12-23

## 2022-12-23 RX ORDER — PROPOFOL 10 MG/ML
INJECTION, EMULSION INTRAVENOUS PRN
Status: DISCONTINUED | OUTPATIENT
Start: 2022-12-23 | End: 2022-12-23

## 2022-12-23 RX ORDER — LIDOCAINE 40 MG/G
CREAM TOPICAL
Status: DISCONTINUED | OUTPATIENT
Start: 2022-12-23 | End: 2022-12-27 | Stop reason: HOSPADM

## 2022-12-23 RX ORDER — FENTANYL CITRATE 0.05 MG/ML
50 INJECTION, SOLUTION INTRAMUSCULAR; INTRAVENOUS EVERY 5 MIN PRN
Status: DISCONTINUED | OUTPATIENT
Start: 2022-12-23 | End: 2022-12-23

## 2022-12-23 RX ORDER — EPHEDRINE SULFATE 50 MG/ML
INJECTION, SOLUTION INTRAMUSCULAR; INTRAVENOUS; SUBCUTANEOUS PRN
Status: DISCONTINUED | OUTPATIENT
Start: 2022-12-23 | End: 2022-12-23

## 2022-12-23 RX ORDER — ONDANSETRON 4 MG/1
4 TABLET, ORALLY DISINTEGRATING ORAL EVERY 30 MIN PRN
Status: DISCONTINUED | OUTPATIENT
Start: 2022-12-23 | End: 2022-12-23

## 2022-12-23 RX ORDER — OXYCODONE HYDROCHLORIDE 5 MG/1
5 TABLET ORAL EVERY 4 HOURS PRN
Status: DISCONTINUED | OUTPATIENT
Start: 2022-12-23 | End: 2022-12-25

## 2022-12-23 RX ORDER — HYDROMORPHONE HCL IN WATER/PF 6 MG/30 ML
0.2 PATIENT CONTROLLED ANALGESIA SYRINGE INTRAVENOUS EVERY 5 MIN PRN
Status: DISCONTINUED | OUTPATIENT
Start: 2022-12-23 | End: 2022-12-23 | Stop reason: HOSPADM

## 2022-12-23 RX ORDER — ONDANSETRON 2 MG/ML
4 INJECTION INTRAMUSCULAR; INTRAVENOUS EVERY 30 MIN PRN
Status: DISCONTINUED | OUTPATIENT
Start: 2022-12-23 | End: 2022-12-23

## 2022-12-23 RX ORDER — ONDANSETRON 2 MG/ML
INJECTION INTRAMUSCULAR; INTRAVENOUS PRN
Status: DISCONTINUED | OUTPATIENT
Start: 2022-12-23 | End: 2022-12-23

## 2022-12-23 RX ORDER — TIZANIDINE 2 MG/1
4 TABLET ORAL 2 TIMES DAILY PRN
Status: DISCONTINUED | OUTPATIENT
Start: 2022-12-23 | End: 2022-12-27 | Stop reason: HOSPADM

## 2022-12-23 RX ORDER — SODIUM CHLORIDE 9 MG/ML
INJECTION, SOLUTION INTRAVENOUS CONTINUOUS PRN
Status: DISCONTINUED | OUTPATIENT
Start: 2022-12-23 | End: 2022-12-23

## 2022-12-23 RX ORDER — POLYETHYLENE GLYCOL 3350 17 G/17G
17 POWDER, FOR SOLUTION ORAL DAILY
Status: DISCONTINUED | OUTPATIENT
Start: 2022-12-24 | End: 2022-12-27 | Stop reason: HOSPADM

## 2022-12-23 RX ORDER — CEFAZOLIN SODIUM/WATER 2 G/20 ML
2 SYRINGE (ML) INTRAVENOUS
Status: COMPLETED | OUTPATIENT
Start: 2022-12-23 | End: 2022-12-23

## 2022-12-23 RX ORDER — TRANEXAMIC ACID 10 MG/ML
1 INJECTION, SOLUTION INTRAVENOUS CONTINUOUS
Status: DISCONTINUED | OUTPATIENT
Start: 2022-12-23 | End: 2022-12-23 | Stop reason: HOSPADM

## 2022-12-23 RX ORDER — LIDOCAINE HYDROCHLORIDE 20 MG/ML
INJECTION, SOLUTION INFILTRATION; PERINEURAL PRN
Status: DISCONTINUED | OUTPATIENT
Start: 2022-12-23 | End: 2022-12-23

## 2022-12-23 RX ORDER — PANTOPRAZOLE SODIUM 40 MG/1
40 TABLET, DELAYED RELEASE ORAL EVERY MORNING
Status: DISCONTINUED | OUTPATIENT
Start: 2022-12-24 | End: 2022-12-27 | Stop reason: HOSPADM

## 2022-12-23 RX ORDER — BISACODYL 10 MG
10 SUPPOSITORY, RECTAL RECTAL DAILY PRN
Status: DISCONTINUED | OUTPATIENT
Start: 2022-12-23 | End: 2022-12-27 | Stop reason: HOSPADM

## 2022-12-23 RX ORDER — CEFAZOLIN SODIUM/WATER 2 G/20 ML
2 SYRINGE (ML) INTRAVENOUS SEE ADMIN INSTRUCTIONS
Status: DISCONTINUED | OUTPATIENT
Start: 2022-12-23 | End: 2022-12-23 | Stop reason: HOSPADM

## 2022-12-23 RX ORDER — HYDROMORPHONE HCL IN WATER/PF 6 MG/30 ML
0.4 PATIENT CONTROLLED ANALGESIA SYRINGE INTRAVENOUS
Status: DISCONTINUED | OUTPATIENT
Start: 2022-12-23 | End: 2022-12-27 | Stop reason: HOSPADM

## 2022-12-23 RX ORDER — OXYCODONE HYDROCHLORIDE 5 MG/1
10 TABLET ORAL EVERY 4 HOURS PRN
Status: DISCONTINUED | OUTPATIENT
Start: 2022-12-23 | End: 2022-12-25

## 2022-12-23 RX ORDER — HYDROMORPHONE HCL IN WATER/PF 6 MG/30 ML
0.2 PATIENT CONTROLLED ANALGESIA SYRINGE INTRAVENOUS
Status: DISCONTINUED | OUTPATIENT
Start: 2022-12-23 | End: 2022-12-25

## 2022-12-23 RX ORDER — HYDROMORPHONE HCL IN WATER/PF 6 MG/30 ML
0.2 PATIENT CONTROLLED ANALGESIA SYRINGE INTRAVENOUS EVERY 5 MIN PRN
Status: DISCONTINUED | OUTPATIENT
Start: 2022-12-23 | End: 2022-12-23

## 2022-12-23 RX ORDER — SODIUM CHLORIDE, SODIUM LACTATE, POTASSIUM CHLORIDE, CALCIUM CHLORIDE 600; 310; 30; 20 MG/100ML; MG/100ML; MG/100ML; MG/100ML
INJECTION, SOLUTION INTRAVENOUS CONTINUOUS
Status: DISCONTINUED | OUTPATIENT
Start: 2022-12-23 | End: 2022-12-23

## 2022-12-23 RX ORDER — HYDROMORPHONE HYDROCHLORIDE 1 MG/ML
INJECTION, SOLUTION INTRAMUSCULAR; INTRAVENOUS; SUBCUTANEOUS PRN
Status: DISCONTINUED | OUTPATIENT
Start: 2022-12-23 | End: 2022-12-23

## 2022-12-23 RX ORDER — BUPIVACAINE HYDROCHLORIDE AND EPINEPHRINE 2.5; 5 MG/ML; UG/ML
INJECTION, SOLUTION INFILTRATION; PERINEURAL PRN
Status: DISCONTINUED | OUTPATIENT
Start: 2022-12-23 | End: 2022-12-23 | Stop reason: HOSPADM

## 2022-12-23 RX ORDER — FENTANYL CITRATE 0.05 MG/ML
25 INJECTION, SOLUTION INTRAMUSCULAR; INTRAVENOUS EVERY 5 MIN PRN
Status: DISCONTINUED | OUTPATIENT
Start: 2022-12-23 | End: 2022-12-23

## 2022-12-23 RX ORDER — LIDOCAINE 40 MG/G
CREAM TOPICAL
Status: DISCONTINUED | OUTPATIENT
Start: 2022-12-23 | End: 2022-12-23 | Stop reason: HOSPADM

## 2022-12-23 RX ORDER — NALOXONE HYDROCHLORIDE 0.4 MG/ML
0.4 INJECTION, SOLUTION INTRAMUSCULAR; INTRAVENOUS; SUBCUTANEOUS
Status: DISCONTINUED | OUTPATIENT
Start: 2022-12-23 | End: 2022-12-27 | Stop reason: HOSPADM

## 2022-12-23 RX ORDER — ACETAMINOPHEN 325 MG/1
650 TABLET ORAL EVERY 4 HOURS PRN
Status: DISCONTINUED | OUTPATIENT
Start: 2022-12-26 | End: 2022-12-27 | Stop reason: HOSPADM

## 2022-12-23 RX ADMIN — TRANEXAMIC ACID 770 MG: 100 INJECTION INTRAVENOUS at 08:00

## 2022-12-23 RX ADMIN — PROPOFOL 20 MG: 10 INJECTION, EMULSION INTRAVENOUS at 08:44

## 2022-12-23 RX ADMIN — PHENYLEPHRINE HYDROCHLORIDE 150 MCG: 10 INJECTION INTRAVENOUS at 08:50

## 2022-12-23 RX ADMIN — Medication 5 MG: at 09:49

## 2022-12-23 RX ADMIN — SODIUM CHLORIDE, POTASSIUM CHLORIDE, SODIUM LACTATE AND CALCIUM CHLORIDE: 600; 310; 30; 20 INJECTION, SOLUTION INTRAVENOUS at 07:33

## 2022-12-23 RX ADMIN — Medication 10 MG: at 09:30

## 2022-12-23 RX ADMIN — PHENYLEPHRINE HYDROCHLORIDE 100 MCG: 10 INJECTION INTRAVENOUS at 07:59

## 2022-12-23 RX ADMIN — SODIUM CHLORIDE: 9 INJECTION, SOLUTION INTRAVENOUS at 07:50

## 2022-12-23 RX ADMIN — SENNOSIDES AND DOCUSATE SODIUM 1 TABLET: 50; 8.6 TABLET ORAL at 20:44

## 2022-12-23 RX ADMIN — DEXMEDETOMIDINE HYDROCHLORIDE 0.2 MCG/KG/HR: 100 INJECTION, SOLUTION INTRAVENOUS at 08:05

## 2022-12-23 RX ADMIN — PHENYLEPHRINE HYDROCHLORIDE 100 MCG: 10 INJECTION INTRAVENOUS at 10:35

## 2022-12-23 RX ADMIN — FENTANYL CITRATE 50 MCG: 50 INJECTION, SOLUTION INTRAMUSCULAR; INTRAVENOUS at 11:07

## 2022-12-23 RX ADMIN — HYDROMORPHONE HYDROCHLORIDE 0.4 MG: 0.2 INJECTION, SOLUTION INTRAMUSCULAR; INTRAVENOUS; SUBCUTANEOUS at 13:24

## 2022-12-23 RX ADMIN — ALBUMIN HUMAN: 0.05 INJECTION, SOLUTION INTRAVENOUS at 10:16

## 2022-12-23 RX ADMIN — PHENYLEPHRINE HYDROCHLORIDE 0.5 MCG/KG/MIN: 10 INJECTION INTRAVENOUS at 08:00

## 2022-12-23 RX ADMIN — HYDROMORPHONE HYDROCHLORIDE 0.4 MG: 0.2 INJECTION, SOLUTION INTRAMUSCULAR; INTRAVENOUS; SUBCUTANEOUS at 20:39

## 2022-12-23 RX ADMIN — PROPOFOL 150 MG: 10 INJECTION, EMULSION INTRAVENOUS at 07:40

## 2022-12-23 RX ADMIN — ROCURONIUM BROMIDE 10 MG: 50 INJECTION, SOLUTION INTRAVENOUS at 09:45

## 2022-12-23 RX ADMIN — ROCURONIUM BROMIDE 60 MG: 50 INJECTION, SOLUTION INTRAVENOUS at 07:41

## 2022-12-23 RX ADMIN — FENTANYL CITRATE 50 MCG: 50 INJECTION, SOLUTION INTRAMUSCULAR; INTRAVENOUS at 07:41

## 2022-12-23 RX ADMIN — SODIUM CHLORIDE: 9 INJECTION, SOLUTION INTRAVENOUS at 13:35

## 2022-12-23 RX ADMIN — MIDAZOLAM 2 MG: 1 INJECTION INTRAMUSCULAR; INTRAVENOUS at 07:33

## 2022-12-23 RX ADMIN — HYDROMORPHONE HYDROCHLORIDE 0.2 MG: 0.2 INJECTION, SOLUTION INTRAMUSCULAR; INTRAVENOUS; SUBCUTANEOUS at 11:43

## 2022-12-23 RX ADMIN — PHENYLEPHRINE HYDROCHLORIDE 100 MCG: 10 INJECTION INTRAVENOUS at 07:50

## 2022-12-23 RX ADMIN — FENTANYL CITRATE 25 MCG: 50 INJECTION, SOLUTION INTRAMUSCULAR; INTRAVENOUS at 11:13

## 2022-12-23 RX ADMIN — HYDROMORPHONE HYDROCHLORIDE 0.4 MG: 0.2 INJECTION, SOLUTION INTRAMUSCULAR; INTRAVENOUS; SUBCUTANEOUS at 18:41

## 2022-12-23 RX ADMIN — Medication 2 G: at 07:47

## 2022-12-23 RX ADMIN — Medication 5 MG: at 07:53

## 2022-12-23 RX ADMIN — PHENYLEPHRINE HYDROCHLORIDE 50 MCG: 10 INJECTION INTRAVENOUS at 08:54

## 2022-12-23 RX ADMIN — PHENYLEPHRINE HYDROCHLORIDE 100 MCG: 10 INJECTION INTRAVENOUS at 08:41

## 2022-12-23 RX ADMIN — Medication 5 MG: at 08:08

## 2022-12-23 RX ADMIN — ALBUMIN HUMAN: 0.05 INJECTION, SOLUTION INTRAVENOUS at 07:50

## 2022-12-23 RX ADMIN — PHENYLEPHRINE HYDROCHLORIDE 100 MCG: 10 INJECTION INTRAVENOUS at 08:08

## 2022-12-23 RX ADMIN — OXYCODONE HYDROCHLORIDE 5 MG: 5 TABLET ORAL at 21:57

## 2022-12-23 RX ADMIN — PHENYLEPHRINE HYDROCHLORIDE 50 MCG: 10 INJECTION INTRAVENOUS at 09:16

## 2022-12-23 RX ADMIN — PHENYLEPHRINE HYDROCHLORIDE 100 MCG: 10 INJECTION INTRAVENOUS at 08:04

## 2022-12-23 RX ADMIN — ACETAMINOPHEN 975 MG: 325 TABLET, FILM COATED ORAL at 21:57

## 2022-12-23 RX ADMIN — PHENYLEPHRINE HYDROCHLORIDE 100 MCG: 10 INJECTION INTRAVENOUS at 09:00

## 2022-12-23 RX ADMIN — ONDANSETRON 4 MG: 2 INJECTION INTRAMUSCULAR; INTRAVENOUS at 10:24

## 2022-12-23 RX ADMIN — FENTANYL CITRATE 50 MCG: 50 INJECTION, SOLUTION INTRAMUSCULAR; INTRAVENOUS at 08:15

## 2022-12-23 RX ADMIN — Medication 5 MG: at 09:21

## 2022-12-23 RX ADMIN — HYDROMORPHONE HYDROCHLORIDE 0.2 MG: 0.2 INJECTION, SOLUTION INTRAMUSCULAR; INTRAVENOUS; SUBCUTANEOUS at 12:13

## 2022-12-23 RX ADMIN — TRANEXAMIC ACID 1 MG/KG/HR: 10 INJECTION, SOLUTION INTRAVENOUS at 08:00

## 2022-12-23 RX ADMIN — PROPOFOL 50 MG: 10 INJECTION, EMULSION INTRAVENOUS at 07:52

## 2022-12-23 RX ADMIN — MIDAZOLAM 2 MG: 1 INJECTION INTRAMUSCULAR; INTRAVENOUS at 06:45

## 2022-12-23 RX ADMIN — PHENYLEPHRINE HYDROCHLORIDE 50 MCG: 10 INJECTION INTRAVENOUS at 09:08

## 2022-12-23 RX ADMIN — ROCURONIUM BROMIDE 20 MG: 50 INJECTION, SOLUTION INTRAVENOUS at 08:15

## 2022-12-23 RX ADMIN — TRAZODONE HYDROCHLORIDE 100 MG: 100 TABLET ORAL at 22:58

## 2022-12-23 RX ADMIN — PHENYLEPHRINE HYDROCHLORIDE 100 MCG: 10 INJECTION INTRAVENOUS at 09:26

## 2022-12-23 RX ADMIN — HYDROMORPHONE HYDROCHLORIDE 0.5 MG: 1 INJECTION, SOLUTION INTRAMUSCULAR; INTRAVENOUS; SUBCUTANEOUS at 09:45

## 2022-12-23 RX ADMIN — SUGAMMADEX 200 MG: 100 INJECTION, SOLUTION INTRAVENOUS at 10:42

## 2022-12-23 RX ADMIN — OXYCODONE HYDROCHLORIDE 5 MG: 5 TABLET ORAL at 17:03

## 2022-12-23 RX ADMIN — ROCURONIUM BROMIDE 10 MG: 50 INJECTION, SOLUTION INTRAVENOUS at 09:00

## 2022-12-23 RX ADMIN — FENTANYL CITRATE 50 MCG: 50 INJECTION, SOLUTION INTRAMUSCULAR; INTRAVENOUS at 10:59

## 2022-12-23 RX ADMIN — HYDROMORPHONE HYDROCHLORIDE 0.2 MG: 0.2 INJECTION, SOLUTION INTRAMUSCULAR; INTRAVENOUS; SUBCUTANEOUS at 11:34

## 2022-12-23 RX ADMIN — FENTANYL CITRATE 25 MCG: 50 INJECTION, SOLUTION INTRAMUSCULAR; INTRAVENOUS at 11:18

## 2022-12-23 RX ADMIN — Medication 30 MG: at 08:10

## 2022-12-23 RX ADMIN — HYDROMORPHONE HYDROCHLORIDE 0.4 MG: 0.2 INJECTION, SOLUTION INTRAMUSCULAR; INTRAVENOUS; SUBCUTANEOUS at 15:47

## 2022-12-23 RX ADMIN — PHENYLEPHRINE HYDROCHLORIDE 100 MCG: 10 INJECTION INTRAVENOUS at 10:00

## 2022-12-23 RX ADMIN — LIDOCAINE HYDROCHLORIDE 40 MG: 20 INJECTION, SOLUTION INFILTRATION; PERINEURAL at 07:40

## 2022-12-23 RX ADMIN — Medication 10 MG: at 10:24

## 2022-12-23 ASSESSMENT — ENCOUNTER SYMPTOMS: ORTHOPNEA: 0

## 2022-12-23 ASSESSMENT — ACTIVITIES OF DAILY LIVING (ADL)
ADLS_ACUITY_SCORE: 37
ADLS_ACUITY_SCORE: 39
ADLS_ACUITY_SCORE: 39
ADLS_ACUITY_SCORE: 37
ADLS_ACUITY_SCORE: 37
ADLS_ACUITY_SCORE: 35
ADLS_ACUITY_SCORE: 37
ADLS_ACUITY_SCORE: 35
ADLS_ACUITY_SCORE: 35

## 2022-12-23 NOTE — ANESTHESIA PROCEDURE NOTES
Airway       Patient location during procedure: OR       Procedure Start/Stop Times: 12/23/2022 7:44 AM  Staff -        Anesthesiologist:  Hannah Cornejo MD       CRNA: Ray Blair APRN CRNA       Performed By: CRNA  Consent for Airway        Urgency: elective  Indications and Patient Condition       Indications for airway management: maria luz-procedural       Induction type:intravenous       Mask difficulty assessment: 1 - vent by mask    Final Airway Details       Final airway type: endotracheal airway       Successful airway: ETT - single and Oral  Endotracheal Airway Details        ETT size (mm): 7.0       Cuffed: yes       Successful intubation technique: video laryngoscopy       VL Blade Size: Glidescope 3       Grade View of Cords: 1       Adjucts: stylet       Position: Right       Measured from: lips       Secured at (cm): 21       Bite block used: Soft    Post intubation assessment        Placement verified by: capnometry, equal breath sounds and chest rise        Number of attempts at approach: 1       Number of other approaches attempted: 0       Secured with: pink tape       Ease of procedure: easy       Dentition: Unchanged    Medication(s) Administered   Medication Administration Time: 12/23/2022 7:44 AM

## 2022-12-23 NOTE — ANESTHESIA PREPROCEDURE EVALUATION
Anesthesia Pre-Procedure Evaluation    Patient: Annalise Wallace   MRN: [id number] : 1955        Procedure : Procedure(s):  Cervical 2 to Thoracic 2 posterior segmental instrumentation. Left Cervical 3 to Cervical 4 and Cervical 7 to Thoracic 2 foraminotomies. Posterior arthrodesis Cervical 2 to Thoracic 2 using allograft cancellous chips. Horan wells tong placement.          Past Medical History:   Diagnosis Date     Abnormal Papanicolaou smear of cervix and cervical HPV      Degenerative disc disease      Depressive disorder, not elsewhere classified      Displacement of cervical intervertebral disc without myelopathy      Gastro-oesophageal reflux disease      H/O hysterectomy for benign disease      TOBACCO ABUSE-CONTINUOUS       Past Surgical History:   Procedure Laterality Date     COLONOSCOPY      normal     DISCECTOMY LUMBAR MINIMALLY INVASIVE ONE LEVEL  2014    Procedure: DISCECTOMY LUMBAR MINIMALLY INVASIVE ONE LEVEL;  Minimally Invasive Discectomy L2-3 Left ;  Surgeon: Juanjose Pitt MD;  Location: [facility] OR     DISCECTOMY LUMBAR MINIMALLY INVASIVE ONE LEVEL       FECAL COLORECTAL CANCER SCREEN FIT  10/2022    did home test     FUSION SPINE POSTERIOR MINIMALLY INVASIVE ONE LEVEL  05/15/2014    Procedure: FUSION SPINE POSTERIOR MINIMALLY INVASIVE ONE LEVEL;  Surgeon: Juanjose Pitt MD;  Location: [facility] OR     HYSTERECTOMY VAGINAL, BILATERAL SALPINGO-OOPHERECTOMY, COMBINED      done for precancer cells - did not need chemo or radiation     ZZC NONSPECIFIC PROCEDURE      Lump removed L. breast - benign     ZZC NONSPECIFIC PROCEDURE      6 surgeries over time - first was early  - 3 in front and 2 in back     ZZC NONSPECIFIC PROCEDURE      C-spine - Nany      Allergies   Allergen Reactions     Sulfa Drugs Other (See Comments)     Headaches      Social History     Tobacco Use     Smoking status: Former     Packs/day: 1.00     Years: 30.00     Pack years: 30.00     Types:  Cigarettes     Quit date: 2017     Years since quittin.5     Smokeless tobacco: Never   Substance Use Topics     Alcohol use: No      Wt Readings from Last 1 Encounters:   22 76.6 kg (168 lb 12.8 oz)        Anesthesia Evaluation   Pt has had prior anesthetic.     No history of anesthetic complications       ROS/MED HX  ENT/Pulmonary: Comment: H/o max sinusitis     (+) tobacco use, Current use,  (-) asthma and sleep apnea   Neurologic: Comment: Cervical radiculopathy and foraminal stenosis, Pain-neck and Bilat UEs           Cardiovascular:    (-) RAND, orthopnea/PND and syncope   METS/Exercise Tolerance: >4 METS    Hematologic:    (-) history of blood clots   Musculoskeletal: Comment: Cervical and lumbar fusion, 5 prior neck surgeries      GI/Hepatic: Comment: diaphragmatic hernia     (+) GERD, Asymptomatic on medication,     Renal/Genitourinary:    (-) renal disease   Endo: Comment: Adrenal adenoma    (-) Type II DM   Psychiatric/Substance Use:     (+) psychiatric history anxiety and depression H/O chronic opiod use .     Infectious Disease:    (-) Recent Fever   Malignancy: Comment: Cervical Ca  (+) Malignancy,     Other:      (+) , H/O Chronic Pain,        Physical Exam    Airway        Mallampati: II   TM distance: > 3 FB   Neck ROM: full   Mouth opening: > 3 cm    Respiratory Devices and Support         Dental     Comment: Poor repair     (+) caps and chipped      Cardiovascular          Rhythm and rate: regular     Pulmonary           breath sounds clear to auscultation           OUTSIDE LABS:  CBC:   Lab Results   Component Value Date    WBC 12.7 (H) 2022    WBC 14.0 (H) 2015    HGB 14.3 2022    HGB 12.3 2015    HCT 43.3 2022    HCT 36.6 2015     2022     2015     BMP:   Lab Results   Component Value Date     2022     2017    POTASSIUM 4.7 2022    POTASSIUM 4.6 2017    CHLORIDE 107 2022     CHLORIDE 105 07/07/2017    CO2 28 01/27/2022    CO2 27 07/07/2017    BUN 18 01/27/2022    BUN 19 07/07/2017    CR 0.88 01/27/2022    CR 0.96 07/07/2017    GLC 94 01/27/2022    GLC 97 07/07/2017     COAGS: No results found for: PTT, INR, FIBR  POC:   Lab Results   Component Value Date     (H) 02/15/2014     HEPATIC:   Lab Results   Component Value Date    ALBUMIN 3.8 01/27/2022    PROTTOTAL 7.8 01/27/2022    ALT 19 01/27/2022    AST 11 01/27/2022    ALKPHOS 100 01/27/2022    BILITOTAL 0.3 01/27/2022     OTHER:   Lab Results   Component Value Date    PHILIP 9.2 01/27/2022    LIPASE 52 11/11/2003    AMYLASE 45 11/11/2003    TSH 1.58 03/02/2015    T4 8.3 10/29/2007    T3 150 10/29/2007       Anesthesia Plan    ASA Status:  3      Anesthesia Type: General.     - Airway: ETT         Techniques and Equipment:     - Airway: Video-Laryngoscope     - Lines/Monitors: Arterial Line     Consents    Anesthesia Plan(s) and associated risks, benefits, and realistic alternatives discussed. Questions answered and patient/representative(s) expressed understanding.    - Discussed:     - Discussed with:  Patient      - Extended Intubation/Ventilatory Support Discussed: Yes.    Use of blood products discussed: Yes.     Postoperative Care       PONV prophylaxis: Ondansetron (or other 5HT-3)     Comments:    Other Comments: Glidescope, phenylephrine infusion, ketamine, dexmed       Prior to Admission medications    Medication Sig Start Date End Date Taking? Authorizing Provider   acetaminophen (TYLENOL) 500 MG tablet Take 500-1,000 mg by mouth every 6 hours as needed for mild pain   Yes Reported, Patient   citalopram (CELEXA) 20 MG tablet TAKE 1 TABLET (20 MG) BY MOUTH DAILY 12/14/22  Yes Ruth Monroe MD   fluticasone (FLONASE) 50 MCG/ACT nasal spray Spray 1 spray into both nostrils as needed for rhinitis or allergies   Yes Reported, Patient   loratadine (CLARITIN) 10 MG tablet Take 10 mg by mouth daily   Yes Reported,  Patient   Magnesium Hydroxide (MILK OF MAGNESIA PO) Take 1 Tablespoonful by mouth as needed   Yes Reported, Patient   omeprazole (PRILOSEC) 40 MG DR capsule TAKE ONE CAPSULE BY MOUTH ONCE DAILY 8/25/22  Yes Ruth Monroe MD   oxyCODONE-acetaminophen (PERCOCET) 5-325 MG tablet Take one to two tablets by mouth every 6 hours as needed for severe pain. 12/8/22  Yes Ruth Monroe MD   tiZANidine (ZANAFLEX) 4 MG tablet TAKE 1 TABLET (4 MG) BY MOUTH 2 TIMES DAILY AS NEEDED FOR MUSCLE SPASMS 11/28/22  Yes Ruth Monroe MD   traZODone (DESYREL) 100 MG tablet TAKE ONE TABLET BY MOUTH ONCE DAILY 12/14/22  Yes Ruth Monroe MD     Current Facility-Administered Medications Ordered in Epic   Medication Dose Route Frequency Last Rate Last Admin     ceFAZolin Sodium (ANCEF) injection 2 g  2 g Intravenous Pre-Op/Pre-procedure x 1 dose         ceFAZolin Sodium (ANCEF) injection 2 g  2 g Intravenous See Admin Instructions         fentaNYL (PF) (SUBLIMAZE) injection 50 mcg  50 mcg Intravenous Pre-Op/Pre-procedure x 1 dose         lactated ringers infusion   Intravenous Continuous         lactated ringers infusion   Intravenous Continuous         lidocaine (LMX4) cream   Topical Q1H PRN         lidocaine 1 % 0.1-1 mL  0.1-1 mL Other Q1H PRN         lidocaine 1 % 0.1-1 mL  0.1-1 mL Other Q1H PRN         sodium chloride (PF) 0.9% PF flush 3 mL  3 mL Intracatheter Q8H         sodium chloride (PF) 0.9% PF flush 3 mL  3 mL Intracatheter q1 min prn         tranexamic acid (CYKLOKAPRON) 770 mg in sodium chloride 0.9 % 50 mL bolus  10 mg/kg Intravenous Once         tranexamic acid 1 g in 100 mL NS IV bag (premix)  1 mg/kg/hr Intravenous Continuous         No current [facility]-ordered outpatient medications on file.       lactated ringers       lactated ringers       tranexamic acid       No results for input(s): ABO, RH in the last 40333 hours.  No results for input(s): HCG in the last  47515 hours.  Recent Results (from the past 744 hour(s))   XR Chest 2 Views    Narrative    XR CHEST 2 VIEWS 12/21/2022 10:59 AM    HISTORY: preop smoker; Preop general physical exam    COMPARISON: 9/15/2009      Impression    IMPRESSION: No focal airspace opacities. No pleural effusion or  pneumothorax. The cardiac and mediastinal silhouettes are normal.  Instrument fusion of the cervical spine and lumbar spine.    JOSE LUIS GARCÍA MD         SYSTEM ID:  [id number]            Hannah Cornejo MD

## 2022-12-23 NOTE — BRIEF OP NOTE
[facility]    Brief Operative Note    Pre-operative diagnosis: Cervical spondylosis with radiculopathy [M47.22]  S/P cervical spinal fusion [Z98.1]  Post-operative diagnosis Same as pre-operative diagnosis    Procedure: Procedure(s):  Cervical 2 to Thoracic 2 posterior segmental instrumentation. Left Cervical 3 to Cervical 4 and Cervical 7 to Thoracic 2 foraminotomies. Posterior arthrodesis Cervical 2 to Thoracic 2 using allograft cancellous chips. Aung acosta placement.  Surgeon: Surgeon(s) and Role:     * Layton Ma MD - Primary  Anesthesia: General   Estimated Blood Loss: 200 ml    Drains: None  Specimens: * No specimens in log *  Findings:   None.  Complications: None.  Implants:   Implant Name Type Inv. Item Serial No.  Lot No. LRB No. Used Action   BONE CHIP CANCELLOUS 30CC [id number] - [id number] Bone/Tissue/Biologic BONE CHIP CANCELLOUS 30CC [id number]   N/A 1 Implanted   BONE CHIP CANCELLOUS 30CC [id number] - [id number] Bone/Tissue/Biologic BONE CHIP CANCELLOUS 30CC [id number]   N/A 1 Implanted   4.0 PLY SCRW 3.5 X 14 Metallic Hardware/[place]   Depuy LOAD 41 08 DEC 14, 2014 Right 4 Implanted   4.0 PLY SCRW 3.5 X 14 Metallic Hardware/[place]   Depuy LOAD 41 08 DEC 14, 2014 Left 3 Implanted   4.0 PLY SCRW 3.5 X 16 Metallic Hardware/[place]   Depuy LOAD 41 08 DEC 14, 2014 Left 1 Implanted   4.0 PLY SCRW 3.5 X 25 Metallic Hardware/[place]   Depuy LOAD 41 08 DEC 14, 2014 Left 1 [place]   4.0 PLY SCRW 3.5 X 30 Metallic Hardware/[place]   Depuy LOAD 41 08 DEC 14, 2014 Left 1 Implanted   4.0 PLY SCRW 3.5 X 30 Metallic Hardware/[place]   Depuy LOAD 41 08 DEC 14, 2014 Right 1 Implanted   SET SCREW Metallic Hardware/[place]   Depuy LOAD 41 08 DEC 14, 2014 N/A 14 Implanted   SCRW 4 X 26 Metallic Hardware/[place]   Depuy LOAD 41 08 DEC 14, 2014 Left 1 Implanted   SCRW 4 X 26  Metallic Hardware/[place]   Depuy LOAD 41 08 DEC 14, 2014 Right 2 Implanted    4 X 24 SCREW Metallic Hardware/[place]   Depuy LOAD 41 08 DEC 14, 2014 Left 1 Implanted   3.5 DIAMETER 240MM DOUGLAS Metallic Hardware/[employer]   Depuy LOAD 41 08 DEC 14, 2014 Left 1 Implanted   3.5 DIAMETER 240MM DOUGLAS Metallic Hardware/[employer]   Depuy LOAD 41 08 DEC 14, 2014 Right 1 Implanted     [id number]

## 2022-12-23 NOTE — ANESTHESIA PROCEDURE NOTES
Arterial Line Procedure Note    Pre-Procedure   Staff -        Anesthesiologist:  Hannah Cornejo MD       Performed By: anesthesiologist       Location: pre-op       Pre-Anesthestic Checklist: patient identified, risks and benefits discussed, informed consent, pre-op evaluation and at physician/surgeon's request  Timeout:       Correct Patient: Yes        Correct Procedure: Yes        Correct Site: Yes   Line Placement:   This line was placed Pre Induction starting at 12/23/2022 7:00 AM and ending at 12/23/2022 7:06 AM  Procedure   Procedure: arterial line       Laterality: left       Insertion Site: radial.  Sterile Prep        All elements of maximal sterile barrier technique followed       Patient Prep/Sterile Barriers: hand hygiene, sterile gloves, hat, mask, draped, sterile gown, patient draped, draped       Skin prep: Chloraprep  Insertion/Injection        Technique: ultrasound guided, Seldinger Technique and Deshaun's test completed        1. Ultrasound was used to evaluate the access site.       2. Artery evaluated via ultrasound for patency/adequacy.       3. Using real-time ultrasound the needle/catheter was observed entering the artery/vein.       4. Permanent image was captured and entered into the patient's record.       5. The visualized structures were anatomically normal.       6. There were no apparent abnormal pathologic findings.       Catheter Type/Size: 20 gauge, 12 cm  Narrative         Secured by: suture       Tegaderm dressing used.       Complications: None apparent,        Arterial waveform: Yes    Comments:  Times one.  Good blood flow.  No complications.

## 2022-12-23 NOTE — ANESTHESIA CARE TRANSFER NOTE
Patient: Annalise Wallace    Procedure: Procedure(s):  Cervical 2 to Thoracic 2 posterior segmental instrumentation. Left Cervical 3 to Cervical 4 and Cervical 7 to Thoracic 2 foraminotomies. Posterior arthrodesis Cervical 2 to Thoracic 2 using allograft cancellous chips. Horan wells tong placement.       Diagnosis: Cervical spondylosis with radiculopathy [M47.22]  S/P cervical spinal fusion [Z98.1]  Diagnosis Additional Information: No value filed.    Anesthesia Type:   General     Note:    Oropharynx: oropharynx clear of all foreign objects and spontaneously breathing  Level of Consciousness: drowsy  Oxygen Supplementation: face mask    Independent Airway: airway patency satisfactory and stable  Dentition: dentition unchanged  Vital Signs Stable: post-procedure vital signs reviewed and stable  Report to RN Given: handoff report given  Patient transferred to: PACU    Handoff Report: Identifed the Patient, Identified the Reponsible Provider, Reviewed the pertinent medical history, Discussed the surgical course, Reviewed Intra-OP anesthesia mangement and issues during anesthesia, Set expectations for post-procedure period and Allowed opportunity for questions and acknowledgement of understanding      Vitals:  Vitals Value Taken Time   /56 12/23/22 1050   Temp     Pulse 89 12/23/22 1056   Resp 17 12/23/22 1056   SpO2 100 % 12/23/22 1056     Electronically Signed By: KWADWO Garza CRNA  December 23, 2022  10:57 AM

## 2022-12-23 NOTE — PROGRESS NOTES
Notified provider about indwelling combs catheter discussed removal or continued need.    Did provider choose to remove indwelling combs catheter? No    Provider's combs indication for keeping indwelling combs catheter: Other - Ok to leave combs in overnight, will assess tomorrow.    Is there an order for indwelling combs catheter? Yes    *If there is a plan to keep combs catheter in place at discharge daily notification with provider is not necessary.

## 2022-12-23 NOTE — PROGRESS NOTES
"Amcom text sent to Catalino Barton: \"2424 M.[address] is it ok to leave combs in until tomorrow? Patient has not been OOB and is in a lot of pain. Ok to reassess tomorrow? Please call Jacqueline PRIETO [phone] thanks!\"    Received return call. Ok to leave combs in tonight, will reassess tomorrow.  "

## 2022-12-23 NOTE — ANESTHESIA POSTPROCEDURE EVALUATION
Patient: Annalise Wallace    Procedure: Procedure(s):  Cervical 2 to Thoracic 2 posterior segmental instrumentation. Left Cervical 3 to Cervical 4 and Cervical 7 to Thoracic 2 foraminotomies. Posterior arthrodesis Cervical 2 to Thoracic 2 using allograft cancellous chips. Horan wells tong placement.       Anesthesia Type:  General    Note:     Postop Pain Control: Uneventful   PONV: No   Neuro/Psych: Uneventful            Sign Out: Acceptable/Baseline neuro status   Airway/Respiratory: Uneventful            Sign Out: Acceptable/Baseline resp. status   CV/Hemodynamics: Uneventful            Sign Out: Acceptable CV status   Other NRE: NONE   DID A NON-ROUTINE EVENT OCCUR? No           Last vitals:  Vitals Value Taken Time   /58 12/23/22 1230   Temp 37.1  C (98.7  F) 12/23/22 1132   Pulse 77 12/23/22 1233   Resp 23 12/23/22 1233   SpO2 93 % 12/23/22 1237   Vitals shown include unvalidated device data.    Electronically Signed By: Hannah Cornejo MD  December 23, 2022  2:35 PM

## 2022-12-23 NOTE — PLAN OF CARE
Goal Outcome Evaluation:               Pt is alert and oriented .Incision site on the back of the neck intact.Frias intact to be remove maybe tomorrow MD aware , tolerating full liquid diet.Taking Dilaudid and oxycodone for pain.Not out of bed yet.Will monitor.

## 2022-12-23 NOTE — OP NOTE
Procedure Date: 12/23/2022    PREOPERATIVE DIAGNOSIS:  Cervical spondylosis with radiculopathy, status post cervical fusion.    POSTOPERATIVE DIAGNOSIS:  Cervical spondylosis with radiculopathy, status post cervical fusion.    PROCEDURE:  1.  C2 to T2 posterior segmental instrumentation.  2.  Left C3 to C4, C7 to T2 foraminotomies.  3.  Posterior arthrodesis, C2 to T2 using allograft cancellous chips.  4.  Horan-Wells tong placement.    SURGEON:  Layton Ma M.D.     ANESTHESIA:  General endotracheal anesthesia.    ESTIMATED BLOOD LOSS:  200 mL    INDICATIONS FOR PROCEDURE:  The patient is a 67-year-old female with history of multiple previous cervical fusions, who presented with neck and left upper extremity symptoms and degeneration above and below her previous cervical fusions.  She was brought for posterior cervical instrumentation and fusion and decompression.    DESCRIPTION OF PROCEDURE:  The patient was brought to the operating room.  General endotracheal anesthesia was induced.  The patient was rolled in the prone position on the [place] 4-poster table with a C-Flex head crawford after the Horan-Wells tongs had been applied and secured.  Ten pounds of traction was begun.  The back of the head, neck and chest were prepped and draped in sterile fashion.  A midline exposure was performed from C2 to T2.  The O-arm was sterilely draped and brought into the field and 3-dimensional imaging was obtained for neuronavigation using the Stealth system.  Using Stealth guidance, bilateral pedicle screws were placed from C7 to T2 using the Synthes Symphony System, as well as isthmus screws at C2, bilateral lateral mass screws were placed from C3 to C5.  Eventually, the left C4 screw was left out given its proximity to the foraminotomy performed there.  Repeat 3-dimensional imaging was obtained and screws were felt to be in good position.  Once this was done, then the high-speed drill was used to perform a laminotomy  and facetectomy on the left at C3 to C4, decompressing the C4 nerve root.  Once this was well decompressed, then attention was turned to C7 to T1 and T1 to T2, and in a similar fashion, the high-speed drill, curettes, rongeurs and Kerrison punches were used to decompress the exiting nerve roots in these areas.  Once these were well decompressed and hemostasis was achieved, the rods were contoured and seated.  Set screws were applied and final tightened.  Final x-rays were obtained.  The posterior elements were decorticated and 60 mL of allograft cancellous chips were placed from C2 to T2.  Fascia was then closed with 0 Vicryl, 2-0 inverted interrupted Vicryl was used for subcutaneous layer, running 3-0 nylon was used for skin.  The patient was then awakened, extubated, and taken to recovery room in good condition.    Layton Ma MD        D: 2022   T: 2022   MT: LIT    Name:     TEJINDER INTERIANO  MRN:      [id number]        Account:        [id number]   :      1955           Procedure Date: 2022     Document: [id number]

## 2022-12-24 ENCOUNTER — APPOINTMENT (OUTPATIENT)
Dept: PHYSICAL THERAPY | Facility: CLINIC | Age: 67
DRG: 460 | End: 2022-12-24
Attending: NEUROLOGICAL SURGERY
Payer: COMMERCIAL

## 2022-12-24 ENCOUNTER — APPOINTMENT (OUTPATIENT)
Dept: GENERAL RADIOLOGY | Facility: CLINIC | Age: 67
DRG: 460 | End: 2022-12-24
Attending: PHYSICIAN ASSISTANT
Payer: COMMERCIAL

## 2022-12-24 LAB
GLUCOSE BLDC GLUCOMTR-MCNC: 128 MG/DL (ref 70–99)
GLUCOSE BLDC GLUCOMTR-MCNC: 148 MG/DL (ref 70–99)
HGB BLD-MCNC: 12.4 G/DL (ref 11.7–15.7)

## 2022-12-24 PROCEDURE — 250N000013 HC RX MED GY IP 250 OP 250 PS 637: Performed by: PHYSICIAN ASSISTANT

## 2022-12-24 PROCEDURE — 250N000011 HC RX IP 250 OP 636: Performed by: PHYSICIAN ASSISTANT

## 2022-12-24 PROCEDURE — 36415 COLL VENOUS BLD VENIPUNCTURE: CPT | Performed by: PHYSICIAN ASSISTANT

## 2022-12-24 PROCEDURE — 85018 HEMOGLOBIN: CPT | Performed by: PHYSICIAN ASSISTANT

## 2022-12-24 PROCEDURE — 72040 X-RAY EXAM NECK SPINE 2-3 VW: CPT

## 2022-12-24 PROCEDURE — 120N000001 HC R&B MED SURG/OB

## 2022-12-24 PROCEDURE — 97161 PT EVAL LOW COMPLEX 20 MIN: CPT | Mod: GP

## 2022-12-24 PROCEDURE — 97530 THERAPEUTIC ACTIVITIES: CPT | Mod: GP

## 2022-12-24 PROCEDURE — 97116 GAIT TRAINING THERAPY: CPT | Mod: GP

## 2022-12-24 RX ADMIN — HYDROMORPHONE HYDROCHLORIDE 0.4 MG: 0.2 INJECTION, SOLUTION INTRAMUSCULAR; INTRAVENOUS; SUBCUTANEOUS at 14:06

## 2022-12-24 RX ADMIN — SENNOSIDES AND DOCUSATE SODIUM 1 TABLET: 50; 8.6 TABLET ORAL at 08:53

## 2022-12-24 RX ADMIN — HYDROMORPHONE HYDROCHLORIDE 0.2 MG: 0.2 INJECTION, SOLUTION INTRAMUSCULAR; INTRAVENOUS; SUBCUTANEOUS at 07:28

## 2022-12-24 RX ADMIN — HYDROMORPHONE HYDROCHLORIDE 0.4 MG: 0.2 INJECTION, SOLUTION INTRAMUSCULAR; INTRAVENOUS; SUBCUTANEOUS at 16:35

## 2022-12-24 RX ADMIN — OXYCODONE HYDROCHLORIDE 10 MG: 5 TABLET ORAL at 15:30

## 2022-12-24 RX ADMIN — SENNOSIDES AND DOCUSATE SODIUM 1 TABLET: 50; 8.6 TABLET ORAL at 20:02

## 2022-12-24 RX ADMIN — ACETAMINOPHEN 975 MG: 325 TABLET, FILM COATED ORAL at 21:11

## 2022-12-24 RX ADMIN — ACETAMINOPHEN 975 MG: 325 TABLET, FILM COATED ORAL at 14:03

## 2022-12-24 RX ADMIN — OXYCODONE HYDROCHLORIDE 10 MG: 5 TABLET ORAL at 20:02

## 2022-12-24 RX ADMIN — HYDROMORPHONE HYDROCHLORIDE 0.4 MG: 0.2 INJECTION, SOLUTION INTRAMUSCULAR; INTRAVENOUS; SUBCUTANEOUS at 22:07

## 2022-12-24 RX ADMIN — HYDROMORPHONE HYDROCHLORIDE 0.4 MG: 0.2 INJECTION, SOLUTION INTRAMUSCULAR; INTRAVENOUS; SUBCUTANEOUS at 03:53

## 2022-12-24 RX ADMIN — HYDROMORPHONE HYDROCHLORIDE 0.4 MG: 0.2 INJECTION, SOLUTION INTRAMUSCULAR; INTRAVENOUS; SUBCUTANEOUS at 18:53

## 2022-12-24 RX ADMIN — OXYCODONE HYDROCHLORIDE 10 MG: 5 TABLET ORAL at 06:25

## 2022-12-24 RX ADMIN — ONDANSETRON 4 MG: 2 INJECTION INTRAMUSCULAR; INTRAVENOUS at 13:01

## 2022-12-24 RX ADMIN — LORATADINE 10 MG: 10 TABLET ORAL at 08:53

## 2022-12-24 RX ADMIN — PANTOPRAZOLE SODIUM 40 MG: 40 TABLET, DELAYED RELEASE ORAL at 08:53

## 2022-12-24 RX ADMIN — TIZANIDINE 4 MG: 2 TABLET ORAL at 09:15

## 2022-12-24 RX ADMIN — OXYCODONE HYDROCHLORIDE 10 MG: 5 TABLET ORAL at 11:21

## 2022-12-24 RX ADMIN — CITALOPRAM HYDROBROMIDE 20 MG: 20 TABLET ORAL at 08:53

## 2022-12-24 RX ADMIN — ACETAMINOPHEN 975 MG: 325 TABLET, FILM COATED ORAL at 06:25

## 2022-12-24 RX ADMIN — POLYETHYLENE GLYCOL 3350 17 G: 17 POWDER, FOR SOLUTION ORAL at 08:53

## 2022-12-24 RX ADMIN — HYDROMORPHONE HYDROCHLORIDE 0.4 MG: 0.2 INJECTION, SOLUTION INTRAMUSCULAR; INTRAVENOUS; SUBCUTANEOUS at 12:06

## 2022-12-24 RX ADMIN — TRAZODONE HYDROCHLORIDE 100 MG: 100 TABLET ORAL at 21:12

## 2022-12-24 RX ADMIN — HYDROMORPHONE HYDROCHLORIDE 0.4 MG: 0.2 INJECTION, SOLUTION INTRAMUSCULAR; INTRAVENOUS; SUBCUTANEOUS at 09:46

## 2022-12-24 RX ADMIN — OXYCODONE HYDROCHLORIDE 10 MG: 5 TABLET ORAL at 01:57

## 2022-12-24 RX ADMIN — HYDROMORPHONE HYDROCHLORIDE 0.4 MG: 0.2 INJECTION, SOLUTION INTRAMUSCULAR; INTRAVENOUS; SUBCUTANEOUS at 00:05

## 2022-12-24 RX ADMIN — TIZANIDINE 4 MG: 2 TABLET ORAL at 21:12

## 2022-12-24 ASSESSMENT — ACTIVITIES OF DAILY LIVING (ADL)
ADLS_ACUITY_SCORE: 35

## 2022-12-24 NOTE — PROGRESS NOTES
Pt returned from xray in major pain and shivering. Applied more warm blankets and gave IV dilaudid. Daughter at bedside providing encouragement. Pt reported feeling better upon recheck.

## 2022-12-24 NOTE — PROGRESS NOTES
Requested activity orders and to clarify if pt needs brace from [place], Neurosurgery PA. He is looking into it.

## 2022-12-24 NOTE — PROGRESS NOTES
12/24/22 4195   Appointment Info   Signing Clinician's Name / Credentials (PT) Christa Zayas, PT, DPT   Living Environment   People in Home alone   Current Living Arrangements apartment   Home Accessibility no concerns   Transportation Anticipated family or friend will provide   Living Environment Comments elevator access at apartment. Planning on  discharging to [facility] house, has 1 [address] and stairs to bedroom with rail.   Self-Care   Usual Activity Tolerance good   Current Activity Tolerance fair   Equipment Currently Used at Home raised toilet seat;grab bar, tub/shower   Fall history within last six months no   Activity/Exercise/Self-Care Comment ind with ADLs and IADLs at baseline. does not have a walker at home. tub shower.   General Information   Onset of Illness/Injury or Date of Surgery 12/23/22   Referring Physician Layton Ma MD   Patient/Family Therapy Goals Statement (PT) Planning on discharging to [facility] when able.   Pertinent History of Current Problem (include personal factors and/or comorbidities that impact the POC) Pt is a 67 y.o. female underwent C2 to T2 posterior segmental instrumentation, Left C3 to C4, C7 to T2 foraminotomies, Posterior arthrodesis, C2 to T2 using allograft cancellous chips on 12/23/22. Pt is POD#1.   Existing Precautions/Restrictions fall;spinal   Cognition   Affect/Mental Status (Cognition) WFL   Orientation Status (Cognition) oriented x 4   Follows Commands (Cognition) WFL   Pain Assessment   Patient Currently in Pain   (7/10 pain incision.)   Integumentary/Edema   Integumentary/Edema Comments Dressing over posterior cervical spine incision appears CDI.   Posture    Posture Forward head position;Protracted shoulders   Range of Motion (ROM)   ROM Comment Grossly WFL BLE, able to touch head with BUE, increased pain from muscle soreness in BUE.   Strength (Manual Muscle Testing)   Strength (Manual Muscle Testing) Able to perform R SLR;Able to perform L  SLR   Strength Comments Grossly antigravity strength BUE and LE with functional transfers. global weakness secondary to post op status.   Bed Mobility   Comment, (Bed Mobility) R side ly>sitting EOB, log roll technique, SBA, increased time and effort.   Transfers   Comment, (Transfers) sit>stand to FWW, CGA   Gait/Stairs (Locomotion)   Distance in Feet 5' eval + 125' treatment   Comment, (Gait/Stairs) Amb with FWW, CGA, no LOB, decreased step length, decreased chilango.   Balance   Balance Comments Able to maintain seated balance BUE support. Able to maintain standing balance in FWW, SBA.   Sensory Examination   Sensory Perception patient reports no sensory changes   Clinical Impression   Criteria for Skilled Therapeutic Intervention Yes, treatment indicated   PT Diagnosis (PT) Impaired mobility   Influenced by the following impairments pain, decreased functional strength, decreased activity tolerance   Functional limitations due to impairments pain, fall risk, impaired functional independence, impaired ADLs and IADLs   Clinical Presentation (PT Evaluation Complexity) Stable/Uncomplicated   Clinical Presentation Rationale per MR and clinical judgement   Clinical Decision Making (Complexity) low complexity   Planned Therapy Interventions (PT) balance training;bed mobility training;cryotherapy;gait training;ROM (range of motion);stair training;strengthening;stretching;patient/family education;home exercise program;transfer training;progressive activity/exercise   Anticipated Equipment Needs at Discharge (PT) walker, rolling   Risk & Benefits of therapy have been explained evaluation/treatment results reviewed;care plan/treatment goals reviewed;risks/benefits reviewed;current/potential barriers reviewed;participants voiced agreement with care plan;participants included;patient;daughter   PT Total Evaluation Time   PT Eval, Low Complexity Minutes (15755) 6   Physical Therapy Goals   PT Frequency Daily   PT Predicted  Duration/Target Date for Goal Attainment 12/29/22   PT Goals Bed Mobility;Transfers;Gait;Stairs   PT: Bed Mobility Supervision/stand-by assist;Supine to/from sit;Rolling;Within precautions   PT: Transfers Supervision/stand-by assist;Sit to/from stand;Bed to/from chair;Assistive device  (least restrictive device)   PT: Gait Supervision/stand-by assist;Assistive device;Rolling walker;Greater than 200 feet   PT: Stairs Supervision/stand-by assist;8 stairs;Rail on right   PT Discharge Planning   PT Plan progress bed mobility, log roll, repeated STS, gait distance with and without walker, stairs.   PT Discharge Recommendation (DC Rec) home with assist   PT Rationale for DC Rec Pt is below baseline functional independence, limited by pain, decreased strength and activity tolerance. Pt lives alone but is planning on discharging to [facility] where she will be able to have assist as needed. Pt will need to do stairs to enter [facility]. Anticipate with pain control and continued mobilization with nursing and IP PT, pt may be able to discharge to [facility] with assist as needed. Pt currently requiring, supervision-CGA for bed mob and transfers, use of walker for ambulation. Pt may benefit from use of walker for ambulation at discharge, will continue to assess.   PT Brief overview of current status bed mob, SBA. STS to FWW, CGA. Amb with FWW, CGA.   Total Session Time   Total Session Time (sum of timed and untimed services) 6

## 2022-12-24 NOTE — PLAN OF CARE
Goal Outcome Evaluation:    Pt is A&Ox4, VSS on room air, back incision intact, CMS intact, PRN Oxycodone , PRN Dilaudid and scheduled  Tylenol given for pain. Pt declined to get out of bed yet; education provided; will reapproach.   Continue to monitor.

## 2022-12-24 NOTE — PROGRESS NOTES
[facility]    Neurosurgery  Daily Post-Op Note    Assessment & Plan   Procedure(s):  Cervical 2 to Thoracic 2 posterior segmental instrumentation. Left Cervical 3 to Cervical 4 and Cervical 7 to Thoracic 2 foraminotomies. Posterior arthrodesis Cervical 2 to Thoracic 2 using allograft cancellous chips. Aung vaca tong placement.   1 Day Post-Op  Doing well.  C/o neck pain     Plan:  -Advance activity as tolerated  -Continue supportive and symptomatic treatment  -Start or continue physical therapy  -Pain control measures    Kamran Camp PA-C    Interval History   Stable.  Doing well.  Improving slowly.  Pain is reasonably controlled.  No fevers.     Physical Exam   Temp: 99.7  F (37.6  C) Temp src: Oral BP: 118/55 Pulse: 96   Resp: 16 SpO2: 91 % O2 Device: None (Room air) Oxygen Delivery: 1 LPM  Vitals:    12/23/22 0619   Weight: 75.3 kg (165 lb 14.4 oz)     Vital Signs with Ranges  Temp:  [98  F (36.7  C)-99.7  F (37.6  C)] 99.7  F (37.6  C)  Pulse:  [75-96] 96  Resp:  [10-22] 16  BP: ()/(54-69) 118/55  MAP:  [68 mmHg-76 mmHg] 76 mmHg  Arterial Line BP: (103-124)/(48-54) 121/54  SpO2:  [88 %-100 %] 91 %  I/O last 3 completed shifts:  In: 2380 [P.O.:380; I.V.:1500]  Out: 2025 [Urine:1825; Blood:200]    Alert and oriented.  Moves all extremities equally.  Reflexes symmetrical.     Incision: CDI      Medications     sodium chloride Stopped (12/24/22 0400)        acetaminophen  975 mg Oral Q8H     citalopram  20 mg Oral Daily     loratadine  10 mg Oral Daily     pantoprazole  40 mg Oral QAM     polyethylene glycol  17 g Oral Daily     senna-docusate  1 tablet Oral BID     sodium chloride (PF)  3 mL Intracatheter Q8H     traZODone  100 mg Oral At Bedtime           Kamran Camp PA-C  [facility]  [facility]  [address] [place] [address]  [address]  [place] [address]    Tel [phone]  Pager [phone]

## 2022-12-25 LAB
GLUCOSE BLDC GLUCOMTR-MCNC: 107 MG/DL (ref 70–99)
HGB BLD-MCNC: 10.7 G/DL (ref 11.7–15.7)

## 2022-12-25 PROCEDURE — 258N000003 HC RX IP 258 OP 636: Performed by: HOSPITALIST

## 2022-12-25 PROCEDURE — 36415 COLL VENOUS BLD VENIPUNCTURE: CPT | Performed by: HOSPITALIST

## 2022-12-25 PROCEDURE — 250N000013 HC RX MED GY IP 250 OP 250 PS 637: Performed by: PHYSICIAN ASSISTANT

## 2022-12-25 PROCEDURE — 120N000001 HC R&B MED SURG/OB

## 2022-12-25 PROCEDURE — 250N000011 HC RX IP 250 OP 636: Performed by: PHYSICIAN ASSISTANT

## 2022-12-25 PROCEDURE — 85018 HEMOGLOBIN: CPT | Performed by: HOSPITALIST

## 2022-12-25 PROCEDURE — 99222 1ST HOSP IP/OBS MODERATE 55: CPT | Performed by: HOSPITALIST

## 2022-12-25 PROCEDURE — 99207 PR NO BILLABLE SERVICE THIS VISIT: CPT | Performed by: INTERNAL MEDICINE

## 2022-12-25 RX ORDER — HYDROMORPHONE HYDROCHLORIDE 2 MG/1
2-4 TABLET ORAL
Status: DISCONTINUED | OUTPATIENT
Start: 2022-12-25 | End: 2022-12-27 | Stop reason: HOSPADM

## 2022-12-25 RX ADMIN — HYDROMORPHONE HYDROCHLORIDE 4 MG: 2 TABLET ORAL at 15:42

## 2022-12-25 RX ADMIN — OXYCODONE HYDROCHLORIDE 10 MG: 5 TABLET ORAL at 10:13

## 2022-12-25 RX ADMIN — HYDROMORPHONE HYDROCHLORIDE 4 MG: 2 TABLET ORAL at 21:40

## 2022-12-25 RX ADMIN — ACETAMINOPHEN 975 MG: 325 TABLET, FILM COATED ORAL at 06:10

## 2022-12-25 RX ADMIN — HYDROMORPHONE HYDROCHLORIDE 0.4 MG: 0.2 INJECTION, SOLUTION INTRAMUSCULAR; INTRAVENOUS; SUBCUTANEOUS at 08:25

## 2022-12-25 RX ADMIN — OXYCODONE HYDROCHLORIDE 10 MG: 5 TABLET ORAL at 00:52

## 2022-12-25 RX ADMIN — HYDROMORPHONE HYDROCHLORIDE 0.4 MG: 0.2 INJECTION, SOLUTION INTRAMUSCULAR; INTRAVENOUS; SUBCUTANEOUS at 02:58

## 2022-12-25 RX ADMIN — SENNOSIDES AND DOCUSATE SODIUM 1 TABLET: 50; 8.6 TABLET ORAL at 20:16

## 2022-12-25 RX ADMIN — TRAZODONE HYDROCHLORIDE 100 MG: 100 TABLET ORAL at 21:41

## 2022-12-25 RX ADMIN — OXYCODONE HYDROCHLORIDE 10 MG: 5 TABLET ORAL at 06:11

## 2022-12-25 RX ADMIN — TIZANIDINE 4 MG: 2 TABLET ORAL at 11:09

## 2022-12-25 RX ADMIN — TIZANIDINE 4 MG: 2 TABLET ORAL at 20:16

## 2022-12-25 RX ADMIN — HYDROMORPHONE HYDROCHLORIDE 4 MG: 2 TABLET ORAL at 18:42

## 2022-12-25 RX ADMIN — SODIUM CHLORIDE 1000 ML: 9 INJECTION, SOLUTION INTRAVENOUS at 01:00

## 2022-12-25 RX ADMIN — HYDROMORPHONE HYDROCHLORIDE 4 MG: 2 TABLET ORAL at 12:32

## 2022-12-25 RX ADMIN — ACETAMINOPHEN 975 MG: 325 TABLET, FILM COATED ORAL at 21:40

## 2022-12-25 RX ADMIN — PANTOPRAZOLE SODIUM 40 MG: 40 TABLET, DELAYED RELEASE ORAL at 08:23

## 2022-12-25 RX ADMIN — LORATADINE 10 MG: 10 TABLET ORAL at 08:23

## 2022-12-25 RX ADMIN — CITALOPRAM HYDROBROMIDE 20 MG: 20 TABLET ORAL at 08:23

## 2022-12-25 RX ADMIN — SENNOSIDES AND DOCUSATE SODIUM 1 TABLET: 50; 8.6 TABLET ORAL at 08:23

## 2022-12-25 RX ADMIN — POLYETHYLENE GLYCOL 3350 17 G: 17 POWDER, FOR SOLUTION ORAL at 08:30

## 2022-12-25 RX ADMIN — ACETAMINOPHEN 975 MG: 325 TABLET, FILM COATED ORAL at 15:01

## 2022-12-25 RX ADMIN — HYDROMORPHONE HYDROCHLORIDE 0.4 MG: 0.2 INJECTION, SOLUTION INTRAMUSCULAR; INTRAVENOUS; SUBCUTANEOUS at 05:02

## 2022-12-25 ASSESSMENT — ACTIVITIES OF DAILY LIVING (ADL)
ADLS_ACUITY_SCORE: 35
ADLS_ACUITY_SCORE: 37
ADLS_ACUITY_SCORE: 35
ADLS_ACUITY_SCORE: 37
ADLS_ACUITY_SCORE: 35

## 2022-12-25 NOTE — PLAN OF CARE
Goal Outcome Evaluation:       Patient is alert and oriented X4. VSS on RA with good sat.Up with assist of 1 with gait belt and walker. On regular diet with good appetite. PIV saline locked. Patient reports pain prn oxycodone,IV dilaudid, Zanaflex and scheduled acetaminophen  was given .CMS intact.  Dressing dry and intact. Continent of B&B, voiding adequately in bathroom. Continue to monitor.

## 2022-12-25 NOTE — PROGRESS NOTES
Patient called for pain medication around midnight, requested IV dilaudid but BP was a low as 87/45. Neurosurgery contacted, consult order obtained form . Bolus fluid and hemoglobin ordered by Dr. Grider. 1000 ml fluid infused and blood pressure up to 131/66. Patient denied having any symptoms associated with low BP readings. Oxycodone 10 mg given to control incisional pain but very minimal relief. IV dilaudid given after bolus completion, will keep following.

## 2022-12-25 NOTE — PROGRESS NOTES
Patient asserted this morning that IV dilaudid no longer effective against pain. Bumpy spot found around incision, sticky note left for Neurosurgery. Page sent out to Dr. Camp at 06:20 about both concerns but no response yet till end of NOC.

## 2022-12-25 NOTE — PROVIDER NOTIFICATION
Paged Neurosurgery PA.   PO dilaudid worked well for pt. Did you intend to discontinue the PO oxy? Oxy order is still active.

## 2022-12-25 NOTE — PROGRESS NOTES
Hospitalist Chart Check  12/25/2022    Consulted overnight due to hypotension. This appears to have been transient and resolved without intervention. Hemoglobin stable. Suspect related to opiate side effects with challenging pain control. Will sign off.    Ebony Patiño MD

## 2022-12-25 NOTE — PROVIDER NOTIFICATION
Paged Neurosurgery PA.  Pt would like you to reevaluate pain medications when you stop by today. She believes that the current meds are not working.           no

## 2022-12-25 NOTE — CONSULTS
[facility]    Hospitalist Consultation    Date of Admission:  12/23/2022  Date of Consult (When I saw the patient): 12/25/22    Assessment & Plan   Annalise Wallace is a markedly pleasant 67 year old woman who was admitted on 12/23/2022 after elective cervical spine surgery. I was consulted by Dr. Ma of Neurosurgery this evening for evaluation of acute post-operative hypotension.    Acute post-operative hypotension: This was transient this evening, improved without intervention. STAT HGB was checked and shows only a mild drop in HGB, making acute post-operative bleeding less likely. She denies chest pain or dyspnea and is not tachycardic or hypoxic, thus ACS or PE seem less likely. She is not febrile, making sepsis less likely.    -- IV NS bolus ordered; monitor BP    -- If BP remains stable today Hospitalists are likely to sign off    Acute expected post-operative blood loss anemia: Monitor    Hemoglobin   Date Value Ref Range Status   12/25/2022 10.7 (L) 11.7 - 15.7 g/dL Final   12/24/2022 12.4 11.7 - 15.7 g/dL Final   09/20/2015 12.3 11.7 - 15.7 g/dL Final   03/02/2015 13.7 11.7 - 15.7 g/dL Final     Chronic depression and GERD: Continue Celexa, PPI.    Status post cervical spine fusion surgery: Diet, pain, activity and disposition as per Neurosurgery    DVT Prophylaxis: Defer to primary service  Code Status: Full Code    Disposition: Expected discharge as per NS    Porter Grider MD, MD    Chief Complaint   Neck pain    History is obtained from the patient    History of Present Illness   Annalise Wallace is a markedly pleasant 67 year old woman who presents with severe ongoing neck pain after her surgery on 12/23/2022. She tells me this is the 6th surgery she has had for her neck and that this surgery has caused the most pain. It is partially relieved with Oxycodone and exacerbated by movement. I was called to see her after she had a transient drop in BP overnight. By the time I  started my assessment the hypotension seems to have resolved. She adds that her bowels have not yet moved since her surgery. She otherwise denies fever, dyspnea, chest pain, nausea, cough, dysuria, or any other acute complaints.     Recent Results (from the past 24 hour(s))   XR Cervical Spine 2/3 Views    Narrative    EXAM: XR CERVICAL SPINE 2/3 VIEWS  LOCATION: [facility]  DATE/TIME: 12/24/2022 11:52 AM    INDICATION: post op C2 T2 fusion  COMPARISON: CT scan 11/21/2022.  TECHNIQUE: CR Cervical Spine.      Impression    IMPRESSION:     Surgical revision with postoperative lateral mass screw and katie fixation extending from C2 to T3.    No evidence of hardware loosening.    Stable anterior and interbody fusion changes extending from C4 to C7 as seen previously. Normal anatomic alignment.         Past Medical History    I have reviewed this patient's medical history and updated it with pertinent information if needed.   Past Medical History:   Diagnosis Date     Abnormal Papanicolaou smear of cervix and cervical HPV      Degenerative disc disease      Depressive disorder, not elsewhere classified      Displacement of cervical intervertebral disc without myelopathy      Gastro-oesophageal reflux disease      H/O hysterectomy for benign disease      PONV (postoperative nausea and vomiting)      TOBACCO ABUSE-CONTINUOUS        Past Surgical History   I have reviewed this patient's surgical history and updated it with pertinent information if needed.  Past Surgical History:   Procedure Laterality Date     COLONOSCOPY  2008    normal     DISCECTOMY LUMBAR MINIMALLY INVASIVE ONE LEVEL  02/13/2014    Procedure: DISCECTOMY LUMBAR MINIMALLY INVASIVE ONE LEVEL;  Minimally Invasive Discectomy L2-3 Left ;  Surgeon: Juanjose Pitt MD;  Location: [facility] OR     DISCECTOMY LUMBAR MINIMALLY INVASIVE ONE LEVEL       FECAL COLORECTAL CANCER SCREEN FIT  10/2022    did home test     FUSION SPINE POSTERIOR  MINIMALLY INVASIVE ONE LEVEL  05/15/2014    Procedure: FUSION SPINE POSTERIOR MINIMALLY INVASIVE ONE LEVEL;  Surgeon: Juanjose Pitt MD;  Location: [facility] OR     HYSTERECTOMY VAGINAL, BILATERAL SALPINGO-OOPHERECTOMY, COMBINED      done for precancer cells - did not need chemo or radiation     [facility] NONSPECIFIC PROCEDURE      Lump removed L. breast - benign     Z NONSPECIFIC PROCEDURE      6 surgeries over time - first was early 2000's - 3 in front and 2 in back     Z NONSPECIFIC PROCEDURE      C-spine - Nany       Prior to Admission Medications   Prior to Admission Medications   Prescriptions Last Dose Informant Patient Reported? Taking?   Magnesium Hydroxide (MILK OF MAGNESIA PO) Unknown at prn Self Yes Yes   Sig: Take 1 Tablespoonful by mouth as needed   acetaminophen (TYLENOL) 500 MG tablet  at prn Self Yes Yes   Sig: Take 500-1,000 mg by mouth every 6 hours as needed for mild pain   citalopram (CELEXA) 20 MG tablet 12/23/2022 at am Self No Yes   Sig: TAKE 1 TABLET (20 MG) BY MOUTH DAILY   fluticasone (FLONASE) 50 MCG/ACT nasal spray  at prn Self Yes Yes   Sig: Spray 1 spray into both nostrils as needed for rhinitis or allergies   loratadine (CLARITIN) 10 MG tablet 12/23/2022 at am Self Yes Yes   Sig: Take 10 mg by mouth daily   omeprazole (PRILOSEC) 40 MG DR capsule 12/23/2022 at am Self No Yes   Sig: TAKE ONE CAPSULE BY MOUTH ONCE DAILY   oxyCODONE-acetaminophen (PERCOCET) 5-325 MG tablet  at prn Self No Yes   Sig: Take one to two tablets by mouth every 6 hours as needed for severe pain.   tiZANidine (ZANAFLEX) 4 MG tablet  at prn Self No Yes   Sig: TAKE 1 TABLET (4 MG) BY MOUTH 2 TIMES DAILY AS NEEDED FOR MUSCLE SPASMS   traZODone (DESYREL) 100 MG tablet 12/22/2022 at pm Self No Yes   Sig: TAKE ONE TABLET BY MOUTH ONCE DAILY      Facility-Administered Medications: None     Allergies   Allergies   Allergen Reactions     Sulfa Drugs Other (See Comments)     Headaches       Social History   I have reviewed  this patient's social history and updated it with pertinent information if needed. Annalise Wallace  reports that she quit smoking about 5 years ago. Her smoking use included cigarettes. She has a 30.00 pack-year smoking history. She has never used smokeless tobacco. She reports that she does not drink alcohol and does not use drugs.    Family History   I have reviewed this patient's family history and updated it with pertinent information if needed.   Family History   Problem Relation Age of Onset     Cancer Mother         had spread everywhere     Cancer Father         throat     Diabetes Paternal Grandmother        Review of Systems   The 10 point Review of Systems is negative other than noted in the HPI or here.    Vital Signs with Ranges  Temp:  [98.1  F (36.7  C)-99.7  F (37.6  C)] 98.2  F (36.8  C)  Pulse:  [73-96] 75  Resp:  [15-18] 18  BP: ()/(45-66) 131/66  SpO2:  [88 %-91 %] 91 %  165 lbs 14.4 oz    Constitutional: Alert and oriented to person, place and time; in pain  Respiratory: lungs clear to auscultation bilaterally  Cardiovascular: regular S1 S2 no murmurs rubs or gallops  GI: abdomen soft non tender non distended bowel sounds positive though faint  Lymph/Hematologic: pale, no cervical lymphadenopathy  Skin: no rash, good turgor  Musculoskeletal: no clubbing, cyanosis or edema  Neurologic: extra-ocular muscles intact; moves all four extremities  Psychiatric: appropriate affect, insight and judgment    Data   -Data reviewed today: All pertinent laboratory and imaging results from this encounter were reviewed. I personally reviewed no images or EKG's today.  Recent Labs   Lab 12/25/22  0117 12/24/22  0951 12/24/22  0621 12/24/22  0154 12/23/22  0606 12/21/22  1129   WBC  --   --   --   --  12.2* 12.7*   HGB 10.7* 12.4  --   --  14.4 14.3   MCV  --   --   --   --  90 92   PLT  --   --   --   --  274 264   INR  --   --   --   --  0.97  --    GLC  --   --  148* 128*  --   --

## 2022-12-25 NOTE — PROGRESS NOTES
[facility]    Neurosurgery  Daily Post-Op Note    Assessment & Plan   Procedure(s):  Cervical 2 to Thoracic 2 posterior segmental instrumentation. Left Cervical 3 to Cervical 4 and Cervical 7 to Thoracic 2 foraminotomies. Posterior arthrodesis Cervical 2 to Thoracic 2 using allograft cancellous chips. Aung vaca tong placement.   2 Days Post-Op  Struggle with pain control overnight. Changes made to pain meds this am.   Had mary hypotensive overnight, but states she did not feel dizzy or lightheaded.   Appreciate hospitalist recs.     Plan:  -Advance activity as tolerated  -Continue supportive and symptomatic treatment  -Pain control measures      Kamran aCmp PA-C    Interval History   Stable.  Doing well.  Improving slowly.  Pain is reasonably controlled.  No fevers.    Physical Exam   Temp: 97.7  F (36.5  C) Temp src: Oral BP: 114/50 Pulse: 93   Resp: 16 SpO2: 93 % O2 Device: None (Room air)    Vitals:    12/23/22 0619   Weight: 75.3 kg (165 lb 14.4 oz)     Vital Signs with Ranges  Temp:  [97.7  F (36.5  C)-98.2  F (36.8  C)] 97.7  F (36.5  C)  Pulse:  [73-93] 93  Resp:  [15-18] 16  BP: ()/(45-66) 114/50  SpO2:  [90 %-93 %] 93 %  I/O last 3 completed shifts:  In: 240 [P.O.:240]  Out: 550 [Urine:550]    Alert and oriented.  Moves all extremities equally.  Reflexes symmetrical.     Incision: CDI      Medications     sodium chloride Stopped (12/24/22 0400)        acetaminophen  975 mg Oral Q8H     citalopram  20 mg Oral Daily     loratadine  10 mg Oral Daily     pantoprazole  40 mg Oral QAM     polyethylene glycol  17 g Oral Daily     senna-docusate  1 tablet Oral BID     sodium chloride (PF)  3 mL Intracatheter Q8H     traZODone  100 mg Oral At Bedtime           Kamran Camp PA-C  [facility]  [facility]  [address] [place] [address]  [address]  [place] [address]    Tel [phone]  Pager [phone]

## 2022-12-25 NOTE — SIGNIFICANT EVENT
"Significant Event Note    Time of event: 12:50 AM December 25, 2022    Description of event:    Hospitalist consult received for post-operative hypotension. It seems that on 12/23 she had neck surgery. Tonight she developed low blood pressure. Per nursing staff she has ongoing post-operative pain but is not hypoxic nor tachycardic.    Blood pressure (!) 87/45, pulse 73, temperature 98.2  F (36.8  C), temperature source Oral, resp. rate 18, height 1.676 m (5' 6\"), weight 75.3 kg (165 lb 14.4 oz), SpO2 91 %, not currently breastfeeding.    Actually per nursing staff BP has now been repeated and improved to 107 systolic without intervention.    Plan:  She could have post-operative bleeding. I ordered a STAT HGB and IV fluid bolus and will evaluate further promptly if she becomes unstable. Otherwise, if these are reassuring and if blood pressure otherwise remains stable overnight we would plan to defer further management to her primary surgical team.     Discussed with: bedside nurse    Porter Grider MD    "

## 2022-12-25 NOTE — PROGRESS NOTES
Pain medications were changed this shift. PO Dilaudid was added today. 4 mg PO dilaudid was effective in controlling her pain.

## 2022-12-26 ENCOUNTER — APPOINTMENT (OUTPATIENT)
Dept: PHYSICAL THERAPY | Facility: CLINIC | Age: 67
DRG: 460 | End: 2022-12-26
Attending: NEUROLOGICAL SURGERY
Payer: COMMERCIAL

## 2022-12-26 PROCEDURE — 97116 GAIT TRAINING THERAPY: CPT | Mod: GP | Performed by: PHYSICAL THERAPY ASSISTANT

## 2022-12-26 PROCEDURE — 250N000013 HC RX MED GY IP 250 OP 250 PS 637: Performed by: PHYSICIAN ASSISTANT

## 2022-12-26 PROCEDURE — 120N000001 HC R&B MED SURG/OB

## 2022-12-26 PROCEDURE — 250N000011 HC RX IP 250 OP 636: Performed by: PHYSICIAN ASSISTANT

## 2022-12-26 PROCEDURE — 97530 THERAPEUTIC ACTIVITIES: CPT | Mod: GP | Performed by: PHYSICAL THERAPY ASSISTANT

## 2022-12-26 RX ORDER — ALBUTEROL SULFATE 0.83 MG/ML
SOLUTION RESPIRATORY (INHALATION)
Status: COMPLETED
Start: 2022-12-26 | End: 2022-12-26

## 2022-12-26 RX ADMIN — ACETAMINOPHEN 975 MG: 325 TABLET, FILM COATED ORAL at 06:13

## 2022-12-26 RX ADMIN — CITALOPRAM HYDROBROMIDE 20 MG: 20 TABLET ORAL at 10:08

## 2022-12-26 RX ADMIN — LORATADINE 10 MG: 10 TABLET ORAL at 10:08

## 2022-12-26 RX ADMIN — HYDROMORPHONE HYDROCHLORIDE 4 MG: 2 TABLET ORAL at 01:50

## 2022-12-26 RX ADMIN — SENNOSIDES AND DOCUSATE SODIUM 1 TABLET: 50; 8.6 TABLET ORAL at 10:08

## 2022-12-26 RX ADMIN — TIZANIDINE 4 MG: 2 TABLET ORAL at 06:14

## 2022-12-26 RX ADMIN — HYDROMORPHONE HYDROCHLORIDE 4 MG: 2 TABLET ORAL at 10:08

## 2022-12-26 RX ADMIN — TRAZODONE HYDROCHLORIDE 100 MG: 100 TABLET ORAL at 22:35

## 2022-12-26 RX ADMIN — PANTOPRAZOLE SODIUM 40 MG: 40 TABLET, DELAYED RELEASE ORAL at 10:08

## 2022-12-26 RX ADMIN — ACETAMINOPHEN 650 MG: 325 TABLET, FILM COATED ORAL at 16:07

## 2022-12-26 RX ADMIN — ACETAMINOPHEN 650 MG: 325 TABLET, FILM COATED ORAL at 22:35

## 2022-12-26 RX ADMIN — HYDROMORPHONE HYDROCHLORIDE 0.4 MG: 0.2 INJECTION, SOLUTION INTRAMUSCULAR; INTRAVENOUS; SUBCUTANEOUS at 06:58

## 2022-12-26 RX ADMIN — HYDROMORPHONE HYDROCHLORIDE 4 MG: 2 TABLET ORAL at 04:58

## 2022-12-26 RX ADMIN — SENNOSIDES AND DOCUSATE SODIUM 1 TABLET: 50; 8.6 TABLET ORAL at 21:10

## 2022-12-26 RX ADMIN — POLYETHYLENE GLYCOL 3350 17 G: 17 POWDER, FOR SOLUTION ORAL at 10:08

## 2022-12-26 RX ADMIN — HYDROMORPHONE HYDROCHLORIDE 4 MG: 2 TABLET ORAL at 13:20

## 2022-12-26 RX ADMIN — HYDROMORPHONE HYDROCHLORIDE 4 MG: 2 TABLET ORAL at 16:26

## 2022-12-26 RX ADMIN — HYDROMORPHONE HYDROCHLORIDE 4 MG: 2 TABLET ORAL at 22:34

## 2022-12-26 RX ADMIN — HYDROMORPHONE HYDROCHLORIDE 4 MG: 2 TABLET ORAL at 19:34

## 2022-12-26 ASSESSMENT — ACTIVITIES OF DAILY LIVING (ADL)
ADLS_ACUITY_SCORE: 36
ADLS_ACUITY_SCORE: 37
ADLS_ACUITY_SCORE: 36
ADLS_ACUITY_SCORE: 37
ADLS_ACUITY_SCORE: 36
ADLS_ACUITY_SCORE: 37

## 2022-12-26 NOTE — PROGRESS NOTES
[facility]     Neurosurgery  Daily Post-Op Note        Assessment & Plan     Procedure(s):  Cervical 2 to Thoracic 2 posterior segmental instrumentation. Left Cervical 3 to Cervical 4 and Cervical 7 to Thoracic 2 foraminotomies. Posterior arthrodesis Cervical 2 to Thoracic 2 using allograft cancellous chips. Horan lio tong placement.   3 Days Post-Op  She states her pain is better today after starting oral Dilaudid.  No new issues.  On exam incision C/D/I, dressing removed, no swelling noted, mildly erythematous.  On exam she's seen ambulating in halls with walker.  Neuro intact. /48  Hgb 10.7 12/25/22     Plan:  -Continue oral Dilaudid.  -Discussed discharge plan, she prefers to discharge tomorrow when daughter will be home to help care for her.  -Continue PT/OT    Delphine Scherer MPAS  [facility]surgery  [address]  [address] [place] [id number]    Tel [phone]  Pager [phone]

## 2022-12-26 NOTE — PLAN OF CARE
Goal Outcome Evaluation:        Patient is alert and oriented X4. VSS on RA with good sat.Up with assist of 1 with gait belt and walker. On regular diet with good appetite. PIV saline locked. Patient reports pain prn oral dilaudid , Zanaflex and scheduled acetaminophen  was given . Patient verbalized satisfaction with level of pain control. CMS intact.  Dressing dry and intact. Continent of B&B, voiding adequately in bathroom. Continue to monitor.

## 2022-12-26 NOTE — PROGRESS NOTES
Today is POD #3 of posterior neck procedure, patient reports tact sensation and dressing CDI. No fever or chills, pain treated with 4 mg dilaudid every 3 hours along with gel pack. Patient sleeping most of NOC, reports no new concerns this shift.

## 2022-12-26 NOTE — PLAN OF CARE
Goal Outcome Evaluation:      Plan of Care Reviewed With: patient      VSS on RA. Pain meds given PRN, pt still needing frequent PRNs. See MAR. No IV meds given this shift, only PO. Pt currently sleeping, stating recent PRN pain meds were effective.

## 2022-12-27 VITALS
TEMPERATURE: 98.9 F | WEIGHT: 165.9 LBS | HEIGHT: 66 IN | BODY MASS INDEX: 26.66 KG/M2 | OXYGEN SATURATION: 95 % | DIASTOLIC BLOOD PRESSURE: 66 MMHG | SYSTOLIC BLOOD PRESSURE: 143 MMHG | HEART RATE: 86 BPM | RESPIRATION RATE: 16 BRPM

## 2022-12-27 PROCEDURE — 250N000013 HC RX MED GY IP 250 OP 250 PS 637: Performed by: PHYSICIAN ASSISTANT

## 2022-12-27 RX ORDER — AMOXICILLIN 250 MG
1 CAPSULE ORAL 2 TIMES DAILY PRN
Qty: 32 TABLET | Refills: 0 | Status: SHIPPED | OUTPATIENT
Start: 2022-12-27 | End: 2023-01-27

## 2022-12-27 RX ORDER — HYDROMORPHONE HYDROCHLORIDE 2 MG/1
2-4 TABLET ORAL
Qty: 50 TABLET | Refills: 0 | Status: SHIPPED | OUTPATIENT
Start: 2022-12-27 | End: 2022-12-30

## 2022-12-27 RX ADMIN — HYDROMORPHONE HYDROCHLORIDE 4 MG: 2 TABLET ORAL at 08:46

## 2022-12-27 RX ADMIN — SENNOSIDES AND DOCUSATE SODIUM 1 TABLET: 50; 8.6 TABLET ORAL at 08:46

## 2022-12-27 RX ADMIN — HYDROMORPHONE HYDROCHLORIDE 4 MG: 2 TABLET ORAL at 01:40

## 2022-12-27 RX ADMIN — PANTOPRAZOLE SODIUM 40 MG: 40 TABLET, DELAYED RELEASE ORAL at 08:46

## 2022-12-27 RX ADMIN — HYDROMORPHONE HYDROCHLORIDE 4 MG: 2 TABLET ORAL at 04:44

## 2022-12-27 RX ADMIN — POLYETHYLENE GLYCOL 3350 17 G: 17 POWDER, FOR SOLUTION ORAL at 08:50

## 2022-12-27 RX ADMIN — LORATADINE 10 MG: 10 TABLET ORAL at 08:46

## 2022-12-27 RX ADMIN — HYDROMORPHONE HYDROCHLORIDE 4 MG: 2 TABLET ORAL at 15:02

## 2022-12-27 RX ADMIN — CITALOPRAM HYDROBROMIDE 20 MG: 20 TABLET ORAL at 08:45

## 2022-12-27 RX ADMIN — HYDROMORPHONE HYDROCHLORIDE 4 MG: 2 TABLET ORAL at 11:49

## 2022-12-27 ASSESSMENT — ACTIVITIES OF DAILY LIVING (ADL)
ADLS_ACUITY_SCORE: 36
ADLS_ACUITY_SCORE: 35
ADLS_ACUITY_SCORE: 36
ADLS_ACUITY_SCORE: 35
ADLS_ACUITY_SCORE: 36
ADLS_ACUITY_SCORE: 35

## 2022-12-27 NOTE — PROGRESS NOTES
Pt is A/O x4. VSS on RA. Up with SBA GB/W. Ice, PRN tylenol and PO dilaudid used for pain with effect. Continent B/B. Incision ASTER, CDI. CMS intact. PIV SL.

## 2022-12-27 NOTE — PROGRESS NOTES
Pt continues to make progress and stated that her goal is to discharge tomorrow after her daughter gets off work at 1630. PRN Tylenol and dilaudid for breakthrough pain. Medications effective. Pt doing well with ambulation and with using her incentive spirometer.

## 2022-12-27 NOTE — PLAN OF CARE
Goal Outcome Evaluation:       A&OX4. VSS on room air. Up SBA/IND with walker. Pain 6-8/10 managing with oral dilaudid q3h. Incision ASTER. Voiding adequately in the BR. Pt received walker from PT for discharge. discharge later today with daughter, passed on to incoming shift regarding discharge. discharge medications were obtained from discharge pharmacy.

## 2022-12-27 NOTE — PLAN OF CARE
Physical Therapy Discharge Summary    Reason for therapy discharge:    Discharged to home.    Progress towards therapy goal(s). See goals on Care Plan in [facility] electronic health record for goal details.  Goals met    Therapy recommendation(s):    Continue home exercise program. Pt is below baseline functional independence, limited by pain, decreased strength and activity tolerance. Pt lives alone but is planning on discharging to [facility] where she will be able to have assist as needed. Pt will need to do stairs to enter daughters home. Anticipate with pain control and continued mobilization with nursing and IP PT, pt may be able to discharge to [facility] with assist as needed. Pt currently requiring, supervision-CGA for bed mob and transfers, use of walker for ambulation. Pt may benefit from use of walker for ambulation at discharge, will continue to assess.  PT Brief overview of current status: SBA with transfers, CGA with gait using wheeled walker, CGA on stairs.

## 2022-12-27 NOTE — PLAN OF CARE
Occupational Therapy: Orders received. Chart reviewed and discussed with care team, including IP PT. Pt completing ADLs with no concerns per discussion with treatment team. All therapy needs are being met with IP PT. Will cancel OT evaluation and complete orders.

## 2022-12-28 LAB
ATRIAL RATE - MUSE: 77 BPM
DIASTOLIC BLOOD PRESSURE - MUSE: NORMAL MMHG
INTERPRETATION ECG - MUSE: NORMAL
P AXIS - MUSE: 33 DEGREES
PR INTERVAL - MUSE: 164 MS
QRS DURATION - MUSE: 76 MS
QT - MUSE: 374 MS
QTC - MUSE: 423 MS
R AXIS - MUSE: -3 DEGREES
SYSTOLIC BLOOD PRESSURE - MUSE: NORMAL MMHG
T AXIS - MUSE: 34 DEGREES
VENTRICULAR RATE- MUSE: 77 BPM

## 2022-12-30 DIAGNOSIS — Z98.1 S/P CERVICAL SPINAL FUSION: ICD-10-CM

## 2022-12-30 RX ORDER — HYDROMORPHONE HYDROCHLORIDE 2 MG/1
2-4 TABLET ORAL
Qty: 50 TABLET | Refills: 0 | Status: SHIPPED | OUTPATIENT
Start: 2022-12-30 | End: 2023-01-04

## 2022-12-30 NOTE — TELEPHONE ENCOUNTER
"Patient calling for a refill of Dilaudid.     DOS: 12/23  Procedure: Cervical 2 to Thoracic 2 posterior segmental instrumentation. Left Cervical 3 to Cervical 4 and Cervical 7 to Thoracic 2 foraminotomies. Posterior arthrodesis Cervical 2 to Thoracic 2 using allograft cancellous chips  Surgeon: Dr. Ma  Weeks Post Op: 1 week    Current symptom(s): reports she is \"pretty good\". Muscle pain, reports stiffness. Pain is located near incision site. No new symptoms. They are working on decreasing dilaudid use as able  Current pain management: Dilaudid 2 tabs Q4-4.5 hours, Zanaflex as prescribed, icing     Last fill: 12/27 #50  Next visit: 1/10 with RN    Medication pended for your approval, if appropriate. Pharmacy verified.     Any patient questions or concerns:     Informed patient request will be forwarded to care team.     "

## 2022-12-30 NOTE — TELEPHONE ENCOUNTER
Patient's daughter called requesting for patient to get refill for HYDROmorphone (DILAUDID) 2 MG tablet. Thank you ~

## 2023-01-04 DIAGNOSIS — Z98.1 S/P CERVICAL SPINAL FUSION: ICD-10-CM

## 2023-01-04 RX ORDER — HYDROMORPHONE HYDROCHLORIDE 2 MG/1
2-4 TABLET ORAL
Qty: 50 TABLET | Refills: 0 | Status: SHIPPED | OUTPATIENT
Start: 2023-01-05 | End: 2023-01-10

## 2023-01-04 NOTE — TELEPHONE ENCOUNTER
"Patient calling for a refill of Dilaudid.      DOS: 12/23  Procedure: Cervical 2 to Thoracic 2 posterior segmental instrumentation. Left Cervical 3 to Cervical 4 and Cervical 7 to Thoracic 2 foraminotomies. Posterior arthrodesis Cervical 2 to Thoracic 2 using allograft cancellous chips  Surgeon: Dr. Ma  Weeks Post Op: 1 week     Current symptom(s): Reports she is \"getting better each day\". Reports continued muslce pain/stiffness but improving.  Pain near incision is better. No new symptoms.   Has been able to decrease to 1 tab dilaudid throughout the day   Current pain management:   Dilaudid: 1-2 (2 mg) tabs Q4-4.5 hours  2 tabs in the am, 1 throughout the day, then 2 at HS  Zanaflex: TID  Tylenol: as needed  Ice: As needed      Last fill: 12/30 #50. Has 9 left   Next visit: 1/10 with RN     Medication pended for your approval, if appropriate. Pharmacy verified.      Any patient questions or concerns:      Informed patient request will be forwarded to care team.   "

## 2023-01-04 NOTE — TELEPHONE ENCOUNTER
Patient is requesting refill for HYDROmorphone (DILAUDID) 2 MG tablet and she requests that it be sent to [facility] in [place]. Thank you ~

## 2023-01-05 NOTE — DISCHARGE SUMMARY
[facility]    Discharge Summary  Neurosurgery    Date of Admission:  12/23/2022  Date of Discharge:  12/27/2022  4:55 PM  Discharging Provider: Kamran Camp PA-C  Date of Service (when I saw the patient): 12/27/22    Discharge Diagnoses   Principal Problem:    S/P cervical spinal fusion      History of Present Illness   Annalise Wallace is an 67 year old female who presented with C2 to T2 posterior segmental instrumentation, Left C3 to C4, C7 to T2 foraminotomies.  Posterior arthrodesis, C2 to T2    Hospital Course   Annalise Wallace was admitted on 12/23/2022.  The following problems were addressed during her hospitalization:    Principal Problem:    S/P cervical spinal fusion    Assessment: Mobilize with PT/OT postoperatively.    Plan: Routine postoperative follow-up expected        I have discussed the following assessment and plan with Dr. Ma who is in agreement with initial plan and will follow up with further consultation recommendations.      Kamran Camp PA-C  [facility]  [place] [address]    Tel [phone]  Pager [phone]        Significant Results and Procedures       Pending Results   These results will be followed up by   Unresulted Labs Ordered in the Past 30 Days of this Admission     No orders found from 11/23/2022 to 12/24/2022.          Code Status   Full Code    Primary Care Physician   Ruth Monroe    Physical Exam                     Vitals:    12/23/22 0619   Weight: 75.3 kg (165 lb 14.4 oz)     Vital Signs with Ranges     No intake/output data recorded.    Constitutional: Awake, alert, cooperative, no apparent distress, and appears stated age.  Eyes: Lids and lashes normal, pupils equal, round and reactive to light, extra ocular muscles intact  ENT: Normocephalic, without obvious abnormality, atraumatic  Respiratory: No increased work of breathing  Skin: No  bruising or bleeding, normal skin color, texture, turgor, no redness, warmth, or swelling.  Incision with staples intact, minimal drainage, open to air.  Musculoskeletal: There is no redness, warmth, or swelling of the joints.  Full range of motion noted.  Motor strength is 5 out of 5 all extremities bilaterally.  Tone is normal.  Neurologic: Awake, alert, oriented to name, place and time.  Cranial nerves II-XII are grossly intact.  Motor is 5 out of 5 bilaterally.     Neuropsychiatric: Calm, normal eye contact, alert, normal affect, oriented to self, place, time and situation, memory for past and recent events intact and thought process normal.       Time Spent on this Encounter   I, Kamran Camp PA-C, personally saw the patient today and spent less than or equal to 30 minutes discharging this patient.    Discharge Disposition   Discharged to home  Condition at discharge: Good    Consultations This Hospital Stay   OCCUPATIONAL THERAPY ADULT IP CONSULT  PHYSICAL THERAPY ADULT IP CONSULT  PHYSICAL THERAPY ADULT IP CONSULT  OCCUPATIONAL THERAPY ADULT IP CONSULT  HOSPITALIST IP CONSULT    Discharge Orders      Reason for your hospital stay    Cervical 2 to Thoracic 2 posterior segmental instrumentation. Left Cervical 3 to Cervical 4 and Cervical 7 to Thoracic 2 foraminotomies. Posterior arthrodesis Cervical 2 to Thoracic 2 using allograft cancellous chips. Horan wells tong placement.     Follow-up and recommended labs and tests     Your Neurosurgical follow up appointments have been scheduled. You may call [phone] to make, confirm or change your follow-up Neurosurgery appointment dates and/or times.     Activity    Please limit your lifting to no more that ten pounds and avoid excessive bending, twisting and turning. You should also avoid excessive jostling and jarring activities.     Wound care and dressings    Instructions to care for your wound at home: ice to area for comfort, keep wound clean and dry,  and may get incision wet in shower but do not soak or scrub.     Walker Order for DME - ONLY FOR DME    I, the undersigned, certify that the above prescribed supplies are medically necessary for this patient and is both reasonable and necessary in reference to accepted standards of medical and necessary in reference to accepted standards of medical practice in the treatment of this patient's condition and is not prescribed as a convenience.      Diet    Follow this diet upon discharge: Advance to a regular diet as tolerated.     Discharge Medications   Discharge Medication List as of 12/27/2022  3:26 PM      START taking these medications    Details   senna-docusate (SENOKOT-S/PERICOLACE) 8.6-50 MG tablet Take 1 tablet by mouth 2 times daily as needed for constipation, Disp-32 tablet, R-0, E-Prescribe      HYDROmorphone (DILAUDID) 2 MG tablet Take 1-2 tablets (2-4 mg) by mouth every 3 hours as needed for severe pain (7-10), Disp-50 tablet, R-0, E-Prescribe         CONTINUE these medications which have CHANGED    Details   tiZANidine (ZANAFLEX) 4 MG tablet Take 1 tablet (4 mg) by mouth 3 times daily, Disp-40 tablet, R-0, E-Prescribe         STOP taking these medications       acetaminophen (TYLENOL) 500 MG tablet Comments:   Reason for Stopping:         citalopram (CELEXA) 20 MG tablet Comments:   Reason for Stopping:         fluticasone (FLONASE) 50 MCG/ACT nasal spray Comments:   Reason for Stopping:         loratadine (CLARITIN) 10 MG tablet Comments:   Reason for Stopping:         Magnesium Hydroxide (MILK OF MAGNESIA PO) Comments:   Reason for Stopping:         omeprazole (PRILOSEC) 40 MG DR capsule Comments:   Reason for Stopping:         oxyCODONE-acetaminophen (PERCOCET) 5-325 MG tablet Comments:   Reason for Stopping:         traZODone (DESYREL) 100 MG tablet Comments:   Reason for Stopping:             Allergies   Allergies   Allergen Reactions     Sulfa Drugs Other (See Comments)     Headaches

## 2023-01-10 ENCOUNTER — DOCUMENTATION ONLY (OUTPATIENT)
Dept: NEUROSURGERY | Facility: CLINIC | Age: 68
End: 2023-01-10

## 2023-01-10 ENCOUNTER — OFFICE VISIT (OUTPATIENT)
Dept: NEUROSURGERY | Facility: CLINIC | Age: 68
End: 2023-01-10
Attending: NEUROLOGICAL SURGERY
Payer: COMMERCIAL

## 2023-01-10 ENCOUNTER — TELEPHONE (OUTPATIENT)
Dept: NEUROSURGERY | Facility: CLINIC | Age: 68
End: 2023-01-10

## 2023-01-10 VITALS
OXYGEN SATURATION: 94 % | SYSTOLIC BLOOD PRESSURE: 152 MMHG | DIASTOLIC BLOOD PRESSURE: 82 MMHG | HEART RATE: 76 BPM | TEMPERATURE: 98.5 F

## 2023-01-10 DIAGNOSIS — Z98.1 S/P CERVICAL SPINAL FUSION: Primary | ICD-10-CM

## 2023-01-10 DIAGNOSIS — Z98.1 S/P CERVICAL SPINAL FUSION: ICD-10-CM

## 2023-01-10 PROCEDURE — G0463 HOSPITAL OUTPT CLINIC VISIT: HCPCS

## 2023-01-10 PROCEDURE — 99207 PR NO CHARGE NURSE ONLY: CPT

## 2023-01-10 RX ORDER — HYDROMORPHONE HYDROCHLORIDE 2 MG/1
2-4 TABLET ORAL
Qty: 50 TABLET | Refills: 0 | Status: SHIPPED | OUTPATIENT
Start: 2023-01-10 | End: 2023-01-16

## 2023-01-10 ASSESSMENT — PAIN SCALES - GENERAL
PAINLEVEL: MODERATE PAIN (5)
PAINLEVEL: MODERATE PAIN (5)

## 2023-01-10 NOTE — CONFIDENTIAL NOTE
Post-op Nurse Visit:    Patient seen today per the order of  Layton Ma MD.   DOS: 12/23/22  Surgeon: Dr. Ma  Procedure:     Pain/Neuro Assessment  5/10 pain rating . Reports pain to posterior neck shoulders. Denies arm pain. Beltrami some cracking described pain as nagging, throbbing, stiff and sore. Constant pain.  Pain in left arm has resolved post op.   Numbness/tingling: no   Strength: denies  issues but reports some weakness in left arm. Advised patient to discuss possible PT at 6 wk post op.      Patient reports she has been hearing cracking in her neck mostly when she is walking. This is new and started 1 week ago. Patient denies any pain that follows the cracking. Discussed this concern with Sun Page PA-C who said this may be due to some tendonitis, will improve with time and Physical Therapy down the road. Will update patient via [employer] message regarding this.     Pain Relief Measures:  Dilaudid: 6-7 tabs per day as needed for pain level. Last filled 1/5 #50.  Tylenol: BID prn    tizanidine: TID last filled 12/27/22 #40  Ice: yes     Discussed weaning down and off pain medication by 6 weeks.     Incision   Incision inspected. Edges well-approximated. No redness, swelling, drainage, or warmth noted. Incision healing well. Incision prepped with ChloraPrep and suture(s) removed without difficulty. Steri-strips applied.    Activity  Following restrictions   Falls: none  Patient is walking frequently without difficulty.   Legs examined in clinic; no redness, swelling, or warmth noted. Patient denies any pain in bilateral calves.   Wearing brace? N/A    GI/  Difficulty swallowing? No  Patient's appetite is normal  Bowel/bladder problems? No  Taking stool softeners? Yes     Refills/x-rays/return to work  Refills given at this appointment? Yes, dilaudid and tizanidine  Sent for x-rays after this appointment? No  Ordered future x-rays? Yes, sent our schedulers a staff message to set up 6 and 12 wk XR's  prior to appts.   Return to work discussed at this appointment? No, patient retired     All of patient's questions addressed today. Patient was instructed to call with any additional questions/concerns.     [facility]  [place] [address] [place] [address]  Telephone:  [phone]   Fax:  [phone]

## 2023-01-10 NOTE — TELEPHONE ENCOUNTER
LM- imaging has been added to patient appointment for Feb 3, and March 27, just prior to seeing the provider.  Patient was guided to review in My- Chart for additional information.

## 2023-01-10 NOTE — PATIENT INSTRUCTIONS
Instructions for Patient    Incision  Steri-strips were applied today, they will fall off in 7-10 days. Do not to pull them off.   Keep your incision clean and dry at all times.   It is okay to shower, just pat the incision dry   No submerging incision in water such as pools, hot tubs, or baths for at least 8 weeks and until the incision is healed  Do not apply lotions or ointments to incision    Activity  No lifting greater than 10 pounds. No bending, twisting, or overhead reaching.  Walking is the best way to start exercise after surgery. Take short frequent walks. You may gradually increase the distance as tolerated. If you feel pain, decrease your activity, but DO NOT stop walking. Walking will help you gain strength, prevent muscle soreness and spasms, and prevent blood clots.   Continue to wear brace as directed by your surgeon if one was ordered  Avoid bed rest and prolonged sitting for longer than 30 minutes (change positions frequently while awake)  No contact sports or high impact activities such as; running/jogging, snowmobile or 4 stallworth riding or any other recreational vehicles until after given clearance at one of your follow up visits    Medications   Refills of pain medication:   Please call the neurosurgery clinic to request 2-3 days before you run out  Weaning from narcotic pain medications  When it is time, start weaning by extending the time between doses.   For example, if you're taking 2 tabs every 4 hours, spread it out to 2 tabs over 4.5, 5, 6 hours. At that point you can certainly cut down to 1 tab, then wean to an as needed basis until completely done with them.Refills: call our clinic 2-3 business days before you are out of medication. A nurse will call you back to obtain a pain assessment.   Don't take more than 3000mg of Acetaminophen in 24 hours  Avoid Aspirin and NSAIDs (ex: ibuprofen/Advil) until given clearance  Encouraged icing for at least 3-4 times throughout the day for 20-30  minutes at a time. Avoid heat to the incision area.   Taking stool softeners regularly can reduce constipation commonly caused by narcotic pain medications.    Contact clinic or Emergency Room if you develop:   Infection (redness, swelling, warmth, drainage, fever over 101 F)  New injury  Bladder or bowel changes or loss of control    Signs of blood clot:  Swelling and/or warmth in one or both legs  Pain or tenderness in your leg, ankle, foot, or arm   Red or discolored skin     Go to the Emergency Room   If sudden onset of severe headache, weakness, confusion, change in level of consciousness, pain, or loss of movement.  Chest pain  Trouble breathing     Post-operative appointments  Arrive 30 minutes before your 6 week and 3 months post-op appointments to allow time for an x-ray before each    [facility]  [place] [address] [place] [address]  Telephone:  [phone]   Fax:  [phone]

## 2023-01-10 NOTE — PROGRESS NOTES
Faxed Form/Referral/IMG orders January 10, 2023 to fax number [phone]    Right Fax confirmed at  1:57PM    Rosa Garcia MA

## 2023-01-10 NOTE — LETTER
1/10/2023         RE: Annalise Wallace  [address]  [place] [facility] [address]        Dear Colleague,    Thank you for referring your patient, Annalise Wallace, to the [facility] [place]. Please see a copy of my visit note below.    No notes on file    Again, thank you for allowing me to participate in the care of your patient.        Sincerely,         Spine/Brain Nurse

## 2023-01-16 DIAGNOSIS — Z98.1 S/P CERVICAL SPINAL FUSION: ICD-10-CM

## 2023-01-16 RX ORDER — HYDROMORPHONE HYDROCHLORIDE 2 MG/1
2-4 TABLET ORAL EVERY 6 HOURS PRN
Qty: 40 TABLET | Refills: 0 | Status: SHIPPED | OUTPATIENT
Start: 2023-01-16 | End: 2023-01-20

## 2023-01-16 NOTE — TELEPHONE ENCOUNTER
Patient calling for a refill of Dilaudid.      DOS: 12/23  Procedure: Cervical 2 to Thoracic 2 posterior segmental instrumentation. Left Cervical 3 to Cervical 4 and Cervical 7 to Thoracic 2 foraminotomies. Posterior arthrodesis Cervical 2 to Thoracic 2 using allograft cancellous chips  Surgeon: Dr. Ma  Weeks Post Op: 3 week, 3 days     Current symptom(s): reports she is feeling well.   Muscle pain, reports stiffness. Pain is located near incision site. No new symptoms.   Has been able to decrease dilaudid     Taking 5-6 tabs per day.   Refil Rx updated to reflect this and add Max 6 tabs per day. Decreased dispensed quantity of 40     Current pain management:   Dilaudid 1-2 tabs, every 6 hours as needed  Takes 2 tabs in the am, takes 1-2 tabs throughout the day and 2 at bedtime.  Zanaflex: TID   icing      Last fill: 1/10/23 #50. Has 2 tabs left   Next visit: 2/3/23     Medication pended for your approval, if appropriate. Pharmacy verified.      Any patient questions or concerns: Please give 2 days notice for refills.      Informed patient request will be forwarded to care team.

## 2023-01-20 DIAGNOSIS — Z98.1 S/P CERVICAL SPINAL FUSION: ICD-10-CM

## 2023-01-20 RX ORDER — HYDROMORPHONE HYDROCHLORIDE 2 MG/1
2-4 TABLET ORAL EVERY 6 HOURS PRN
Qty: 40 TABLET | Refills: 0 | Status: SHIPPED | OUTPATIENT
Start: 2023-01-20 | End: 2023-01-26

## 2023-01-20 ASSESSMENT — ENCOUNTER SYMPTOMS
HEMATURIA: 0
PALPITATIONS: 0
FEVER: 0
BREAST MASS: 0
CONSTIPATION: 0
NERVOUS/ANXIOUS: 0
SHORTNESS OF BREATH: 0
COUGH: 0
DIARRHEA: 0
DIZZINESS: 0
ABDOMINAL PAIN: 0
HEMATOCHEZIA: 0
SORE THROAT: 0
JOINT SWELLING: 0
WEAKNESS: 0
HEARTBURN: 0
ARTHRALGIAS: 0
EYE PAIN: 0
MYALGIAS: 1
CHILLS: 0
DYSURIA: 0
FREQUENCY: 0
NAUSEA: 0
PARESTHESIAS: 0
HEADACHES: 0

## 2023-01-20 ASSESSMENT — ACTIVITIES OF DAILY LIVING (ADL): CURRENT_FUNCTION: TRANSPORTATION REQUIRES ASSISTANCE

## 2023-01-20 NOTE — TELEPHONE ENCOUNTER
Patient called asking for a script for Dilaudid.  Questions why [facility] is calling in regards to XR and injections.  Please call patient back at [phone]

## 2023-01-20 NOTE — TELEPHONE ENCOUNTER
"Patient calling for a refill of Dilaudid.     DOS: 12/23  Procedure: Cervical 2 to Thoracic 2 posterior segmental instrumentation. Left Cervical 3 to Cervical 4 and Cervical 7 to Thoracic 2 foraminotomies. Posterior arthrodesis Cervical 2 to Thoracic 2 using allograft cancellous chips  Surgeon: Dr. Ma  Weeks Post Op: 4 weeks    Current symptom(s): Pain is doing \"really good\". Rating it a 4/10. No new symptoms. Pain is located in upper back/neck/shoulders.   Current pain management: 4-6 tabs of dilaudid per day, tylenol throughout the day, zanaflex TID    Last fill: 1/16 #40, will be out tomorrow   Next visit: 2/3    Medication pended for your approval, if appropriate. Pharmacy verified.     Any patient questions or concerns:     Informed patient request will be forwarded to care team.     "

## 2023-01-26 DIAGNOSIS — Z98.1 S/P CERVICAL SPINAL FUSION: ICD-10-CM

## 2023-01-26 NOTE — CONFIDENTIAL NOTE
Patient calling for a refill of dilaudid and tizanidine.     DOS: 12/23/22  Procedure: Cervical 2 to Thoracic 2 posterior segmental instrumentation. Left Cervical 3 to Cervical 4 and Cervical 7 to Thoracic 2 foraminotomies. Posterior arthrodesis Cervical 2 to Thoracic 2 using allograft cancellous chips  Surgeon: Dr. Ma  Weeks Post Op: 4 weeks 6 days    Current symptom(s): 3/10 back of neck into back of head. No pain down the arms. Denies new n/t/w.  Current pain management:   Dilaudid - 2 pills am, sometimes 1 mid day, 2 pills evening. Has 9 pills left.  Tizanidine - 1 pill TID    Last fill: 1/20/23 #40 dilaudid. 1/10 tizanidine.  Next visit: 2/3/23    Medication pended for your approval, if appropriate. Pharmacy verified.     Any patient questions or concerns:     Informed patient request will be forwarded to care team.

## 2023-01-27 ENCOUNTER — OFFICE VISIT (OUTPATIENT)
Dept: FAMILY MEDICINE | Facility: CLINIC | Age: 68
End: 2023-01-27
Attending: FAMILY MEDICINE
Payer: COMMERCIAL

## 2023-01-27 VITALS
WEIGHT: 164 LBS | HEART RATE: 89 BPM | SYSTOLIC BLOOD PRESSURE: 150 MMHG | OXYGEN SATURATION: 97 % | BODY MASS INDEX: 26.36 KG/M2 | TEMPERATURE: 97.5 F | DIASTOLIC BLOOD PRESSURE: 72 MMHG | HEIGHT: 66 IN

## 2023-01-27 DIAGNOSIS — K21.00 GASTROESOPHAGEAL REFLUX DISEASE WITH ESOPHAGITIS WITHOUT HEMORRHAGE: ICD-10-CM

## 2023-01-27 DIAGNOSIS — Z98.1 S/P CERVICAL SPINAL FUSION: ICD-10-CM

## 2023-01-27 DIAGNOSIS — N95.1 SYMPTOMATIC MENOPAUSAL OR FEMALE CLIMACTERIC STATES: ICD-10-CM

## 2023-01-27 DIAGNOSIS — Z00.00 ENCOUNTER FOR MEDICARE ANNUAL WELLNESS EXAM: Primary | ICD-10-CM

## 2023-01-27 LAB
ALBUMIN SERPL BCG-MCNC: 4.3 G/DL (ref 3.5–5.2)
ALP SERPL-CCNC: 94 U/L (ref 35–104)
ALT SERPL W P-5'-P-CCNC: 10 U/L (ref 10–35)
ANION GAP SERPL CALCULATED.3IONS-SCNC: 12 MMOL/L (ref 7–15)
AST SERPL W P-5'-P-CCNC: 19 U/L (ref 10–35)
BASOPHILS # BLD AUTO: 0.1 10E3/UL (ref 0–0.2)
BASOPHILS NFR BLD AUTO: 1 %
BILIRUB SERPL-MCNC: 0.3 MG/DL
BUN SERPL-MCNC: 8.7 MG/DL (ref 8–23)
CALCIUM SERPL-MCNC: 9 MG/DL (ref 8.8–10.2)
CHLORIDE SERPL-SCNC: 103 MMOL/L (ref 98–107)
CHOLEST SERPL-MCNC: 250 MG/DL
CREAT SERPL-MCNC: 0.75 MG/DL (ref 0.51–0.95)
DEPRECATED HCO3 PLAS-SCNC: 25 MMOL/L (ref 22–29)
EOSINOPHIL # BLD AUTO: 0.3 10E3/UL (ref 0–0.7)
EOSINOPHIL NFR BLD AUTO: 3 %
ERYTHROCYTE [DISTWIDTH] IN BLOOD BY AUTOMATED COUNT: 13.6 % (ref 10–15)
GFR SERPL CREATININE-BSD FRML MDRD: 86 ML/MIN/1.73M2
GLUCOSE SERPL-MCNC: 93 MG/DL (ref 70–99)
HCT VFR BLD AUTO: 38.5 % (ref 35–47)
HDLC SERPL-MCNC: 53 MG/DL
HGB BLD-MCNC: 12.5 G/DL (ref 11.7–15.7)
LDLC SERPL CALC-MCNC: 164 MG/DL
LYMPHOCYTES # BLD AUTO: 4.2 10E3/UL (ref 0.8–5.3)
LYMPHOCYTES NFR BLD AUTO: 38 %
MCH RBC QN AUTO: 29.6 PG (ref 26.5–33)
MCHC RBC AUTO-ENTMCNC: 32.5 G/DL (ref 31.5–36.5)
MCV RBC AUTO: 91 FL (ref 78–100)
MONOCYTES # BLD AUTO: 0.7 10E3/UL (ref 0–1.3)
MONOCYTES NFR BLD AUTO: 7 %
NEUTROPHILS # BLD AUTO: 5.8 10E3/UL (ref 1.6–8.3)
NEUTROPHILS NFR BLD AUTO: 53 %
NONHDLC SERPL-MCNC: 197 MG/DL
PLATELET # BLD AUTO: 287 10E3/UL (ref 150–450)
POTASSIUM SERPL-SCNC: 3.1 MMOL/L (ref 3.4–5.3)
PROT SERPL-MCNC: 7.1 G/DL (ref 6.4–8.3)
RBC # BLD AUTO: 4.22 10E6/UL (ref 3.8–5.2)
SODIUM SERPL-SCNC: 140 MMOL/L (ref 136–145)
TRIGL SERPL-MCNC: 166 MG/DL
WBC # BLD AUTO: 11 10E3/UL (ref 4–11)

## 2023-01-27 PROCEDURE — 80053 COMPREHEN METABOLIC PANEL: CPT | Performed by: FAMILY MEDICINE

## 2023-01-27 PROCEDURE — 85025 COMPLETE CBC W/AUTO DIFF WBC: CPT | Performed by: FAMILY MEDICINE

## 2023-01-27 PROCEDURE — 80061 LIPID PANEL: CPT | Performed by: FAMILY MEDICINE

## 2023-01-27 PROCEDURE — 36415 COLL VENOUS BLD VENIPUNCTURE: CPT | Performed by: FAMILY MEDICINE

## 2023-01-27 PROCEDURE — G0438 PPPS, INITIAL VISIT: HCPCS | Performed by: FAMILY MEDICINE

## 2023-01-27 RX ORDER — OMEPRAZOLE 10 MG/1
20 CAPSULE, DELAYED RELEASE ORAL DAILY
COMMUNITY
End: 2023-01-27

## 2023-01-27 RX ORDER — HYDROMORPHONE HYDROCHLORIDE 2 MG/1
2-4 TABLET ORAL EVERY 6 HOURS PRN
Qty: 40 TABLET | Refills: 0 | Status: SHIPPED | OUTPATIENT
Start: 2023-01-27 | End: 2023-02-03

## 2023-01-27 RX ORDER — CITALOPRAM HYDROBROMIDE 20 MG/1
20 TABLET ORAL DAILY
Qty: 90 TABLET | Refills: 3 | Status: SHIPPED | OUTPATIENT
Start: 2023-01-27 | End: 2024-03-11

## 2023-01-27 RX ORDER — CITALOPRAM HYDROBROMIDE 20 MG/1
20 TABLET ORAL DAILY
COMMUNITY
End: 2023-01-27

## 2023-01-27 RX ORDER — OMEPRAZOLE 40 MG/1
40 CAPSULE, DELAYED RELEASE ORAL DAILY
Qty: 90 CAPSULE | Refills: 3 | Status: SHIPPED | OUTPATIENT
Start: 2023-01-27 | End: 2024-01-10

## 2023-01-27 RX ORDER — OMEPRAZOLE 40 MG/1
40 CAPSULE, DELAYED RELEASE ORAL DAILY
COMMUNITY
End: 2023-01-27

## 2023-01-27 ASSESSMENT — ENCOUNTER SYMPTOMS
DIZZINESS: 0
FREQUENCY: 0
DIARRHEA: 0
NAUSEA: 0
BREAST MASS: 0
PARESTHESIAS: 0
FEVER: 0
ARTHRALGIAS: 0
SHORTNESS OF BREATH: 0
ABDOMINAL PAIN: 0
JOINT SWELLING: 0
HEARTBURN: 0
CHILLS: 0
DYSURIA: 0
WEAKNESS: 0
COUGH: 0
NERVOUS/ANXIOUS: 0
CONSTIPATION: 0
MYALGIAS: 1
HEMATOCHEZIA: 0
EYE PAIN: 0
HEADACHES: 0
HEMATURIA: 0
PALPITATIONS: 0
SORE THROAT: 0

## 2023-01-27 ASSESSMENT — ACTIVITIES OF DAILY LIVING (ADL): CURRENT_FUNCTION: TRANSPORTATION REQUIRES ASSISTANCE

## 2023-01-27 NOTE — TELEPHONE ENCOUNTER
Called patient to let her know refills routed to [facility] team so someone should be able to sign off. Apologized to patient for the delay in her refills. Sent to team as high jovani.

## 2023-01-27 NOTE — PATIENT INSTRUCTIONS
Patient Education   Personalized Prevention Plan  You are due for the preventive services outlined below.  Your care team is available to assist you in scheduling these services.  If you have already completed any of these items, please share that information with your care team to update in your medical record.  Health Maintenance Due   Topic Date Due     LUNG CANCER SCREENING  09/24/2008     Colorectal Cancer Screening  05/13/2018     Pneumococcal Vaccine (2 - PCV) 10/26/2021     COVID-19 Vaccine (4 - Booster for Pfizer series) 12/07/2021     Flu Vaccine (1) 09/01/2022     Annual Wellness Visit  01/27/2023

## 2023-01-27 NOTE — TELEPHONE ENCOUNTER
Patient is wanting to know when her prescription will be sent to her pharmacy. She would like a call back to advise.

## 2023-01-31 DIAGNOSIS — E87.6 HYPOKALEMIA: Primary | ICD-10-CM

## 2023-01-31 RX ORDER — POTASSIUM CHLORIDE 1500 MG/1
20 TABLET, EXTENDED RELEASE ORAL DAILY
Qty: 3 TABLET | Refills: 0 | Status: SHIPPED | OUTPATIENT
Start: 2023-01-31 | End: 2023-03-17

## 2023-02-03 ENCOUNTER — ANCILLARY PROCEDURE (OUTPATIENT)
Dept: GENERAL RADIOLOGY | Facility: CLINIC | Age: 68
End: 2023-02-03
Attending: PHYSICIAN ASSISTANT
Payer: COMMERCIAL

## 2023-02-03 ENCOUNTER — OFFICE VISIT (OUTPATIENT)
Dept: NEUROSURGERY | Facility: CLINIC | Age: 68
End: 2023-02-03
Attending: PHYSICIAN ASSISTANT
Payer: COMMERCIAL

## 2023-02-03 VITALS
HEART RATE: 72 BPM | DIASTOLIC BLOOD PRESSURE: 85 MMHG | WEIGHT: 164 LBS | HEIGHT: 66 IN | OXYGEN SATURATION: 97 % | SYSTOLIC BLOOD PRESSURE: 147 MMHG | BODY MASS INDEX: 26.36 KG/M2

## 2023-02-03 DIAGNOSIS — Z98.1 S/P CERVICAL SPINAL FUSION: ICD-10-CM

## 2023-02-03 PROCEDURE — G0463 HOSPITAL OUTPT CLINIC VISIT: HCPCS | Performed by: PHYSICIAN ASSISTANT

## 2023-02-03 PROCEDURE — 72040 X-RAY EXAM NECK SPINE 2-3 VW: CPT | Mod: TC | Performed by: RADIOLOGY

## 2023-02-03 PROCEDURE — G0463 HOSPITAL OUTPT CLINIC VISIT: HCPCS

## 2023-02-03 PROCEDURE — 99024 POSTOP FOLLOW-UP VISIT: CPT | Performed by: PHYSICIAN ASSISTANT

## 2023-02-03 RX ORDER — HYDROMORPHONE HYDROCHLORIDE 2 MG/1
2-4 TABLET ORAL EVERY 6 HOURS PRN
Qty: 40 TABLET | Refills: 0 | Status: SHIPPED | OUTPATIENT
Start: 2023-02-03 | End: 2023-02-09

## 2023-02-03 ASSESSMENT — PAIN SCALES - GENERAL: PAINLEVEL: NO PAIN (0)

## 2023-02-03 NOTE — PROGRESS NOTES
Neurosurgery 6-week follow-up    Ms. Wallace is a 67-year-old female who is 6 weeks status post    PROCEDURE:  1.  C2 to T2 posterior segmental instrumentation.  2.  Left C3 to C4, C7 to T2 foraminotomies.  3.  Posterior arthrodesis, C2 to T2 using allograft cancellous chips.  4.  Horan-Wells tong placement.    Performed Dr. Levine for cervical spondylosis with radiculopathy, status post anterior cervical fusion.    She states her left arm pain that was bothering her for several months before surgery has resolved that she generally feels that she is moving the right direction.  She does report neck stiffness and discomfort, which is gradually improving.  She denies any issues with her incision.    Exam    Alert and oriented no acute distress  Bilateral upper extremities with 5-5 strength  Incision well-healed    Imaging    X-rays demonstrate expected placement of posterior cervical fusion hardware from C2-T2.    Assessment    Status post    PROCEDURE:  1.  C2 to T2 posterior segmental instrumentation.  2.  Left C3 to C4, C7 to T2 foraminotomies.  3.  Posterior arthrodesis, C2 to T2 using allograft cancellous chips.  4.  Horan-Wells tong placement.      Plan        Follow-up visit in 6 weeks with cervical x-ray with AP/lateral views prior.

## 2023-02-03 NOTE — LETTER
2/3/2023         RE: Annalise Wallace  [address]  [facility] [address]        Dear Colleague,    Thank you for referring your patient, Annalise Wallace, to the [facility] [place]. Please see a copy of my visit note below.    Neurosurgery 6-week follow-up    Ms. Wallace is a 67-year-old female who is 6 weeks status post    PROCEDURE:  1.  C2 to T2 posterior segmental instrumentation.  2.  Left C3 to C4, C7 to T2 foraminotomies.  3.  Posterior arthrodesis, C2 to T2 using allograft cancellous chips.  4.  Horan-Wells tong placement.    Performed Dr. Levine for cervical spondylosis with radiculopathy, status post anterior cervical fusion.    She states her left arm pain that was bothering her for several months before surgery has resolved that she generally feels that she is moving the right direction.  She does report neck stiffness and discomfort, which is gradually improving.  She denies any issues with her incision.    Exam    Alert and oriented no acute distress  Bilateral upper extremities with 5-5 strength  Incision well-healed    Imaging    X-rays demonstrate expected placement of posterior cervical fusion hardware from C2-T2.    Assessment    Status post    PROCEDURE:  1.  C2 to T2 posterior segmental instrumentation.  2.  Left C3 to C4, C7 to T2 foraminotomies.  3.  Posterior arthrodesis, C2 to T2 using allograft cancellous chips.  4.  Horan-Wells tong placement.      Plan        Follow-up visit in 6 weeks with cervical x-ray with AP/lateral views prior.              Again, thank you for allowing me to participate in the care of your patient.        Sincerely,        Catalino Barton PA-C

## 2023-02-03 NOTE — NURSING NOTE
"Annalise Wallace is a 67 year old female who presents for:  Chief Complaint   Patient presents with     Surgical Followup     6 week follow-up          Vitals:    Vitals:    02/03/23 1004   BP: (!) 147/85   Pulse: 72   SpO2: 97%   Weight: 164 lb (74.4 kg)   Height: 5' 6\" (1.676 m)       BMI:  Estimated body mass index is 26.47 kg/m  as calculated from the following:    Height as of this encounter: 5' 6\" (1.676 m).    Weight as of this encounter: 164 lb (74.4 kg).    Pain Score:  No Pain (0)        Amendo Phorn      "

## 2023-02-09 ENCOUNTER — ALLIED HEALTH/NURSE VISIT (OUTPATIENT)
Dept: FAMILY MEDICINE | Facility: CLINIC | Age: 68
End: 2023-02-09
Payer: COMMERCIAL

## 2023-02-09 VITALS — DIASTOLIC BLOOD PRESSURE: 74 MMHG | SYSTOLIC BLOOD PRESSURE: 122 MMHG

## 2023-02-09 DIAGNOSIS — Z01.30 BP CHECK: Primary | ICD-10-CM

## 2023-02-09 PROCEDURE — 99207 PR NO CHARGE NURSE ONLY: CPT | Performed by: FAMILY MEDICINE

## 2023-02-09 NOTE — PROGRESS NOTES
Annalise Wallace was evaluated at [facility] on February 9, 2023 at which time her blood pressure was:    BP Readings from Last 3 Encounters:   02/09/23 122/74   02/03/23 (!) 147/85   01/27/23 (!) 150/72     Pulse Readings from Last 3 Encounters:   02/03/23 72   01/27/23 89   01/10/23 76       Reviewed lifestyle modifications for blood pressure control and reduction: including making healthy food choices, managing weight, getting regular exercise, smoking cessation, reducing alcohol consumption, monitoring blood pressure regularly.     Symptoms: None    BP Goal:< 140/90 mmHg    BP Assessment:  BP at goal    Potential Reasons for BP too high: NA - Not applicable    BP Follow-Up Plan: Recheck BP in 6 months at pharmacy    Recommendation to Provider: Patient stated that her blood pressure had been elevated at past clinic visit. Advised patient that if this continues it might be a good idea to get a home monitor so we could collect more data on her blood pressure.     Note completed by:  Thank you,   Maria G Hanna, PharmD  [facility] [place]

## 2023-02-15 DIAGNOSIS — Z98.1 S/P CERVICAL SPINAL FUSION: ICD-10-CM

## 2023-02-15 RX ORDER — HYDROMORPHONE HYDROCHLORIDE 2 MG/1
2-4 TABLET ORAL EVERY 6 HOURS PRN
Qty: 40 TABLET | Refills: 0 | Status: SHIPPED | OUTPATIENT
Start: 2023-02-15 | End: 2023-03-17

## 2023-02-15 NOTE — TELEPHONE ENCOUNTER
"Patient calling for a refill of Dilaudid and Zanaflex.     DOS: 12/23/22  Procedure: Cervical 2 to Thoracic 2 posterior segmental instrumentation. Left Cervical 3 to Cervical 4 and Cervical 7 to Thoracic 2 foraminotomies. Posterior arthrodesis Cervical 2 to Thoracic 2 using allograft cancellous chips  Surgeon: Dr. Ma  Weeks Post Op: 7 weeks 5 days     Current symptom(s): Pain is worse when sitting for long periods of time, pain is located on the back of her neck. Patient reports her head feels \"heavy\". No new n/t or weakness.  Current pain management: dilaudid usually 3-4 tablets daily, zanaflex TID, tylenol prn     Last fill: dilaudid 2/9 #40, zanaflex 1/27 #40  Next visit: 3/27    Medication pended for your approval, if appropriate. Pharmacy verified.     Any patient questions or concerns:     Informed patient request will be forwarded to care team.     "

## 2023-03-15 NOTE — PROGRESS NOTES
SUBJECTIVE:                                                    Annalise Wallace is a 61 year old female who presents to clinic today for the following health issues:  Past Medical History:   Diagnosis Date     Abnormal Papanicolaou smear of cervix and cervical HPV      Degenerative disc disease      Depressive disorder, not elsewhere classified      Displacement of cervical intervertebral disc without myelopathy      Gastro-oesophageal reflux disease      H/O hysterectomy for benign disease      TOBACCO ABUSE-CONTINUOUS        Past Surgical History:   Procedure Laterality Date     BACK SURGERY       C NONSPECIFIC PROCEDURE      Lump removed L. breast - benign     C NONSPECIFIC PROCEDURE      Cervical disk     C NONSPECIFIC PROCEDURE      C-spine - Nany     DISCECTOMY LUMBAR MINIMALLY INVASIVE ONE LEVEL  2/13/2014    Procedure: DISCECTOMY LUMBAR MINIMALLY INVASIVE ONE LEVEL;  Minimally Invasive Discectomy L2-3 Left ;  Surgeon: Juanjose Pitt MD;  Location: [facility] OR     DISCECTOMY LUMBAR MINIMALLY INVASIVE ONE LEVEL       FUSION SPINE POSTERIOR MINIMALLY INVASIVE ONE LEVEL  5/15/2014    Procedure: FUSION SPINE POSTERIOR MINIMALLY INVASIVE ONE LEVEL;  Surgeon: Juanjose Pitt MD;  Location: [facility] OR     HYSTERECTOMY VAGINAL, BILATERAL SALPINGO-OOPHERECTOMY, COMBINED       LAPAROSCOPIC HYSTERECTOMY TOTAL         Family History   Problem Relation Age of Onset     CANCER Mother      CANCER Father      throat     DIABETES Paternal Grandmother        Social History   Substance Use Topics     Smoking status: Current Some Day Smoker     Packs/day: 1.00     Years: 30.00     Types: Cigarettes     Smokeless tobacco: Never Used      Comment: 2 cigs per day, using e-cig     Alcohol use No         Chronic pain follow up for the neck and back surgeries.  Pain is managed through using the Oxycodone though the pain can be intermittent depending on the circumstances.  Weather can affect the pain.  States she has degenerative  "disc disease.    Post three level dual cervical fusion     Problem list and histories reviewed & adjusted, as indicated.  Additional history: as documented    BP Readings from Last 3 Encounters:   05/08/17 122/82   03/08/17 (P) 130/70   12/09/16 126/78    Wt Readings from Last 3 Encounters:   05/08/17 143 lb 9.6 oz (65.1 kg)   03/08/17 146 lb 1.6 oz (66.3 kg)   12/09/16 142 lb (64.4 kg)                  ROS:  Constitutional, HEENT, cardiovascular, pulmonary, GI, , musculoskeletal, neuro, skin, endocrine and psych systems are negative, except as otherwise noted.    OBJECTIVE:                                                    /82 (BP Location: Right arm, Patient Position: Chair, Cuff Size: Adult Regular)  Pulse 81  Temp 97.7  F (36.5  C) (Oral)  Resp 12  Ht 5' 6\" (1.676 m)  Wt 143 lb 9.6 oz (65.1 kg)  SpO2 99%  BMI 23.18 kg/m2  Body mass index is 23.18 kg/(m^2).  GENERAL: healthy, alert and no distress  EYES: Eyes grossly normal to inspection, PERRL and conjunctivae and sclerae normal  HENT: ear canals and TM's normal, nose and mouth without ulcers or lesions  NECK: no adenopathy, no asymmetry, masses, or scars and thyroid normal to palpation  RESP: lungs clear to auscultation - no rales, rhonchi or wheezes  CV: regular rate and rhythm, normal S1 S2, no S3 or S4, no murmur, click or rub, no peripheral edema and peripheral pulses strong  ABDOMEN: soft, nontender, no hepatosplenomegaly, no masses and bowel sounds normal  MS: no gross musculoskeletal defects noted, no edema  SKIN: no suspicious lesions or rashes  NEURO: Normal strength and tone, mentation intact and speech normal  PSYCH: mentation appears normal, affect normal/bright    (Z11.59) Need for hepatitis C screening test  (primary encounter diagnosis)  Comment:   Plan: **Hepatitis C Screen Reflex to RNA FUTURE         anytime            (Z12.31) Encounter for screening mammogram for breast cancer  Comment:   Plan: *MA Screening Digital " Day 1 of Anesthesia Pain Management Service    SUBJECTIVE: I'm doing ok    Pain Scale Score:	[X] Refer to charted pain scores    THERAPY:    [ ] IV PCA Morphine		[ ] 5 mg/mL	[ ] 1 mg/mL  [X] IV PCA Hydromorphone	[ ] 5 mg/mL	[X] 1 mg/mL  [ ] IV PCA Fentanyl		[ ] 50 micrograms/mL    Demand dose: 0.2 mg     Lockout: 6 minutes   Continuous Rate: 0 mg/hr  4 Hour Limit: 4 mg    MEDICATIONS  (STANDING):  acetaminophen     Tablet .. 650 milliGRAM(s) Oral every 6 hours  albumin human  5% IVPB 250 milliLiter(s) IV Intermittent once  aMIOdarone    Tablet 400 milliGRAM(s) Oral two times a day  ascorbic acid 500 milliGRAM(s) Oral two times a day  cefuroxime  IVPB 1500 milliGRAM(s) IV Intermittent every 8 hours  chlorhexidine 2% Cloths 1 Application(s) Topical daily  dextrose 50% Injectable 50 milliLiter(s) IV Push every 15 minutes  dextrose 50% Injectable 25 milliLiter(s) IV Push every 15 minutes  enoxaparin Injectable 40 milliGRAM(s) SubCutaneous every 24 hours  gabapentin 100 milliGRAM(s) Oral every 8 hours  HYDROmorphone PCA (1 mG/mL) 30 milliLiter(s) PCA Continuous PCA Continuous  insulin regular Infusion 3 Unit(s)/Hr (3 mL/Hr) IV Continuous <Continuous>  ketorolac   Injectable 30 milliGRAM(s) IV Push every 8 hours  magnesium sulfate  IVPB 1 Gram(s) IV Intermittent once  meperidine     Injectable 25 milliGRAM(s) IV Push once  metoclopramide Injectable 10 milliGRAM(s) IV Push every 8 hours  metoprolol tartrate 25 milliGRAM(s) Oral every 8 hours  niCARdipine Infusion 2 mG/Hr (10 mL/Hr) IV Continuous <Continuous>  pantoprazole  Injectable 40 milliGRAM(s) IV Push every 24 hours  polyethylene glycol 3350 17 Gram(s) Oral daily  potassium chloride  10 mEq/50 mL IVPB 10 milliEquivalent(s) IV Intermittent every 1 hour  potassium chloride  10 mEq/50 mL IVPB 10 milliEquivalent(s) IV Intermittent every 1 hour  potassium chloride  10 mEq/50 mL IVPB 10 milliEquivalent(s) IV Intermittent every 1 hour  potassium chloride  10 mEq/50 mL IVPB 10 milliEquivalent(s) IV Intermittent every 1 hour  senna 2 Tablet(s) Oral at bedtime  sodium chloride 0.9%. 1000 milliLiter(s) (10 mL/Hr) IV Continuous <Continuous>    MEDICATIONS  (PRN):  HYDROmorphone  Injectable 0.5 milliGRAM(s) IV Push every 6 hours PRN Breakthrough Pain  HYDROmorphone PCA (1 mG/mL) Rescue Clinician Bolus 0.5 milliGRAM(s) IV Push every 15 minutes PRN for Pain Scale GREATER THAN 6  naloxone Injectable 0.1 milliGRAM(s) IV Push every 3 minutes PRN For ANY of the following changes in patient status:  A. RR LESS THAN 10 breaths per minute, B. Oxygen saturation LESS THAN 90%, C. Sedation score of 6  ondansetron Injectable 4 milliGRAM(s) IV Push every 6 hours PRN Nausea  oxyCODONE    IR 5 milliGRAM(s) Oral every 4 hours PRN Moderate Pain (4 - 6)  oxyCODONE    IR 10 milliGRAM(s) Oral every 4 hours PRN Severe Pain (7 - 10)      OBJECTIVE:    Sedation Score:	[ X] Alert 	[ ] Drowsy 	[ ] Arousable	[ ] Asleep	[ ] Unresponsive    Side Effects:	[X ] None	[ ] Nausea	[ ] Vomiting	[ ] Pruritus  		[ ] Other:    Vital Signs Last 24 Hrs  T(C): 37.6 (15 Mar 2023 07:00), Max: 37.8 (15 Mar 2023 04:00)  T(F): 99.7 (15 Mar 2023 07:00), Max: 100 (15 Mar 2023 04:00)  HR: 89 (15 Mar 2023 09:03) (63 - 96)  BP: --  BP(mean): --  RR: 24 (15 Mar 2023 09:03) (9 - 36)  SpO2: 97% (15 Mar 2023 09:03) (90% - 100%)    Parameters below as of 15 Mar 2023 09:03  Patient On (Oxygen Delivery Method): nasal cannula, high flow  O2 Flow (L/min): 50  O2 Concentration (%): 80    ASSESSMENT/ PLAN    Therapy to  be:               [X] Continued   [ ] Discontinued   [ ] Changed to PRN Analgesics    Documentation and Verification of current medications:   [X] Done	[ ] Not done, not eligible    Comments: Total PCA use4.8mg /11 hours. Using 0-4x/hr. Reeducated to PCA use. Bilateral            (M54.2) Neck pain  Comment:   Plan: oxyCODONE-acetaminophen (PERCOCET) 5-325 MG per        tablet        Review  Next refill              Abelino Flores MD  [facility]

## 2023-03-16 ENCOUNTER — PRE VISIT (OUTPATIENT)
Dept: NEUROSURGERY | Facility: CLINIC | Age: 68
End: 2023-03-16
Payer: COMMERCIAL

## 2023-03-16 NOTE — TELEPHONE ENCOUNTER
NEUROSURGERY- NEW PREVISIT PLANNING       Record Status/Location     Referring Provider N/A N/A   Diagnosis In  S/p cervical spinal fusion   MRI (HEAD, NECK, SPINE) Epic MR Cervical - Done 10/21/22   CT Epic CT Cervical - Done 11/21/22   X-ray Epic XR Cervical - Scheduled 3/27/23 @ 10:50am  XR Cervical - Done 2/3/23  XR Cervical - Done 12/24/22   INJECTION  NO   PHYSICAL THERAPY  NO   SURGERY Epic C2 to T2 posterior segmental instrumentation. Left C3 to C4 and C7 to T2 foraminotomies. Posterior arthrodesis C2 to T2.  DOS: 12/23/22

## 2023-03-17 ENCOUNTER — OFFICE VISIT (OUTPATIENT)
Dept: FAMILY MEDICINE | Facility: CLINIC | Age: 68
End: 2023-03-17
Attending: FAMILY MEDICINE
Payer: COMMERCIAL

## 2023-03-17 VITALS
OXYGEN SATURATION: 96 % | DIASTOLIC BLOOD PRESSURE: 78 MMHG | HEIGHT: 66 IN | SYSTOLIC BLOOD PRESSURE: 132 MMHG | BODY MASS INDEX: 25.88 KG/M2 | TEMPERATURE: 98.1 F | WEIGHT: 161 LBS | RESPIRATION RATE: 18 BRPM | HEART RATE: 100 BPM

## 2023-03-17 DIAGNOSIS — F11.20 UNCOMPLICATED OPIOID DEPENDENCE (H): Primary | ICD-10-CM

## 2023-03-17 DIAGNOSIS — Z98.1 S/P CERVICAL SPINAL FUSION: ICD-10-CM

## 2023-03-17 DIAGNOSIS — G47.00 PERSISTENT DISORDER OF INITIATING OR MAINTAINING SLEEP: ICD-10-CM

## 2023-03-17 DIAGNOSIS — G89.29 OTHER CHRONIC PAIN: ICD-10-CM

## 2023-03-17 DIAGNOSIS — M54.2 NECK PAIN: ICD-10-CM

## 2023-03-17 PROCEDURE — 99214 OFFICE O/P EST MOD 30 MIN: CPT | Performed by: FAMILY MEDICINE

## 2023-03-17 RX ORDER — OXYCODONE AND ACETAMINOPHEN 5; 325 MG/1; MG/1
1 TABLET ORAL EVERY 6 HOURS PRN
Qty: 120 TABLET | Refills: 0 | Status: SHIPPED | OUTPATIENT
Start: 2023-03-17 | End: 2023-04-12

## 2023-03-17 RX ORDER — TRAZODONE HYDROCHLORIDE 100 MG/1
100 TABLET ORAL DAILY
Qty: 90 TABLET | Refills: 1 | Status: SHIPPED | OUTPATIENT
Start: 2023-03-17 | End: 2023-07-19

## 2023-03-17 RX ORDER — METOPROLOL SUCCINATE 25 MG/1
25 TABLET, EXTENDED RELEASE ORAL DAILY
Status: CANCELLED | OUTPATIENT
Start: 2023-03-17

## 2023-03-17 ASSESSMENT — ENCOUNTER SYMPTOMS
SORE THROAT: 0
PALPITATIONS: 0
COUGH: 0
DIZZINESS: 0
CONSTIPATION: 0
EYE PAIN: 0
NERVOUS/ANXIOUS: 0
DYSURIA: 0
HEMATURIA: 0
WEAKNESS: 1
DIARRHEA: 0
CHILLS: 0
JOINT SWELLING: 0
FEVER: 0
NAUSEA: 0
FREQUENCY: 0
HEARTBURN: 0
ARTHRALGIAS: 1
HEMATOCHEZIA: 0
HEADACHES: 0
BREAST MASS: 0
PARESTHESIAS: 0
MYALGIAS: 1
SHORTNESS OF BREATH: 0
ABDOMINAL PAIN: 0

## 2023-03-17 ASSESSMENT — ACTIVITIES OF DAILY LIVING (ADL): CURRENT_FUNCTION: NO ASSISTANCE NEEDED

## 2023-03-17 NOTE — PROGRESS NOTES
"  Assessment & Plan     Uncomplicated opioid dependence (H)  - after a month of no opioid wants to go back on percocet which she was on prior to dilaudid. Discussed not going back on this but starting on cymbalta and gabapentin. She is against this plan and wants to stick with percocet. She will establish with new provider and sign a new controlled substance agreement.     S/P cervical spinal fusion  - progressing as expected   - tiZANidine (ZANAFLEX) 4 MG tablet; Take 1 tablet (4 mg) by mouth 3 times daily    Other chronic pain  Neck pain  - due for recheck with NSG soon   - oxyCODONE-acetaminophen (PERCOCET) 5-325 MG tablet; Take 1 tablet by mouth every 6 hours as needed for severe pain (7-10)    Persistent disorder of initiating or maintaining sleep  - stable with trazodone   - traZODone (DESYREL) 100 MG tablet; Take 1 tablet (100 mg) by mouth daily           See Patient Instructions    No follow-ups on file.    Ruth Monroe MD  [facility]    Ganga Woody is a 67 year old, presenting for the following health issues:  Hypertension      HPI     Hypertension Follow-up      Do you check your blood pressure regularly outside of the clinic? No     Are you following a low salt diet? Yes    Are your blood pressures ever more than 140 on the top number (systolic) OR more   than 90 on the bottom number (diastolic), for example 140/90? No    Patient also taking Trazodone 100mg  Patient  stated that she is out of tiZANidine (ZANAFLEX) 4 MG tablet        Review of Systems   Constitutional, HEENT, cardiovascular, pulmonary, GI, , musculoskeletal, neuro, skin, endocrine and psych systems are negative, except as otherwise noted.      Objective    /78   Pulse 100   Temp 98.1  F (36.7  C) (Oral)   Resp 18   Ht 1.676 m (5' 6\")   Wt 73 kg (161 lb)   SpO2 96%   BMI 25.99 kg/m    Body mass index is 25.99 kg/m .  Physical Exam   GENERAL: healthy, alert and no distress  RESP: " lungs clear to auscultation - no rales, rhonchi or wheezes  CV: regular rate and rhythm, normal S1 S2, no S3 or S4, no murmur, click or rub, no peripheral edema and peripheral pulses strong  PSYCH: mentation appears normal and judgement and insight impaired

## 2023-03-27 ENCOUNTER — OFFICE VISIT (OUTPATIENT)
Dept: NEUROSURGERY | Facility: CLINIC | Age: 68
End: 2023-03-27
Attending: NEUROLOGICAL SURGERY
Payer: COMMERCIAL

## 2023-03-27 ENCOUNTER — ANCILLARY PROCEDURE (OUTPATIENT)
Dept: GENERAL RADIOLOGY | Facility: CLINIC | Age: 68
End: 2023-03-27
Attending: PHYSICIAN ASSISTANT
Payer: COMMERCIAL

## 2023-03-27 VITALS — OXYGEN SATURATION: 91 % | HEART RATE: 71 BPM | DIASTOLIC BLOOD PRESSURE: 81 MMHG | SYSTOLIC BLOOD PRESSURE: 138 MMHG

## 2023-03-27 DIAGNOSIS — Z98.1 S/P CERVICAL SPINAL FUSION: Primary | ICD-10-CM

## 2023-03-27 DIAGNOSIS — Z98.1 S/P CERVICAL SPINAL FUSION: ICD-10-CM

## 2023-03-27 PROCEDURE — 72040 X-RAY EXAM NECK SPINE 2-3 VW: CPT | Mod: TC | Performed by: RADIOLOGY

## 2023-03-27 PROCEDURE — 99207 PR NO CHARGE LOS: CPT | Performed by: NEUROLOGICAL SURGERY

## 2023-03-27 ASSESSMENT — PAIN SCALES - GENERAL: PAINLEVEL: MILD PAIN (3)

## 2023-03-27 NOTE — LETTER
"    3/27/2023         RE: Annalise Wallace  [address]  [place] [address]        Dear Colleague,    Thank you for referring your patient, Annalise Wallace, to the [facility] [place]. Please see a copy of my visit note below.    It was a pleasure to see Annalise Wallace today in Neurosurgery Clinic. She is a 67 year old female who underwent:    Procedure Date: 12/23/2022     PREOPERATIVE DIAGNOSIS:  Cervical spondylosis with radiculopathy, status post cervical fusion.     POSTOPERATIVE DIAGNOSIS:  Cervical spondylosis with radiculopathy, status post cervical fusion.     PROCEDURE:  1.  C2 to T2 posterior segmental instrumentation.  2.  Left C3 to C4, C7 to T2 foraminotomies.  3.  Posterior arthrodesis, C2 to T2 using allograft cancellous chips.  4.  [facility] tong placement.     SURGEON:  Layton Ma M.D.      ANESTHESIA:  General endotracheal anesthesia.     ESTIMATED BLOOD LOSS:  200 mL    Overall she is doing well with improvement in her preoperative neck and arm pain.  She does have some paraspinal muscle pain at times but in general is quite happy with the results of surgery.    Vitals:    03/27/23 1130   BP: 138/81   Pulse: 71   SpO2: 91%     Estimated body mass index is 25.99 kg/m  as calculated from the following:    Height as of 3/17/23: 1.676 m (5' 6\").    Weight as of 3/17/23: 73 kg (161 lb).  Mild Pain (3)    Well-healed posterior cervical incision.  Bilateral upper and lower extremity strength is 5-5 in all muscle groups    Imaging: X-rays of the cervical spine show stable alignment of the hardware and position of the posterior implants.  These were reviewed with the patient today in clinic.    Assessment: Status post cervical surgery, doing well.    Plan: Overall I think she is doing well.  I have offered physical therapy if she wishes to work on her paraspinal muscle symptoms.  Otherwise she will follow-up towards the end of the year with x-rays of " the cervical spine and a follow-up visit.         Again, thank you for allowing me to participate in the care of your patient.        Sincerely,        Layton Ma MD

## 2023-03-27 NOTE — PROGRESS NOTES
"It was a pleasure to see Annalise Wallace today in Neurosurgery Clinic. She is a 67 year old female who underwent:    Procedure Date: 12/23/2022     PREOPERATIVE DIAGNOSIS:  Cervical spondylosis with radiculopathy, status post cervical fusion.     POSTOPERATIVE DIAGNOSIS:  Cervical spondylosis with radiculopathy, status post cervical fusion.     PROCEDURE:  1.  C2 to T2 posterior segmental instrumentation.  2.  Left C3 to C4, C7 to T2 foraminotomies.  3.  Posterior arthrodesis, C2 to T2 using allograft cancellous chips.  4.  [facility].     SURGEON:  Layton Ma M.D.      ANESTHESIA:  General endotracheal anesthesia.     ESTIMATED BLOOD LOSS:  200 mL    Overall she is doing well with improvement in her preoperative neck and arm pain.  She does have some paraspinal muscle pain at times but in general is quite happy with the results of surgery.    Vitals:    03/27/23 1130   BP: 138/81   Pulse: 71   SpO2: 91%     Estimated body mass index is 25.99 kg/m  as calculated from the following:    Height as of 3/17/23: 1.676 m (5' 6\").    Weight as of 3/17/23: 73 kg (161 lb).  Mild Pain (3)    Well-healed posterior cervical incision.  Bilateral upper and lower extremity strength is 5-5 in all muscle groups    Imaging: X-rays of the cervical spine show stable alignment of the hardware and position of the posterior implants.  These were reviewed with the patient today in clinic.    Assessment: Status post cervical surgery, doing well.    Plan: Overall I think she is doing well.  I have offered physical therapy if she wishes to work on her paraspinal muscle symptoms.  Otherwise she will follow-up towards the end of the year with x-rays of the cervical spine and a follow-up visit.     "

## 2023-03-27 NOTE — NURSING NOTE
".  Annalise Wallace is a 67 year old female who presents for:  Chief Complaint   Patient presents with     Surgical Followup     12 week follow-up          Vitals:    Vitals:    03/27/23 1130   BP: 138/81   Pulse: 71   SpO2: 91%       BMI:  Estimated body mass index is 25.99 kg/m  as calculated from the following:    Height as of 3/17/23: 5' 6\" (1.676 m).    Weight as of 3/17/23: 161 lb (73 kg).    Pain Score:  Mild Pain (3)        Amendo Phorn      "

## 2023-04-12 ENCOUNTER — OFFICE VISIT (OUTPATIENT)
Dept: FAMILY MEDICINE | Facility: CLINIC | Age: 68
End: 2023-04-12
Payer: COMMERCIAL

## 2023-04-12 VITALS
BODY MASS INDEX: 26.16 KG/M2 | HEIGHT: 66 IN | SYSTOLIC BLOOD PRESSURE: 139 MMHG | OXYGEN SATURATION: 96 % | HEART RATE: 82 BPM | WEIGHT: 162.8 LBS | DIASTOLIC BLOOD PRESSURE: 80 MMHG | TEMPERATURE: 98 F

## 2023-04-12 DIAGNOSIS — I10 BENIGN ESSENTIAL HYPERTENSION: ICD-10-CM

## 2023-04-12 DIAGNOSIS — F11.20 CONTINUOUS OPIOID DEPENDENCE (H): Primary | ICD-10-CM

## 2023-04-12 DIAGNOSIS — Z12.11 SCREEN FOR COLON CANCER: ICD-10-CM

## 2023-04-12 DIAGNOSIS — Z12.31 ENCOUNTER FOR SCREENING MAMMOGRAM FOR BREAST CANCER: ICD-10-CM

## 2023-04-12 DIAGNOSIS — G89.29 OTHER CHRONIC PAIN: ICD-10-CM

## 2023-04-12 DIAGNOSIS — Z00.00 WELLNESS EXAMINATION: ICD-10-CM

## 2023-04-12 DIAGNOSIS — M54.2 NECK PAIN: ICD-10-CM

## 2023-04-12 DIAGNOSIS — Z87.891 PERSONAL HISTORY OF TOBACCO USE: ICD-10-CM

## 2023-04-12 DIAGNOSIS — Z23 ENCOUNTER FOR IMMUNIZATION: ICD-10-CM

## 2023-04-12 LAB
ANION GAP SERPL CALCULATED.3IONS-SCNC: 14 MMOL/L (ref 7–15)
BUN SERPL-MCNC: 15 MG/DL (ref 8–23)
CALCIUM SERPL-MCNC: 9.4 MG/DL (ref 8.8–10.2)
CHLORIDE SERPL-SCNC: 104 MMOL/L (ref 98–107)
CREAT SERPL-MCNC: 0.83 MG/DL (ref 0.51–0.95)
DEPRECATED HCO3 PLAS-SCNC: 22 MMOL/L (ref 22–29)
GFR SERPL CREATININE-BSD FRML MDRD: 76 ML/MIN/1.73M2
GLUCOSE SERPL-MCNC: 85 MG/DL (ref 70–99)
POTASSIUM SERPL-SCNC: 3.5 MMOL/L (ref 3.4–5.3)
SODIUM SERPL-SCNC: 140 MMOL/L (ref 136–145)

## 2023-04-12 PROCEDURE — 90677 PCV20 VACCINE IM: CPT | Performed by: FAMILY MEDICINE

## 2023-04-12 PROCEDURE — 80048 BASIC METABOLIC PNL TOTAL CA: CPT | Performed by: FAMILY MEDICINE

## 2023-04-12 PROCEDURE — 36415 COLL VENOUS BLD VENIPUNCTURE: CPT | Performed by: FAMILY MEDICINE

## 2023-04-12 PROCEDURE — G0296 VISIT TO DETERM LDCT ELIG: HCPCS | Performed by: FAMILY MEDICINE

## 2023-04-12 PROCEDURE — 99214 OFFICE O/P EST MOD 30 MIN: CPT | Mod: 25 | Performed by: FAMILY MEDICINE

## 2023-04-12 PROCEDURE — G0009 ADMIN PNEUMOCOCCAL VACCINE: HCPCS | Performed by: FAMILY MEDICINE

## 2023-04-12 RX ORDER — OXYCODONE AND ACETAMINOPHEN 5; 325 MG/1; MG/1
1 TABLET ORAL EVERY 6 HOURS PRN
Qty: 120 TABLET | Refills: 0 | Status: SHIPPED | OUTPATIENT
Start: 2023-04-17 | End: 2023-05-15

## 2023-04-12 NOTE — PATIENT INSTRUCTIONS
You may schedule your mammogram at [facility] by calling [phone] and asking to schedule your mammogram or you may schedule with [facility] in [place] by calling [phone].     Lung Cancer Screening   Frequently Asked Questions  If you are at high-risk for lung cancer, getting screened with low-dose computed tomography (LDCT) every year can help save your life. This handout offers answers to some of the most common questions about lung cancer screening. If you have other questions, please call [phone] ([phone]).     What is it?  Lung cancer screening uses special X-ray technology to create an image of your lung tissue. The exam is quick and easy and takes less than 10 seconds. We don t give you any medicine or use any needles. You can eat before and after the exam. You don t need to change your clothes as long as the clothing on your chest doesn t contain metal. But, you do need to be able to hold your breath for at least 6 seconds during the exam.    What is the goal of lung cancer screening?  The goal of lung cancer screening is to save lives. Many times, lung cancer is not found until a person starts having physical symptoms. Lung cancer screening can help detect lung cancer in the earliest stages when it may be easier to treat.    Who should be screened for lung cancer?  We suggest lung cancer screening for anyone who is at high-risk for lung cancer. You are in the high-risk group if you:     are between the ages of 55 and 79, and   have smoked at least 1 pack of cigarettes a day for 20 or more years, and   still smoke or have quit within the past 15 years.    However, if you have a new cough or shortness of breath, you should talk to your doctor before being screened.    Why does it matter if I have symptoms?  Certain symptoms can be a sign that you have a condition in your lungs that should be checked and treated by your doctor. These symptoms  include fever, chest pain, a new or changing cough, shortness of breath that you have never felt before, coughing up blood or unexplained weight loss. Having any of these symptoms can greatly affect the results of lung cancer screening.       Should all smokers get an LDCT lung cancer screening exam?  It depends. Lung cancer screening is for a very specific group of men and women who have a history of heavy smoking over a long period of time (see  Who should be screened for lung cancer  above).  I am in the high-risk group, but have been diagnosed with cancer in the past. Is LDCT lung cancer screening right for me?  In some cases, you should not have LDCT lung screening, such as when your doctor is already following your cancer with CT scan studies. Your doctor will help you decide if LDCT lung screening is right for you.  Do I need to have a screening exam every year?  Yes. If you are in the high-risk group described earlier, you should get an LDCT lung cancer screening exam every year until you are 79, or are no longer willing or able to undergo screening and possible procedures to diagnose and treat lung cancer.  How effective is LDCT at preventing death from lung cancer?  Studies have shown that LDCT lung cancer screening can lower the risk of death from lung cancer by 20 percent in people who are at high-risk.  What are the risks?  There are some risks and limitations of LDCT lung cancer screening. We want to make sure you understand the risks and benefits, so please let us know if you have any questions. Your doctor may want to talk with you more about these risks.   Radiation exposure: As with any exam that uses radiation, there is a very small increased risk of cancer. The amount of radiation in LDCT is small--about the same amount a person would get from a mammogram. Your doctor orders the exam when he or she feels the potential benefits outweigh the risks.   False negatives: No test is perfect, including  LDCT. It is possible that you may have a medical condition, including lung cancer, that is not found during your exam. This is called a false negative result.   False positives and more testing: LDCT very often finds something in the lung that could be cancer, but in fact is not. This is called a false positive result. False positive tests often cause anxiety. To make sure these findings are not cancer, you may need to have more tests. These tests will be done only if you give us permission. Sometimes patients need a treatment that can have side effects, such as a biopsy. For more information on false positives, see  What can I expect from the results?    Findings not related to lung cancer: Your LDCT exam also takes pictures of areas of your body next to your lungs. In a very small number of cases, the CT scan will show an abnormal finding in one of these areas, such as your kidneys, adrenal glands, liver or thyroid. This finding may not be serious, but you may need more tests. Your doctor can help you decide what other tests you may need, if any.  What can I expect from the results?  About 1 out of 4 LDCT exams will find something that may need more tests. Most of the time, these findings are lung nodules. Lung nodules are very small collections of tissue in the lung. These nodules are very common, and the vast majority--more than 97 percent--are not cancer (benign). Most are normal lymph nodes or small areas of scarring from past infections.  But, if a small lung nodule is found to be cancer, the cancer can be cured more than 90 percent of the time. To know if the nodule is cancer, we may need to get more images before your next yearly screening exam. If the nodule has suspicious features (for example, it is large, has an odd shape or grows over time), we will refer you to a specialist for further testing.  Will my doctor also get the results?  Yes. Your doctor will get a copy of your results.  Is it okay to keep  smoking now that there s a cancer screening exam?  No. Tobacco is one of the strongest cancer-causing agents. It causes not only lung cancer, but other cancers and cardiovascular (heart) diseases as well. The damage caused by smoking builds over time. This means that the longer you smoke, the higher your risk of disease. While it is never too late to quit, the sooner you quit, the better.  Where can I find help to quit smoking?  The best way to prevent lung cancer is to stop smoking. If you have already quit smoking, congratulations and keep it up! For help on quitting smoking, please call [employer] at 4-211-QUIT-NOW ([phone]) or the American [employer] at 1-514.120.7491 to find local resources near you.  One-on-one health coaching:  If you d prefer to work individually with a health care provider on tobacco cessation, we offer:     Medication Therapy Management:  Our specially trained pharmacists work closely with you and your doctor to help you quit smoking.  Call [phone] or [phone] (toll free).

## 2023-04-12 NOTE — PROGRESS NOTES
Prior to immunization administration, verified patients identity using patient s name and date of birth. Please see Immunization Activity for additional information.     Screening Questionnaire for Adult Immunization    Are you sick today?   No   Do you have allergies to medications, food, a vaccine component or latex?   No   Have you ever had a serious reaction after receiving a vaccination?   No   Do you have a long-term health problem with heart, lung, kidney, or metabolic disease (e.g., diabetes), asthma, a blood disorder, no spleen, complement component deficiency, a cochlear implant, or a spinal fluid leak?  Are you on long-term aspirin therapy?   No   Do you have cancer, leukemia, HIV/AIDS, or any other immune system problem?   No   Do you have a parent, brother, or sister with an immune system problem?   No   In the past 3 months, have you taken medications that affect  your immune system, such as prednisone, other steroids, or anticancer drugs; drugs for the treatment of rheumatoid arthritis, Crohn s disease, or psoriasis; or have you had radiation treatments?   No   Have you had a seizure, or a brain or other nervous system problem?   No   During the past year, have you received a transfusion of blood or blood    products, or been given immune (gamma) globulin or antiviral drug?   No   For women: Are you pregnant or is there a chance you could become       pregnant during the next month?   No   Have you received any vaccinations in the past 4 weeks?   No     Immunization questionnaire answers were all negative.      Injection of Pneumoccoccal 20 Vaccine given by Ksenia Hairston CMA. Patient instructed to remain in clinic for 15 minutes afterwards, and to report any adverse reactions.     Screening performed by Ksenia Hairston CMA on 4/12/2023 at 2:00 PM.

## 2023-04-12 NOTE — PROGRESS NOTES
Assessment & Plan     Screen for colon cancer  Had NEG FIT test last fall    F11.2 - Continuous opioid dependence (H)  Stable  Updated problem list and CSA    Encounter for screening mammogram for breast cancer    - *MA Screening Digital Bilateral    Wellness examination    - Pneumococcal 20 Valent Conjugate (PCV20)    Personal history of tobacco use    - Prof fee: Shared Decision Making for Lung Cancer Screening  - CT Chest Lung Cancer Scrn Low Dose wo    Benign essential hypertension  Under control  continue  - Basic metabolic panel  (Ca, Cl, CO2, Creat, Gluc, K, Na, BUN)    Other chronic pain  stable  - oxyCODONE-acetaminophen (PERCOCET) 5-325 MG tablet  Dispense: 120 tablet; Refill: 0    Neck pain    - oxyCODONE-acetaminophen (PERCOCET) 5-325 MG tablet  Dispense: 120 tablet; Refill: 0    Encounter for immunization    - Pneumococcal 20 Valent Conjugate (PCV20)        28 minutes spent by me on the date of the encounter doing chart review, history and exam, documentation and further activities per the note       See Patient Instructions    Cyndi Chirinos MD  [facility]    Ganga Woody is a 67 year old, presenting for the following health issues:  Establish Care (1) and Recheck Medication (2)  she is here to establish primary care and recheck on her pain  Meds and her blood pressure meds.   She is also on something for mood.   She has no concerns with her meds and does not feel she needs changes.            4/12/2023    12:48 PM   Additional Questions   Roomed by Krystyna GRULLON     History of Present Illness       Reason for visit:  Chronic pain    She eats 2-3 servings of fruits and vegetables daily.She consumes 0 sweetened beverage(s) daily.She exercises with enough effort to increase her heart rate 10 to 19 minutes per day.  She exercises with enough effort to increase her heart rate 3 or less days per week.   She is taking medications regularly.       Past Medical History:   Diagnosis  Date     Abnormal Papanicolaou smear of cervix and cervical HPV      Degenerative disc disease      Depressive disorder, not elsewhere classified      Displacement of cervical intervertebral disc without myelopathy      Gastro-oesophageal reflux disease      H/O hysterectomy for benign disease      PONV (postoperative nausea and vomiting)      TOBACCO ABUSE-CONTINUOUS        Past Surgical History:   Procedure Laterality Date     COLONOSCOPY  2008    normal     DISCECTOMY LUMBAR MINIMALLY INVASIVE ONE LEVEL  02/13/2014    Procedure: DISCECTOMY LUMBAR MINIMALLY INVASIVE ONE LEVEL;  Minimally Invasive Discectomy L2-3 Left ;  Surgeon: Juanjose Pitt MD;  Location: [facility] OR     DISCECTOMY LUMBAR MINIMALLY INVASIVE ONE LEVEL       FECAL COLORECTAL CANCER SCREEN FIT  10/2022    did home test     FUSION SPINE POSTERIOR MINIMALLY INVASIVE ONE LEVEL  05/15/2014    Procedure: FUSION SPINE POSTERIOR MINIMALLY INVASIVE ONE LEVEL;  Surgeon: Juanjose Pitt MD;  Location: [facility] OR     HYSTERECTOMY VAGINAL, BILATERAL SALPINGO-OOPHERECTOMY, COMBINED      done for precancer cells - did not need chemo or radiation     OPTICAL TRACKING SYSTEM FUSION POSTERIOR CERVICAL THREE + LEVELS N/A 12/23/2022    Procedure: Cervical 2 to Thoracic 2 posterior segmental instrumentation. Left Cervical 3 to Cervical 4 and Cervical 7 to Thoracic 2 foraminotomies. Posterior arthrodesis Cervical 2 to Thoracic 2 using allograft cancellous chips. Aung acosta placement.;  Surgeon: Layton Ma MD;  Location:  OR     [facility] NONSPECIFIC PROCEDURE      Lump removed L. breast - benign     [facility] NONSPECIFIC PROCEDURE      6 surgeries over time - first was early 2000's - 3 in front and 2 in back     [facility] NONSPECIFIC PROCEDURE      C-spine - Nany       MEDICATIONS:  Current Outpatient Medications   Medication     citalopram (CELEXA) 20 MG tablet     omeprazole (PRILOSEC) 40 MG DR capsule     [START ON 4/17/2023] oxyCODONE-acetaminophen (PERCOCET)  "5-325 MG tablet     tiZANidine (ZANAFLEX) 4 MG tablet     traZODone (DESYREL) 100 MG tablet     No current facility-administered medications for this visit.       SOCIAL HISTORY:  Social History     Tobacco Use     Smoking status: Former     Packs/day: 1.00     Years: 30.00     Pack years: 30.00     Types: Cigarettes     Quit date: 2017     Years since quittin.8     Smokeless tobacco: Never   Vaping Use     Vaping status: Never Used   Substance Use Topics     Alcohol use: No       Family History   Problem Relation Age of Onset     Cancer Mother         had spread everywhere     Cancer Father         throat     Diabetes Paternal Grandmother            Review of Systems   Constitutional, HEENT, cardiovascular, pulmonary, gi and gu systems are negative, except as otherwise noted.      Objective    /80 (BP Location: Right arm, Patient Position: Sitting, Cuff Size: Adult Regular)   Pulse 82   Temp 98  F (36.7  C) (Oral)   Ht 1.676 m (5' 6\")   Wt 73.8 kg (162 lb 12.8 oz)   SpO2 96%   BMI 26.28 kg/m    Body mass index is 26.28 kg/m .  Physical Exam   GENERAL: healthy, alert and no distress  EYES: Eyes grossly normal to inspection, PERRL and conjunctivae and sclerae normal  HENT: ear canals and TM's normal, nose and mouth without ulcers or lesions  NECK: no adenopathy, no asymmetry, masses, or scars and thyroid normal to palpation  RESP: lungs clear to auscultation - no rales, rhonchi or wheezes  BREAST: normal without masses, tenderness or nipple discharge and no palpable axillary masses or adenopathy  CV: regular rate and rhythm, normal S1 S2, no S3 or S4, no murmur, click or rub, no peripheral edema and peripheral pulses strong  ABDOMEN: soft, nontender, no hepatosplenomegaly, no masses and bowel sounds normal  MS: no gross musculoskeletal defects noted, no edema  SKIN: no suspicious lesions or rashes  NEURO: Normal strength and tone, mentation intact and speech normal  PSYCH: mentation appears " normal, affect normal/bright  LYMPH: no cervical, supraclavicular, axillary, or inguinal adenopathy    Office Visit on 01/27/2023   Component Date Value Ref Range Status     Sodium 01/27/2023 140  136 - 145 mmol/L Final     Potassium 01/27/2023 3.1 (L)  3.4 - 5.3 mmol/L Final     Chloride 01/27/2023 103  98 - 107 mmol/L Final     Carbon Dioxide (CO2) 01/27/2023 25  22 - 29 mmol/L Final     Anion Gap 01/27/2023 12  7 - 15 mmol/L Final     Urea Nitrogen 01/27/2023 8.7  8.0 - 23.0 mg/dL Final     Creatinine 01/27/2023 0.75  0.51 - 0.95 mg/dL Final     Calcium 01/27/2023 9.0  8.8 - 10.2 mg/dL Final     Glucose 01/27/2023 93  70 - 99 mg/dL Final     Alkaline Phosphatase 01/27/2023 94  35 - 104 U/L Final     AST 01/27/2023 19  10 - 35 U/L Final     ALT 01/27/2023 10  10 - 35 U/L Final     Protein Total 01/27/2023 7.1  6.4 - 8.3 g/dL Final     Albumin 01/27/2023 4.3  3.5 - 5.2 g/dL Final     Bilirubin Total 01/27/2023 0.3  <=1.2 mg/dL Final     GFR Estimate 01/27/2023 86  >60 mL/min/1.73m2 Final    eGFR calculated using 2021 CKD-EPI equation.     Cholesterol 01/27/2023 250 (H)  <200 mg/dL Final     Triglycerides 01/27/2023 166 (H)  <150 mg/dL Final     Direct Measure HDL 01/27/2023 53  >=50 mg/dL Final     LDL Cholesterol Calculated 01/27/2023 164 (H)  <=100 mg/dL Final     Non HDL Cholesterol 01/27/2023 197 (H)  <130 mg/dL Final     WBC Count 01/27/2023 11.0  4.0 - 11.0 10e3/uL Final     RBC Count 01/27/2023 4.22  3.80 - 5.20 10e6/uL Final     Hemoglobin 01/27/2023 12.5  11.7 - 15.7 g/dL Final     Hematocrit 01/27/2023 38.5  35.0 - 47.0 % Final     MCV 01/27/2023 91  78 - 100 fL Final     MCH 01/27/2023 29.6  26.5 - 33.0 pg Final     MCHC 01/27/2023 32.5  31.5 - 36.5 g/dL Final     RDW 01/27/2023 13.6  10.0 - 15.0 % Final     Platelet Count 01/27/2023 287  150 - 450 10e3/uL Final     % Neutrophils 01/27/2023 53  % Final     % Lymphocytes 01/27/2023 38  % Final     % Monocytes 01/27/2023 7  % Final     % Eosinophils  01/27/2023 3  % Final     % Basophils 01/27/2023 1  % Final     Absolute Neutrophils 01/27/2023 5.8  1.6 - 8.3 10e3/uL Final     Absolute Lymphocytes 01/27/2023 4.2  0.8 - 5.3 10e3/uL Final     Absolute Monocytes 01/27/2023 0.7  0.0 - 1.3 10e3/uL Final     Absolute Eosinophils 01/27/2023 0.3  0.0 - 0.7 10e3/uL Final     Absolute Basophils 01/27/2023 0.1  0.0 - 0.2 10e3/uL Final

## 2023-04-12 NOTE — Clinical Note
She had FIT test that is scanned into labs on 10/13/22.   It was neg - can you satisfy this for HCM?

## 2023-04-12 NOTE — LETTER
Opioid / Opioid Plus Controlled Substance Agreement    This is an agreement between you and your provider about the safe and appropriate use of controlled substance/opioids prescribed by your care team. Controlled substances are medicines that can cause physical and mental dependence (abuse).    There are strict laws about having and using these medicines. We here at [facility] are committing to working with you in your efforts to get better. To support you in this work, we ll help you schedule regular office appointments for medicine refills. If we must cancel or change your appointment for any reason, we ll make sure you have enough medicine to last until your next appointment.     As a Provider, I will:    Listen carefully to your concerns and treat you with respect.     Recommend a treatment plan that I believe is in your best interest. This plan may involve therapies other than opioid pain medication.     Talk with you often about the possible benefits, and the risk of harm of any medicine that we prescribe for you.     Provide a plan on how to taper (discontinue or go off) using this medicine if the decision is made to stop its use.    As a Patient, I understand that opioid(s):     Are a controlled substance prescribed by my care team to help me function or work and manage my condition(s).     Are strong medicines and can cause serious side effects such as:    Drowsiness, which can seriously affect my driving ability    A lower breathing rate, enough to cause death    Harm to my thinking ability     Depression     Abuse of and addiction to this medicine    Need to be taken exactly as prescribed. Combining opioids with certain medicines or chemicals (such as illegal drugs, sedatives, sleeping pills, and benzodiazepines) can be dangerous or even fatal. If I stop opioids suddenly, I may have severe withdrawal symptoms.    Do not work for all types of pain nor for all patients. If they re not helpful, I may  be asked to stop them.        The risks, benefits and side effects of these medicine(s) were explained to me. I agree that:  1. I will take part in other treatments as advised by my care team. This may be psychiatry or counseling, physical therapy, behavioral therapy, group treatment or a referral to a specialist.     2. I will keep all my appointments. I understand that this is part of the monitoring of opioids. My care team may require an office visit for EVERY opioid/controlled substance refill. If I miss appointments or don t follow instructions, my care team may stop my medicine.    3. I will take my medicines as prescribed. I will not change the dose or schedule unless my care team tells me to. There will be no refills if I run out early.     4. I may be asked to come to the clinic and complete a urine drug test or complete a pill count at any time. If I don t give a urine sample or participate in a pill count, the care team may stop my medicine.    5. I will only receive prescriptions from this clinic for chronic pain. If I am treated by another provider for acute pain issues, I will tell them that I am taking opioid pain medication for chronic pain and that I have a treatment agreement with this provider. I will inform my [facility] care team within one business day if I am given a prescription for any pain medication by another healthcare provider. My [facility] care team can contact other providers and pharmacists about my use of any medicines.    6. It is up to me to make sure that I don t run out of my medicines on weekends or holidays. If my care team is willing to refill my opioid prescription without a visit, I must request refills only during office hours. Refills may take up to 3 business days to process. I will use one pharmacy to fill all my opioid and other controlled substance prescriptions. I will notify the clinic about any changes to my insurance or medication  availability.    7. I am responsible for my prescriptions. If the medicine/prescription is lost, stolen or destroyed, it will not be replaced. I also agree not to share controlled substance medicines with anyone.    8. I am aware I should not use any illegal or recreational drugs. I agree not to drink alcohol unless my care team says I can.       9. If I enroll in the [place] [facility] program, I will tell my care team prior to my next refill.     10. I will tell my care team right away if I become pregnant, have a new medical problem treated outside of my regular clinic, or have a change in my medications.    11. I understand that this medicine can affect my thinking, judgment and reaction time. Alcohol and drugs affect the brain and body, which can affect the safety of my driving. Being under the influence of alcohol or drugs can affect my decision-making, behaviors, personal safety, and the safety of others. Driving while impaired (DWI) can occur if a person is driving, operating, or in physical control of a car, motorcycle, boat, snowmobile, ATV, motorbike, off-road vehicle, or any other motor vehicle ([place] Statute 169A.20). I understand the risk if I choose to drive or operate any vehicle or machinery.    I understand that if I do not follow any of the conditions above, my prescriptions or treatment may be stopped or changed.          Opioids  What You Need to Know    What are opioids?   Opioids are pain medicines that must be prescribed by a doctor. They are also known as narcotics.     Examples are:   1. morphine (MS Contin, Kate)  2. oxycodone (Oxycontin)  3. oxycodone and acetaminophen (Percocet)  4. hydrocodone and acetaminophen (Vicodin, Norco)   5. fentanyl patch (Duragesic)   6. hydromorphone (Dilaudid)   7. methadone  8. codeine (Tylenol #3)     What do opioids do well?   Opioids are best for severe short-term pain such as after a surgery or injury. They may work well for cancer pain. They may  help some people with long-lasting (chronic) pain.     What do opioids NOT do well?   Opioids never get rid of pain entirely, and they don t work well for most patients with chronic pain. Opioids don t reduce swelling, one of the causes of pain.                                    Other ways to manage chronic pain and improve function include:       Treat the health problem that may be causing pain    Anti-inflammation medicines, which reduce swelling and tenderness, such as ibuprofen (Advil, Motrin) or naproxen (Aleve)    Acetaminophen (Tylenol)    Antidepressants and anti-seizure medicines, especially for nerve pain    Topical treatments such as patches or creams    Injections or nerve blocks    Chiropractic or osteopathic treatment    Acupuncture, massage, deep breathing, meditation, visual imagery, aromatherapy    Use heat or ice at the pain site    Physical therapy     Exercise    Stop smoking    Take part in therapy       Risks and side effects     Talk to your doctor before you start or decide to keep taking opioids. Possible side effects include:      Lowering your breathing rate enough to cause death    Overdose, including death, especially if taking higher than prescribed doses    Worse depression symptoms; less pleasure in things you usually enjoy    Feeling tired or sluggish    Slower thoughts or cloudy thinking    Being more sensitive to pain over time; pain is harder to control    Trouble sleeping or restless sleep    Changes in hormone levels (for example, less testosterone)    Changes in sex drive or ability to have sex    Constipation    Unsafe driving    Itching and sweating    Dizziness    Nausea, throwing up and dry mouth    What else should I know about opioids?    Opioids may lead to dependence, tolerance, or addiction.      Dependence means that if you stop or reduce the medicine too quickly, you will have withdrawal symptoms. These include loose poop (diarrhea), jitters, flu-like symptoms,  nervousness and tremors. Dependence is not the same as addiction.                       Tolerance means needing higher doses over time to get the same effect. This may increase the chance of serious side effects.      Addiction is when people improperly use a substance that harms their body, their mind or their relations with others. Use of opiates can cause a relapse of addiction if you have a history of drug or alcohol abuse.      People who have used opioids for a long time may have a lower quality of life, worse depression, higher levels of pain and more visits to doctors.    You can overdose on opioids. Take these steps to lower your risk of overdose:    1. Recognize the signs:  Signs of overdose include decrease or loss of consciousness (blackout), slowed breathing, trouble waking up and blue lips. If someone is worried about overdose, they should call 911.    2. Talk to your doctor about Narcan (naloxone).   If you are at risk for overdose, you may be given a prescription for Narcan. This medicine very quickly reverses the effects of opioids.   If you overdose, a friend or family member can give you Narcan while waiting for the ambulance. They need to know the signs of overdose and how to give Narcan.     3. Don't use alcohol or street drugs.   Taking them with opioids can cause death.    4. Do not take any of these medicines unless your doctor says it s OK. Taking these with opioids can cause death:    Benzodiazepines, such as lorazepam (Ativan), alprazolam (Xanax) or diazepam (Valium)    Muscle relaxers, such as cyclobenzaprine (Flexeril)    Sleeping pills like zolpidem (Ambien)     Other opioids      How to keep you and other people safe while taking opioids:    1. Never share your opioids with others.  Opioid medicines are regulated by the Drug Enforcement Agency ([place]ESS[place]). Selling or sharing medications is a criminal act.    2. Be sure to store opioids in a secure place, locked up if possible. Young children  can easily swallow them and overdose.    3. When you are traveling with your medicines, keep them in the original bottles. If you use a pill box, be sure you also carry a copy of your medicine list from your clinic or pharmacy.    4. Safe disposal of opioids    Most pharmacies have places to get rid of medicine, called disposal kiosks. Medicine disposal options are also available in every [facility]. Search your county and  medication disposal  to find more options. You can find more details at:  [email or web address]     I agree that my provider, clinic care team, and pharmacy may work with any city, state or federal law enforcement agency that investigates the misuse, sale, or other diversion of my controlled medicine. I will allow my provider to discuss my care with, or share a copy of, this agreement with any other treating provider, pharmacy or emergency room where I receive care.    I have read this agreement and have asked questions about anything I did not understand.    _______________________________________________________  Patient Signature - Annalise Wallace _____________________                   Date     _______________________________________________________  Provider Signature - Cyndi Chirinos MD   _____________________                   Date     _______________________________________________________  Witness Signature (required if provider not present while patient signing)   _____________________                   Date

## 2023-04-12 NOTE — PROGRESS NOTES
Lung Cancer Screening Shared Decision Making Visit     Annalise Wallace, a 67 year old female, is eligible for lung cancer screening    History   Smoking Status     Former     Packs/day: 1.00     Years: 30.00     Types: Cigarettes     Quit date: 6/21/2017   Smokeless Tobacco     Never       I have discussed with patient the risks and benefits of screening for lung cancer with low-dose CT.     The risks include:    radiation exposure: one low dose chest CT has as much ionizing radiation as about 15 chest x-rays, or 6 months of background radiation living in [place]      false positives: most findings/nodules are NOT cancer, but some might still require additional diagnostic evaluation, including biopsy    over-diagnosis: some slow growing cancers that might never have been clinically significant will be detected and treated unnecessarily     The benefit of early detection of lung cancer is contingent upon adherence to annual screening or more frequent follow up if indicated.     Furthermore, to benefit from screening, Annalise must be willing and able to undergo diagnostic procedures, if indicated. Although no specific guide is available for determining severity of comorbidities, it is reasonable to withhold screening in patients who have greater mortality risk from other diseases.     We did discuss that the best way to prevent lung cancer is to not smoke.    Some patients may value a numeric estimation of lung cancer risk when evaluating if lung cancer screening is right for them, here is one calculator:    ShouldIScreen  Answers for HPI/ROS submitted by the patient on 4/5/2023  What is the reason for your visit today? : Chronic pain  How many servings of fruits and vegetables do you eat daily?: 2-3  On average, how many sweetened beverages do you drink each day (Examples: soda, juice, sweet tea, etc.  Do NOT count diet or artificially sweetened beverages)?: 0  How many minutes a day do you exercise enough to make  your heart beat faster?: 10 to 19  How many days a week do you exercise enough to make your heart beat faster?: 3 or less  How many days per week do you miss taking your medication?: 0

## 2023-05-15 ENCOUNTER — MYC REFILL (OUTPATIENT)
Dept: FAMILY MEDICINE | Facility: CLINIC | Age: 68
End: 2023-05-15
Payer: COMMERCIAL

## 2023-05-15 DIAGNOSIS — M54.2 NECK PAIN: ICD-10-CM

## 2023-05-15 DIAGNOSIS — Z98.1 S/P CERVICAL SPINAL FUSION: ICD-10-CM

## 2023-05-15 DIAGNOSIS — G89.29 OTHER CHRONIC PAIN: ICD-10-CM

## 2023-05-15 RX ORDER — OXYCODONE AND ACETAMINOPHEN 5; 325 MG/1; MG/1
1 TABLET ORAL EVERY 6 HOURS PRN
Qty: 120 TABLET | Refills: 0 | Status: SHIPPED | OUTPATIENT
Start: 2023-05-17 | End: 2023-06-14

## 2023-06-14 ENCOUNTER — MYC REFILL (OUTPATIENT)
Dept: FAMILY MEDICINE | Facility: CLINIC | Age: 68
End: 2023-06-14
Payer: COMMERCIAL

## 2023-06-14 DIAGNOSIS — M54.2 NECK PAIN: ICD-10-CM

## 2023-06-14 DIAGNOSIS — G89.29 OTHER CHRONIC PAIN: ICD-10-CM

## 2023-06-14 RX ORDER — OXYCODONE AND ACETAMINOPHEN 5; 325 MG/1; MG/1
1 TABLET ORAL EVERY 6 HOURS PRN
Qty: 120 TABLET | Refills: 0 | Status: SHIPPED | OUTPATIENT
Start: 2023-06-17 | End: 2023-07-14

## 2023-07-14 ENCOUNTER — MYC REFILL (OUTPATIENT)
Dept: FAMILY MEDICINE | Facility: CLINIC | Age: 68
End: 2023-07-14
Payer: COMMERCIAL

## 2023-07-14 DIAGNOSIS — Z98.1 S/P CERVICAL SPINAL FUSION: ICD-10-CM

## 2023-07-14 DIAGNOSIS — M54.2 NECK PAIN: ICD-10-CM

## 2023-07-14 DIAGNOSIS — G89.29 OTHER CHRONIC PAIN: ICD-10-CM

## 2023-07-16 RX ORDER — OXYCODONE AND ACETAMINOPHEN 5; 325 MG/1; MG/1
1 TABLET ORAL EVERY 6 HOURS PRN
Qty: 120 TABLET | Refills: 0 | Status: SHIPPED | OUTPATIENT
Start: 2023-07-16 | End: 2023-08-15

## 2023-07-19 ENCOUNTER — OFFICE VISIT (OUTPATIENT)
Dept: FAMILY MEDICINE | Facility: CLINIC | Age: 68
End: 2023-07-19
Attending: FAMILY MEDICINE
Payer: COMMERCIAL

## 2023-07-19 VITALS
HEIGHT: 66 IN | DIASTOLIC BLOOD PRESSURE: 62 MMHG | HEART RATE: 84 BPM | SYSTOLIC BLOOD PRESSURE: 130 MMHG | TEMPERATURE: 97.8 F | OXYGEN SATURATION: 96 % | WEIGHT: 163.6 LBS | BODY MASS INDEX: 26.29 KG/M2

## 2023-07-19 DIAGNOSIS — Z23 NEED FOR DIPHTHERIA-TETANUS-PERTUSSIS (TDAP) VACCINE: ICD-10-CM

## 2023-07-19 DIAGNOSIS — G47.00 PERSISTENT DISORDER OF INITIATING OR MAINTAINING SLEEP: ICD-10-CM

## 2023-07-19 DIAGNOSIS — F41.8 DEPRESSION WITH ANXIETY: ICD-10-CM

## 2023-07-19 DIAGNOSIS — G89.29 OTHER CHRONIC PAIN: ICD-10-CM

## 2023-07-19 DIAGNOSIS — M50.30 DDD (DEGENERATIVE DISC DISEASE), CERVICAL: ICD-10-CM

## 2023-07-19 DIAGNOSIS — Z98.1 S/P CERVICAL SPINAL FUSION: ICD-10-CM

## 2023-07-19 DIAGNOSIS — L57.8 SOLAR AGING OF SKIN: Primary | ICD-10-CM

## 2023-07-19 DIAGNOSIS — L82.1 SEBORRHEIC KERATOSES: ICD-10-CM

## 2023-07-19 PROCEDURE — 99214 OFFICE O/P EST MOD 30 MIN: CPT | Performed by: FAMILY MEDICINE

## 2023-07-19 RX ORDER — TRAZODONE HYDROCHLORIDE 100 MG/1
100 TABLET ORAL DAILY
Qty: 90 TABLET | Refills: 1 | Status: SHIPPED | OUTPATIENT
Start: 2023-07-19 | End: 2024-03-11

## 2023-07-19 ASSESSMENT — ANXIETY QUESTIONNAIRES
4. TROUBLE RELAXING: NOT AT ALL
GAD7 TOTAL SCORE: 0
7. FEELING AFRAID AS IF SOMETHING AWFUL MIGHT HAPPEN: NOT AT ALL
2. NOT BEING ABLE TO STOP OR CONTROL WORRYING: NOT AT ALL
3. WORRYING TOO MUCH ABOUT DIFFERENT THINGS: NOT AT ALL
6. BECOMING EASILY ANNOYED OR IRRITABLE: NOT AT ALL
1. FEELING NERVOUS, ANXIOUS, OR ON EDGE: NOT AT ALL
5. BEING SO RESTLESS THAT IT IS HARD TO SIT STILL: NOT AT ALL
GAD7 TOTAL SCORE: 0
IF YOU CHECKED OFF ANY PROBLEMS ON THIS QUESTIONNAIRE, HOW DIFFICULT HAVE THESE PROBLEMS MADE IT FOR YOU TO DO YOUR WORK, TAKE CARE OF THINGS AT HOME, OR GET ALONG WITH OTHER PEOPLE: NOT DIFFICULT AT ALL

## 2023-07-19 ASSESSMENT — PATIENT HEALTH QUESTIONNAIRE - PHQ9
SUM OF ALL RESPONSES TO PHQ QUESTIONS 1-9: 1
SUM OF ALL RESPONSES TO PHQ QUESTIONS 1-9: 1
10. IF YOU CHECKED OFF ANY PROBLEMS, HOW DIFFICULT HAVE THESE PROBLEMS MADE IT FOR YOU TO DO YOUR WORK, TAKE CARE OF THINGS AT HOME, OR GET ALONG WITH OTHER PEOPLE: NOT DIFFICULT AT ALL

## 2023-07-19 ASSESSMENT — ENCOUNTER SYMPTOMS: NERVOUS/ANXIOUS: 1

## 2023-07-19 NOTE — PROGRESS NOTES
"  Assessment & Plan     Need for diphtheria-tetanus-pertussis (Tdap) vaccine  Will get at pharmacy   - Tdap, tetanus-diptheria-acell pertussis, (BOOSTRIX) 5-2.5-18.5 LF-MCG/0.5 ISMAEL injection  Dispense: 0.5 mL; Refill: 0    S/P cervical spinal fusion  Refills per [facility]   - tiZANidine (ZANAFLEX) 4 MG tablet  Dispense: 90 tablet; Refill: 6    Persistent disorder of initiating or maintaining sleep  This works very well  - traZODone (DESYREL) 100 MG tablet  Dispense: 90 tablet; Refill: 1    Solar aging of skin  Will refer to derm for annual skin checks  - Adult Dermatology Referral    Seborrheic keratoses  Vs SCC - send to derm   - Adult Dermatology Referral    Depression with anxiety  stable  - Basic metabolic panel  (Ca, Cl, CO2, Creat, Gluc, K, Na, BUN)    DDD (degenerative disc disease), cervical  Reason for pain    Other chronic pain  CSA signed 3 months ago        25 minutes spent by me on the date of the encounter doing chart review, history and exam, documentation and further activities per the note       BMI:   Estimated body mass index is 26.41 kg/m  as calculated from the following:    Height as of this encounter: 1.676 m (5' 6\").    Weight as of this encounter: 74.2 kg (163 lb 9.6 oz).       See Patient Instructions    Cyndi Chirinos MD  [facility]    Ganga Woody is a 67 year old, presenting for the following health issues:  Neck Pain, Depression, and Anxiety  she is doing well overall.   She does not think she needs any refills.   She had surgery on her neck about 6-7 months ago and is still having muscle pain and soreness.   Has recheck with spine in 12/23 and wonders if this is the best it is going to get.      She also would like to have a lesion on her anterior right shin looked at.   It formed about a year ago and is stable in size now.   It does not bleed.             4/12/2023    12:48 PM   Additional Questions   Roomed by Krystyna GRULLON     Anxiety    History of Present " Illness       Reason for visit:  Medication check    She eats 2-3 servings of fruits and vegetables daily.She consumes 0 sweetened beverage(s) daily.She exercises with enough effort to increase her heart rate 10 to 19 minutes per day.  She exercises with enough effort to increase her heart rate 4 days per week.   She is taking medications regularly.    Today's PHQ-9         PHQ-9 Total Score: 1    PHQ-9 Q9 Thoughts of better off dead/self-harm past 2 weeks :   Not at all    How difficult have these problems made it for you to do your work, take care of things at home, or get along with other people: Not difficult at all  Today's JEANNINE-7 Score: 0       Depression and Anxiety Follow-Up    How are you doing with your depression since your last visit? Varies depending on the day/situation     How are you doing with your anxiety since your last visit?  Varies depending on the day/situation     Are you having other symptoms that might be associated with depression or anxiety? No    Have you had a significant life event? No     Do you have any concerns with your use of alcohol or other drugs? No    Social History     Tobacco Use     Smoking status: Former     Packs/day: 1.00     Years: 30.00     Pack years: 30.00     Types: Cigarettes     Quit date: 2017     Years since quittin.0     Smokeless tobacco: Never   Vaping Use     Vaping Use: Never used   Substance Use Topics     Alcohol use: No     Drug use: No         2021     3:06 PM 9/3/2021    12:17 PM 2023     9:30 AM   PHQ   PHQ-9 Total Score 0 0 1   Q9: Thoughts of better off dead/self-harm past 2 weeks Not at all Not at all Not at all         2021     3:06 PM 9/3/2021    12:18 PM 2023     9:30 AM   JEANNINE-7 SCORE   Total Score 0 (minimal anxiety) 0 (minimal anxiety) 0 (minimal anxiety)   Total Score 0 0 0         2023     9:30 AM   Last PHQ-9   1.  Little interest or pleasure in doing things 0   2.  Feeling down, depressed, or hopeless 0   3.   Trouble falling or staying asleep, or sleeping too much 1   4.  Feeling tired or having little energy 0   5.  Poor appetite or overeating 0   6.  Feeling bad about yourself 0   7.  Trouble concentrating 0   8.  Moving slowly or restless 0   Q9: Thoughts of better off dead/self-harm past 2 weeks 0   PHQ-9 Total Score 1         7/19/2023     9:30 AM   JEANNINE-7    1. Feeling nervous, anxious, or on edge 0   2. Not being able to stop or control worrying 0   3. Worrying too much about different things 0   4. Trouble relaxing 0   5. Being so restless that it is hard to sit still 0   6. Becoming easily annoyed or irritable 0   7. Feeling afraid, as if something awful might happen 0   JEANNINE-7 Total Score 0   If you checked any problems, how difficult have they made it for you to do your work, take care of things at home, or get along with other people? Not difficult at all       Suicide Assessment Five-step Evaluation and Treatment (SAFE-T)    Pain History:  Have you seen this provider for your pain in the past?   Yes   Where in your body do you have pain? Neck   Are you seeing anyone else for your pain? No        5/6/2021     3:06 PM 9/3/2021    12:17 PM 7/19/2023     9:30 AM   PHQ-9 SCORE   PHQ-9 Total Score MyChart 0 0 1 (Minimal depression)   PHQ-9 Total Score 0 0 1           5/6/2021     3:06 PM 9/3/2021    12:18 PM 7/19/2023     9:30 AM   JEANNINE-7 SCORE   Total Score 0 (minimal anxiety) 0 (minimal anxiety) 0 (minimal anxiety)   Total Score 0 0 0           7/19/2023     9:42 AM   PEG Score   PEG Total Score 5           5/6/2021     3:06 PM 9/3/2021    12:17 PM 7/19/2023     9:30 AM   PHQ-9 SCORE   PHQ-9 Total Score MyChart 0 0 1 (Minimal depression)   PHQ-9 Total Score 0 0 1         5/6/2021     3:06 PM 9/3/2021    12:18 PM 7/19/2023     9:30 AM   JEANNINE-7 SCORE   Total Score 0 (minimal anxiety) 0 (minimal anxiety) 0 (minimal anxiety)   Total Score 0 0 0         7/19/2023     9:42 AM   PEG Score   PEG Total Score 5       Chronic  Pain Follow Up:    Location of pain: neck  Analgesia/pain control:    - Recent changes:  Surgery 6 months ago    - Overall control: Tolerable with discomfort    - Current treatments: narcotics and home exercise and muscle relaxers   Adherence:     - Do you ever take more pain medicine than prescribed? No    - When did you take your last dose of pain medicine?  Today    Adverse effects: No   PDMP Review       Value Time User    State PDMP site checked  Yes 6/14/2023  1:19 PM Cyndi Chirinos MD        Last CSA Agreement:   CSA -- Patient Level:     [Media Unavailable] Controlled Substance Agreement - Opioid - Scan on 4/12/2023  2:14 PM   [Media Unavailable] Controlled Substance Agreement - Opioid - Scan on 2/27/2022  6:22 PM   [Media Unavailable] Controlled Substance Agreement - Opioid - Scan on 1/27/2022 12:59 PM: Opioid   [Media Unavailable] Controlled Substance Agreement - Opioid - Scan on 10/2/2021  3:43 PM       Last UDS: 8/30/2022      Past Medical History:   Diagnosis Date     Abnormal Papanicolaou smear of cervix and cervical HPV      Degenerative disc disease      Depressive disorder, not elsewhere classified      Displacement of cervical intervertebral disc without myelopathy      Gastro-oesophageal reflux disease      H/O hysterectomy for benign disease      PONV (postoperative nausea and vomiting)      TOBACCO ABUSE-CONTINUOUS        Past Surgical History:   Procedure Laterality Date     COLONOSCOPY  2008    normal     DISCECTOMY LUMBAR MINIMALLY INVASIVE ONE LEVEL  02/13/2014    Procedure: DISCECTOMY LUMBAR MINIMALLY INVASIVE ONE LEVEL;  Minimally Invasive Discectomy L2-3 Left ;  Surgeon: Juanjose Pitt MD;  Location: [facility] OR     DISCECTOMY LUMBAR MINIMALLY INVASIVE ONE LEVEL       FECAL COLORECTAL CANCER SCREEN FIT  10/2022    did home test     FUSION SPINE POSTERIOR MINIMALLY INVASIVE ONE LEVEL  05/15/2014    Procedure: FUSION SPINE POSTERIOR MINIMALLY INVASIVE ONE LEVEL;  Surgeon: Sweetie  Juanjose ENCINAS MD;  Location: [facility] OR     HYSTERECTOMY VAGINAL, BILATERAL SALPINGO-OOPHERECTOMY, COMBINED      done for precancer cells - did not need chemo or radiation     OPTICAL TRACKING SYSTEM FUSION POSTERIOR CERVICAL THREE + LEVELS N/A 2022    Procedure: Cervical 2 to Thoracic 2 posterior segmental instrumentation. Left Cervical 3 to Cervical 4 and Cervical 7 to Thoracic 2 foraminotomies. Posterior arthrodesis Cervical 2 to Thoracic 2 using allograft cancellous chips. Aung lima.;  Surgeon: Layton Ma MD;  Location:  OR     [facility] NONSPECIFIC PROCEDURE      Lump removed L. breast - benign     [facility] NONSPECIFIC PROCEDURE      6 surgeries over time - first was early  - 3 in front and 2 in back     [facility] NONSPECIFIC PROCEDURE      C-spine - Nany       MEDICATIONS:  Current Outpatient Medications   Medication     citalopram (CELEXA) 20 MG tablet     omeprazole (PRILOSEC) 40 MG DR capsule     oxyCODONE-acetaminophen (PERCOCET) 5-325 MG tablet     Tdap, tetanus-diptheria-acell pertussis, (BOOSTRIX) 5-2.5-18.5 LF-MCG/0.5 ISMAEL injection     tiZANidine (ZANAFLEX) 4 MG tablet     traZODone (DESYREL) 100 MG tablet     naloxone (NARCAN) 4 MG/0.1ML nasal spray     No current facility-administered medications for this visit.       SOCIAL HISTORY:  Social History     Tobacco Use     Smoking status: Former     Packs/day: 1.00     Years: 30.00     Pack years: 30.00     Types: Cigarettes     Quit date: 2017     Years since quittin.0     Smokeless tobacco: Never   Substance Use Topics     Alcohol use: No       Family History   Problem Relation Age of Onset     Cancer Mother         had spread everywhere     Cancer Father         throat     Diabetes Paternal Grandmother            Review of Systems   Psychiatric/Behavioral: The patient is nervous/anxious.             Objective    /62 (BP Location: Right arm, Patient Position: Sitting, Cuff Size: Adult Regular)   Pulse 84   Temp 97.8  " F (36.6  C) (Oral)   Ht 1.676 m (5' 6\")   Wt 74.2 kg (163 lb 9.6 oz)   SpO2 96%   BMI 26.41 kg/m    Body mass index is 26.41 kg/m .  Physical Exam   GENERAL: healthy, alert and no distress  EYES: Eyes grossly normal to inspection, PERRL and conjunctivae and sclerae normal  NECK: no adenopathy, no asymmetry, masses, or scars and thyroid normal to palpation  RESP: lungs clear to auscultation - no rales, rhonchi or wheezes  CV: regular rate and rhythm, normal S1 S2, no S3 or S4, no murmur, click or rub, no peripheral edema and peripheral pulses strong  MS: no gross musculoskeletal defects noted, no edema  SKIN: solar aging profuse,    Trunk and legs with seb K - also right shin with quarter sized lesion which is soft and elevated and lobular like seb K but not crusty and slightly red - seb K vs SCC  NEURO: Normal strength and tone, mentation intact and speech normal  PSYCH: mentation appears normal, affect normal/bright  LYMPH: no cervical, supraclavicular, axillary, or inguinal adenopathy    Office Visit on 04/12/2023   Component Date Value Ref Range Status     Sodium 04/12/2023 140  136 - 145 mmol/L Final     Potassium 04/12/2023 3.5  3.4 - 5.3 mmol/L Final     Chloride 04/12/2023 104  98 - 107 mmol/L Final     Carbon Dioxide (CO2) 04/12/2023 22  22 - 29 mmol/L Final     Anion Gap 04/12/2023 14  7 - 15 mmol/L Final     Urea Nitrogen 04/12/2023 15.0  8.0 - 23.0 mg/dL Final     Creatinine 04/12/2023 0.83  0.51 - 0.95 mg/dL Final     Calcium 04/12/2023 9.4  8.8 - 10.2 mg/dL Final     Glucose 04/12/2023 85  70 - 99 mg/dL Final     GFR Estimate 04/12/2023 76  >60 mL/min/1.73m2 Final    eGFR calculated using 2021 CKD-EPI equation.                   "

## 2023-08-04 ENCOUNTER — PATIENT OUTREACH (OUTPATIENT)
Dept: CARE COORDINATION | Facility: CLINIC | Age: 68
End: 2023-08-04
Payer: COMMERCIAL

## 2023-08-15 ENCOUNTER — MYC REFILL (OUTPATIENT)
Dept: FAMILY MEDICINE | Facility: CLINIC | Age: 68
End: 2023-08-15
Payer: COMMERCIAL

## 2023-08-15 DIAGNOSIS — M54.2 NECK PAIN: ICD-10-CM

## 2023-08-15 DIAGNOSIS — G89.29 OTHER CHRONIC PAIN: ICD-10-CM

## 2023-08-15 RX ORDER — OXYCODONE AND ACETAMINOPHEN 5; 325 MG/1; MG/1
1 TABLET ORAL EVERY 6 HOURS PRN
Qty: 120 TABLET | Refills: 0 | Status: SHIPPED | OUTPATIENT
Start: 2023-08-17 | End: 2023-09-13

## 2023-08-15 NOTE — TELEPHONE ENCOUNTER
Routing refill request to provider for review/approval because:  Drug not on the [facility] refill protocol     Dulce MOFFETT RN   [facility]

## 2023-09-01 ENCOUNTER — PATIENT OUTREACH (OUTPATIENT)
Dept: CARE COORDINATION | Facility: CLINIC | Age: 68
End: 2023-09-01
Payer: COMMERCIAL

## 2023-09-13 ENCOUNTER — MYC REFILL (OUTPATIENT)
Dept: FAMILY MEDICINE | Facility: CLINIC | Age: 68
End: 2023-09-13
Payer: COMMERCIAL

## 2023-09-13 DIAGNOSIS — G89.29 OTHER CHRONIC PAIN: ICD-10-CM

## 2023-09-13 DIAGNOSIS — M54.2 NECK PAIN: ICD-10-CM

## 2023-09-13 RX ORDER — OXYCODONE AND ACETAMINOPHEN 5; 325 MG/1; MG/1
1 TABLET ORAL EVERY 6 HOURS PRN
Qty: 120 TABLET | Refills: 0 | Status: SHIPPED | OUTPATIENT
Start: 2023-09-16 | End: 2023-10-12

## 2023-10-05 ENCOUNTER — HOSPITAL ENCOUNTER (EMERGENCY)
Facility: CLINIC | Age: 68
Discharge: HOME | End: 2023-10-05
Attending: EMERGENCY MEDICINE | Admitting: EMERGENCY MEDICINE
Payer: COMMERCIAL

## 2023-10-05 VITALS
HEART RATE: 79 BPM | SYSTOLIC BLOOD PRESSURE: 159 MMHG | RESPIRATION RATE: 18 BRPM | TEMPERATURE: 98.6 F | OXYGEN SATURATION: 96 % | DIASTOLIC BLOOD PRESSURE: 81 MMHG

## 2023-10-05 DIAGNOSIS — K11.21 PAROTITIS, ACUTE: ICD-10-CM

## 2023-10-05 PROCEDURE — 250N000013 HC RX MED GY IP 250 OP 250 PS 637: Performed by: EMERGENCY MEDICINE

## 2023-10-05 PROCEDURE — 99283 EMERGENCY DEPT VISIT LOW MDM: CPT

## 2023-10-05 RX ADMIN — AMOXICILLIN AND CLAVULANATE POTASSIUM 1 TABLET: 875; 125 TABLET, FILM COATED ORAL at 19:44

## 2023-10-05 NOTE — ED TRIAGE NOTES
Pt arrives with swelling and lump to the right jaw near ear. Pt states they noticed this and hour and a half PTA. States she was eating when she noticed this. ABCS intact and Aox4.      Triage Assessment       Row Name 10/05/23 0306       Triage Assessment (Adult)    Airway WDL WDL       Respiratory WDL    Respiratory WDL WDL       Cardiac WDL    Cardiac WDL WDL

## 2023-10-06 NOTE — ED PROVIDER NOTES
History     Chief Complaint:  Facial Swelling       The history is provided by the patient.      Annalise Wallace is a 67 year old female who presents with right-sided facial swelling. Pain and swelling started today while eating. She endorses tenderness to her jaw that shoots up into her right ear. Patient denies any other symtoms including fever, cough, or rhinorrhea. No tooth pain. She denies vomiting or diarrhea. Patient has a history of six neck procedures with the most recent one being in December.        Independent Historian:    None- patient only     Review of External Notes:  None    Medications:    Norco  Ambien  Celexa  Prilosec  Lasix  Desyrel  Flexeril      Past Medical History:    Depression with anxiety  Post operative pain  Symptomatic female climacteric state  Acute maxillary sinusitis  Displacement of cervical intervertebral disc   Tobacco abuse  Abnormal Pap smear   GERD  Degenerative disc disease   UTI     Past Surgical History:    Left breast lump removal, benign  Cervical disk surgery  C-spine surgery  Hysterectomy vaginal, bilateral salpingo-oophorectomy  Back surgery  Discectomy lumbar minimally invasive one level x2  Fusion spine posterior minimally invasive one level      Physical Exam   Patient Vitals for the past 24 hrs:   BP Temp Temp src Pulse Resp SpO2   10/05/23 1947 -- -- -- -- -- 96 %   10/05/23 1946 -- -- -- -- -- 96 %   10/05/23 1945 (!) 159/81 -- -- 79 -- --   10/05/23 1639 (!) 185/88 98.6  F (37  C) Temporal 102 18 97 %        Physical Exam    GEN:    Pleasant, age appropriate.     Resting comfortably in the bed.  HEENT:    Tympanic membranes are clear bilateral.      Oropharynx is moist.       No tonsillar erythema without exudate or asymmetric edema.     No deviation of the uvula.     No pooling of secretions, trismus or sublingual edema.     No tenderness to the dentition     Mild soft tissue swelling over the right parotid gland with associated tenderness and reproduces the  patient's pain  Eyes:    Conjunctiva normal, PERRL  Neck:     Supple, no meningismus.    No pain with manipulation of the hyoid.   CV:     Regular rate and rhythm  No murmurs, rubs or gallops.    PULM:    Clear to auscultation bilateral.       No respiratory distress.       No stridor.  MSK:     No gross deformity to all four extremities.   LYMPH:   No cervical lymphadenopathy.  NEURO:   Alert.  Normal muscular tone, no atrophy.  Skin:    Warm, dry and intact.    PSYCH:    Mood is good and affect is appropriate.      Emergency Department Course     Emergency Department Course & Assessments:     Interventions:  Medications   amoxicillin-clavulanate (AUGMENTIN) 875-125 MG per tablet 1 tablet (1 tablet Oral $Given 10/5/23 1944)        Assessments:   I obtained history and examined the patient as noted above. At this point I feel that the patient is safe for discharge, and the patient agrees.     Independent Interpretation (X-rays, CTs, rhythm strip):  None    Consultations/Discussion of Management or Tests:  None       Social Determinants of Health affecting care:  None     Disposition:  The patient was discharged to home.     Impression & Plan      Medical Decision Makin-year-old female presents with progressively worsening swelling at the angle of the mandible on the right.  Edema is isolated to the right parotid gland.  She has no features concerning for dental infection, otitis media.  Low suspicion for salivary gland duct stone.  Evaluation most consistent with parotitis.  Patient encouraged to utilize sialagogues, fluid rehydration and Augmentin in event of atypical bacterial parotitis.      Diagnosis:    ICD-10-CM    1. Parotitis, acute  K11.21            Discharge Medications:  Discharge Medication List as of 10/5/2023  7:46 PM        START taking these medications    Details   amoxicillin-clavulanate (AUGMENTIN) 875-125 MG tablet Take 1 tablet by mouth 2 times daily for 10 days, Disp-20 tablet,  R-0, Local Print             Scribe Disclosure:  I, Anh Johnsonusen, am serving as a scribe at 7:38 PM on 10/5/2023 to document services personally performed by Davy Diaz MD based on my observations and the provider's statements to me.    10/5/2023   Davy Diaz MD Matthews, Jeremiah R, MD  10/05/23 2030

## 2023-10-12 ENCOUNTER — MYC MEDICAL ADVICE (OUTPATIENT)
Dept: FAMILY MEDICINE | Facility: CLINIC | Age: 68
End: 2023-10-12
Payer: COMMERCIAL

## 2023-10-12 ENCOUNTER — MYC REFILL (OUTPATIENT)
Dept: FAMILY MEDICINE | Facility: CLINIC | Age: 68
End: 2023-10-12
Payer: COMMERCIAL

## 2023-10-12 DIAGNOSIS — G89.29 OTHER CHRONIC PAIN: ICD-10-CM

## 2023-10-12 DIAGNOSIS — M54.2 NECK PAIN: ICD-10-CM

## 2023-10-12 RX ORDER — OXYCODONE AND ACETAMINOPHEN 5; 325 MG/1; MG/1
1 TABLET ORAL EVERY 6 HOURS PRN
Qty: 120 TABLET | Refills: 0 | Status: SHIPPED | OUTPATIENT
Start: 2023-10-12 | End: 2023-11-09

## 2023-10-12 NOTE — TELEPHONE ENCOUNTER
Routing refill request to provider for review/approval because:  Drug not on the [facility] refill protocol   Bozena Herrera RN

## 2023-10-19 ENCOUNTER — OFFICE VISIT (OUTPATIENT)
Dept: URGENT CARE | Facility: URGENT CARE | Age: 68
End: 2023-10-19
Payer: COMMERCIAL

## 2023-10-19 VITALS
WEIGHT: 163 LBS | RESPIRATION RATE: 14 BRPM | TEMPERATURE: 97.7 F | DIASTOLIC BLOOD PRESSURE: 84 MMHG | BODY MASS INDEX: 26.31 KG/M2 | HEART RATE: 68 BPM | SYSTOLIC BLOOD PRESSURE: 140 MMHG | OXYGEN SATURATION: 98 %

## 2023-10-19 DIAGNOSIS — K11.20 PAROTIDITIS: Primary | ICD-10-CM

## 2023-10-19 PROCEDURE — 99213 OFFICE O/P EST LOW 20 MIN: CPT | Performed by: FAMILY MEDICINE

## 2023-10-19 RX ORDER — FLUCONAZOLE 150 MG/1
150 TABLET ORAL WEEKLY
Qty: 2 TABLET | Refills: 0 | Status: SHIPPED | OUTPATIENT
Start: 2023-10-19 | End: 2023-10-27

## 2023-10-19 NOTE — PROGRESS NOTES
ASSESSMENT/  PLAN:  Parotiditis     - amoxicillin-clavulanate (AUGMENTIN) 875-125 MG tablet; Take 1 tablet by mouth 2 times daily  - fluconazole (DIFLUCAN) 150 MG tablet; Take 1 tablet (150 mg) by mouth once a week for 2 doses One tablet per week-  for yeast infection vaginal due to antibiotic  - Adult ENT  Referral; Future     Recommend that patient stimulate saliva production by sucking on lemon drops or other hard candy to try to dislodge the salivary obstruction     Acetaminophen / ibuprofen for pain.   Follow-up with  ENT for evaluation of parotid obstruction-  given referral  Given education materials on obstruction of the salivary gland  May apply a warm pack to the region to improve blood flow to the salivary gland    -----------------------------------------------------------------------------------------------------------------------------------------    SUBJECTIVE:  Chief Complaint   Patient presents with    Breast Mass     Was in the  ER about 2 weeks   and was put on Augmented --NOW the lump is back ---its tender ----Lump on side of neck-per ER message in [employer]     Annalise Wallace is a 67 year old female  with a chief complaint of swelling and tenderness in the right   cheek .  There has been  no associated redness,  warmth and purulence  in the swollen area.         Onset of symptoms was 15 day(s) ago.  -  she was evaluated in ER, treated with Augmentin,  symptoms improved,  then swelling repeated last night    no swelling/ pain localized to a tooth or the gingiva    Course of illness: worsening last night ,  swelling decreased now.    Severity:  moderate.  Current and Associated symptoms:  mild right cheek tenderness  Treatment measures tried include Tylenol/Ibuprofen and she is finishing augmentin.  Has developed vaginal yeast from antibiotic    Past Medical History:   Diagnosis Date    Abnormal Papanicolaou smear of cervix and cervical HPV     Degenerative disc disease     Depressive  disorder, not elsewhere classified     Displacement of cervical intervertebral disc without myelopathy     Gastro-oesophageal reflux disease     H/O hysterectomy for benign disease     PONV (postoperative nausea and vomiting)     TOBACCO ABUSE-CONTINUOUS      Patient Active Problem List   Diagnosis    Carcinoma in situ of cervix uteri    Acute maxillary sinusitis    Symptomatic menopausal or female climacteric states    CARDIOVASCULAR SCREENING; LDL GOAL LESS THAN 160    Health Care Home    Depression with anxiety    Benign neoplasm of skin    Adrenal adenoma    Ganglion cyst    Other chronic pain    Inflamed seborrheic keratosis    Persistent disorder of initiating or maintaining sleep    S/P cervical spinal fusion    Primary insomnia    Diaphragmatic hernia without obstruction and without gangrene    Spinal stenosis in cervical region    Controlled substance agreement signed    DDD (degenerative disc disease), cervical    F11.2 - Continuous opioid dependence (H)       ALLERGIES:  Sulfa antibiotics    MEDs  citalopram (CELEXA) 20 MG tablet, Take 1 tablet (20 mg) by mouth daily  omeprazole (PRILOSEC) 40 MG DR capsule, Take 1 capsule (40 mg) by mouth daily  oxyCODONE-acetaminophen (PERCOCET) 5-325 MG tablet, Take 1 tablet by mouth every 6 hours as needed for severe pain  tiZANidine (ZANAFLEX) 4 MG tablet, Take 1 tablet (4 mg) by mouth 3 times daily  traZODone (DESYREL) 100 MG tablet, Take 1 tablet (100 mg) by mouth daily  naloxone (NARCAN) 4 MG/0.1ML nasal spray,     No current facility-administered medications on file prior to visit.      Social History     Tobacco Use    Smoking status: Former     Packs/day: 1.00     Years: 30.00     Additional pack years: 0.00     Total pack years: 30.00     Types: Cigarettes     Quit date: 2017     Years since quittin.3    Smokeless tobacco: Never   Substance Use Topics    Alcohol use: No       Family History   Problem Relation Age of Onset    Cancer Mother         had  spread everywhere    Cancer Father         throat    Diabetes Paternal Grandmother        ROS:  CONSTITUTIONAL:NEGATIVE for fever, chills,    INTEGUMENTARY/SKIN: NEGATIVE for worrisome rashes, or lesions  EYES: NEGATIVE for vision changes or irritation  RESP:NEGATIVE for significant cough or SOB       OBJECTIVE:   BP (!) 140/84 (BP Location: Right arm, Patient Position: Chair, Cuff Size: Adult Regular)   Pulse 68   Temp 97.7  F (36.5  C) (Oral)   Resp 14   Wt 73.9 kg (163 lb)   SpO2 98%   BMI 26.31 kg/m     Swelling in the right parotid, sublingual   region with no associated redness, warmth, purulence,  has mild tenderness to palpation  GENERAL APPEARANCE:  , alert and mild distress,  EYES: EOMI,  PERRL, conjunctiva clear  ,HENT: ear canals and TM's normal.  Nose normal.  Pharynx  no erythema , no exudate noted.  NECK: supple, non-tender to palpation, no adenopathy noted,  RESP: lungs clear to auscultation - no rales, rhonchi or wheezes  ,CV: regular rates and rhythm, normal S1 S2, no murmur noted,   SKIN: no suspicious lesions or rashes

## 2023-11-09 ENCOUNTER — MYC REFILL (OUTPATIENT)
Dept: FAMILY MEDICINE | Facility: CLINIC | Age: 68
End: 2023-11-09
Payer: COMMERCIAL

## 2023-11-09 DIAGNOSIS — G89.29 OTHER CHRONIC PAIN: ICD-10-CM

## 2023-11-09 DIAGNOSIS — M54.2 NECK PAIN: ICD-10-CM

## 2023-11-12 RX ORDER — OXYCODONE AND ACETAMINOPHEN 5; 325 MG/1; MG/1
1 TABLET ORAL EVERY 6 HOURS PRN
Qty: 120 TABLET | Refills: 0 | Status: SHIPPED | OUTPATIENT
Start: 2023-11-13 | End: 2023-12-11

## 2023-11-18 ENCOUNTER — HEALTH MAINTENANCE LETTER (OUTPATIENT)
Age: 68
End: 2023-11-18

## 2023-11-22 ENCOUNTER — TELEPHONE (OUTPATIENT)
Dept: NEUROSURGERY | Facility: CLINIC | Age: 68
End: 2023-11-22
Payer: COMMERCIAL

## 2023-11-22 NOTE — TELEPHONE ENCOUNTER
LVM to call back to schedule 1 year follow up from surgery with Dr. Ma in December. Also needs an XR prior to that appointment. DOS: 12/23/22. C2 to T2 posterior segmental instrumentation. Left C3 to C4 and C7 to T2 foraminotomies. Posterior arthrodesis C2 to T2. Any  can assist with this request.

## 2023-11-28 ENCOUNTER — TELEPHONE (OUTPATIENT)
Dept: NEUROSURGERY | Facility: CLINIC | Age: 68
End: 2023-11-28
Payer: COMMERCIAL

## 2023-11-28 NOTE — TELEPHONE ENCOUNTER
LVM for patient that an XR has been scheduled prior to her appointment with Dr. Ma on 12/4/23. Advised patient to call back if she has questions.

## 2023-12-04 ENCOUNTER — OFFICE VISIT (OUTPATIENT)
Dept: NEUROSURGERY | Facility: CLINIC | Age: 68
End: 2023-12-04
Attending: NEUROLOGICAL SURGERY
Payer: COMMERCIAL

## 2023-12-04 ENCOUNTER — ANCILLARY PROCEDURE (OUTPATIENT)
Dept: GENERAL RADIOLOGY | Facility: CLINIC | Age: 68
End: 2023-12-04
Attending: NEUROLOGICAL SURGERY
Payer: COMMERCIAL

## 2023-12-04 ENCOUNTER — MYC MEDICAL ADVICE (OUTPATIENT)
Dept: FAMILY MEDICINE | Facility: CLINIC | Age: 68
End: 2023-12-04

## 2023-12-04 VITALS — DIASTOLIC BLOOD PRESSURE: 84 MMHG | HEART RATE: 73 BPM | SYSTOLIC BLOOD PRESSURE: 167 MMHG | OXYGEN SATURATION: 96 %

## 2023-12-04 DIAGNOSIS — Z98.1 S/P CERVICAL SPINAL FUSION: ICD-10-CM

## 2023-12-04 DIAGNOSIS — M54.16 SPINAL STENOSIS OF LUMBAR REGION WITH RADICULOPATHY: Primary | ICD-10-CM

## 2023-12-04 DIAGNOSIS — M48.061 SPINAL STENOSIS OF LUMBAR REGION WITH RADICULOPATHY: Primary | ICD-10-CM

## 2023-12-04 PROCEDURE — G0463 HOSPITAL OUTPT CLINIC VISIT: HCPCS | Performed by: NEUROLOGICAL SURGERY

## 2023-12-04 PROCEDURE — 99214 OFFICE O/P EST MOD 30 MIN: CPT | Performed by: NEUROLOGICAL SURGERY

## 2023-12-04 PROCEDURE — 72040 X-RAY EXAM NECK SPINE 2-3 VW: CPT | Mod: TC | Performed by: RADIOLOGY

## 2023-12-04 ASSESSMENT — PAIN SCALES - GENERAL: PAINLEVEL: SEVERE PAIN (7)

## 2023-12-04 NOTE — NURSING NOTE
"Annalise Wallace is a 68 year old female who presents for:  Chief Complaint   Patient presents with    Surgical Followup        Vitals:    Vitals:    12/04/23 1254   BP: (!) 167/84   Pulse: 73   SpO2: 96%       BMI:  Estimated body mass index is 26.31 kg/m  as calculated from the following:    Height as of 7/19/23: 5' 6\" (1.676 m).    Weight as of 10/19/23: 163 lb (73.9 kg).    Pain Score:  Severe Pain (7)        Amendo Phorn      "

## 2023-12-04 NOTE — LETTER
"    12/4/2023         RE: Annalise Wallace  [address]  Count [place] the [facility] [address]        Dear Colleague,    Thank you for referring your patient, Annalise Wallace, to the [facility]OS[facility] [place]. Please see a copy of my visit note below.    It was a pleasure to see Annalise Wallace today in Neurosurgery Clinic. She is a 68 year old female who underwent:    Procedure Date: 12/23/2022     PREOPERATIVE DIAGNOSIS:  Cervical spondylosis with radiculopathy, status post cervical fusion.     POSTOPERATIVE DIAGNOSIS:  Cervical spondylosis with radiculopathy, status post cervical fusion.     PROCEDURE:  1.  C2 to T2 posterior segmental instrumentation.  2.  Left C3 to C4, C7 to T2 foraminotomies.  3.  Posterior arthrodesis, C2 to T2 using allograft cancellous chips.  4.  [facility].     SURGEON:  Layton Ma M.D.      ANESTHESIA:  General endotracheal anesthesia.     ESTIMATED BLOOD LOSS:  200 mL    Overall she is doing well from her cervical surgery and besides some heaviness feels like she is doing well.  Her main concern is back and lower extremity symptoms.  She had initially right lower extremity symptoms in August but over the last 4 to 6 weeks has had more left lower extremity symptoms and what sounds like an S1 distribution.    She has a history of previous lumbar fusion by Dr. Pitt approximately 10 years ago.        Vitals:    12/04/23 1254   BP: (!) 167/84   Pulse: 73   SpO2: 96%     Estimated body mass index is 26.31 kg/m  as calculated from the following:    Height as of 7/19/23: 1.676 m (5' 6\").    Weight as of 10/19/23: 73.9 kg (163 lb).  Severe Pain (7)    Well-healed lumbar incisions  Bilateral lower extremity strength is 5 out of 5 in all muscle groups.    Imaging: Review of her cervical x-rays demonstrate stable alignment of the cervical spine and hardware with areas of progressing fusion.    Assessment: 1.  Status post cervical fusion, doing " well.  2.  Lumbar stenosis with radiculopathy.    Plan: Given the persistence of her symptoms I have ordered an MRI of the lumbar spine without contrast.  Once we have the results we will follow-up with the patient for a treatment plan.         Again, thank you for allowing me to participate in the care of your patient.        Sincerely,        Layton Ma MD

## 2023-12-04 NOTE — PROGRESS NOTES
"It was a pleasure to see Annalise Wallace today in Neurosurgery Clinic. She is a 68 year old female who underwent:    Procedure Date: 12/23/2022     PREOPERATIVE DIAGNOSIS:  Cervical spondylosis with radiculopathy, status post cervical fusion.     POSTOPERATIVE DIAGNOSIS:  Cervical spondylosis with radiculopathy, status post cervical fusion.     PROCEDURE:  1.  C2 to T2 posterior segmental instrumentation.  2.  Left C3 to C4, C7 to T2 foraminotomies.  3.  Posterior arthrodesis, C2 to T2 using allograft cancellous chips.  4.  [facility] tong placement.     SURGEON:  Layton Ma M.D.      ANESTHESIA:  General endotracheal anesthesia.     ESTIMATED BLOOD LOSS:  200 mL    Overall she is doing well from her cervical surgery and besides some heaviness feels like she is doing well.  Her main concern is back and lower extremity symptoms.  She had initially right lower extremity symptoms in August but over the last 4 to 6 weeks has had more left lower extremity symptoms and what sounds like an S1 distribution.    She has a history of previous lumbar fusion by Dr. Pitt approximately 10 years ago.        Vitals:    12/04/23 1254   BP: (!) 167/84   Pulse: 73   SpO2: 96%     Estimated body mass index is 26.31 kg/m  as calculated from the following:    Height as of 7/19/23: 1.676 m (5' 6\").    Weight as of 10/19/23: 73.9 kg (163 lb).  Severe Pain (7)    Well-healed lumbar incisions  Bilateral lower extremity strength is 5 out of 5 in all muscle groups.    Imaging: Review of her cervical x-rays demonstrate stable alignment of the cervical spine and hardware with areas of progressing fusion.    Assessment: 1.  Status post cervical fusion, doing well.  2.  Lumbar stenosis with radiculopathy.    Plan: Given the persistence of her symptoms I have ordered an MRI of the lumbar spine without contrast.  Once we have the results we will follow-up with the patient for a treatment plan.     "

## 2023-12-04 NOTE — PATIENT INSTRUCTIONS
Patient Next Steps:      Order placed for lumbar MRI imaging. Central Scheduling will call you to make the appointment. If you do not hear from them within 1-2 business days you can call the numbers below. We will call you with the results and next steps once imaging is completed.  [phone]       Please call us if you have any further questions or concerns.    [facility]   Phone: [phone]  Fax: [phone]

## 2023-12-11 ENCOUNTER — MYC REFILL (OUTPATIENT)
Dept: FAMILY MEDICINE | Facility: CLINIC | Age: 68
End: 2023-12-11
Payer: COMMERCIAL

## 2023-12-11 DIAGNOSIS — M54.2 NECK PAIN: ICD-10-CM

## 2023-12-11 DIAGNOSIS — G89.29 OTHER CHRONIC PAIN: ICD-10-CM

## 2023-12-11 RX ORDER — OXYCODONE AND ACETAMINOPHEN 5; 325 MG/1; MG/1
1 TABLET ORAL EVERY 6 HOURS PRN
Qty: 120 TABLET | Refills: 0 | Status: SHIPPED | OUTPATIENT
Start: 2023-12-13 | End: 2024-01-10

## 2023-12-26 ENCOUNTER — HOSPITAL ENCOUNTER (OUTPATIENT)
Dept: MRI IMAGING | Facility: CLINIC | Age: 68
Discharge: HOME | End: 2023-12-26
Attending: NEUROLOGICAL SURGERY | Admitting: NEUROLOGICAL SURGERY
Payer: COMMERCIAL

## 2023-12-26 DIAGNOSIS — M54.16 SPINAL STENOSIS OF LUMBAR REGION WITH RADICULOPATHY: ICD-10-CM

## 2023-12-26 DIAGNOSIS — M48.061 SPINAL STENOSIS OF LUMBAR REGION WITH RADICULOPATHY: ICD-10-CM

## 2023-12-26 PROCEDURE — 72148 MRI LUMBAR SPINE W/O DYE: CPT

## 2024-01-10 ENCOUNTER — MYC REFILL (OUTPATIENT)
Dept: FAMILY MEDICINE | Facility: CLINIC | Age: 69
End: 2024-01-10
Payer: COMMERCIAL

## 2024-01-10 DIAGNOSIS — M54.2 NECK PAIN: ICD-10-CM

## 2024-01-10 DIAGNOSIS — K21.00 GASTROESOPHAGEAL REFLUX DISEASE WITH ESOPHAGITIS WITHOUT HEMORRHAGE: ICD-10-CM

## 2024-01-10 DIAGNOSIS — G89.29 OTHER CHRONIC PAIN: ICD-10-CM

## 2024-01-10 RX ORDER — OXYCODONE AND ACETAMINOPHEN 5; 325 MG/1; MG/1
1 TABLET ORAL EVERY 6 HOURS PRN
Qty: 120 TABLET | Refills: 0 | Status: SHIPPED | OUTPATIENT
Start: 2024-01-13 | End: 2024-01-22

## 2024-01-10 RX ORDER — OMEPRAZOLE 40 MG/1
40 CAPSULE, DELAYED RELEASE ORAL DAILY
Qty: 90 CAPSULE | Refills: 0 | Status: SHIPPED | OUTPATIENT
Start: 2024-01-10 | End: 2024-04-02

## 2024-01-22 ENCOUNTER — OFFICE VISIT (OUTPATIENT)
Dept: FAMILY MEDICINE | Facility: CLINIC | Age: 69
End: 2024-01-22
Attending: FAMILY MEDICINE
Payer: COMMERCIAL

## 2024-01-22 VITALS
SYSTOLIC BLOOD PRESSURE: 125 MMHG | DIASTOLIC BLOOD PRESSURE: 77 MMHG | RESPIRATION RATE: 10 BRPM | BODY MASS INDEX: 27.64 KG/M2 | HEART RATE: 80 BPM | OXYGEN SATURATION: 96 % | TEMPERATURE: 97.5 F | HEIGHT: 66 IN | WEIGHT: 172 LBS

## 2024-01-22 DIAGNOSIS — Z12.11 SCREEN FOR COLON CANCER: ICD-10-CM

## 2024-01-22 DIAGNOSIS — G89.29 OTHER CHRONIC PAIN: ICD-10-CM

## 2024-01-22 DIAGNOSIS — I10 BENIGN ESSENTIAL HYPERTENSION: ICD-10-CM

## 2024-01-22 DIAGNOSIS — Z12.31 ENCOUNTER FOR SCREENING MAMMOGRAM FOR BREAST CANCER: ICD-10-CM

## 2024-01-22 DIAGNOSIS — Z29.11 NEED FOR VACCINATION AGAINST RESPIRATORY SYNCYTIAL VIRUS: ICD-10-CM

## 2024-01-22 DIAGNOSIS — M54.2 NECK PAIN: ICD-10-CM

## 2024-01-22 DIAGNOSIS — Z23 NEED FOR PROPHYLACTIC VACCINATION AGAINST HEPATITIS A: ICD-10-CM

## 2024-01-22 DIAGNOSIS — F11.20 CONTINUOUS OPIOID DEPENDENCE (H): ICD-10-CM

## 2024-01-22 DIAGNOSIS — M51.369 DDD (DEGENERATIVE DISC DISEASE), LUMBAR: Primary | ICD-10-CM

## 2024-01-22 PROCEDURE — G0481 DRUG TEST DEF 8-14 CLASSES: HCPCS | Performed by: FAMILY MEDICINE

## 2024-01-22 PROCEDURE — 99214 OFFICE O/P EST MOD 30 MIN: CPT | Performed by: FAMILY MEDICINE

## 2024-01-22 RX ORDER — RESPIRATORY SYNCYTIAL VIRUS VACCINE 120MCG/0.5
0.5 KIT INTRAMUSCULAR ONCE
Qty: 1 EACH | Refills: 0 | Status: CANCELLED | OUTPATIENT
Start: 2024-01-22 | End: 2024-01-22

## 2024-01-22 RX ORDER — OXYCODONE AND ACETAMINOPHEN 5; 325 MG/1; MG/1
1 TABLET ORAL EVERY 6 HOURS PRN
Qty: 120 TABLET | Refills: 0 | Status: SHIPPED | OUTPATIENT
Start: 2024-02-13 | End: 2024-03-11

## 2024-01-22 NOTE — Clinical Note
Saw patient today - sent to spine clinic for pain management but she was hoping to get some advise from you based on her recent MRI.   Can you have someone from your office reach out to her with results and plan?

## 2024-01-22 NOTE — PROGRESS NOTES
"  Assessment & Plan     Need for prophylactic vaccination against hepatitis A  Pharmacy     Need for vaccination against respiratory syncytial virus  Pharmacy     Screen for colon cancer    - Fecal colorectal cancer screen FIT - Future (S+30)    Other chronic pain  Not under ideal control   - [id number] - Urine Drug Confirmation Panel (Comprehensive)  - naloxone (NARCAN) 4 MG/0.1ML nasal spray  Dispense: 0.2 mL  - oxyCODONE-acetaminophen (PERCOCET) 5-325 MG tablet  Dispense: 120 tablet; Refill: 0    Benign essential hypertension  Under control   - [id number] - Urine Drug Confirmation Panel (Comprehensive)    DDD (degenerative disc disease), lumbar  Reason for newer pain  Will message Dr. Ma   - ICD2102 - Urine Drug Confirmation Panel (Comprehensive)  - Spine  Referral    Continuous opioid dependence (H)  Refer for pain control   - Spine  Referral    Neck pain  Ongoing  Continue   - oxyCODONE-acetaminophen (PERCOCET) 5-325 MG tablet  Dispense: 120 tablet; Refill: 0    Encounter for screening mammogram for breast cancer    - *MA Screening Digital Bilateral        30 minutes spent by me on the date of the encounter doing chart review, history and exam, documentation and further activities per the note      BMI  Estimated body mass index is 27.76 kg/m  as calculated from the following:    Height as of this encounter: 1.676 m (5' 6\").    Weight as of this encounter: 78 kg (172 lb).       See Patient Instructions    Subjective   Annalise is a 68 year old, presenting for the following health issues:  she is here for pain recheck.   She does see neurosurgeon and had MRI of low back done but they have not gotten back to her with the results or the plan.   She can see the results in her mychart.   She is on 120 oxy per month and this has been stable for some time.   She has had 6 surgeries on her neck and this is stable.   The pain is harder to deal with and harder to sleep.     RECHECK (Back pain, Neck pain)        " 1/22/2024     8:58 AM   Additional Questions   Roomed by Charles SOU[facility]     History of Present Illness       Reason for visit:  Check up    She eats 2-3 servings of fruits and vegetables daily.She consumes 0 sweetened beverage(s) daily.She exercises with enough effort to increase her heart rate 20 to 29 minutes per day.  She exercises with enough effort to increase her heart rate 3 or less days per week.   She is taking medications regularly.         Pain History:  When did you first notice your pain? Years   Have you seen this provider for your pain in the past? Yes   Where in your body do you have pain? Neck  Are you seeing anyone else for your pain? Yes - neurologist - DR. Ma        5/6/2021     3:06 PM 9/3/2021    12:17 PM 7/19/2023     9:30 AM   PHQ-9 SCORE   PHQ-9 Total Score MyChart 0 0 1 (Minimal depression)   PHQ-9 Total Score 0 0 1           5/6/2021     3:06 PM 9/3/2021    12:18 PM 7/19/2023     9:30 AM   JEANNINE-7 SCORE   Total Score 0 (minimal anxiety) 0 (minimal anxiety) 0 (minimal anxiety)   Total Score 0 0 0           7/19/2023     9:42 AM 1/22/2024     9:03 AM   PEG Score   PEG Total Score 5 3.67           5/6/2021     3:06 PM 9/3/2021    12:17 PM 7/19/2023     9:30 AM   PHQ-9 SCORE   PHQ-9 Total Score MyChart 0 0 1 (Minimal depression)   PHQ-9 Total Score 0 0 1           5/6/2021     3:06 PM 9/3/2021    12:18 PM 7/19/2023     9:30 AM   JEANNINE-7 SCORE   Total Score 0 (minimal anxiety) 0 (minimal anxiety) 0 (minimal anxiety)   Total Score 0 0 0       Chronic Pain Follow Up:    Location of pain: neck and more lately low back   Analgesia/pain control:    - Recent changes:  none other than pain - has MRI results     - Overall control: Inadequate pain control    - Current treatments: oxy 120 per month    Adherence:     - Do you ever take more pain medicine than prescribed? No    - When did you take your last dose of pain medicine?  Today    Adverse effects: No   PDMP Review         Value Time User    State PDMP site  checked  Yes 12/11/2023 10:31 AM O&#39;Cyndi Mott MD          Last CSA Agreement:   CSA -- Patient Level:     [Media Unavailable] Controlled Substance Agreement - Opioid - Scan on 4/12/2023  2:14 PM   [Media Unavailable] Controlled Substance Agreement - Opioid - Scan on 2/27/2022  6:22 PM   [Media Unavailable] Controlled Substance Agreement - Opioid - Scan on 1/27/2022 12:59 PM: Opioid   [Media Unavailable] Controlled Substance Agreement - Opioid - Scan on 10/2/2021  3:43 PM       Last UDS: 8/30/2022        Past Medical History:   Diagnosis Date    Abnormal Papanicolaou smear of cervix and cervical HPV     Degenerative disc disease     Depressive disorder, not elsewhere classified     Displacement of cervical intervertebral disc without myelopathy     Gastro-oesophageal reflux disease     H/O hysterectomy for benign disease     PONV (postoperative nausea and vomiting)     TOBACCO ABUSE-CONTINUOUS        Past Surgical History:   Procedure Laterality Date    COLONOSCOPY  2008    normal    DISCECTOMY LUMBAR MINIMALLY INVASIVE ONE LEVEL  02/13/2014    Procedure: DISCECTOMY LUMBAR MINIMALLY INVASIVE ONE LEVEL;  Minimally Invasive Discectomy L2-3 Left ;  Surgeon: Juanjose Pitt MD;  Location: [facility] OR    DISCECTOMY LUMBAR MINIMALLY INVASIVE ONE LEVEL      FECAL COLORECTAL CANCER SCREEN FIT  10/2022    did home test    FUSION SPINE POSTERIOR MINIMALLY INVASIVE ONE LEVEL  05/15/2014    Procedure: FUSION SPINE POSTERIOR MINIMALLY INVASIVE ONE LEVEL;  Surgeon: Juanjose Pitt MD;  Location: [facility] OR    HYSTERECTOMY VAGINAL, BILATERAL SALPINGO-OOPHERECTOMY, COMBINED      done for precancer cells - did not need chemo or radiation    OPTICAL TRACKING SYSTEM FUSION POSTERIOR CERVICAL THREE + LEVELS N/A 12/23/2022    Procedure: Cervical 2 to Thoracic 2 posterior segmental instrumentation. Left Cervical 3 to Cervical 4 and Cervical 7 to Thoracic 2 foraminotomies. Posterior arthrodesis Cervical 2 to Thoracic 2 using  allograft cancellous chips. Aung acosta placement.;  Surgeon: Layton Ma MD;  Location:  OR    ZZC NONSPECIFIC PROCEDURE      Lump removed L. breast - benign    Z NONSPECIFIC PROCEDURE      6 surgeries over time - first was early s - 3 in front and 2 in back    Z NONSPECIFIC PROCEDURE      C-spine - Nany       MEDICATIONS:  Current Outpatient Medications   Medication    citalopram (CELEXA) 20 MG tablet    naloxone (NARCAN) 4 MG/0.1ML nasal spray    omeprazole (PRILOSEC) 40 MG DR capsule    oxyCODONE-acetaminophen (PERCOCET) 5-325 MG tablet    tiZANidine (ZANAFLEX) 4 MG tablet    traZODone (DESYREL) 100 MG tablet     No current facility-administered medications for this visit.       SOCIAL HISTORY:  Social History     Tobacco Use    Smoking status: Former     Packs/day: 1.00     Years: 30.00     Additional pack years: 0.00     Total pack years: 30.00     Types: Cigarettes     Quit date: 2017     Years since quittin.5    Smokeless tobacco: Never   Substance Use Topics    Alcohol use: No       Family History   Problem Relation Age of Onset    Cancer Mother         had spread everywhere    Cancer Father         throat    Diabetes Paternal Grandmother            Chronic/Recurring Back Pain Follow Up    Where is your back pain located? (Select all that apply) low back bilateral  How would you describe your back pain?  sharp and shooting  Where does your back pain spread? the right and left buttock, the right and left  thigh, the right and left  knee, and the right and left foot  Since your last clinic visit for back pain, how has your pain changed? always present, but gets better and worse  Does your back pain interfere with your job? Not applicable  Since your last visit, have you tried any new treatment? No        Review of Systems  Constitutional, neuro, ENT, endocrine, pulmonary, cardiac, gastrointestinal, genitourinary, musculoskeletal, integument and psychiatric systems are  "negative, except as otherwise noted.      Objective    /77 (BP Location: Left arm, Patient Position: Chair, Cuff Size: Adult Regular)   Pulse 80   Temp 97.5  F (36.4  C) (Oral)   Resp 10   Ht 1.676 m (5' 6\")   Wt 78 kg (172 lb)   SpO2 96%   BMI 27.76 kg/m    Body mass index is 27.76 kg/m .  Physical Exam   GENERAL: alert and no distress  EYES: Eyes grossly normal to inspection, PERRL and conjunctivae and sclerae normal  NECK: no adenopathy, no asymmetry, masses, or scars  RESP: lungs clear to auscultation - no rales, rhonchi or wheezes  CV: regular rate and rhythm, normal S1 S2, no S3 or S4, no murmur, click or rub, no peripheral edema  MS: neck exam shows limited ROM due to her previous surgeries - posterior neck scar  SKIN: no suspicious lesions or rashes  NEURO: Normal strength and tone, mentation intact and speech normal  PSYCH: mentation appears normal, affect normal/bright  LYMPH: no cervical, supraclavicular, axillary, or inguinal adenopathy    Office Visit on 04/12/2023   Component Date Value Ref Range Status    Sodium 04/12/2023 140  136 - 145 mmol/L Final    Potassium 04/12/2023 3.5  3.4 - 5.3 mmol/L Final    Chloride 04/12/2023 104  98 - 107 mmol/L Final    Carbon Dioxide (CO2) 04/12/2023 22  22 - 29 mmol/L Final    Anion Gap 04/12/2023 14  7 - 15 mmol/L Final    Urea Nitrogen 04/12/2023 15.0  8.0 - 23.0 mg/dL Final    Creatinine 04/12/2023 0.83  0.51 - 0.95 mg/dL Final    Calcium 04/12/2023 9.4  8.8 - 10.2 mg/dL Final    Glucose 04/12/2023 85  70 - 99 mg/dL Final    GFR Estimate 04/12/2023 76  >60 mL/min/1.73m2 Final    eGFR calculated using 2021 CKD-EPI equation.       IMPRESSION:    1. Degenerative changes below the postoperative level at L2-L3. These  have progressed since prior MR 9/13/2019.  2. At L3-L4, there is mild spinal canal narrowing as well as mild  bilateral neural foraminal narrowing.  3. At L4-L5, there is mild right and moderate left neural foraminal  narrowing.  4. At " L5-S1, there is narrowing of the left lateral recess. Mild right  and moderate left neural foraminal narrowing.    Signed Electronically by: Cyndi Chirinos MD

## 2024-01-23 LAB — CREAT UR-MCNC: 172 MG/DL

## 2024-01-24 LAB
OXYCODONE UR CFM-MCNC: 2680 NG/ML
OXYCODONE/CREAT UR: 1558 NG/MG {CREAT}
OXYMORPHONE UR CFM-MCNC: 516 NG/ML
OXYMORPHONE/CREAT UR: 300 NG/MG {CREAT}

## 2024-01-31 ENCOUNTER — TELEPHONE (OUTPATIENT)
Dept: NEUROSURGERY | Facility: CLINIC | Age: 69
End: 2024-01-31
Payer: COMMERCIAL

## 2024-01-31 NOTE — TELEPHONE ENCOUNTER
A message was left for patient to call and schedule a follow up appointment with Dr Ma to review results of most recent MRI.  Dr Ma has approved either a clinic visit or phone visit for this discussion.    Patient was given the Central Scheduling phone line of [phone] to schedule the appointment.

## 2024-02-08 ENCOUNTER — TELEPHONE (OUTPATIENT)
Dept: NEUROSURGERY | Facility: CLINIC | Age: 69
End: 2024-02-08

## 2024-02-08 NOTE — TELEPHONE ENCOUNTER
As per Dr Ma, can schedule telephone visit in afternoon 2/9/24 at 1pm.     [place] [facility] [place] exclusively for [place] patients

## 2024-02-08 NOTE — TELEPHONE ENCOUNTER
Date: 2/8/2024  (Provide the date when patient was called)  Provider: Dr. Ma ( held slot for patient 2/9/2024 @ 1pm )  Reason for out-going call: review imaging / results   (with whom  should patient alicia with/ reason patient was contacted)  Detailed message: attempted to call patient to alicia telephone visit with Dr. Ma. LDVM for the patient to call back and coordinate care.

## 2024-02-13 NOTE — TELEPHONE ENCOUNTER
Patient was reached today to schedule a phone visit to review results of latest MRI with Dr. aM.  Provider schedule is completely booked until 2-27-24.  Is there a possibility of adding this patient on at 11:40 double booked on 2-16-24.

## 2024-02-16 ENCOUNTER — VIRTUAL VISIT (OUTPATIENT)
Dept: NEUROSURGERY | Facility: CLINIC | Age: 69
End: 2024-02-16
Payer: COMMERCIAL

## 2024-02-16 DIAGNOSIS — M48.061 SPINAL STENOSIS OF LUMBAR REGION WITH RADICULOPATHY: Primary | ICD-10-CM

## 2024-02-16 DIAGNOSIS — M48.062 LUMBAR STENOSIS WITH NEUROGENIC CLAUDICATION: ICD-10-CM

## 2024-02-16 DIAGNOSIS — M54.16 SPINAL STENOSIS OF LUMBAR REGION WITH RADICULOPATHY: Primary | ICD-10-CM

## 2024-02-16 PROCEDURE — 99441 PR PHYSICIAN TELEPHONE EVALUATION 5-10 MIN: CPT | Mod: 93 | Performed by: NEUROLOGICAL SURGERY

## 2024-02-16 NOTE — PATIENT INSTRUCTIONS
Patient Next Steps:    Order placed for epidural steroid injection  The steroid can take 10-14 days to reach max effect  Please call our clinic if symptoms persist after this timeframe.  You can call [facility] to schedule your injection: [phone]    Order placed for physical therapy. You can call the phone number highlighted in the order to schedule your appointment. Please call our clinic if symptoms persist after your course of physical therapy.  If you have not heard from the scheduling office within 2 business days, please call [phone] for [employer], [phone] for [employer] and [phone] for [employer].        Please call us if you have any further questions or concerns.     [facility]   Phone: [phone]  Fax: [phone]

## 2024-02-16 NOTE — LETTER
"    2/16/2024         RE: Annalise Wallace  [address]  [facility] [address]        Dear Colleague,    Thank you for referring your patient, Annalise Wallace, to the [facility]. Please see a copy of my visit note below.    It was a pleasure to see Annalise Wallace today in Neurosurgery Clinic. She is a 68 year old female who is here by phone to review the results of her MRI.    We reviewed her symptoms which include low back pain radiating into the lower extremities.  Seems to go back and forth between the legs.  It tends to radiate down the back of the legs worse with standing and walking.    There were no vitals filed for this visit.  Estimated body mass index is 27.76 kg/m  as calculated from the following:    Height as of 1/22/24: 1.676 m (5' 6\").    Weight as of 1/22/24: 78 kg (172 lb).  Data Unavailable    Phone visit    Imaging: Review of her MRI shows moderate to severe stenosis at L3-4 at the level below her previous fusion.  There may also be some lateral recess stenosis at L5-S1 that correlates with her symptomatology.  Imaging was reviewed with the patient shown to the patient in clinic today.    Assessment: Lumbar stenosis with neurogenic claudication/radiculopathy.    Plan: I recommended an epidural steroid injection and physical therapy to start.  She will let us know how these go.  If she is not making progress then I think a minimally invasive decompression at L3-4 and possibly L5-S1 would be warranted.     This phone visit took 8 minutes.  MD Location: [place], [place]  Patient Location: [place]       Again, thank you for allowing me to participate in the care of your patient.        Sincerely,        Layton Ma MD  "

## 2024-02-16 NOTE — NURSING NOTE
"Annalise Wallace is a 68 year old female who presents for:  Chief Complaint   Patient presents with    Follow Up     Review MRI        Initial Vitals:  There were no vitals taken for this visit. Estimated body mass index is 27.76 kg/m  as calculated from the following:    Height as of 1/22/24: 5' 6\" (1.676 m).    Weight as of 1/22/24: 172 lb (78 kg).. There is no height or weight on file to calculate BSA. BP not complete. Phone visit.  Data Unavailable      Odette Murillo    "

## 2024-02-16 NOTE — PROGRESS NOTES
"It was a pleasure to see Annalise Wallace today in Neurosurgery Clinic. She is a 68 year old female who is here by phone to review the results of her MRI.    We reviewed her symptoms which include low back pain radiating into the lower extremities.  Seems to go back and forth between the legs.  It tends to radiate down the back of the legs worse with standing and walking.    There were no vitals filed for this visit.  Estimated body mass index is 27.76 kg/m  as calculated from the following:    Height as of 1/22/24: 1.676 m (5' 6\").    Weight as of 1/22/24: 78 kg (172 lb).  Data Unavailable    Phone visit    Imaging: Review of her MRI shows moderate to severe stenosis at L3-4 at the level below her previous fusion.  There may also be some lateral recess stenosis at L5-S1 that correlates with her symptomatology.  Imaging was reviewed with the patient shown to the patient in clinic today.    Assessment: Lumbar stenosis with neurogenic claudication/radiculopathy.    Plan: I recommended an epidural steroid injection and physical therapy to start.  She will let us know how these go.  If she is not making progress then I think a minimally invasive decompression at L3-4 and possibly L5-S1 would be warranted.     This phone visit took 8 minutes.  MD Location: [place]  Patient Location: [place]   "

## 2024-02-19 ENCOUNTER — TELEPHONE (OUTPATIENT)
Dept: PALLIATIVE MEDICINE | Facility: CLINIC | Age: 69
End: 2024-02-19
Payer: COMMERCIAL

## 2024-02-19 DIAGNOSIS — M54.16 LUMBAR RADICULOPATHY: Primary | ICD-10-CM

## 2024-02-19 NOTE — TELEPHONE ENCOUNTER
"Screening Questions for Radiology Injections:    Injection to be done at which interventional clinic site? [facility]    If choosing [facility] for location, please inform patient:  \"[facility] is a Hospital based clinic. Before your visit, you should check with your insurance about how it covers the charges for facility services in a hospital-based clinic.     Procedure ordered by Dr. Ma     Procedure ordered? L3-4 interlaminar JACKELIN    Transforaminal Cervical JACKELIN - Send to [facility] - No [facility] providers perform this procedure    What insurance would patient like us to bill for this procedure? UCOANDA/MEDICARE     IF SCHEDULING IN [place] PAIN OR SPINE PLEASE SCHEDULE AT LEAST 7-10 BUSINESS DAYS OUT SO A PA CAN BE OBTAINED    Worker's comp or MVA (motor vehicle accident) -Any injection DO NOT SCHEDULE and route to Roberto Guerrier.      DNA SEQ insurance - For SI joint injections, DO NOT SCHEDULE and route to Brandi Hernandez.       ALL BCBS, Hum[employer] and [employer] CIGNA - DO NOT SCHEDULE and route to Brandi Hernandez    MEDICA- facet joint injections, route to Brandi David    Is patient scheduled at [facility]? NO    If YES, route every encounter to [facility] SPINE CENTER CARE NAVIGATION POOL [[phone]]    Is an  needed? No     Patient has a  home? (Review Grid) YES: Informed     Any chance of pregnancy? NO   If YES, do NOT schedule and route to RN pool  - Dr. Zimmerman route to Hanna Mckinnon and PM&R Nurse  [[id number]]      Is patient actively being treated for cancer or immunocompromised? No  If YES, do NOT schedule and route to RN pool/ Dr. Zimmerman's Team    Does the patient have a bleeding or clotting disorder? No     If YES, okay to schedule AND route to RN nurse / Dr. Zimmerman's Team     (For any patients with platelet count <100, RN must forward to provider)    Is patient taking any Blood Thinners OR Antiplatelet medication?  No   If hold needed, do NOT " schedule, route to [place] dick/ Dr. Zimmerman's Team    Examples:   o Blood Thinners: (Coumadin, Warfarin, Jantoven, Pradaxa, Xarelto, Eliquis, Edoxaban, Enoxaparin, Lovenox, Heparin, Arixtra, Fondaparinux or Fragmin)  o Antiplatelet Medications: (Plavix, Brilinta or Effient)     Is patient taking any aspirin products (includes Excedrin and Fiorinal)? No     If more than 325mg/day, OK to schedule; Instruct Pt to decrease to less than 325 mg for 7 days AND route to RN pool/ Dr. Zimmerman's Team     For CERVICAL procedures, hold all aspirin products for 6 days.     Tell Pt that if aspirin product is not held for 6 days, the procedure WILL BE cancelled.     Any allergies to contrast dye, iodine, shellfish, or numbing and steroid medications? No    If YES, schedule and add allergy information to appointment notes AND route to the RN pool/ Dr. Zimmerman's Team    If JACKELIN and Contrast Dye / Iodine Allergy? DO NOT SCHEDULE, route to RN pool/ Dr. Zimmerman's Team    Allergies: Sulfa antibiotics     Does patient have an active infection or treated for one within the past week? No    Is patient currently taking any antibiotics or steroid medications?  No     For patients on chronic, preventative, or prophylactic antibiotics, procedures may be scheduled.     For patients on antibiotics for active or recent infection, schedule 4 days after completed.    For patients on steroid medications, schedule 4 days after completed.     Has the patient had a flu shot or any other vaccinations within the past 7 days? No  If yes, explain that for the vaccine to work best they need to:       wait 1 week before and 1 week after getting any Vaccine    wait 1 week before and 2 weeks after getting any Covid Vaccine     If patient has concerns about the timing, send to RN pool/ Dr. Zimmerman's Team    Does patient have an MRI/CT?  YES: 12/26/23 Include Date and Check Procedure Scheduling Grid to see if required.    Was the MRI/CT done within the last 3 years?  Yes      If no route to RN Pool/ Dr. Zimmerman's Team    If yes, where was the MRI/CT done? [facility]      Refer to PACS Transmissions list for approved external locations and route to RN Pool High Priority/ Dr. Valdezs Team    If MRI was not done at approved external location do NOT schedule and route to RN pool/ Dr. Valdezs Team      If patient has an imaging disc, the injection MAY be scheduled but patient must bring disc to appt or appt will be cancelled.    Is patient able to transfer to a procedure table with minimal or no assistance? Yes     If no, do NOT schedule and route to RN Pool/ Dr. Zimmerman's Team    Procedure Specific Instructions:    If celiac plexus block, informed patient NPO for 6 hours and that it is okay to take medications with sips of water, especially blood pressure medications Not Applicable         If this is for a cervical procedure, informed patient that aspirin needs to be held for 6 days.   Not Applicable      Sedation, If Sedation is ordered for any procedure, patient must be NPO for 6 hours prior to procedure Not Applicable      If IV needed:    Do not schedule procedures requiring IV placement in the first appointment of the day or first appointment after lunch. Do NOT schedule at 0745, 0815 or 1245.       Instructed patient to arrive 30 minutes early for IV start if required. (Check Procedure Scheduling Grid)  Not Applicable    Reminders:    If you are started on any steroids or antibiotics between now and your appointment, you must contact us because the procedure may need to be cancelled.  Yes      As a reminder, receiving steroids can decrease your body's ability to fight infection.   Would you still like to move forward with scheduling the injection?  Yes      IV Sedation is not provided for procedures. If oral anti-anxiety medication is needed, the patient should request this from their referring provider.      Instruct patient to arrive as directed prior to the scheduled appointment  time:  If IV needed 30 minutes before appointment time       For patients 85 or older we recommend having an adult stay w/ them for the remainder of the day.       If the patient is Diabetic, remind them to bring their glucometer.      Does the patient have any questions?  NO  Darling Rushing  [facility]

## 2024-02-21 NOTE — PROGRESS NOTES
[facility] - Procedure Note    Date of Visit: 2/23/2024    Procedure performed: Lumbar L3-4 interlaminar epidural steroid injection  Diagnosis: Lumbar spondylosis; Lumbar radiculitis/radiculopathy  : Leanne Marks MD   Anesthesia: none    Indications: Annalise Wallace is a 68 year old female who is seen at the request of Dr. Ma for a lumbar epidural steroid injection. The patient describes low back pain with radiation down the back of both legs. The patient has been exhibiting symptoms consistent with lumbar intraspinal inflammation and radiculopathy. Symptoms have been persistent, disabling, and intermittently severe. The patient reports minimal improvement with conservative treatment, including previous surgery, PT and medications.    S/P lumbar fusion L2-3    MRI of the LUMBAR SPINE was done on 12/26/2023 which showed:   FINDINGS: There are 5 lumbar-type vertebral bodies assumed for the  purposes of this dictation. The distal spinal cord and cauda equina  appear normal.      Postoperative changes with posterior katie and screw fixation hardware  and intervertebral disc spacer at L2-L3. Metal hardware causes  artifact that limits evaluation in those areas. Lumbar spine alignment  is within normal limits. No obvious loss of vertebral body height. No  focal destructive bony lesions. Modic type II degenerative endplate  changes at L5-S1.     Level by level as follows:      T12-L1: No loss of disc height. No significant disc herniation. Normal  facets. No spinal canal or neural foraminal narrowing.      L1-L2: No loss of disc height. No significant disc herniation. Normal  facets. No spinal canal or neural foraminal narrowing.      L2-L3: Postoperative changes with metal hardware causing artifact and  limiting evaluation. No spinal canal or neural foraminal narrowing  appreciated.      L3-L4: Mild loss of disc height. Posterior disc bulge. Bilateral facet  hypertrophy. Mild spinal  canal narrowing. Mild bilateral neural  foraminal narrowing.      L4-L5: Mild loss of disc height and signal. Posterior disc bulge.  Marked bilateral facet hypertrophy. Small left facet joint effusion.  No significant spinal canal narrowing. Mild right neural foraminal  narrowing. Moderate left neural foraminal narrowing.      L5-S1: Marked loss of disc height and signal with degenerative  endplate changes. Circumferential disc bulge with endplate osteophytic  spurring. Prominent facet hypertrophy. No spinal canal narrowing  however there is narrowing of the left lateral recess. Mild right  neural foraminal narrowing. Moderate left neural foraminal narrowing.     Paraspinous soft tissues: Unremarkable.                                                                       IMPRESSION:    1. Degenerative changes below the postoperative level at L2-L3. These  have progressed since prior MR 9/13/2019.  2. At L3-L4, there is mild spinal canal narrowing as well as mild  bilateral neural foraminal narrowing.  3. At L4-L5, there is mild right and moderate left neural foraminal  narrowing.  4. At L5-S1, there is narrowing of the left lateral recess. Mild right  and moderate left neural foraminal narrowing.    Allergies:      Allergies   Allergen Reactions    Sulfa Antibiotics Other (See Comments)     Headaches        Vitals:  BP (!) 156/82   Pulse 76   SpO2 94%     Review of Systems: The patient denies recent fever, chills, illness, use of antibiotics or anticoagulants. All other 10-point review of systems negative.     Procedure: The procedure and risks were explained, and informed written consent was obtained from the patient. Risks include but are not limited to: infection, bleeding, increased pain, and damage to soft tissue, nerve, muscle, and vasculature structures. After getting informed consent, patient was brought into the procedure suite and was placed in a prone position on the procedure table. A Pause for the  Cause was performed. Patient was prepped and draped in sterile fashion.     The L3-4 interspace was identified with use of fluoroscopy in AP view. A 25-gauge, 1.5 inch needle was used to anesthetize the skin and subcutaneous tissue entry site with a total of 2 ml of 1% lidocaine. Under fluoroscopic visualization, a 22-gauge, 4.5 inch Tuohy epidural needle was slowly advanced towards the epidural space a few millimeters left of midline. The latter part of the needle advancement was guided with fluoroscopy in the lateral view. The epidural space was identified using loss of resistance technique. After negative aspiration for heme and cerebrospinal fluid, a total of 1 mL of non-ionic contrast was injected to confirm needle placement with 0 mL of contrast wasted. Epidurogram confirmed spread within the posterior epidural space. 2 ml of 40mg/ml of triamcinolone, 2 ml of 1% lidocaine, and 1 ml of preservative free saline was injected. The needle was removed.  Images were saved to PACS.    The patient tolerated the procedure well, and there was no evidence of procedural complications. No new sensory or motor deficits were noted following the procedure. The patient was stable and able to ambulate on discharge home. Post-procedure instructions were provided.     Pre-procedure pain score: 4/10 in the back, 4/10 in the leg  Post-procedure pain score: 4/10 in the back, 4/10 in the leg    Assessment/Plan: Annalise Wallace is a 68 year old female s/p lumbar interlaminar epidural steroid injection today for lumbar spondylosis and radiculitis/radiculopathy.     1. Following today's procedure, the patient was advised to contact the Pain Management Center for any of the following:   Fever, chills, or night sweats   New onset of pain, numbness, or weakness   Any questions/concerns regarding the procedure  If unable to contact the Pain Center, the patient was instructed to go to a local Emergency Room for any complications.   2.  Follow-up with the referring provider in 2 weeks for post-procedure evaluation.    LILIA HALEY MD   Pain Management

## 2024-02-23 ENCOUNTER — RADIOLOGY INJECTION OFFICE VISIT (OUTPATIENT)
Dept: PALLIATIVE MEDICINE | Facility: CLINIC | Age: 69
End: 2024-02-23
Attending: NEUROLOGICAL SURGERY
Payer: COMMERCIAL

## 2024-02-23 VITALS — DIASTOLIC BLOOD PRESSURE: 75 MMHG | OXYGEN SATURATION: 96 % | SYSTOLIC BLOOD PRESSURE: 141 MMHG | HEART RATE: 71 BPM

## 2024-02-23 DIAGNOSIS — M48.062 LUMBAR STENOSIS WITH NEUROGENIC CLAUDICATION: ICD-10-CM

## 2024-02-23 DIAGNOSIS — M54.16 SPINAL STENOSIS OF LUMBAR REGION WITH RADICULOPATHY: ICD-10-CM

## 2024-02-23 DIAGNOSIS — M54.16 LUMBAR RADICULOPATHY: Primary | ICD-10-CM

## 2024-02-23 DIAGNOSIS — M48.061 SPINAL STENOSIS OF LUMBAR REGION WITH RADICULOPATHY: ICD-10-CM

## 2024-02-23 PROCEDURE — 62323 NJX INTERLAMINAR LMBR/SAC: CPT | Performed by: ANESTHESIOLOGY

## 2024-02-23 RX ORDER — TRIAMCINOLONE ACETONIDE 40 MG/ML
40 INJECTION, SUSPENSION INTRA-ARTICULAR; INTRAMUSCULAR ONCE
Status: COMPLETED | OUTPATIENT
Start: 2024-02-23 | End: 2024-02-23

## 2024-02-23 RX ADMIN — TRIAMCINOLONE ACETONIDE 40 MG: 40 INJECTION, SUSPENSION INTRA-ARTICULAR; INTRAMUSCULAR at 10:40

## 2024-02-23 NOTE — NURSING NOTE
Pre-procedure Intake  If YES to any questions or NO to having a   Please complete laminated checklist and leave on the computer keyboard for Provider, verbally inform provider if able.    For SCS Trial, RFA's or any sedation procedure:  Have you been fasting? NA  If yes, for how long?     Are you taking any any blood thinners such as Coumadin, Warfarin, Jantoven, Pradaxa Xarelto, Eliquis, Edoxaban, Enoxaparin, Lovenox, Heparin, Arixtra, Fondaparinux, or Fragmin? OR Antiplatelet medication such as Plavix, Brilinta, or Effient?   No   If yes, when did you take your last dose?     Do you take aspirin?  No  If cervical procedure, have you held aspirin for 6 days?   NA    Do you have any allergies to contrast dye, iodine, steroid and/or numbing medications?  NO    Are you currently taking antibiotics or have an active infection?  NO    Have you had a fever/elevated temperature within the past week? NO    Are you currently taking oral steroids? NO    Do you have a ? Yes    Are you pregnant or breastfeeding?  Not Applicable    Have you received the COVID-19 vaccine? NA  Notify provider and RNs if systolic BP >170, diastolic BP >100, P >100 or O2 sats < 90%

## 2024-02-23 NOTE — NURSING NOTE
Discharge Information    IV Discontiued Time:  NA    Amount of Fluid Infused:  NA    Discharge Criteria = When patient returns to baseline or as per MD order    Consciousness:  Pt is fully awake    Circulation:  BP +/- 20% of pre-procedure level    Respiration:  Patient is able to breathe deeply    O2 Sat:  Patient is able to maintain O2 Sat >92% on room air    Activity:  Moves 4 extremities on command    Ambulation:  Patient is able to stand and walk or stand and pivot into wheelchair    Dressing:  Clean/dry or No Dressing    Notes:   Discharge instructions and AVS given to patient    Patient meets criteria for discharge?  YES    Admitted to PCU?  No    Responsible adult present to accompany patient home?  Yes    Signature/Title:    Tootie Davis RN  RN Care Coordinator  [facility]

## 2024-02-23 NOTE — PATIENT INSTRUCTIONS
[facility] Procedure Discharge Instructions    Today you saw:   Dr. Leanne Marks     Your procedure:  Epidural steroid injection      Medications used:  Lidocaine (anesthetic)  Bupivacaine (anesthetic)  Kenalog (steroid)  Omnipaque (contrast)          Be cautious when walking as numbness and/or weakness in the legs may occur up to 6-8 hours after the procedure due to effect of the local anesthetic  Do not drive for 6 hours. The effect of the local anesthetic could slow your reflexes.   Avoid strenuous activity for the first 24 hours. You may resume your regular activities after that.   You may shower, however avoid swimming, tub baths or hot tubs for 24 hours following your procedure  You may have a mild to moderate increase in pain for several days following the injection.    You may use ice packs for 10-15 minutes, 3 to 4 times a day at the injection site for comfort  Do not use heat to painful areas for 6 to 8 hours. This will give the local anesthetic time to wear off and prevent you from accidentally burning your skin.  Unless you have been directed to avoid the use of anti-inflammatory medications (NSAIDS-ibuprofen, Aleve, Motrin), you may use these medications or Tylenol for pain control if needed.   Possible side effects of steroids that you may experience include flushing, elevated blood pressure, increased appetite, mild headaches and restlessness.  All of these symptoms will get better with time.  It may take up to 14 days for the steroid medication to start working although you may feel the effect as early as a few days after the procedure.   Follow up with your referring provider (Dr Ma) in 2-3 weeks    If you experience any of the following, call the pain center line during work hours at [phone] or on-call physician after hours at [phone]:  -Fever over 100 degree F  -Swelling, bleeding, redness, drainage, warmth at the injection site  -Progressive weakness or numbness in  your legs or arms  -Loss of bowel or bladder function  -Unusual headache that is not relieved by Tylenol or your regular headache medication  -Unusual new onset of pain that is not improving

## 2024-03-05 ENCOUNTER — PATIENT OUTREACH (OUTPATIENT)
Dept: CARE COORDINATION | Facility: CLINIC | Age: 69
End: 2024-03-05
Payer: COMMERCIAL

## 2024-03-05 NOTE — PROGRESS NOTES
Clinical Product Navigator RN reviewed chart; patient on payer product coverage.  Review results:   CPN Initial Information Gathering  Referral Source: Health Plan    Patient has had 1 ED visit in the past 12 months. PMHx chronic pain, HTN, DDD lumbar. Lumbar stenosis with neurogenic claudication/radiculopathy, s/p cervical spinal fusion for cervical sponylosis with radiculopathy (12/22), depression with anixiety, primary insomnia.  [facility] PCP, Neurosurgery, Pain Management-receiving JACKELIN.    Clinic Care Coordination Contact  [facility]/Voicemail    Clinical Data: Care Coordinator Outreach    Outreach Documentation Number of Outreach Attempt   3/5/2024  10:42 AM 1       Left message on patient's voicemail with call back information and requested return call.    Plan: Care Coordinator will try to reach patient again in 5-10 business days.    Melissa Behl BSN, RN, PHN, [facility]  RN Clinical Product Navigator  [phone]

## 2024-03-11 ENCOUNTER — MYC REFILL (OUTPATIENT)
Dept: FAMILY MEDICINE | Facility: CLINIC | Age: 69
End: 2024-03-11
Payer: COMMERCIAL

## 2024-03-11 DIAGNOSIS — N95.1 SYMPTOMATIC MENOPAUSAL OR FEMALE CLIMACTERIC STATES: ICD-10-CM

## 2024-03-11 DIAGNOSIS — M54.2 NECK PAIN: ICD-10-CM

## 2024-03-11 DIAGNOSIS — G47.00 PERSISTENT DISORDER OF INITIATING OR MAINTAINING SLEEP: ICD-10-CM

## 2024-03-11 DIAGNOSIS — G89.29 OTHER CHRONIC PAIN: ICD-10-CM

## 2024-03-11 DIAGNOSIS — K21.00 GASTROESOPHAGEAL REFLUX DISEASE WITH ESOPHAGITIS WITHOUT HEMORRHAGE: ICD-10-CM

## 2024-03-11 RX ORDER — OMEPRAZOLE 40 MG/1
40 CAPSULE, DELAYED RELEASE ORAL DAILY
Qty: 90 CAPSULE | Refills: 0 | OUTPATIENT
Start: 2024-03-11

## 2024-03-11 RX ORDER — TRAZODONE HYDROCHLORIDE 100 MG/1
100 TABLET ORAL DAILY
Qty: 90 TABLET | Refills: 1 | Status: SHIPPED | OUTPATIENT
Start: 2024-03-11 | End: 2024-04-29

## 2024-03-11 RX ORDER — OXYCODONE AND ACETAMINOPHEN 5; 325 MG/1; MG/1
1 TABLET ORAL EVERY 6 HOURS PRN
Qty: 120 TABLET | Refills: 0 | Status: SHIPPED | OUTPATIENT
Start: 2024-03-14 | End: 2024-04-10

## 2024-03-11 RX ORDER — CITALOPRAM HYDROBROMIDE 20 MG/1
20 TABLET ORAL DAILY
Qty: 90 TABLET | Refills: 2 | Status: SHIPPED | OUTPATIENT
Start: 2024-03-11

## 2024-03-12 ASSESSMENT — ACTIVITIES OF DAILY LIVING (ADL): DEPENDENT_IADLS:: INDEPENDENT

## 2024-03-12 NOTE — PROGRESS NOTES
Clinic Care Coordination Contact  Clinic Care Coordination Contact  OUTREACH    Referral Information:  Referral Source: Health Plan    Primary Diagnosis: Orthopedic    Chief Complaint   Patient presents with    Clinic Care Coordination - Initial        Universal Utilization:   Clinic Utilization  Difficulty keeping appointments:: No  Compliance Concerns: No  No-Show Concerns: No  No PCP office visit in Past Year: No  Utilization      No Show Count (past year)  0             ED Visits  1             Hospital Admissions  0                    Current as of: 3/12/2024  9:36 AM                Clinical Concerns:  Current Medical Concerns:  Patient's main concern is her chronic low back pain.  Patient had a recent JACKELIN on 2/23/24 and notes intermittent improvement in her chronic pain.  Patient will follow-up with her neurosurgeon to discuss whether another surgery is indicated.  Patient has a future appointment with her PcP.  Current Behavioral Concerns: none at this time.    Education Provided to patient: RN Clinical Product Navigator reviewed CPN role, Care Coordination.     Pain  Pain (GOAL):: Yes  Type: Chronic (>3mo)  Location of chronic pain:: low back pain  Radiating: Yes  Location pain radiates to: bilateral legs  Progression: Waxing and Waning  Limitation of routine activities due to chronic pain:: No  Health Maintenance Reviewed: Due/Overdue   Health Maintenance Due   Topic Date Due    HEPATITIS A IMMUNIZATION (1 of 2 - Risk 2-dose series) Never done    LUNG CANCER SCREENING  09/24/2008    RSV VACCINE (Pregnancy & 60+) (1 - 1-dose 60+ series) Never done    MAMMO SCREENING  09/03/2023    COLORECTAL CANCER SCREENING  10/13/2023    FALL RISK ASSESSMENT  03/17/2024    MEDICARE ANNUAL WELLNESS VISIT  04/12/2024    CONTROLLED SUBSTANCE AGREEMENT FOR CHRONIC PAIN MANAGEMENT  04/12/2024    Patient has a future annual wellness visit scheduled with PCP 4/29/24.  Clinical Pathway: None    Medication Management:  Medication  review status: Patient denies questions or concerns regarding her medication.       Functional Status:  Dependent ADLs:: Independent  Dependent IADLs:: Independent  Bed or wheelchair confined:: No  Mobility Status: Independent    Living Situation:  Current living arrangement:: I live alone  Type of residence:: Apartment    Lifestyle & Psychosocial Needs:    Social Determinants of Health     Food Insecurity: Low Risk  (1/18/2024)    Food Insecurity     Within the past 12 months, did you worry that your food would run out before you got money to buy more?: No     Within the past 12 months, did the food you bought just not last and you didn t have money to get more?: No   Depression: Not at risk (1/22/2024)    PHQ-2     PHQ-2 Score: 1   Housing Stability: Low Risk  (1/18/2024)    Housing Stability     Do you have housing? : Yes     Are you worried about losing your housing?: No   Tobacco Use: Medium Risk (1/22/2024)    Patient History     Smoking Tobacco Use: Former     Smokeless Tobacco Use: Never     Passive Exposure: Not on file   Financial Resource Strain: Low Risk  (1/18/2024)    Financial Resource Strain     Within the past 12 months, have you or your family members you live with been unable to get utilities (heat, electricity) when it was really needed?: No   Alcohol Use: Not At Risk (12/18/2022)    AUDIT-C     Frequency of Alcohol Consumption: Monthly or less     Average Number of Drinks: Patient does not drink     Frequency of Binge Drinking: Never   Transportation Needs: Low Risk  (1/18/2024)    Transportation Needs     Within the past 12 months, has lack of transportation kept you from medical appointments, getting your medicines, non-medical meetings or appointments, work, or from getting things that you need?: No   Physical Activity: Insufficiently Active (12/18/2022)    Exercise Vital Sign     Days of Exercise per Week: 3 days     Minutes of Exercise per Session: 20 min   Interpersonal Safety: Low Risk   (1/22/2024)    Interpersonal Safety     Do you feel physically and emotionally safe where you currently live?: Yes     Within the past 12 months, have you been hit, slapped, kicked or otherwise physically hurt by someone?: No     Within the past 12 months, have you been humiliated or emotionally abused in other ways by your partner or ex-partner?: No   Stress: Stress Concern Present (12/18/2022)    [employer] - Occupational Stress Questionnaire     Feeling of Stress : Very much   Social Connections: Socially Isolated (12/18/2022)    Social Connection and Isolation Panel [NHANES]     Frequency of Communication with Friends and Family: More than three times a week     Frequency of Social Gatherings with Friends and Family: Twice a week     Attends Hoahaoism Services: Never     Active Member of Clubs or Organizations: No     Attends Club or Organization Meetings: Not on file     Marital Status:      Inadequate nutrition (GOAL):: No  Tube Feeding: No  Inadequate activity/exercise (GOAL):: No  Significant changes in sleep pattern (GOAL): No        Hoahaoism or spiritual beliefs that impact treatment:: No  Mental health DX:: Yes  Mental health DX how managed:: Medication  Informal Support system:: Children      Resources and Interventions:  Current Resources:      Community Resources: None  Supplies Currently Used at Home: None  Equipment Currently Used at Home: grab bar, tub/shower, raised toilet seat  Employment Status: retired         Advance Care Plan/Directive  Advanced Care Plans/Directives on file:: No    Referrals Placed: None      Patient/Caregiver understanding: Yes, patient denies any needs from RN Clinical Product Navigator at this time.       Future Appointments                In 1 month Cyndi Chirinos MD [facility]            Plan: Patient will follow-up with neurosurgery as advised and see PCP as scheduled.    Melissa Behl BSN, RN, PHN,  CCM  RN Clinical Product Navigator  [phone]

## 2024-04-02 DIAGNOSIS — K21.00 GASTROESOPHAGEAL REFLUX DISEASE WITH ESOPHAGITIS WITHOUT HEMORRHAGE: ICD-10-CM

## 2024-04-02 RX ORDER — OMEPRAZOLE 40 MG/1
40 CAPSULE, DELAYED RELEASE ORAL DAILY
Qty: 90 CAPSULE | Refills: 0 | Status: SHIPPED | OUTPATIENT
Start: 2024-04-02 | End: 2024-04-29

## 2024-04-10 ENCOUNTER — MYC REFILL (OUTPATIENT)
Dept: FAMILY MEDICINE | Facility: CLINIC | Age: 69
End: 2024-04-10
Payer: COMMERCIAL

## 2024-04-10 DIAGNOSIS — Z98.1 S/P CERVICAL SPINAL FUSION: ICD-10-CM

## 2024-04-10 DIAGNOSIS — M54.2 NECK PAIN: ICD-10-CM

## 2024-04-10 DIAGNOSIS — G89.29 OTHER CHRONIC PAIN: ICD-10-CM

## 2024-04-10 RX ORDER — OXYCODONE AND ACETAMINOPHEN 5; 325 MG/1; MG/1
1 TABLET ORAL EVERY 6 HOURS PRN
Qty: 120 TABLET | Refills: 0 | Status: SHIPPED | OUTPATIENT
Start: 2024-04-13 | End: 2024-05-08

## 2024-04-24 SDOH — HEALTH STABILITY: PHYSICAL HEALTH: ON AVERAGE, HOW MANY DAYS PER WEEK DO YOU ENGAGE IN MODERATE TO STRENUOUS EXERCISE (LIKE A BRISK WALK)?: 5 DAYS

## 2024-04-24 SDOH — HEALTH STABILITY: PHYSICAL HEALTH: ON AVERAGE, HOW MANY MINUTES DO YOU ENGAGE IN EXERCISE AT THIS LEVEL?: 20 MIN

## 2024-04-24 ASSESSMENT — SOCIAL DETERMINANTS OF HEALTH (SDOH)
HOW OFTEN DO YOU ATTENT MEETINGS OF THE CLUB OR ORGANIZATION YOU BELONG TO?: NEVER
DO YOU BELONG TO ANY CLUBS OR ORGANIZATIONS SUCH AS CHURCH GROUPS UNIONS, FRATERNAL OR ATHLETIC GROUPS, OR SCHOOL GROUPS?: NO
IN A TYPICAL WEEK, HOW MANY TIMES DO YOU TALK ON THE PHONE WITH FAMILY, FRIENDS, OR NEIGHBORS?: MORE THAN THREE TIMES A WEEK
HOW OFTEN DO YOU GET TOGETHER WITH FRIENDS OR RELATIVES?: TWICE A WEEK
HOW OFTEN DO YOU ATTEND CHURCH OR RELIGIOUS SERVICES?: NEVER
HOW OFTEN DO YOU GET TOGETHER WITH FRIENDS OR RELATIVES?: TWICE A WEEK

## 2024-04-24 ASSESSMENT — LIFESTYLE VARIABLES
HOW MANY STANDARD DRINKS CONTAINING ALCOHOL DO YOU HAVE ON A TYPICAL DAY: 1 OR 2
AUDIT-C TOTAL SCORE: 2
HOW OFTEN DO YOU HAVE A DRINK CONTAINING ALCOHOL: 2-4 TIMES A MONTH
SKIP TO QUESTIONS 9-10: 1
HOW OFTEN DO YOU HAVE SIX OR MORE DRINKS ON ONE OCCASION: NEVER

## 2024-04-29 ENCOUNTER — OFFICE VISIT (OUTPATIENT)
Dept: FAMILY MEDICINE | Facility: CLINIC | Age: 69
End: 2024-04-29
Attending: FAMILY MEDICINE
Payer: COMMERCIAL

## 2024-04-29 ENCOUNTER — ORDERS ONLY (AUTO-RELEASED) (OUTPATIENT)
Dept: FAMILY MEDICINE | Facility: CLINIC | Age: 69
End: 2024-04-29

## 2024-04-29 VITALS
WEIGHT: 176.6 LBS | HEART RATE: 76 BPM | DIASTOLIC BLOOD PRESSURE: 81 MMHG | RESPIRATION RATE: 12 BRPM | OXYGEN SATURATION: 94 % | BODY MASS INDEX: 28.38 KG/M2 | SYSTOLIC BLOOD PRESSURE: 148 MMHG | HEIGHT: 66 IN | TEMPERATURE: 98.1 F

## 2024-04-29 DIAGNOSIS — Z12.11 SCREEN FOR COLON CANCER: ICD-10-CM

## 2024-04-29 DIAGNOSIS — Z00.00 ENCOUNTER FOR MEDICARE ANNUAL WELLNESS EXAM: Primary | ICD-10-CM

## 2024-04-29 DIAGNOSIS — D35.02 ADENOMA OF LEFT ADRENAL GLAND: ICD-10-CM

## 2024-04-29 DIAGNOSIS — Z13.6 CARDIOVASCULAR SCREENING; LDL GOAL LESS THAN 160: ICD-10-CM

## 2024-04-29 DIAGNOSIS — Z23 NEED FOR PROPHYLACTIC VACCINATION AGAINST HEPATITIS A: ICD-10-CM

## 2024-04-29 DIAGNOSIS — Z13.820 SCREENING FOR OSTEOPOROSIS: ICD-10-CM

## 2024-04-29 DIAGNOSIS — Z29.11 NEED FOR VACCINATION AGAINST RESPIRATORY SYNCYTIAL VIRUS: ICD-10-CM

## 2024-04-29 DIAGNOSIS — K21.00 GASTROESOPHAGEAL REFLUX DISEASE WITH ESOPHAGITIS WITHOUT HEMORRHAGE: ICD-10-CM

## 2024-04-29 DIAGNOSIS — R03.0 ELEVATED BLOOD PRESSURE READING WITHOUT DIAGNOSIS OF HYPERTENSION: ICD-10-CM

## 2024-04-29 DIAGNOSIS — G89.29 OTHER CHRONIC PAIN: ICD-10-CM

## 2024-04-29 DIAGNOSIS — Z78.0 ASYMPTOMATIC MENOPAUSAL STATE: ICD-10-CM

## 2024-04-29 DIAGNOSIS — Z12.31 ENCOUNTER FOR SCREENING MAMMOGRAM FOR MALIGNANT NEOPLASM OF BREAST: ICD-10-CM

## 2024-04-29 DIAGNOSIS — G47.00 PERSISTENT DISORDER OF INITIATING OR MAINTAINING SLEEP: ICD-10-CM

## 2024-04-29 DIAGNOSIS — Z78.0 MENOPAUSE PRESENT: ICD-10-CM

## 2024-04-29 LAB
ALBUMIN SERPL BCG-MCNC: 4.5 G/DL (ref 3.5–5.2)
ALP SERPL-CCNC: 90 U/L (ref 40–150)
ALT SERPL W P-5'-P-CCNC: 11 U/L (ref 0–50)
ANION GAP SERPL CALCULATED.3IONS-SCNC: 13 MMOL/L (ref 7–15)
AST SERPL W P-5'-P-CCNC: 19 U/L (ref 0–45)
BILIRUB SERPL-MCNC: 0.3 MG/DL
BUN SERPL-MCNC: 16.6 MG/DL (ref 8–23)
CALCIUM SERPL-MCNC: 9.3 MG/DL (ref 8.8–10.2)
CHLORIDE SERPL-SCNC: 103 MMOL/L (ref 98–107)
CHOLEST SERPL-MCNC: 243 MG/DL
CREAT SERPL-MCNC: 0.86 MG/DL (ref 0.51–0.95)
CREAT UR-MCNC: 102 MG/DL
DEPRECATED HCO3 PLAS-SCNC: 24 MMOL/L (ref 22–29)
EGFRCR SERPLBLD CKD-EPI 2021: 72 ML/MIN/1.73M2
ERYTHROCYTE [DISTWIDTH] IN BLOOD BY AUTOMATED COUNT: 13.6 % (ref 10–15)
FASTING STATUS PATIENT QL REPORTED: YES
GLUCOSE SERPL-MCNC: 95 MG/DL (ref 70–99)
HCT VFR BLD AUTO: 41.3 % (ref 35–47)
HDLC SERPL-MCNC: 63 MG/DL
HGB BLD-MCNC: 13.6 G/DL (ref 11.7–15.7)
LDLC SERPL CALC-MCNC: 134 MG/DL
MCH RBC QN AUTO: 29.4 PG (ref 26.5–33)
MCHC RBC AUTO-ENTMCNC: 32.9 G/DL (ref 31.5–36.5)
MCV RBC AUTO: 89 FL (ref 78–100)
MICROALBUMIN UR-MCNC: <12 MG/L
MICROALBUMIN/CREAT UR: NORMAL MG/G{CREAT}
NONHDLC SERPL-MCNC: 180 MG/DL
PLATELET # BLD AUTO: 269 10E3/UL (ref 150–450)
POTASSIUM SERPL-SCNC: 4 MMOL/L (ref 3.4–5.3)
PROT SERPL-MCNC: 7.6 G/DL (ref 6.4–8.3)
RBC # BLD AUTO: 4.62 10E6/UL (ref 3.8–5.2)
SODIUM SERPL-SCNC: 140 MMOL/L (ref 135–145)
TRIGL SERPL-MCNC: 228 MG/DL
WBC # BLD AUTO: 10.1 10E3/UL (ref 4–11)

## 2024-04-29 PROCEDURE — 82043 UR ALBUMIN QUANTITATIVE: CPT | Performed by: FAMILY MEDICINE

## 2024-04-29 PROCEDURE — 82570 ASSAY OF URINE CREATININE: CPT | Performed by: FAMILY MEDICINE

## 2024-04-29 PROCEDURE — 85027 COMPLETE CBC AUTOMATED: CPT | Performed by: FAMILY MEDICINE

## 2024-04-29 PROCEDURE — G0439 PPPS, SUBSEQ VISIT: HCPCS | Performed by: FAMILY MEDICINE

## 2024-04-29 PROCEDURE — 36415 COLL VENOUS BLD VENIPUNCTURE: CPT | Performed by: FAMILY MEDICINE

## 2024-04-29 PROCEDURE — 80053 COMPREHEN METABOLIC PANEL: CPT | Performed by: FAMILY MEDICINE

## 2024-04-29 PROCEDURE — 99214 OFFICE O/P EST MOD 30 MIN: CPT | Mod: 25 | Performed by: FAMILY MEDICINE

## 2024-04-29 PROCEDURE — 80061 LIPID PANEL: CPT | Performed by: FAMILY MEDICINE

## 2024-04-29 RX ORDER — RESPIRATORY SYNCYTIAL VIRUS VACCINE 120MCG/0.5
0.5 KIT INTRAMUSCULAR ONCE
Qty: 1 EACH | Refills: 0 | Status: CANCELLED | OUTPATIENT
Start: 2024-04-29 | End: 2024-04-29

## 2024-04-29 RX ORDER — TRAZODONE HYDROCHLORIDE 100 MG/1
100 TABLET ORAL DAILY
Qty: 90 TABLET | Refills: 4 | Status: SHIPPED | OUTPATIENT
Start: 2024-04-29

## 2024-04-29 RX ORDER — OMEPRAZOLE 40 MG/1
40 CAPSULE, DELAYED RELEASE ORAL DAILY
Qty: 90 CAPSULE | Refills: 4 | Status: SHIPPED | OUTPATIENT
Start: 2024-04-29

## 2024-04-29 NOTE — LETTER

## 2024-04-29 NOTE — PATIENT INSTRUCTIONS
Preventive Care Advice   This is general advice given by our system to help you stay healthy. However, your care team may have specific advice just for you. Please talk to your care team about your preventive care needs.  Nutrition  Eat 5 or more servings of fruits and vegetables each day.  Try wheat bread, brown rice and whole grain pasta (instead of white bread, rice, and pasta).  Get enough calcium and vitamin D. Check the label on foods and aim for 100% of the RDA (recommended daily allowance).  Lifestyle  Exercise at least 150 minutes each week   (30 minutes a day, 5 days a week).  Do muscle strengthening activities 2 days a week. These help control your weight and prevent disease.  No smoking.  Wear sunscreen to prevent skin cancer.  Have a dental exam and cleaning every 6 months.  Yearly exams  See your health care team every year to talk about:  Any changes in your health.  Any medicines your care team has prescribed.  Preventive care, family planning, and ways to prevent chronic diseases.  Shots (vaccines)   HPV shots (up to age 26), if you've never had them before.  Hepatitis B shots (up to age 59), if you've never had them before.  COVID-19 shot: Get this shot when it's due.  Flu shot: Get a flu shot every year.  Tetanus shot: Get a tetanus shot every 10 years.  Pneumococcal, hepatitis A, and RSV shots: Ask your care team if you need these based on your risk.  Shingles shot (for age 50 and up).  General health tests  Diabetes screening:  Starting at age 35, Get screened for diabetes at least every 3 years.  If you are younger than age 35, ask your care team if you should be screened for diabetes.  Cholesterol test: At age 39, start having a cholesterol test every 5 years, or more often if advised.  Bone density scan (DEXA): At age 50, ask your care team if you should have this scan for osteoporosis (brittle bones).  Hepatitis C: Get tested at least once in your life.  STIs (sexually transmitted  infections)  Before age 24: Ask your care team if you should be screened for STIs.  After age 24: Get screened for STIs if you're at risk. You are at risk for STIs (including HIV) if:  You are sexually active with more than one person.  You don't use condoms every time.  You or a partner was diagnosed with a sexually transmitted infection.  If you are at risk for HIV, ask about PrEP medicine to prevent HIV.  Get tested for HIV at least once in your life, whether you are at risk for HIV or not.  Cancer screening tests  Cervical cancer screening: If you have a cervix, begin getting regular cervical cancer screening tests at age 21. Most people who have regular screenings with normal results can stop after age 65. Talk about this with your provider.  Breast cancer scan (mammogram): If you've ever had breasts, begin having regular mammograms starting at age 40. This is a scan to check for breast cancer.  Colon cancer screening: It is important to start screening for colon cancer at age 45.  Have a colonoscopy test every 10 years (or more often if you're at risk) Or, ask your provider about stool tests like a FIT test every year or Cologuard test every 3 years.  To learn more about your testing options, visit: https://www.[employer]/131692.pdf.  For help making a decision, visit: https://bit.ly/nl40825.  Prostate cancer screening test: If you have a prostate and are age 55 to 69, ask your provider if you would benefit from a yearly prostate cancer screening test.  Lung cancer screening: If you are a current or former smoker age 50 to 80, ask your care team if ongoing lung cancer screenings are right for you.  For informational purposes only. Not to replace the advice of your health care provider. Copyright   2023 [employer]. All rights reserved. Clinically reviewed by the [employer] [facility] Transitions Program. GFS IT [id number] - REV 01/24.    Hearing Loss: Care Instructions  Overview     Hearing loss is a  sudden or slow decrease in how well you hear. It can range from slight to profound. Permanent hearing loss can occur with aging. It also can happen when you are exposed long-term to loud noise. Examples include listening to loud music, riding motorcycles, or being around other loud machines.  Hearing loss can affect your work and home life. It can make you feel lonely or depressed. You may feel that you have lost your independence. But hearing aids and other devices can help you hear better and feel connected to others.  Follow-up care is a key part of your treatment and safety. Be sure to make and go to all appointments, and call your doctor if you are having problems. It's also a good idea to know your test results and keep a list of the medicines you take.  How can you care for yourself at home?  Avoid loud noises whenever possible. This helps keep your hearing from getting worse.  Always wear hearing protection around loud noises.  Wear a hearing aid as directed.  A professional can help you pick a hearing aid that will work best for you.  You can also get hearing aids over the counter for mild to moderate hearing loss.  Have hearing tests as your doctor suggests. They can show whether your hearing has changed. Your hearing aid may need to be adjusted.  Use other devices as needed. These may include:  Telephone amplifiers and hearing aids that can connect to a television, stereo, radio, or microphone.  Devices that use lights or vibrations. These alert you to the doorbell, a ringing telephone, or a baby monitor.  Television closed-captioning. This shows the words at the bottom of the screen. Most new TVs can do this.  TTY (text telephone). This lets you type messages back and forth on the telephone instead of talking or listening. These devices are also called TDD. When messages are typed on the keyboard, they are sent over the phone line to a receiving TTY. The message is shown on a monitor.  Use text  "messaging, social media, and email if it is hard for you to communicate by telephone.  Try to learn a listening technique called speechreading. It is not lipreading. You pay attention to people's gestures, expressions, posture, and tone of voice. These clues can help you understand what a person is saying. Face the person you are talking to, and have them face you. Make sure the lighting is good. You need to see the other person's face clearly.  Think about counseling if you need help to adjust to your hearing loss.  When should you call for help?  Watch closely for changes in your health, and be sure to contact your doctor if:    You think your hearing is getting worse.     You have new symptoms, such as dizziness or nausea.   Where can you learn more?  Go to https://www.[employer].net/patiented  Enter R798 in the search box to learn more about \"Hearing Loss: Care Instructions.\"  Current as of: September 27, 2023               Content Version: 14.0    4993-6053 [employer].   Care instructions adapted under license by your healthcare professional. If you have questions about a medical condition or this instruction, always ask your healthcare professional. [employer] disclaims any warranty or liability for your use of this information.      Learning About Stress  What is stress?     Stress is your body's response to a hard situation. Your body can have a physical, emotional, or mental response. Stress is a fact of life for most people, and it affects everyone differently. What causes stress for you may not be stressful for someone else.  A lot of things can cause stress. You may feel stress when you go on a job interview, take a test, or run a race. This kind of short-term stress is normal and even useful. It can help you if you need to work hard or react quickly. For example, stress can help you finish an important job on time.  Long-term stress is caused by ongoing stressful situations " or events. Examples of long-term stress include long-term health problems, ongoing problems at work, or conflicts in your family. Long-term stress can harm your health.  How does stress affect your health?  When you are stressed, your body responds as though you are in danger. It makes hormones that speed up your heart, make you breathe faster, and give you a burst of energy. This is called the fight-or-flight stress response. If the stress is over quickly, your body goes back to normal and no harm is done.  But if stress happens too often or lasts too long, it can have bad effects. Long-term stress can make you more likely to get sick, and it can make symptoms of some diseases worse. If you tense up when you are stressed, you may develop neck, shoulder, or low back pain. Stress is linked to high blood pressure and heart disease.  Stress also harms your emotional health. It can make you travis, tense, or depressed. Your relationships may suffer, and you may not do well at work or school.  What can you do to manage stress?  You can try these things to help manage stress:   Do something active. Exercise or activity can help reduce stress. Walking is a great way to get started. Even everyday activities such as housecleaning or yard work can help.  Try yoga or rhea chi. These techniques combine exercise and meditation. You may need some training at first to learn them.  Do something you enjoy. For example, listen to music or go to a movie. Practice your hobby or do volunteer work.  Meditate. This can help you relax, because you are not worrying about what happened before or what may happen in the future.  Do guided imagery. Imagine yourself in any setting that helps you feel calm. You can use online videos, books, or a teacher to guide you.  Do breathing exercises. For example:  From a standing position, bend forward from the waist with your knees slightly bent. Let your arms dangle close to the floor.  Breathe in slowly  "and deeply as you return to a standing position. Roll up slowly and lift your head last.  Hold your breath for just a few seconds in the standing position.  Breathe out slowly and bend forward from the waist.  Let your feelings out. Talk, laugh, cry, and express anger when you need to. Talking with supportive friends or family, a counselor, or a anthoyn leader about your feelings is a healthy way to relieve stress. Avoid discussing your feelings with people who make you feel worse.  Write. It may help to write about things that are bothering you. This helps you find out how much stress you feel and what is causing it. When you know this, you can find better ways to cope.  What can you do to prevent stress?  You might try some of these things to help prevent stress:  Manage your time. This helps you find time to do the things you want and need to do.  Get enough sleep. Your body recovers from the stresses of the day while you are sleeping.  Get support. Your family, friends, and community can make a difference in how you experience stress.  Limit your news feed. Avoid or limit time on social media or news that may make you feel stressed.  Do something active. Exercise or activity can help reduce stress. Walking is a great way to get started.  Where can you learn more?  Go to https://www.[employer].net/patiented  Enter N032 in the search box to learn more about \"Learning About Stress.\"  Current as of: October 24, 2023               Content Version: 14.0    3769-2333 [employer].   Care instructions adapted under license by your healthcare professional. If you have questions about a medical condition or this instruction, always ask your healthcare professional. [employer] disclaims any warranty or liability for your use of this information.      Chronic Pain: Care Instructions  Your Care Instructions     Chronic pain is pain that lasts a long time (months or even years) and may or may not have " a clear cause. It is different from acute pain, which usually does have a clear cause--like an injury or illness--and gets better over time. Chronic pain:  Lasts over time but may vary from day to day.  Does not go away despite efforts to end it.  May disrupt your sleep and lead to fatigue.  May cause depression or anxiety.  May make your muscles tense, causing more pain.  Can disrupt your work, hobbies, home life, and relationships with friends and family.  Chronic pain is a very real condition. It is not just in your head. Treatment can help and usually includes several methods used together, such as medicines, physical therapy, exercise, and other treatments. Learning how to relax and changing negative thought patterns can also help you cope.  Chronic pain is complex. Taking an active role in your treatment will help you better manage your pain. Tell your doctor if you have trouble dealing with your pain. You may have to try several things before you find what works best for you.  Follow-up care is a key part of your treatment and safety. Be sure to make and go to all appointments, and call your doctor if you are having problems. It's also a good idea to know your test results and keep a list of the medicines you take.  How can you care for yourself at home?  Pace yourself. Break up large jobs into smaller tasks. Save harder tasks for days when you have less pain, or go back and forth between hard tasks and easier ones. Take rest breaks.  Relax, and reduce stress. Relaxation techniques such as deep breathing or meditation can help.  Keep moving. Gentle, daily exercise can help reduce pain over the long run. Try low- or no-impact exercises such as walking, swimming, and stationary biking. Do stretches to stay flexible.  Try heat, cold packs, and massage.  Get enough sleep. Chronic pain can make you tired and drain your energy. Talk with your doctor if you have trouble sleeping because of pain.  Think positive.  Your thoughts can affect your pain level. Do things that you enjoy to distract yourself when you have pain instead of focusing on the pain. See a movie, read a book, listen to music, or spend time with a friend.  If you think you are depressed, talk to your doctor about treatment.  Keep a daily pain diary. Record how your moods, thoughts, sleep patterns, activities, and medicine affect your pain. You may find that your pain is worse during or after certain activities or when you are feeling a certain emotion. Having a record of your pain can help you and your doctor find the best ways to treat your pain.  Take pain medicines exactly as directed.  If the doctor gave you a prescription medicine for pain, take it as prescribed.  If you are not taking a prescription pain medicine, ask your doctor if you can take an over-the-counter medicine.  Reducing constipation caused by pain medicine  Talk to your doctor about a laxative. If a laxative doesn't work, your doctor may suggest a prescription medicine.  Include fruits, vegetables, beans, and whole grains in your diet each day. These foods are high in fiber.  If your doctor recommends it, get more exercise. Walking is a good choice. Bit by bit, increase the amount you walk every day. Try for at least 30 minutes on most days of the week.  Schedule time each day for a bowel movement. A daily routine may help. Take your time and do not strain when having a bowel movement.  When should you call for help?   Call your doctor now or seek immediate medical care if:    Your pain gets worse or is out of control.     You feel down or blue, or you do not enjoy things like you once did. You may be depressed, which is common in people with chronic pain. Depression can be treated.     You have vomiting or cramps for more than 2 hours.   Watch closely for changes in your health, and be sure to contact your doctor if:    You cannot sleep because of pain.     You are very worried or  "anxious about your pain.     You have trouble taking your pain medicine.     You have any concerns about your pain medicine.     You have trouble with bowel movements, such as:  No bowel movement in 3 days.  Blood in the anal area, in your stool, or on the toilet paper.  Diarrhea for more than 24 hours.   Where can you learn more?  Go to https://www.[employer].net/patiented  Enter N004 in the search box to learn more about \"Chronic Pain: Care Instructions.\"  Current as of: July 10, 2023               Content Version: 14.0    5968-5041 [employer].   Care instructions adapted under license by your healthcare professional. If you have questions about a medical condition or this instruction, always ask your healthcare professional. [employer] disclaims any warranty or liability for your use of this information.      "

## 2024-04-29 NOTE — PROGRESS NOTES
Preventive Care Visit  [facility]  Cyndi Chirinos MD, Family Medicine  Apr 29, 2024      Assessment & Plan     Encounter for Medicare annual wellness exam    - DEXA HIP/PELVIS/SPINE - Future  - *MA Screening Digital Bilateral  - Lipid panel reflex to direct LDL Fasting  - Comprehensive metabolic panel (BMP + Alb, Alk Phos, ALT, AST, Total. Bili, TP)  - CBC with platelets  - Lipid panel reflex to direct LDL Fasting  - Comprehensive metabolic panel (BMP + Alb, Alk Phos, ALT, AST, Total. Bili, TP)  - CBC with platelets    Need for prophylactic vaccination against hepatitis A  Discussed     Need for vaccination against respiratory syncytial virus  Handout on the above diagnosis was given to the patient and discussed     Screen for colon cancer    - COLOGUARD([employer] SCI[employer]ES)    Screening for osteoporosis    - DEXA HIP/PELVIS/SPINE - Future    Menopause present    - *MA Screening Digital Bilateral    Gastroesophageal reflux disease with esophagitis without hemorrhage  Stable  Refills per epicare    - omeprazole (PRILOSEC) 40 MG DR capsule  Dispense: 90 capsule; Refill: 4  - Comprehensive metabolic panel (BMP + Alb, Alk Phos, ALT, AST, Total. Bili, TP)  - CBC with platelets  - Comprehensive metabolic panel (BMP + Alb, Alk Phos, ALT, AST, Total. Bili, TP)  - CBC with platelets    Persistent disorder of initiating or maintaining sleep  Stable  Refills per epicare    - traZODone (DESYREL) 100 MG tablet  Dispense: 90 tablet; Refill: 4  - Comprehensive metabolic panel (BMP + Alb, Alk Phos, ALT, AST, Total. Bili, TP)  - Comprehensive metabolic panel (BMP + Alb, Alk Phos, ALT, AST, Total. Bili, TP)    Adenoma of left adrenal gland  Do not need to further scan this  - Comprehensive metabolic panel (BMP + Alb, Alk Phos, ALT, AST, Total. Bili, TP)  - Comprehensive metabolic panel (BMP + Alb, Alk Phos, ALT, AST, Total. Bili, TP)    CARDIOVASCULAR SCREENING; LDL GOAL LESS THAN 160    - Lipid panel reflex to  "direct LDL Fasting  - Lipid panel reflex to direct LDL Fasting    Asymptomatic menopausal state    - DEXA HIP/PELVIS/SPINE - Future    Encounter for screening mammogram for malignant neoplasm of breast    - *MA Screening Digital Bilateral    Elevated blood pressure reading without diagnosis of hypertension  Repeat was good  - Albumin Random Urine Quantitative with Creat Ratio  - Albumin Random Urine Quantitative with Creat Ratio    Chronic back pain  Seeing spine  CSA signed   Urine checked 3 months ago          Patient has been advised of split billing requirements and indicates understanding: Yes    37 minutes spent by me on the date of the encounter doing chart review, history and exam, documentation and further activities per the note      BMI  Estimated body mass index is 28.5 kg/m  as calculated from the following:    Height as of this encounter: 1.676 m (5' 6\").    Weight as of this encounter: 80.1 kg (176 lb 9.6 oz).       Counseling  Appropriate preventive services were discussed with this patient, including applicable screening as appropriate for fall prevention, nutrition, physical activity, Tobacco-use cessation, weight loss and cognition.  Checklist reviewing preventive services available has been given to the patient.  Reviewed patient's diet, addressing concerns and/or questions.   The patient was instructed to see the dentist every 6 months.   She is at risk for psychosocial distress and has been provided with information to reduce risk.   The patient was provided with written information regarding signs of hearing loss.   I have reviewed Opioid Use Disorder and Substance Use Disorder risk factors and made any needed referrals.       FUTURE APPOINTMENTS:       - Follow-up visit in 6 months for med check   Regular exercise  See Patient Instructions    Ganga Woody is a 68 year old, presenting for the following:  Wellness Visit (Wellness / neck and back reassess pain)  She sees spine for her " ongoing neck and back issues.   She has had multiple surgeries.   She had a shot 3 months ago and it worked okay but seems to be creeping back again.   She is also here for recheck on her chronic issues including  1- gastroesophageal reflux disease - needs refills - doing well  2- insomnia  3- touched base on adrenal adenoma that has been there since 2007 and has been stable  4- borderline BP - no meds - always high the first check    The 10-year ASCVD risk score (Andrea TORO, et al., 2019) is: 17.5%    Values used to calculate the score:      Age: 68 years      Sex: Female      Is Non- : No      Diabetic: No      Tobacco smoker: No      Systolic Blood Pressure: 172 mmHg      Is BP treated: No      HDL Cholesterol: 53 mg/dL      Total Cholesterol: 250 mg/dL        4/29/2024     9:09 AM   Additional Questions   Roomed by rere esparza         Health Care Directive  Patient does not have a Health Care Directive or Living Will: Discussed advance care planning with patient; however, patient declined at this time.    HPI  See above        4/24/2024   General Health   How would you rate your overall physical health? (!) FAIR   Feel stress (tense, anxious, or unable to sleep) Only a little    Only a little   (!) STRESS CONCERN      4/24/2024   Nutrition   Diet: Regular (no restrictions)         4/24/2024   Exercise   Days per week of moderate/strenous exercise 5 days    5 days   Average minutes spent exercising at this level 20 min    20 min         4/24/2024   Social Factors   Frequency of gathering with friends or relatives Twice a week    Twice a week   Worry food won't last until get money to buy more No    Patient declined   Food not last or not have enough money for food? No    Patient declined   Do you have housing?  Yes    Yes   Are you worried about losing your housing? No    No   Lack of transportation? No    No   Unable to get utilities (heat,electricity)? No    No         4/24/2024    Fall Risk   Fallen 2 or more times in the past year? No   Trouble with walking or balance? No          2024   Activities of Daily Living- Home Safety   Needs help with the following daily activites None of the above   Safety concerns in the home None of the above         2024   Dental   Dentist two times every year? (!) NO         2024   Hearing Screening   Hearing concerns? (!) IT'S HARD TO FOLLOW A CONVERSATION IN A NOISY RESTAURANT OR CROWDED ROOM.         2024   Driving Risk Screening   Patient/family members have concerns about driving No         2024   General Alertness/Fatigue Screening   Have you been more tired than usual lately? No         2024   Urinary Incontinence Screening   Bothered by leaking urine in past 6 months No         2024   TB Screening   Were you born outside of the [place]? No           Today's PHQ-2 Score:       2024     9:02 AM   PHQ-2 (  [employer])   Q1: Little interest or pleasure in doing things 1   Q2: Feeling down, depressed or hopeless 0   PHQ-2 Score 1         2024   Substance Use   Alcohol more than 3/day or more than 7/wk No   Do you have a current opioid prescription? (!) YES   How severe/bad is pain from 1 to 10? 4/10   Do you use any other substances recreationally? No       Social History     Tobacco Use    Smoking status: Former     Current packs/day: 0.00     Average packs/day: 1 pack/day for 30.0 years (30.0 ttl pk-yrs)     Types: Cigarettes     Start date: 1987     Quit date: 2017     Years since quittin.8    Smokeless tobacco: Never   Vaping Use    Vaping status: Never Used   Substance Use Topics    Alcohol use: No    Drug use: No           9/3/2021   LAST FHS-7 RESULTS   1st degree relative breast or ovarian cancer No   Any relative bilateral breast cancer No   Any male have breast cancer No   Any ONE woman have BOTH breast AND ovarian cancer No   Any woman with breast cancer before 50yrs No   2 or more relatives  with breast AND/OR ovarian cancer No   2 or more relatives with breast AND/OR bowel cancer No        Mammogram Screening - Mammogram every 1-2 years updated in Health Maintenance based on mutual decision making    ASCVD Risk   The 10-year ASCVD risk score (Andrea TORO, et al., 2019) is: 17.5%    Values used to calculate the score:      Age: 68 years      Sex: Female      Is Non- : No      Diabetic: No      Tobacco smoker: No      Systolic Blood Pressure: 172 mmHg      Is BP treated: No      HDL Cholesterol: 53 mg/dL      Total Cholesterol: 250 mg/dL            Reviewed and updated as needed this visit by Provider                    Labs reviewed in EPIC  BP Readings from Last 3 Encounters:   04/29/24 (!) 148/81   02/23/24 (!) 141/75   01/22/24 125/77    Wt Readings from Last 3 Encounters:   04/29/24 80.1 kg (176 lb 9.6 oz)   01/22/24 78 kg (172 lb)   10/19/23 73.9 kg (163 lb)                  Patient Active Problem List   Diagnosis    Carcinoma in situ of cervix uteri    Acute maxillary sinusitis    Symptomatic menopausal or female climacteric states    CARDIOVASCULAR SCREENING; LDL GOAL LESS THAN 160    Health Care Home    Depression with anxiety    Benign neoplasm of skin    Adrenal adenoma    Ganglion cyst    Other chronic pain    Inflamed seborrheic keratosis    Persistent disorder of initiating or maintaining sleep    S/P cervical spinal fusion    Primary insomnia    Diaphragmatic hernia without obstruction and without gangrene    Spinal stenosis in cervical region    Controlled substance agreement signed    DDD (degenerative disc disease), cervical    F11.2 - Continuous opioid dependence (H)     Past Surgical History:   Procedure Laterality Date    COLONOSCOPY  2008    normal    DISCECTOMY LUMBAR MINIMALLY INVASIVE ONE LEVEL  02/13/2014    Procedure: DISCECTOMY LUMBAR MINIMALLY INVASIVE ONE LEVEL;  Minimally Invasive Discectomy L2-3 Left ;  Surgeon: Juanjose Pitt MD;   Location: [facility] OR    DISCECTOMY LUMBAR MINIMALLY INVASIVE ONE LEVEL      FECAL COLORECTAL CANCER SCREEN FIT  10/2022    did home test    FUSION SPINE POSTERIOR MINIMALLY INVASIVE ONE LEVEL  05/15/2014    Procedure: FUSION SPINE POSTERIOR MINIMALLY INVASIVE ONE LEVEL;  Surgeon: Juanjose Pitt MD;  Location: [facility] OR    HYSTERECTOMY VAGINAL, BILATERAL SALPINGO-OOPHERECTOMY, COMBINED      done for precancer cells - did not need chemo or radiation    OPTICAL TRACKING SYSTEM FUSION POSTERIOR CERVICAL THREE + LEVELS N/A 2022    Procedure: Cervical 2 to Thoracic 2 posterior segmental instrumentation. Left Cervical 3 to Cervical 4 and Cervical 7 to Thoracic 2 foraminotomies. Posterior arthrodesis Cervical 2 to Thoracic 2 using allograft cancellous chips. Aung lima.;  Surgeon: Layton Ma MD;  Location:  OR    [facility] NONSPECIFIC PROCEDURE      Lump removed L. breast - benign    [facility] NONSPECIFIC PROCEDURE      6 surgeries over time - first was early  - 3 in front and 2 in back    [facility] NONSPECIFIC PROCEDURE      C-spine - Nany       Social History     Tobacco Use    Smoking status: Former     Current packs/day: 0.00     Average packs/day: 1 pack/day for 30.0 years (30.0 ttl pk-yrs)     Types: Cigarettes     Start date: 1987     Quit date: 2017     Years since quittin.8    Smokeless tobacco: Never    Tobacco comments:     1 pdd   Substance Use Topics    Alcohol use: No     Family History   Problem Relation Age of Onset    Cancer Mother         had spread everywhere    Cancer Father         throat    Cerebrovascular Disease Brother 72    Diabetes Paternal Grandmother          Current Outpatient Medications   Medication Sig Dispense Refill    citalopram (CELEXA) 20 MG tablet TAKE 1 TABLET (20 MG) BY MOUTH DAILY 90 tablet 2    naloxone (NARCAN) 4 MG/0.1ML nasal spray Spray 1 spray (4 mg) into one nostril alternating nostrils once as needed for opioid reversal 0.2 mL      omeprazole (PRILOSEC) 40 MG DR capsule Take 1 capsule (40 mg) by mouth daily 90 capsule 4    oxyCODONE-acetaminophen (PERCOCET) 5-325 MG tablet Take 1 tablet by mouth every 6 hours as needed for severe pain 120 tablet 0    tiZANidine (ZANAFLEX) 4 MG tablet TAKE 1 TABLET (4 MG) BY MOUTH 3 TIMES DAILY 90 tablet 6    traZODone (DESYREL) 100 MG tablet Take 1 tablet (100 mg) by mouth daily 90 tablet 4     Current providers sharing in care for this patient include:  Patient Care Team:  Cyndi Chirinos MD as PCP - General (Family Medicine)  Juanjose Pitt MD as MD (Orthopaedic Surgery)  Layton Ma MD as Assigned Neuroscience Provider  Cyndi Chirinos MD as Assigned PCP  Jackie So PA-C as Physician Assistant (Dermatology)  Cyndi Chirinos MD as Assigned Pain Medication Provider    The following health maintenance items are reviewed in Epic and correct as of today:  Health Maintenance   Topic Date Due    HEPATITIS A IMMUNIZATION (1 of 2 - Risk 2-dose series) Never done    LUNG CANCER SCREENING  09/24/2008    RSV VACCINE (Pregnancy & 60+) (1 - 1-dose 60+ series) Never done    MAMMO SCREENING  09/03/2023    COLORECTAL CANCER SCREENING  10/13/2023    COVID-19 Vaccine (5 - 2023-24 season) 02/11/2024    CONTROLLED SUBSTANCE AGREEMENT FOR CHRONIC PAIN MANAGEMENT  04/12/2024    DEXA  04/21/2024    MEDICARE ANNUAL WELLNESS VISIT  04/12/2024    JEANNINE ASSESSMENT  07/19/2024    PHQ-9  07/19/2024    URINE DRUG SCREEN  01/22/2025    ANNUAL REVIEW OF HM ORDERS  01/22/2025    FALL RISK ASSESSMENT  04/29/2025    GLUCOSE  04/12/2026    LIPID  01/27/2028    ADVANCE CARE PLANNING  02/05/2028    DTAP/TDAP/TD IMMUNIZATION (3 - Td or Tdap) 07/19/2033    HEPATITIS C SCREENING  Completed    PHQ-2 (once per calendar year)  Completed    INFLUENZA VACCINE  Completed    Pneumococcal Vaccine: 65+ Years  Completed    ZOSTER IMMUNIZATION  Completed    IPV IMMUNIZATION  Aged Out    HPV IMMUNIZATION  Aged Out    MENINGITIS  "IMMUNIZATION  Aged Out    RSV MONOCLONAL ANTIBODY  Aged Out         Review of Systems  Constitutional, HEENT, cardiovascular, pulmonary, gi and gu systems are negative, except as otherwise noted.     Objective    Exam  BP (!) 172/80 (BP Location: Left arm, Patient Position: Sitting, Cuff Size: Adult Regular)   Pulse 76   Temp 98.1  F (36.7  C) (Oral)   Resp 12   Ht 1.676 m (5' 6\")   Wt 80.1 kg (176 lb 9.6 oz)   SpO2 94%   BMI 28.50 kg/m     Estimated body mass index is 28.5 kg/m  as calculated from the following:    Height as of this encounter: 1.676 m (5' 6\").    Weight as of this encounter: 80.1 kg (176 lb 9.6 oz).    Physical Exam  GENERAL: alert and no distress  EYES: Eyes grossly normal to inspection, PERRL and conjunctivae and sclerae normal  HENT: ear canals and TM's normal, nose and mouth without ulcers or lesions  NECK: no adenopathy, no asymmetry, masses, or scars  RESP: lungs clear to auscultation - no rales, rhonchi or wheezes  BREAST: normal without masses, tenderness or nipple discharge and no palpable axillary masses or adenopathy  CV: regular rate and rhythm, normal S1 S2, no S3 or S4, no murmur, click or rub, no peripheral edema  ABDOMEN: soft, nontender, no hepatosplenomegaly, no masses and bowel sounds normal  MS: no gross musculoskeletal defects noted, no edema  SKIN: no suspicious lesions or rashes  NEURO: Normal strength and tone, mentation intact and speech normal  PSYCH: mentation appears normal, affect normal/bright  LYMPH: no cervical, supraclavicular, axillary, or inguinal adenopathy         4/29/2024   Mini Cog   Clock Draw Score 2 Normal   3 Item Recall 3 objects recalled   Mini Cog Total Score 5              Signed Electronically by: Cyndi Chirinos MD    "

## 2024-05-08 ENCOUNTER — MYC REFILL (OUTPATIENT)
Dept: FAMILY MEDICINE | Facility: CLINIC | Age: 69
End: 2024-05-08
Payer: COMMERCIAL

## 2024-05-08 DIAGNOSIS — G89.29 OTHER CHRONIC PAIN: ICD-10-CM

## 2024-05-08 DIAGNOSIS — M54.2 NECK PAIN: ICD-10-CM

## 2024-05-08 RX ORDER — OXYCODONE AND ACETAMINOPHEN 5; 325 MG/1; MG/1
1 TABLET ORAL EVERY 6 HOURS PRN
Qty: 120 TABLET | Refills: 0 | Status: SHIPPED | OUTPATIENT
Start: 2024-05-10 | End: 2024-06-06

## 2024-06-06 ENCOUNTER — MYC REFILL (OUTPATIENT)
Dept: FAMILY MEDICINE | Facility: CLINIC | Age: 69
End: 2024-06-06
Payer: COMMERCIAL

## 2024-06-06 DIAGNOSIS — G89.29 OTHER CHRONIC PAIN: ICD-10-CM

## 2024-06-06 DIAGNOSIS — M54.2 NECK PAIN: ICD-10-CM

## 2024-06-07 NOTE — TELEPHONE ENCOUNTER
Patient calling to check on Percocet refill request.  Worried as Dr. Chirinos not in so wants to make sure someone signs today.  States she will run out over the weekend.  Discussed providers will check  and fill if due or if not due will let Dr. Chirinos fill.  Rosaura Miller RN

## 2024-06-10 RX ORDER — OXYCODONE AND ACETAMINOPHEN 5; 325 MG/1; MG/1
1 TABLET ORAL EVERY 6 HOURS PRN
Qty: 120 TABLET | Refills: 0 | Status: SHIPPED | OUTPATIENT
Start: 2024-06-10 | End: 2024-07-08

## 2024-07-08 ENCOUNTER — PATIENT OUTREACH (OUTPATIENT)
Dept: FAMILY MEDICINE | Facility: CLINIC | Age: 69
End: 2024-07-08
Payer: COMMERCIAL

## 2024-07-08 ENCOUNTER — MYC REFILL (OUTPATIENT)
Dept: FAMILY MEDICINE | Facility: CLINIC | Age: 69
End: 2024-07-08
Payer: COMMERCIAL

## 2024-07-08 DIAGNOSIS — G89.29 OTHER CHRONIC PAIN: ICD-10-CM

## 2024-07-08 DIAGNOSIS — M54.2 NECK PAIN: ICD-10-CM

## 2024-07-08 RX ORDER — OXYCODONE AND ACETAMINOPHEN 5; 325 MG/1; MG/1
1 TABLET ORAL EVERY 6 HOURS PRN
Qty: 120 TABLET | Refills: 0 | Status: SHIPPED | OUTPATIENT
Start: 2024-07-10 | End: 2024-07-31

## 2024-07-08 NOTE — TELEPHONE ENCOUNTER
Patient Quality Outreach    Patient is due for the following:   Hypertension -  BP check  Colon Cancer Screening  Breast Cancer Screening - Mammogram      Topic Date Due    Hepatitis A Vaccine (1 of 2 - Risk 2-dose series) Never done    COVID-19 Vaccine (5 - 2023-24 season) 02/11/2024       Next Steps:   Schedule a nurse only visit for BP check    Type of outreach:    Sent [employer] message.      Questions for provider review:    None           Charles Cristobal MA

## 2024-07-19 NOTE — PROGRESS NOTES
Assessment & Plan:        Plan/Clinical Decision Making:  Patient having sinus pain and pressure with congestion x 6 days.   Discussed usually viral.  Discussed waiting to start an antibiotic for several days.   Continue with flonase, OTC medications as needed.   Start antibiotic if not improving in 3-5 days. Take full course.       ICD-10-CM    1. Acute non-recurrent maxillary sinusitis  J01.00 amoxicillin (AMOXIL) 500 MG capsule         At the end of the encounter, I discussed results, diagnosis, medications. Discussed red flags for immediate return to clinic/ER, as well as indications for follow up if no improvement. Patient understood and agreed to plan. Patient was stable for discharge.        Mercedes Peoples PA-C on 3/5/2022 at 3:04 PM          Subjective:     HPI:    Annalise is a 66 year old female who presents to clinic today for the following health issues:  Chief Complaint   Patient presents with     Sinus Problem     sinus issues, congestion, pressure x 5-6 days, but worse today  - took some Sudafed     Ear Problem     some ear pain too     HPI    Patient having sinus symptoms with severe congestion, sinus pressure, worse today.   This whole week has been worsening. Increase face pain.   At home covid test negative. Took Sudafed today. Hasn't helped.   Symptoms started on Monday.   No hx of seasonal allergies.   Hx of sinusitis in the past. This feels the same.   Yellow nasal drainage.   Started Flonase.     History obtained from the patient.    Review of Systems   Constitutional: Negative for fever.   HENT: Positive for congestion, sinus pressure and sinus pain.    Respiratory: Negative for cough.    Gastrointestinal: Negative for diarrhea and vomiting.         Problem List:  2022-01: Controlled substance agreement signed  2019-09: Diaphragmatic hernia without obstruction and without gangrene  2018-01: Primary insomnia  2017-01: Persistent disorder of initiating or maintaining sleep  2017-01: S/P  The patient completed the 72 month neuropsychological evaluation for the [facility]. There were 176 minutes of psychometrist time (41757: 1 unit; 78110: 5 units) and one unit of neuropsychologist professional time (03054). On[employer] ID: [id number]     Measures administered:    Dementia Rating Scale - 2 Alternate Form: 130/144  WAIS-IV: Similarities, Comprehension, Matrix Reasoning, Letter Number Sequencing, Digit Span  WMS-R Information & Orientation, WMS-4 Logical Memory I and II  D-KEFS Verbal Fluency - Alternate  [place] Naming Test  Clock Drawing  Trail Making Test  Stroop  [place] Card Sorting Test - 64 items  Montiel Judgment of Line Orientation Form H  Gomez Verbal Learning Test - Revised Form 4  Brief Visual Memory Test - Revised Form 4  MoCA v 7.3 (Score = 19/30)    BDI-2: 1  QUIP  ESS  RBDSQ  PDQ-39  Apathy Scale: 18  HAM-D   HAM-A    Verbal learning and memory fall largely within normal limits, but reflect declines compared to 7/13/2023. He has impairments in visual learning and retrieval. Fluency is slowed. Performance otherwise falls largely within normal limits. He endorses minimal depressive symptomatology, but significant apathy, which is a change from 2023.      Edwina Goldstein, Ph.D., ABPP  Licensed Psychologist, [place] [id number]  Board Certified in Clinical Neuropsychology       "cervical spinal fusion  2015: Inflamed seborrheic keratosis  2015: Other chronic pain  2015: Benign neoplasm of skin  2015: Adrenal adenoma  2015: Ganglion cyst  2015: Depression with anxiety  2014: Health Care Home  2014: Back pain  2014: Acute back pain  2010-10: CARDIOVASCULAR SCREENING; LDL GOAL LESS THAN 160  2010: Mild major depression (H)  2007-10: Papanicolaou smear of cervix with low grade squamous   intraepithelial lesion (LGSIL)  2007: Symptomatic menopausal or female climacteric states  2007: Arthrodesis status  2007: Other postprocedural status(V45.89)  2007: Acute maxillary sinusitis  2006: Dysthymic disorder  2006: Spinal stenosis in cervical region  2003: Carcinoma in situ of cervix uteri  2003: Cervicalgia      Past Medical History:   Diagnosis Date     Abnormal Papanicolaou smear of cervix and cervical HPV      Degenerative disc disease      Depressive disorder, not elsewhere classified      Displacement of cervical intervertebral disc without myelopathy      Gastro-oesophageal reflux disease      H/O hysterectomy for benign disease      TOBACCO ABUSE-CONTINUOUS        Social History     Tobacco Use     Smoking status: Former Smoker     Packs/day: 1.00     Years: 30.00     Pack years: 30.00     Types: Cigarettes     Quit date: 2017     Years since quittin.7     Smokeless tobacco: Never Used   Substance Use Topics     Alcohol use: No     Alcohol/week: 0.0 standard drinks             Objective:     Vitals:    22 1448   BP: 116/66   BP Location: Right arm   Patient Position: Chair   Cuff Size: Adult Regular   Pulse: 94   Resp: 16   Temp: 97.4  F (36.3  C)   TempSrc: Oral   SpO2: 97%   Weight: 76.2 kg (168 lb)   Height: 1.676 m (5' 6\")         Physical Exam   EXAM:   Pleasant, alert, appropriate appearance. NAD.  Head Exam: Normocephalic, atraumatic.  Eye Exam:  PERRLA, EOMI, non icteric/injection.    Ear Exam: TMs grey without " bulging. Normal canals.  Normal pinna.  Nose Exam: Normal external nose.  Right turbinates with swelling. Right maxillary tenderness.  OroPharynx Exam:  Normal buccal mucosa. Pharynx without exudate or hypertrophy.  Neck/Thyroid Exam:  No LAD.   Chest/Respiratory Exam: CTAB.  Cardiovascular Exam: RRR. No murmur or rubs.        Results:  No results found for any visits on 03/05/22.

## 2024-07-25 LAB — NONINV COLON CA DNA+OCC BLD SCRN STL QL: NEGATIVE

## 2024-07-31 ENCOUNTER — MYC MEDICAL ADVICE (OUTPATIENT)
Dept: FAMILY MEDICINE | Facility: CLINIC | Age: 69
End: 2024-07-31
Payer: COMMERCIAL

## 2024-07-31 DIAGNOSIS — M54.2 NECK PAIN: ICD-10-CM

## 2024-07-31 DIAGNOSIS — G89.29 OTHER CHRONIC PAIN: ICD-10-CM

## 2024-07-31 RX ORDER — OXYCODONE AND ACETAMINOPHEN 5; 325 MG/1; MG/1
1 TABLET ORAL EVERY 6 HOURS PRN
Qty: 120 TABLET | Refills: 0 | Status: SHIPPED | OUTPATIENT
Start: 2024-08-09 | End: 2024-09-06

## 2024-07-31 NOTE — TELEPHONE ENCOUNTER
Dr. Brady   Please review my chart and pended refill request   Asking for early fill 8/8/2024 instead of 8/10/2024 - RN pended with date of 8/8     Thank you  Jennifer Sibley, Registered Nurse  [facility]

## 2024-07-31 NOTE — TELEPHONE ENCOUNTER
See pt MyChart message  -Requesting refill of Oxycodone too soon, 30 day supply sent 7/10/24    oxyCODONE-acetaminophen (PERCOCET) 5-325 MG tablet 120 tablet 0 7/10/2024 -- No   Sig - Route: Take 1 tablet by mouth every 6 hours as needed for severe pain - Oral     Maryanne LIZ RN  Patient Advocate Liaison - PAL RN  [facility]

## 2024-08-01 ENCOUNTER — MYC MEDICAL ADVICE (OUTPATIENT)
Dept: FAMILY MEDICINE | Facility: CLINIC | Age: 69
End: 2024-08-01
Payer: COMMERCIAL

## 2024-09-06 ENCOUNTER — MYC REFILL (OUTPATIENT)
Dept: FAMILY MEDICINE | Facility: CLINIC | Age: 69
End: 2024-09-06
Payer: COMMERCIAL

## 2024-09-06 DIAGNOSIS — G89.29 OTHER CHRONIC PAIN: ICD-10-CM

## 2024-09-06 DIAGNOSIS — M54.2 NECK PAIN: ICD-10-CM

## 2024-09-07 RX ORDER — OXYCODONE AND ACETAMINOPHEN 5; 325 MG/1; MG/1
1 TABLET ORAL EVERY 6 HOURS PRN
Qty: 120 TABLET | Refills: 0 | Status: SHIPPED | OUTPATIENT
Start: 2024-09-09 | End: 2024-10-07

## 2024-10-07 ENCOUNTER — MYC REFILL (OUTPATIENT)
Dept: FAMILY MEDICINE | Facility: CLINIC | Age: 69
End: 2024-10-07
Payer: COMMERCIAL

## 2024-10-07 DIAGNOSIS — M54.2 NECK PAIN: ICD-10-CM

## 2024-10-07 DIAGNOSIS — G89.29 OTHER CHRONIC PAIN: ICD-10-CM

## 2024-10-07 RX ORDER — OXYCODONE AND ACETAMINOPHEN 5; 325 MG/1; MG/1
1 TABLET ORAL EVERY 6 HOURS PRN
Qty: 120 TABLET | Refills: 0 | Status: SHIPPED | OUTPATIENT
Start: 2024-10-09 | End: 2024-10-30

## 2024-10-22 ENCOUNTER — PATIENT OUTREACH (OUTPATIENT)
Dept: CARE COORDINATION | Facility: CLINIC | Age: 69
End: 2024-10-22
Payer: COMMERCIAL

## 2024-10-25 ENCOUNTER — TELEPHONE (OUTPATIENT)
Dept: FAMILY MEDICINE | Facility: CLINIC | Age: 69
End: 2024-10-25
Payer: COMMERCIAL

## 2024-10-25 NOTE — LETTER
[facility]  [address] [address] [address]  [phone]  October 25, 2024    Annalise MELARA Rodrigo  [address]   [place] [address]    Dear Annalise,    I care about your health and have reviewed your health plan. I have reviewed your medical conditions, medication list, and lab results and am making recommendations based on this review, to better manage your health.    You are in particular need of attention regarding:  -Breast Cancer Screening  -immunizations    I am recommending that you:  {recommendations:-schedule a FOLLOWUP OFFICE APPOINTMENT with me.       Here is a list of Health Maintenance topics that are due now or due soon:  Health Maintenance Due   Topic Date Due    HEPATITIS A IMMUNIZATION (1 of 2 - Risk 2-dose series) Never done    MAMMO SCREENING  09/03/2023    DEXA  04/21/2024    JEANNINE ASSESSMENT  07/19/2024    PHQ-9  07/19/2024    INFLUENZA VACCINE (1) 09/01/2024    COVID-19 Vaccine (5 - 2024-25 season) 09/01/2024       Please call us at [phone] (or use [employer]) to address the above recommendations.     Thank you for trusting [facility] and we appreciate the opportunity to serve you.  We look forward to supporting your healthcare needs in the future.    Healthy Regards,    Cyndi Weiss MD

## 2024-10-25 NOTE — TELEPHONE ENCOUNTER
Patient Quality Outreach    Patient is due for the following:   Breast Cancer Screening - Mammogram  Depression  -  PHQ-9 needed      Topic Date Due    Hepatitis A Vaccine (1 of 2 - Risk 2-dose series) Never done    Flu Vaccine (1) 09/01/2024    COVID-19 Vaccine (5 - 2024-25 season) 09/01/2024       Next Steps:   Schedule a office visit for follow up visit    Type of outreach:    Sent letter.      Questions for provider review:    None           Krystyna Zuleta, CMA

## 2024-10-29 ASSESSMENT — PATIENT HEALTH QUESTIONNAIRE - PHQ9
SUM OF ALL RESPONSES TO PHQ QUESTIONS 1-9: 2
SUM OF ALL RESPONSES TO PHQ QUESTIONS 1-9: 2
10. IF YOU CHECKED OFF ANY PROBLEMS, HOW DIFFICULT HAVE THESE PROBLEMS MADE IT FOR YOU TO DO YOUR WORK, TAKE CARE OF THINGS AT HOME, OR GET ALONG WITH OTHER PEOPLE: NOT DIFFICULT AT ALL

## 2024-10-30 ENCOUNTER — OFFICE VISIT (OUTPATIENT)
Dept: FAMILY MEDICINE | Facility: CLINIC | Age: 69
End: 2024-10-30
Payer: COMMERCIAL

## 2024-10-30 ENCOUNTER — ANCILLARY PROCEDURE (OUTPATIENT)
Dept: MAMMOGRAPHY | Facility: CLINIC | Age: 69
End: 2024-10-30
Payer: COMMERCIAL

## 2024-10-30 VITALS
HEART RATE: 82 BPM | HEIGHT: 66 IN | WEIGHT: 170 LBS | RESPIRATION RATE: 14 BRPM | DIASTOLIC BLOOD PRESSURE: 72 MMHG | OXYGEN SATURATION: 93 % | TEMPERATURE: 98.4 F | BODY MASS INDEX: 27.32 KG/M2 | SYSTOLIC BLOOD PRESSURE: 133 MMHG

## 2024-10-30 DIAGNOSIS — Z12.31 VISIT FOR SCREENING MAMMOGRAM: ICD-10-CM

## 2024-10-30 DIAGNOSIS — Z12.31 VISIT FOR SCREENING MAMMOGRAM: Primary | ICD-10-CM

## 2024-10-30 DIAGNOSIS — N95.1 SYMPTOMATIC MENOPAUSAL OR FEMALE CLIMACTERIC STATES: ICD-10-CM

## 2024-10-30 DIAGNOSIS — G89.29 OTHER CHRONIC PAIN: ICD-10-CM

## 2024-10-30 DIAGNOSIS — M54.2 NECK PAIN: ICD-10-CM

## 2024-10-30 DIAGNOSIS — Z78.0 ASYMPTOMATIC MENOPAUSAL STATE: ICD-10-CM

## 2024-10-30 DIAGNOSIS — Z23 NEED FOR PROPHYLACTIC VACCINATION AGAINST HEPATITIS A: ICD-10-CM

## 2024-10-30 PROCEDURE — 99214 OFFICE O/P EST MOD 30 MIN: CPT | Performed by: FAMILY MEDICINE

## 2024-10-30 PROCEDURE — 77067 SCR MAMMO BI INCL CAD: CPT | Mod: TC | Performed by: RADIOLOGY

## 2024-10-30 PROCEDURE — 77063 BREAST TOMOSYNTHESIS BI: CPT | Mod: TC | Performed by: RADIOLOGY

## 2024-10-30 RX ORDER — CITALOPRAM HYDROBROMIDE 40 MG/1
40 TABLET ORAL DAILY
Qty: 90 TABLET | Refills: 2 | Status: SHIPPED | OUTPATIENT
Start: 2024-10-30

## 2024-10-30 RX ORDER — OXYCODONE AND ACETAMINOPHEN 5; 325 MG/1; MG/1
1 TABLET ORAL EVERY 6 HOURS PRN
Qty: 120 TABLET | Refills: 0 | Status: SHIPPED | OUTPATIENT
Start: 2024-11-07

## 2024-10-30 ASSESSMENT — ANXIETY QUESTIONNAIRES
GAD7 TOTAL SCORE: 13
1. FEELING NERVOUS, ANXIOUS, OR ON EDGE: MORE THAN HALF THE DAYS
6. BECOMING EASILY ANNOYED OR IRRITABLE: SEVERAL DAYS
2. NOT BEING ABLE TO STOP OR CONTROL WORRYING: MORE THAN HALF THE DAYS
GAD7 TOTAL SCORE: 13
4. TROUBLE RELAXING: MORE THAN HALF THE DAYS
7. FEELING AFRAID AS IF SOMETHING AWFUL MIGHT HAPPEN: NEARLY EVERY DAY
3. WORRYING TOO MUCH ABOUT DIFFERENT THINGS: MORE THAN HALF THE DAYS
IF YOU CHECKED OFF ANY PROBLEMS ON THIS QUESTIONNAIRE, HOW DIFFICULT HAVE THESE PROBLEMS MADE IT FOR YOU TO DO YOUR WORK, TAKE CARE OF THINGS AT HOME, OR GET ALONG WITH OTHER PEOPLE: VERY DIFFICULT
5. BEING SO RESTLESS THAT IT IS HARD TO SIT STILL: SEVERAL DAYS

## 2024-10-30 NOTE — PROGRESS NOTES
"  Assessment & Plan     Other chronic pain  Refill for next month   - oxyCODONE-acetaminophen (PERCOCET) 5-325 MG tablet  Dispense: 120 tablet; Refill: 0    Neck pain  Stable   - oxyCODONE-acetaminophen (PERCOCET) 5-325 MG tablet  Dispense: 120 tablet; Refill: 0    Need for prophylactic vaccination against hepatitis A  Discussed     Symptomatic menopausal or female climacteric states  Refills per [facility]  But increased dose due to stressful life circumstances   - citalopram (CELEXA) 40 MG tablet  Dispense: 90 tablet; Refill: 2  - DX Bone Density    Visit for screening mammogram    - MA Screen Bilateral w/Roge    Asymptomatic menopausal state    - DX Bone Density            BMI  Estimated body mass index is 27.44 kg/m  as calculated from the following:    Height as of this encounter: 1.676 m (5' 6\").    Weight as of this encounter: 77.1 kg (170 lb).         FUTURE APPOINTMENTS:       - Follow-up visit in 6-7 months   See Patient Instructions    Ganga Woody is a 68 year old, presenting for the following health issues:  Recheck Medication  She feels her anxiety level is higher due to her step father having stroke recently and her brother dying from liver disease.    She is not having any side effects on her meds        10/30/2024     9:04 AM   Additional Questions   Roomed by Gloria LÓPEZ     History of Present Illness       Reason for visit:  Check meds    She eats 0-1 servings of fruits and vegetables daily.She consumes 1 sweetened beverage(s) daily.She exercises with enough effort to increase her heart rate 9 or less minutes per day.  She exercises with enough effort to increase her heart rate 3 or less days per week.   She is taking medications regularly.         Pain History:  When did you first notice your pain? years   Have you seen this provider for your pain in the past? No   Where in your body do your have pain? Neck and back  Are you seeing anyone else for your pain? No  What makes your pain better? " Medication helps  What makes your pain worse? Weather, activity   How has pain affected your ability to work? Not applicable  Who lives in your household? self        9/3/2021    12:17 PM 7/19/2023     9:30 AM 10/29/2024     6:49 AM   PHQ-9 SCORE   PHQ-9 Total Score MyChart 0 1 (Minimal depression) 2 (Minimal depression)   PHQ-9 Total Score 0 1 2        Patient-reported             7/19/2023     9:42 AM 1/22/2024     9:03 AM 10/30/2024     9:05 AM   PEG Score   PEG Total Score 5 3.67 6.67           Chronic Pain - Initial Assessment:    How would you describe your pain? Nagging pain  Have you had any recent changes to the severity or character of your pain? No the same, some days are better and some are worse  Is there an underlying cause that has been identified? none  Has your ability to work or do daily activities changed recently because of your pain? Yes, hard to move   Which of these pain treatments have you tried? Pain Clinic, Physical Therapy, Steroid Injections, and Surgery  Previous medication treatments:  Opiates - morphine/oxycodone/tramadol (MSContin, Kate, Avinza, Oromorph, Percocet, Oxycontin, Ultram)            10/30/2024    10:39 AM   RIOSORD Total Score   RIOSORD Total Score 8     Average probability of overdose or serious opioid-induced respiratory depression in the next six months:   Points    Risk   0-4    2%   5-7    5%   8-9    7%   10-17    15%   18-25    30%   26-41    55%   42    83%      Past Medical History:   Diagnosis Date    Abnormal Papanicolaou smear of cervix and cervical HPV 2012    Adrenal nodule (H) 2007    on left - stable since 2017    Degenerative disc disease     Depressive disorder, not elsewhere classified     Displacement of cervical intervertebral disc without myelopathy     Gastro-oesophageal reflux disease     H/O hysterectomy for benign disease     for abnormal cervical cells    PONV (postoperative nausea and vomiting)     TOBACCO ABUSE-CONTINUOUS        Past Surgical  History:   Procedure Laterality Date    COLONOSCOPY  2008    normal    DISCECTOMY LUMBAR MINIMALLY INVASIVE ONE LEVEL  02/13/2014    Procedure: DISCECTOMY LUMBAR MINIMALLY INVASIVE ONE LEVEL;  Minimally Invasive Discectomy L2-3 Left ;  Surgeon: Juanjose Pitt MD;  Location: [facility] OR    DISCECTOMY LUMBAR MINIMALLY INVASIVE ONE LEVEL      FECAL COLORECTAL CANCER SCREEN FIT  10/2022    did home test    FUSION SPINE POSTERIOR MINIMALLY INVASIVE ONE LEVEL  05/15/2014    Procedure: FUSION SPINE POSTERIOR MINIMALLY INVASIVE ONE LEVEL;  Surgeon: Juanjose Pitt MD;  Location: [facility] OR    HYSTERECTOMY VAGINAL, BILATERAL SALPINGO-OOPHERECTOMY, COMBINED  2012    done for precancer cells - did not need chemo or radiation    OPTICAL TRACKING SYSTEM FUSION POSTERIOR CERVICAL THREE + LEVELS N/A 12/23/2022    Procedure: Cervical 2 to Thoracic 2 posterior segmental instrumentation. Left Cervical 3 to Cervical 4 and Cervical 7 to Thoracic 2 foraminotomies. Posterior arthrodesis Cervical 2 to Thoracic 2 using allograft cancellous chips. Aung acosta placement.;  Surgeon: Layton Ma MD;  Location:  OR    [facility] NONSPECIFIC PROCEDURE      Lump removed L. breast - benign    [facility] NONSPECIFIC PROCEDURE      6 surgeries over time - first was early 2000's - 3 in front and 2 in back    [facility] NONSPECIFIC PROCEDURE      C-spine - Nany       MEDICATIONS:  Current Outpatient Medications   Medication Sig Dispense Refill    citalopram (CELEXA) 40 MG tablet Take 1 tablet (40 mg) by mouth daily. 90 tablet 2    naloxone (NARCAN) 4 MG/0.1ML nasal spray Spray 1 spray (4 mg) into one nostril alternating nostrils once as needed for opioid reversal 0.2 mL     omeprazole (PRILOSEC) 40 MG DR capsule Take 1 capsule (40 mg) by mouth daily 90 capsule 4    [START ON 11/7/2024] oxyCODONE-acetaminophen (PERCOCET) 5-325 MG tablet Take 1 tablet by mouth every 6 hours as needed for severe pain. 120 tablet 0    tiZANidine (ZANAFLEX) 4 MG tablet  "TAKE 1 TABLET (4 MG) BY MOUTH 3 TIMES DAILY 90 tablet 6    traZODone (DESYREL) 100 MG tablet Take 1 tablet (100 mg) by mouth daily 90 tablet 4     No current facility-administered medications for this visit.       SOCIAL HISTORY:  Social History     Tobacco Use    Smoking status: Former     Current packs/day: 0.00     Average packs/day: 1 pack/day for 30.0 years (30.0 ttl pk-yrs)     Types: Cigarettes     Start date: 1987     Quit date: 2017     Years since quittin.3    Smokeless tobacco: Never    Tobacco comments:     1 pdd   Substance Use Topics    Alcohol use: No       Family History   Problem Relation Age of Onset    Cancer Mother         had spread everywhere    Cancer Father         throat    Cerebrovascular Disease Brother 72    Diabetes Paternal Grandmother              Review of Systems  Constitutional, HEENT, cardiovascular, pulmonary, gi and gu systems are negative, except as otherwise noted.      Objective    /72 (BP Location: Left arm, Patient Position: Chair, Cuff Size: Adult Regular)   Pulse 82   Temp 98.4  F (36.9  C) (Oral)   Resp 14   Ht 1.676 m (5' 6\")   Wt 77.1 kg (170 lb)   SpO2 93%   BMI 27.44 kg/m    Body mass index is 27.44 kg/m .  Physical Exam   GENERAL: alert and no distress  EYES: Eyes grossly normal to inspection, PERRL and conjunctivae and sclerae normal  HENT: ear canals and TM's normal, nose and mouth without ulcers or lesions  NECK: no adenopathy, no asymmetry, masses, or scars  RESP: lungs clear to auscultation - no rales, rhonchi or wheezes  CV: regular rate and rhythm, normal S1 S2, no S3 or S4, no murmur, click or rub, no peripheral edema  MS: no gross musculoskeletal defects noted, no edema  SKIN: no suspicious lesions or rashes  NEURO: Normal strength and tone, mentation intact and speech normal  PSYCH: mentation appears normal, affect normal/bright    Orders Only (auto-released) on 2024   Component Date Value Ref Range Status    " COLOGUARD-ABSTRACT 07/19/2024 Negative  Negative Final    Comment:   NEGATIVE TEST RESULT. A negative Cologuard result indicates a low likelihood that a colorectal cancer (CRC) or advanced adenoma (adenomatous polyps with more advanced pre-malignant features)  is present. The chance that a person with a negative Cologuard test has a colorectal cancer is less than 1 in 1500 (negative predictive value >99.9%) or has an  advanced adenoma is less than  5.3% (negative predictive value 94.7%). These data are based on a prospective cross-sectional study of 10,000 individuals at average risk for colorectal cancer who were screened with both Cologuard and colonoscopy. (Isaias HURTADO. et al, N Engl J Med 2014;370(14):7688-9526) The normal value (reference range) for this assay is negative.    COLOGUARD RE-SCREENING RECOMMENDATION: Periodic colorectal cancer screening is an important part of preventive healthcare for asymptomatic individuals at average risk for colorectal cancer.  Following a negative Cologuard result, the American Cancer Society and U.S.                            Multi-Society Task Force screening guidelines recommend a Cologuard re-screening interval of 3 years.   References: American Cancer Society Guideline for Colorectal Cancer Screening: https://www.cancer.org/cancer/colon-rectal-cancer/lijcamzoh-rkojtxhbk-ujzlgpt/acs-recommendations.html.; Aroldo TORO, Katrina SCHROEDER, Jaron WINSTONK, Colorectal Cancer Screening: Recommendations for Physicians and Patients from the U.S. Multi-Society Task Force on Colorectal Cancer Screening , Am J Gastroenterology 2017; 112:2299-3899.    TEST DESCRIPTION: Composite algorithmic analysis of stool DNA-biomarkers with hemoglobin immunoassay.   Quantitative values of individual biomarkers are not reportable and are not associated with individual biomarker result reference ranges. Cologuard is intended for colorectal cancer screening of adults of either sex, 45 years or older, who are at  average-risk for colorectal cancer (CRC). Cologuard has been approved for use by the U.S. FDA. The performance of Cologuard was                            established in a cross sectional study of average-risk adults aged 50-84. Cologuard performance in patients ages 45 to 49 years was estimated by sub-group analysis of near-age groups. Colonoscopies performed for a positive result may find as the most clinically significant lesion: colorectal cancer [4.0%], advanced adenoma (including sessile serrated polyps greater than or equal to 1cm diameter) [20%] or non- advanced adenoma [31%]; or no colorectal neoplasia [45%]. These estimates are derived from a prospective cross-sectional screening study of 10,000 individuals at average risk for colorectal cancer who were screened with both Cologuard and colonoscopy. (Isaias Hermosillo al, N Engl J Med 2014;370(14):4264-2738.) Cologuard may produce a false negative or false positive result (no colorectal cancer or precancerous polyp present at colonoscopy follow up). A negative Cologuard test result does not guarantee the absence of CRC or advanced adenoma (pre-cancer). The current Cologuard                            screening interval is every 3 years. (American Cancer Society and U.S. Multi-Society Task Force). Cologuard performance data in a 10,000 patient pivotal study using colonoscopy as the reference method can be accessed at the following location: [email or web address]/results. Additional description of the Cologuard test process, warnings and precautions can be found at www.[email or web address].com.             Signed Electronically by: Cyndi Chirinos MD

## 2024-11-05 DIAGNOSIS — Z98.1 S/P CERVICAL SPINAL FUSION: ICD-10-CM

## 2024-12-04 ENCOUNTER — MYC REFILL (OUTPATIENT)
Dept: FAMILY MEDICINE | Facility: CLINIC | Age: 69
End: 2024-12-04
Payer: COMMERCIAL

## 2024-12-04 DIAGNOSIS — G89.29 OTHER CHRONIC PAIN: ICD-10-CM

## 2024-12-04 DIAGNOSIS — M54.2 NECK PAIN: ICD-10-CM

## 2024-12-04 RX ORDER — OXYCODONE AND ACETAMINOPHEN 5; 325 MG/1; MG/1
1 TABLET ORAL EVERY 6 HOURS PRN
Qty: 120 TABLET | Refills: 0 | Status: SHIPPED | OUTPATIENT
Start: 2024-12-06

## 2025-01-02 ENCOUNTER — MYC REFILL (OUTPATIENT)
Dept: FAMILY MEDICINE | Facility: CLINIC | Age: 70
End: 2025-01-02

## 2025-01-02 DIAGNOSIS — G89.29 OTHER CHRONIC PAIN: ICD-10-CM

## 2025-01-02 DIAGNOSIS — M54.2 NECK PAIN: ICD-10-CM

## 2025-01-05 RX ORDER — OXYCODONE AND ACETAMINOPHEN 5; 325 MG/1; MG/1
1 TABLET ORAL EVERY 6 HOURS PRN
Qty: 120 TABLET | Refills: 0 | Status: SHIPPED | OUTPATIENT
Start: 2025-01-06

## 2025-01-06 ENCOUNTER — TELEPHONE (OUTPATIENT)
Dept: FAMILY MEDICINE | Facility: CLINIC | Age: 70
End: 2025-01-06

## 2025-01-06 NOTE — TELEPHONE ENCOUNTER
Central Prior Authorization Team   Phone: [phone]    PA Initiation    Medication: Prior authorization required for percocet (for regular use) - plan limits to 7 days if not regularly used  Insurance Company: [employer] - Phone [phone] Fax [phone]  Pharmacy Filling the Rx: [place], [place] - [address]  Filling Pharmacy Phone: [phone]  Filling Pharmacy Fax:    Start Date: 1/6/2025

## 2025-01-06 NOTE — TELEPHONE ENCOUNTER
Plan only covers 7 days without a prior authorization for continued use.  Patient needs a pa for regulary use.    Please do not close this encounter until this has been addressed.  (prior auth approved/denied, prescriber refusal to complete prior auth or medication changed/discontinued)    Prior Authorization needed on: oxycodone-apap 3/325  Drug NDC: [address]-70     Insurance: [facility] part D  Bin:  [id number]  Member ID: [id number]   Insurance phone #: [phone]    Pharmacy NPI:  [id number]  Pharmacy Phone #: [phone]  Pharmacy Fax #: [phone]      Please let us know if the PA gets approved or denied or if medication is changed    Thanks,  Patricia Fay CPhT  [facility]  [phone]

## 2025-01-08 DIAGNOSIS — M54.2 NECK PAIN: ICD-10-CM

## 2025-01-08 DIAGNOSIS — G89.29 OTHER CHRONIC PAIN: ICD-10-CM

## 2025-01-08 RX ORDER — OXYCODONE AND ACETAMINOPHEN 5; 325 MG/1; MG/1
1 TABLET ORAL EVERY 6 HOURS PRN
Qty: 120 TABLET | Refills: 0 | OUTPATIENT
Start: 2025-01-08

## 2025-01-08 NOTE — TELEPHONE ENCOUNTER
Prior Authorization Approval    Authorization Effective Date:    Authorization Expiration Date: 1/14/2025  Medication: Prior authorization required for percocet (for regular use) - plan limits to 7 days if not regularly used  Approved Dose/Quantity:    Reference #:     Insurance Company: [employer] - Phone [phone] Fax [phone]  Expected CoPay:       CoPay Card Available:      Foundation Assistance Needed:    Which Pharmacy is filling the prescription (Not needed for infusion/clinic administered): [facility] [place], [place] - [address] [facility]  Pharmacy Notified:  Yes  Patient Notified:  **Instructed pharmacy to notify patient when script is ready to /ship.**

## 2025-01-09 DIAGNOSIS — M54.2 NECK PAIN: ICD-10-CM

## 2025-01-09 DIAGNOSIS — G89.29 OTHER CHRONIC PAIN: ICD-10-CM

## 2025-01-09 NOTE — TELEPHONE ENCOUNTER
reviewed. Patient has enough to get her to Monday when Dr. Chirinos will be back to refill remainder of prescription.

## 2025-01-09 NOTE — TELEPHONE ENCOUNTER
"It looks like pharmacy put note on this encounter's reason for call. \"Percocet - we were only able to fill a 7 day supply due to ins restrictions. A prior auth was approved for the full qty now so we need a new RX since the rest of the previous RX was voided.\"    Prior authorization was approved this afternoon (see 1/6/25 telephone encounter).    Outgoing call to patient. Patient confirms the situation above. I let her know I will reach out to Cyndi Chirinos MD for new prescription.    Esther Kapoor RN    "

## 2025-01-09 NOTE — TELEPHONE ENCOUNTER
reviewed. Patient has enough to get her to Monday when Dr. Chirinos will be back to refill remainder of prescription.  Refill request present in other encounter. Will close this encounter.

## 2025-01-09 NOTE — TELEPHONE ENCOUNTER
Please send a new prescription for the percocet for the patient since we could only fill a 7 day supply per her new insurance.    Now that a prior authorization for regular use has been approved we can fill the full amount.  However we already used the last prescription and cannot fill the remaining amount per C2 laws.    If approved, please send a new prescription.  Patient will be all out.    Thanks  Patricia Fay, [facility]  [phone]

## 2025-01-10 NOTE — TELEPHONE ENCOUNTER
Pt calling about her percocet script. States she went to  the remainder of her script yesterday but the pharmacy hadn't received the prescription.     Advised pt of below- Dr. Chirinos will be back on Monday to refill the remainder of her prescription. Pt verbally understands.     Kimberlee Gunn RN

## 2025-01-11 RX ORDER — OXYCODONE AND ACETAMINOPHEN 5; 325 MG/1; MG/1
1 TABLET ORAL EVERY 6 HOURS PRN
Qty: 92 TABLET | Refills: 0 | Status: SHIPPED | OUTPATIENT
Start: 2025-01-11

## 2025-02-04 ENCOUNTER — MYC REFILL (OUTPATIENT)
Dept: FAMILY MEDICINE | Facility: CLINIC | Age: 70
End: 2025-02-04

## 2025-02-04 DIAGNOSIS — G89.29 OTHER CHRONIC PAIN: ICD-10-CM

## 2025-02-04 DIAGNOSIS — M54.2 NECK PAIN: ICD-10-CM

## 2025-02-04 RX ORDER — OXYCODONE AND ACETAMINOPHEN 5; 325 MG/1; MG/1
1 TABLET ORAL EVERY 6 HOURS PRN
Qty: 92 TABLET | Refills: 0 | Status: SHIPPED | OUTPATIENT
Start: 2025-02-12 | End: 2025-02-04

## 2025-02-04 NOTE — TELEPHONE ENCOUNTER
"Received call from patient requesting provider correct the \"fill date\" on the percocet script as she received a partial script on 1/13/25 and will be due for refill on 2/6 rather than 2/12 as listed on current script.     Writer called and confirmed with [place] pharmacy that patient is due for refill on 2/6 as she received #92 on 1/13 for a 23 day supply. (They verified on  as well.)    Routing to Dr. Chirinos to send in new script with corrected fill date of 2/6/25- new script pended with fill date of 2/6. Please also review that 1 month quantity is sent.     Maria G HURTADO RN  [facility]    "

## 2025-02-05 RX ORDER — OXYCODONE AND ACETAMINOPHEN 5; 325 MG/1; MG/1
1 TABLET ORAL EVERY 6 HOURS PRN
Qty: 120 TABLET | Refills: 0 | Status: SHIPPED | OUTPATIENT
Start: 2025-02-06

## 2025-02-05 NOTE — ADDENDUM NOTE
Addended by: JOSEFA JOHNSON on: 2/5/2025 12:30 PM     Modules accepted: Orders     primary hyperparathyroidism

## 2025-03-04 ENCOUNTER — MYC REFILL (OUTPATIENT)
Dept: FAMILY MEDICINE | Facility: CLINIC | Age: 70
End: 2025-03-04
Payer: COMMERCIAL

## 2025-03-04 DIAGNOSIS — G89.29 OTHER CHRONIC PAIN: ICD-10-CM

## 2025-03-04 DIAGNOSIS — M54.2 NECK PAIN: ICD-10-CM

## 2025-03-04 RX ORDER — OXYCODONE AND ACETAMINOPHEN 5; 325 MG/1; MG/1
1 TABLET ORAL EVERY 6 HOURS PRN
Qty: 120 TABLET | Refills: 0 | Status: SHIPPED | OUTPATIENT
Start: 2025-03-06

## 2025-04-02 ENCOUNTER — MYC REFILL (OUTPATIENT)
Dept: FAMILY MEDICINE | Facility: CLINIC | Age: 70
End: 2025-04-02
Payer: COMMERCIAL

## 2025-04-02 DIAGNOSIS — M54.2 NECK PAIN: ICD-10-CM

## 2025-04-02 DIAGNOSIS — G89.29 OTHER CHRONIC PAIN: ICD-10-CM

## 2025-04-02 RX ORDER — OXYCODONE AND ACETAMINOPHEN 5; 325 MG/1; MG/1
1 TABLET ORAL EVERY 6 HOURS PRN
Qty: 120 TABLET | Refills: 0 | Status: SHIPPED | OUTPATIENT
Start: 2025-04-04

## 2025-04-13 ENCOUNTER — HEALTH MAINTENANCE LETTER (OUTPATIENT)
Age: 70
End: 2025-04-13

## 2025-04-30 ENCOUNTER — MYC REFILL (OUTPATIENT)
Dept: FAMILY MEDICINE | Facility: CLINIC | Age: 70
End: 2025-04-30
Payer: COMMERCIAL

## 2025-04-30 DIAGNOSIS — G89.29 OTHER CHRONIC PAIN: ICD-10-CM

## 2025-04-30 DIAGNOSIS — M54.2 NECK PAIN: ICD-10-CM

## 2025-04-30 RX ORDER — OXYCODONE AND ACETAMINOPHEN 5; 325 MG/1; MG/1
1 TABLET ORAL EVERY 6 HOURS PRN
Qty: 120 TABLET | Refills: 0 | Status: SHIPPED | OUTPATIENT
Start: 2025-05-05

## 2025-05-03 SDOH — HEALTH STABILITY: PHYSICAL HEALTH: ON AVERAGE, HOW MANY MINUTES DO YOU ENGAGE IN EXERCISE AT THIS LEVEL?: 30 MIN

## 2025-05-03 SDOH — HEALTH STABILITY: PHYSICAL HEALTH: ON AVERAGE, HOW MANY DAYS PER WEEK DO YOU ENGAGE IN MODERATE TO STRENUOUS EXERCISE (LIKE A BRISK WALK)?: 3 DAYS

## 2025-05-03 ASSESSMENT — SOCIAL DETERMINANTS OF HEALTH (SDOH): HOW OFTEN DO YOU GET TOGETHER WITH FRIENDS OR RELATIVES?: THREE TIMES A WEEK

## 2025-05-07 ENCOUNTER — RESULTS FOLLOW-UP (OUTPATIENT)
Dept: FAMILY MEDICINE | Facility: CLINIC | Age: 70
End: 2025-05-07

## 2025-05-07 ENCOUNTER — OFFICE VISIT (OUTPATIENT)
Dept: FAMILY MEDICINE | Facility: CLINIC | Age: 70
End: 2025-05-07
Attending: FAMILY MEDICINE
Payer: COMMERCIAL

## 2025-05-07 VITALS
TEMPERATURE: 98.1 F | HEIGHT: 66 IN | DIASTOLIC BLOOD PRESSURE: 76 MMHG | BODY MASS INDEX: 28.64 KG/M2 | OXYGEN SATURATION: 96 % | HEART RATE: 70 BPM | RESPIRATION RATE: 20 BRPM | SYSTOLIC BLOOD PRESSURE: 144 MMHG | WEIGHT: 178.2 LBS

## 2025-05-07 DIAGNOSIS — R14.0 ABDOMINAL BLOATING: ICD-10-CM

## 2025-05-07 DIAGNOSIS — G47.00 PERSISTENT DISORDER OF INITIATING OR MAINTAINING SLEEP: ICD-10-CM

## 2025-05-07 DIAGNOSIS — R03.0 ELEVATED BLOOD PRESSURE READING WITHOUT DIAGNOSIS OF HYPERTENSION: ICD-10-CM

## 2025-05-07 DIAGNOSIS — G89.29 OTHER CHRONIC PAIN: ICD-10-CM

## 2025-05-07 DIAGNOSIS — F11.20 CONTINUOUS OPIOID DEPENDENCE (H): ICD-10-CM

## 2025-05-07 DIAGNOSIS — K21.00 GASTROESOPHAGEAL REFLUX DISEASE WITH ESOPHAGITIS WITHOUT HEMORRHAGE: ICD-10-CM

## 2025-05-07 DIAGNOSIS — Z00.00 ENCOUNTER FOR MEDICARE ANNUAL WELLNESS EXAM: Primary | ICD-10-CM

## 2025-05-07 DIAGNOSIS — M54.2 NECK PAIN: ICD-10-CM

## 2025-05-07 DIAGNOSIS — N95.1 SYMPTOMATIC MENOPAUSAL OR FEMALE CLIMACTERIC STATES: ICD-10-CM

## 2025-05-07 DIAGNOSIS — Z98.1 S/P CERVICAL SPINAL FUSION: ICD-10-CM

## 2025-05-07 DIAGNOSIS — Z23 NEED FOR VACCINATION: ICD-10-CM

## 2025-05-07 LAB
ALBUMIN SERPL BCG-MCNC: 4.5 G/DL (ref 3.5–5.2)
ALP SERPL-CCNC: 93 U/L (ref 40–150)
ALT SERPL W P-5'-P-CCNC: 12 U/L (ref 0–50)
ANION GAP SERPL CALCULATED.3IONS-SCNC: 11 MMOL/L (ref 7–15)
AST SERPL W P-5'-P-CCNC: 22 U/L (ref 0–45)
BILIRUB SERPL-MCNC: 0.3 MG/DL
BUN SERPL-MCNC: 12 MG/DL (ref 8–23)
CALCIUM SERPL-MCNC: 9.4 MG/DL (ref 8.8–10.4)
CHLORIDE SERPL-SCNC: 103 MMOL/L (ref 98–107)
CREAT SERPL-MCNC: 0.91 MG/DL (ref 0.51–0.95)
CREAT UR-MCNC: 121 MG/DL
CREAT UR-MCNC: 121 MG/DL
EGFRCR SERPLBLD CKD-EPI 2021: 67 ML/MIN/1.73M2
ERYTHROCYTE [DISTWIDTH] IN BLOOD BY AUTOMATED COUNT: 13.1 % (ref 10–15)
GLUCOSE SERPL-MCNC: 105 MG/DL (ref 70–99)
HCO3 SERPL-SCNC: 25 MMOL/L (ref 22–29)
HCT VFR BLD AUTO: 43.4 % (ref 35–47)
HGB BLD-MCNC: 13.8 G/DL (ref 11.7–15.7)
MCH RBC QN AUTO: 28.5 PG (ref 26.5–33)
MCHC RBC AUTO-ENTMCNC: 31.8 G/DL (ref 31.5–36.5)
MCV RBC AUTO: 90 FL (ref 78–100)
MICROALBUMIN UR-MCNC: <12 MG/L
MICROALBUMIN/CREAT UR: NORMAL MG/G{CREAT}
PLATELET # BLD AUTO: 283 10E3/UL (ref 150–450)
POTASSIUM SERPL-SCNC: 4.3 MMOL/L (ref 3.4–5.3)
PROT SERPL-MCNC: 7.8 G/DL (ref 6.4–8.3)
RBC # BLD AUTO: 4.85 10E6/UL (ref 3.8–5.2)
SODIUM SERPL-SCNC: 139 MMOL/L (ref 135–145)
WBC # BLD AUTO: 10.3 10E3/UL (ref 4–11)

## 2025-05-07 PROCEDURE — 3077F SYST BP >= 140 MM HG: CPT | Performed by: FAMILY MEDICINE

## 2025-05-07 PROCEDURE — 82570 ASSAY OF URINE CREATININE: CPT | Performed by: FAMILY MEDICINE

## 2025-05-07 PROCEDURE — 3078F DIAST BP <80 MM HG: CPT | Performed by: FAMILY MEDICINE

## 2025-05-07 PROCEDURE — 82043 UR ALBUMIN QUANTITATIVE: CPT | Performed by: FAMILY MEDICINE

## 2025-05-07 PROCEDURE — G0439 PPPS, SUBSEQ VISIT: HCPCS | Performed by: FAMILY MEDICINE

## 2025-05-07 PROCEDURE — 80053 COMPREHEN METABOLIC PANEL: CPT | Performed by: FAMILY MEDICINE

## 2025-05-07 PROCEDURE — 85027 COMPLETE CBC AUTOMATED: CPT | Performed by: FAMILY MEDICINE

## 2025-05-07 PROCEDURE — 1125F AMNT PAIN NOTED PAIN PRSNT: CPT | Performed by: FAMILY MEDICINE

## 2025-05-07 PROCEDURE — 91320 SARSCV2 VAC 30MCG TRS-SUC IM: CPT | Performed by: FAMILY MEDICINE

## 2025-05-07 PROCEDURE — 99213 OFFICE O/P EST LOW 20 MIN: CPT | Mod: 25 | Performed by: FAMILY MEDICINE

## 2025-05-07 PROCEDURE — 36415 COLL VENOUS BLD VENIPUNCTURE: CPT | Performed by: FAMILY MEDICINE

## 2025-05-07 PROCEDURE — 90480 ADMN SARSCOV2 VAC 1/ONLY CMP: CPT | Performed by: FAMILY MEDICINE

## 2025-05-07 RX ORDER — OMEPRAZOLE 40 MG/1
40 CAPSULE, DELAYED RELEASE ORAL DAILY
Qty: 90 CAPSULE | Refills: 4 | Status: SHIPPED | OUTPATIENT
Start: 2025-05-07

## 2025-05-07 RX ORDER — CITALOPRAM HYDROBROMIDE 40 MG/1
40 TABLET ORAL DAILY
Qty: 90 TABLET | Refills: 2 | Status: SHIPPED | OUTPATIENT
Start: 2025-05-07

## 2025-05-07 RX ORDER — TRAZODONE HYDROCHLORIDE 150 MG/1
150 TABLET ORAL DAILY
Qty: 90 TABLET | Refills: 4 | Status: SHIPPED | OUTPATIENT
Start: 2025-05-07

## 2025-05-07 RX ORDER — OXYCODONE AND ACETAMINOPHEN 5; 325 MG/1; MG/1
1 TABLET ORAL EVERY 6 HOURS PRN
Qty: 120 TABLET | Refills: 0 | Status: SHIPPED | OUTPATIENT
Start: 2025-06-04

## 2025-05-07 ASSESSMENT — ANXIETY QUESTIONNAIRES
GAD7 TOTAL SCORE: 0
5. BEING SO RESTLESS THAT IT IS HARD TO SIT STILL: NOT AT ALL
6. BECOMING EASILY ANNOYED OR IRRITABLE: NOT AT ALL
8. IF YOU CHECKED OFF ANY PROBLEMS, HOW DIFFICULT HAVE THESE MADE IT FOR YOU TO DO YOUR WORK, TAKE CARE OF THINGS AT HOME, OR GET ALONG WITH OTHER PEOPLE?: NOT DIFFICULT AT ALL
3. WORRYING TOO MUCH ABOUT DIFFERENT THINGS: NOT AT ALL
4. TROUBLE RELAXING: NOT AT ALL
7. FEELING AFRAID AS IF SOMETHING AWFUL MIGHT HAPPEN: NOT AT ALL
IF YOU CHECKED OFF ANY PROBLEMS ON THIS QUESTIONNAIRE, HOW DIFFICULT HAVE THESE PROBLEMS MADE IT FOR YOU TO DO YOUR WORK, TAKE CARE OF THINGS AT HOME, OR GET ALONG WITH OTHER PEOPLE: NOT DIFFICULT AT ALL
GAD7 TOTAL SCORE: 0
2. NOT BEING ABLE TO STOP OR CONTROL WORRYING: NOT AT ALL
7. FEELING AFRAID AS IF SOMETHING AWFUL MIGHT HAPPEN: NOT AT ALL
GAD7 TOTAL SCORE: 0
1. FEELING NERVOUS, ANXIOUS, OR ON EDGE: NOT AT ALL

## 2025-05-07 ASSESSMENT — PATIENT HEALTH QUESTIONNAIRE - PHQ9
10. IF YOU CHECKED OFF ANY PROBLEMS, HOW DIFFICULT HAVE THESE PROBLEMS MADE IT FOR YOU TO DO YOUR WORK, TAKE CARE OF THINGS AT HOME, OR GET ALONG WITH OTHER PEOPLE: NOT DIFFICULT AT ALL
SUM OF ALL RESPONSES TO PHQ QUESTIONS 1-9: 3
SUM OF ALL RESPONSES TO PHQ QUESTIONS 1-9: 3

## 2025-05-07 ASSESSMENT — PAIN SCALES - GENERAL: PAINLEVEL_OUTOF10: MILD PAIN (2)

## 2025-05-07 NOTE — LETTER

## 2025-05-07 NOTE — PROGRESS NOTES
Preventive Care Visit  [facility]  Cyndi Chirinos MD, Family Medicine  May 7, 2025      Assessment & Plan     Encounter for Medicare annual wellness exam    - REVIEW OF HEALTH MAINTENANCE PROTOCOL ORDERS  - COVID-19 12+ (PFIZER)  - Comprehensive metabolic panel (BMP + Alb, Alk Phos, ALT, AST, Total. Bili, TP)  - CBC with platelets    Continuous opioid dependence (H)  CSA signed - doing well  - Drug Confirmation Panel Urine with Creat - lab collect  - Comprehensive metabolic panel (BMP + Alb, Alk Phos, ALT, AST, Total. Bili, TP)    Need for vaccination  Discussed - will do covid vaccine today     Other chronic pain  CSA done   - Drug Confirmation Panel Urine with Creat - lab collect  - oxyCODONE-acetaminophen (PERCOCET) 5-325 MG tablet  Dispense: 120 tablet; Refill: 0  - Comprehensive metabolic panel (BMP + Alb, Alk Phos, ALT, AST, Total. Bili, TP)    Symptomatic menopausal or female climacteric states  Refills per epicare  Working well   - citalopram (CELEXA) 40 MG tablet  Dispense: 90 tablet; Refill: 2    Gastroesophageal reflux disease with esophagitis without hemorrhage  Stable  Refills per epicare    - omeprazole (PRILOSEC) 40 MG DR capsule  Dispense: 90 capsule; Refill: 4    S/P cervical spinal fusion  Refills per epicare    - tiZANidine (ZANAFLEX) 4 MG tablet  Dispense: 90 tablet; Refill: 6    Persistent disorder of initiating or maintaining sleep  Will increase dose to 150 mg per night    - traZODone (DESYREL) 150 MG tablet  Dispense: 90 tablet; Refill: 4    Neck pain  Refills per epicare    - oxyCODONE-acetaminophen (PERCOCET) 5-325 MG tablet  Dispense: 120 tablet; Refill: 0    Elevated blood pressure reading without diagnosis of hypertension  Recheck today   - Albumin Random Urine Quantitative with Creat Ratio    Abdominal bloating  New over the last year - will get EGD and if this is fine will consider ultrasound of abdomen or CT   - Adult GI  Referral - Procedure  "Only      Patient has been advised of split billing requirements and indicates understanding: Yes        BMI  Estimated body mass index is 28.76 kg/m  as calculated from the following:    Height as of this encounter: 1.676 m (5' 6\").    Weight as of this encounter: 80.8 kg (178 lb 3.2 oz).       Counseling  Appropriate preventive services were addressed with this patient via screening, questionnaire, or discussion as appropriate for fall prevention, nutrition, physical activity, Tobacco-use cessation, social engagement, weight loss and cognition.  Checklist reviewing preventive services available has been given to the patient.  Reviewed patient's diet, addressing concerns and/or questions.   She is at risk for lack of exercise and has been provided with information to increase physical activity for the benefit of her well-being.   The patient was instructed to see the dentist every 6 months.   She is at risk for psychosocial distress and has been provided with information to reduce risk.   I have reviewed Opioid Use Disorder and Substance Use Disorder risk factors and made any needed referrals.       Follow-up  Return in about 6 months (around 11/7/2025) for for recheck on BP.    Ganga Woody is a 69 year old, presenting for the following:  she is doing well but feels like she would like to increase her meds for sleep.  Also over the last year she gets very full after eating.   She feels uncomfortably distended.    She thinks  she might have a hernia of some kind.   Reviewed 2019 CT and left inguinal hernia seen.   Last EGD  was 2009 and was okay.    Wellness Visit, Hypertension, and Lipids        5/7/2025     9:09 AM   Additional Questions   Roomed by munir valencia   Accompanied by self           HPI  See above          Advance Care Planning    Discussed in 2024         5/3/2025   General Health   How would you rate your overall physical health? Good   Feel stress (tense, anxious, or unable to sleep) To some extent "   (!) STRESS CONCERN      5/3/2025   Nutrition   Diet: Regular (no restrictions)         5/3/2025   Exercise   Days per week of moderate/strenous exercise 3 days   Average minutes spent exercising at this level 30 min         5/3/2025   Social Factors   Frequency of gathering with friends or relatives Three times a week   Worry food won't last until get money to buy more Patient declined   Food not last or not have enough money for food? Patient declined   Do you have housing? (Housing is defined as stable permanent housing and does not include staying outside in a car, in a tent, in an abandoned building, in an overnight shelter, or couch-surfing.) Yes   Are you worried about losing your housing? No   Lack of transportation? No   Unable to get utilities (heat,electricity)? No         5/3/2025   Fall Risk   Fallen 2 or more times in the past year? No    No   Trouble with walking or balance? No    No       Multiple values from one day are sorted in reverse-chronological order          5/3/2025   Activities of Daily Living- Home Safety   Needs help with the following daily activites None of the above   Safety concerns in the home None of the above         5/3/2025   Dental   Dentist two times every year? (!) NO         5/3/2025   Hearing Screening   Hearing concerns? None of the above         5/3/2025   Driving Risk Screening   Patient/family members have concerns about driving No         5/3/2025   General Alertness/Fatigue Screening   Have you been more tired than usual lately? No         5/3/2025   Urinary Incontinence Screening   Bothered by leaking urine in past 6 months No       Today's PHQ-9 Score:       5/7/2025     8:58 AM   PHQ-9 SCORE   PHQ-9 Total Score MyChart 3 (Minimal depression)   PHQ-9 Total Score 3        Patient-reported         5/3/2025   Substance Use   Alcohol more than 3/day or more than 7/wk No   Do you have a current opioid prescription? (!) YES   How severe/bad is pain from 1 to 10? 8/10    Do you use any other substances recreationally? No       Social History     Tobacco Use    Smoking status: Former     Current packs/day: 0.00     Average packs/day: 1 pack/day for 30.0 years (30.0 ttl pk-yrs)     Types: Cigarettes     Start date: 1987     Quit date: 2017     Years since quittin.8    Smokeless tobacco: Never    Tobacco comments:     1 pdd   Vaping Use    Vaping status: Never Used   Substance Use Topics    Alcohol use: No    Drug use: No           10/30/2024   LAST FHS-7 RESULTS   1st degree relative breast or ovarian cancer No   Any relative bilateral breast cancer No   Any male have breast cancer No   Any ONE woman have BOTH breast AND ovarian cancer No   Any woman with breast cancer before 50yrs No   2 or more relatives with breast AND/OR ovarian cancer No   2 or more relatives with breast AND/OR bowel cancer No        Mammogram Screening - Mammogram every 1-2 years updated in Health Maintenance based on mutual decision making    ASCVD Risk   The 10-year ASCVD risk score (Andrea TORO, et al., 2019) is: 17.8%    Values used to calculate the score:      Age: 69 years      Sex: Female      Is Non- : No      Diabetic: No      Tobacco smoker: No      Systolic Blood Pressure: 168 mmHg      Is BP treated: No      HDL Cholesterol: 63 mg/dL      Total Cholesterol: 243 mg/dL            Reviewed and updated as needed this visit by Provider                    Labs reviewed in EPIC  BP Readings from Last 3 Encounters:   25 (!) 168/80   10/30/24 133/72   24 (!) 148/81    Wt Readings from Last 3 Encounters:   25 80.8 kg (178 lb 3.2 oz)   10/30/24 77.1 kg (170 lb)   24 80.1 kg (176 lb 9.6 oz)                  Patient Active Problem List   Diagnosis    Carcinoma in situ of cervix uteri    Acute maxillary sinusitis    Symptomatic menopausal or female climacteric states    CARDIOVASCULAR SCREENING; LDL GOAL LESS THAN 160    Depression with anxiety     Benign neoplasm of skin    Adrenal adenoma    Ganglion cyst    Other chronic pain    Inflamed seborrheic keratosis    Persistent disorder of initiating or maintaining sleep    S/P cervical spinal fusion    Primary insomnia    Diaphragmatic hernia without obstruction and without gangrene    Spinal stenosis in cervical region    Controlled substance agreement signed    DDD (degenerative disc disease), cervical    F11.2 - Continuous opioid dependence (H)     Past Surgical History:   Procedure Laterality Date    COLONOSCOPY  2008    normal    DISCECTOMY LUMBAR MINIMALLY INVASIVE ONE LEVEL  02/13/2014    Procedure: DISCECTOMY LUMBAR MINIMALLY INVASIVE ONE LEVEL;  Minimally Invasive Discectomy L2-3 Left ;  Surgeon: Juanjose Pitt MD;  Location: [facility] OR    DISCECTOMY LUMBAR MINIMALLY INVASIVE ONE LEVEL      FECAL COLORECTAL CANCER SCREEN FIT  10/2022    did home test    FUSION SPINE POSTERIOR MINIMALLY INVASIVE ONE LEVEL  05/15/2014    Procedure: FUSION SPINE POSTERIOR MINIMALLY INVASIVE ONE LEVEL;  Surgeon: Juanjose Pitt MD;  Location: [facility] OR    HYSTERECTOMY VAGINAL, BILATERAL SALPINGO-OOPHERECTOMY, COMBINED  2012    done for precancer cells - did not need chemo or radiation    OPTICAL TRACKING SYSTEM FUSION POSTERIOR CERVICAL THREE + LEVELS N/A 12/23/2022    Procedure: Cervical 2 to Thoracic 2 posterior segmental instrumentation. Left Cervical 3 to Cervical 4 and Cervical 7 to Thoracic 2 foraminotomies. Posterior arthrodesis Cervical 2 to Thoracic 2 using allograft cancellous chips. Aung acosta placement.;  Surgeon: Layton Ma MD;  Location:  OR    [facility] NONSPECIFIC PROCEDURE      Lump removed L. breast - benign    [facility] NONSPECIFIC PROCEDURE      6 surgeries over time - first was early 2000's - 3 in front and 2 in back    [facility] NONSPECIFIC PROCEDURE      C-spine - Nany       Social History     Tobacco Use    Smoking status: Former     Current packs/day: 0.00     Average packs/day: 1 pack/day  for 30.0 years (30.0 ttl pk-yrs)     Types: Cigarettes     Start date: 1987     Quit date: 2017     Years since quittin.8    Smokeless tobacco: Never    Tobacco comments:     1 pdd   Substance Use Topics    Alcohol use: No     Family History   Problem Relation Age of Onset    Cancer Mother         had spread everywhere    Cancer Father         throat    Cerebrovascular Disease Brother 72    Diabetes Paternal Grandmother          Current Outpatient Medications   Medication Sig Dispense Refill    citalopram (CELEXA) 40 MG tablet Take 1 tablet (40 mg) by mouth daily. 90 tablet 2    naloxone (NARCAN) 4 MG/0.1ML nasal spray Spray 1 spray (4 mg) into one nostril alternating nostrils once as needed for opioid reversal 0.2 mL     omeprazole (PRILOSEC) 40 MG DR capsule Take 1 capsule (40 mg) by mouth daily. 90 capsule 4    [START ON 2025] oxyCODONE-acetaminophen (PERCOCET) 5-325 MG tablet Take 1 tablet by mouth every 6 hours as needed for severe pain. 120 tablet 0    tiZANidine (ZANAFLEX) 4 MG tablet Take 1 tablet (4 mg) by mouth 3 times daily. 90 tablet 6    traZODone (DESYREL) 150 MG tablet Take 1 tablet (150 mg) by mouth daily. 90 tablet 4     Allergies   Allergen Reactions    Sulfa Antibiotics Other (See Comments)     Headaches     Current providers sharing in care for this patient include:  Patient Care Team:  Cyndi Chirinos MD as PCP - General (Family Medicine)  Juanjose Pitt MD as MD (Orthopaedic Surgery)  Layton Ma MD as Assigned Neuroscience Provider  Cyndi Chirinos MD as Assigned PCP  Jackie So PA-C as Physician Assistant (Dermatology)  Cyndi Chirinos MD as Assigned Pain Medication Provider    The following health maintenance items are reviewed in Epic and correct as of today:  Health Maintenance   Topic Date Due    HEPATITIS A IMMUNIZATION (1 of 2 - Risk 2-dose series) Never done    DEXA  2024    URINE DRUG SCREEN  2025    ANNUAL REVIEW OF HM ORDERS   "01/22/2025    COVID-19 Vaccine (6 - 2024-25 season) 04/15/2025    BMP  04/29/2025    CONTROLLED SUBSTANCE AGREEMENT FOR CHRONIC PAIN MANAGEMENT  04/29/2025    MEDICARE ANNUAL WELLNESS VISIT  04/29/2025    LUNG CANCER SCREENING  04/29/2026 (Originally 9/24/2008)    JEANNINE ASSESSMENT  05/07/2026    FALL RISK ASSESSMENT  05/07/2026    PHQ-9  05/07/2026    MAMMO SCREENING  10/30/2026    DIABETES SCREENING  04/29/2027    COLORECTAL CANCER SCREENING  07/19/2027    RSV VACCINE (1 - 1-dose 75+ series) 11/05/2028    LIPID  04/29/2029    ADVANCE CARE PLANNING  04/29/2029    DTAP/TDAP/TD IMMUNIZATION (3 - Td or Tdap) 07/19/2033    HEPATITIS C SCREENING  Completed    PHQ-2 (once per calendar year)  Completed    INFLUENZA VACCINE  Completed    Pneumococcal Vaccine: 50+ Years  Completed    ZOSTER IMMUNIZATION  Completed    HPV IMMUNIZATION  Aged Out    MENINGITIS IMMUNIZATION  Aged Out         Review of Systems  Constitutional, HEENT, cardiovascular, pulmonary, gi and gu systems are negative, except as otherwise noted.     Objective    Exam  BP (!) 168/80 (BP Location: Left arm, Patient Position: Chair, Cuff Size: Adult Regular)   Pulse 70   Temp 98.1  F (36.7  C) (Oral)   Resp 20   Ht 1.676 m (5' 6\")   Wt 80.8 kg (178 lb 3.2 oz)   SpO2 96%   BMI 28.76 kg/m     Estimated body mass index is 28.76 kg/m  as calculated from the following:    Height as of this encounter: 1.676 m (5' 6\").    Weight as of this encounter: 80.8 kg (178 lb 3.2 oz).    Physical Exam  GENERAL: alert and no distress  EYES: Eyes grossly normal to inspection, PERRL and conjunctivae and sclerae normal  HENT: ear canals and TM's normal, nose and mouth without ulcers or lesions  NECK: no adenopathy, no asymmetry, masses, or scars  RESP: lungs clear to auscultation - no rales, rhonchi or wheezes  BREAST: normal without masses, tenderness or nipple discharge and no palpable axillary masses or adenopathy  CV: regular rate and rhythm, normal S1 S2, no S3 or S4, no " murmur, click or rub, no peripheral edema  ABDOMEN: soft, nontender, bowel sounds normal, and does seem more distended   MS: no gross musculoskeletal defects noted, no edema  SKIN: no suspicious lesions or rashes  NEURO: Normal strength and tone, mentation intact and speech normal  PSYCH: mentation appears normal, affect normal/bright  LYMPH: no cervical, supraclavicular, axillary, or inguinal adenopathy        5/7/2025   Mini Cog   Clock Draw Score 2 Normal   3 Item Recall 3 objects recalled   Mini Cog Total Score 5              Signed Electronically by: Cyndi Chirinos MD    Answers submitted by the patient for this visit:  Patient Health Questionnaire (Submitted on 5/7/2025)  If you checked off any problems, how difficult have these problems made it for you to do your work, take care of things at home, or get along with other people?: Not difficult at all  PHQ9 TOTAL SCORE: 3  Patient Health Questionnaire (G7) (Submitted on 5/7/2025)  JEANNINE 7 TOTAL SCORE: 0

## 2025-05-13 LAB
OXYCODONE UR CFM-MCNC: 1170 NG/ML
OXYCODONE/CREAT UR: 967 NG/MG {CREAT}
OXYMORPHONE UR CFM-MCNC: 2680 NG/ML
OXYMORPHONE/CREAT UR: 2215 NG/MG {CREAT}

## 2025-05-19 ENCOUNTER — TELEPHONE (OUTPATIENT)
Dept: GASTROENTEROLOGY | Facility: CLINIC | Age: 70
End: 2025-05-19
Payer: COMMERCIAL

## 2025-05-19 NOTE — TELEPHONE ENCOUNTER
"Endoscopy Scheduling Screen    Caller: patient    Have you had any respiratory illness or flu-like symptoms in the last 10 days?  No    What is your communication preference for Instructions and/or Bowel Prep?   MyChart    What insurance is in the chart?  Other:  [facility]/MEDICARE    Ordering/Referring Provider:   HITESH GODINEZ        (If ordering provider performs procedure, schedule with ordering provider unless otherwise instructed. )    BMI: Estimated body mass index is 28.76 kg/m  as calculated from the following:    Height as of 5/7/25: 1.676 m (5' 6\").    Weight as of 5/7/25: 80.8 kg (178 lb 3.2 oz).     Sedation Ordered  MAC/deep sedation.   BMI<= 45 45 < BMI <= 48 48 < BMI < = 50  BMI > 50   No Restrictions No [facility]  No [facility]  [facility] with exceptions Hospital Only OR Only       Do you have a history of malignant hyperthermia?  No    (Females) Are you currently pregnant?   No     Have you been diagnosed or told you have pulmonary hypertension?   No    Do you have an LVAD?  No    Have you been told you have moderate to severe sleep apnea?  No.    Have you been told you have COPD, asthma, or any other lung disease?  No    Has your doctor ordered any cardiac tests like echo, angiogram, stress test, ablation, or EKG, that you have not completed yet?  No    Do you  have a history of any heart conditions?  No     Have you ever had or are you waiting for an organ transplant?  No. Continue scheduling, no site restrictions.    Have you had a stroke or transient ischemic attack (TIA aka \"mini stroke\") in the last 2 years?   No.    Have you been diagnosed with or been told you have cirrhosis of the liver?   No.    Are you currently on dialysis?   No    Do you need assistance transferring?   No    BMI: Estimated body mass index is 28.76 kg/m  as calculated from the following:    Height as of 5/7/25: 1.676 m (5' 6\").    Weight as of 5/7/25: 80.8 kg (178 lb 3.2 oz).     Is patients BMI > 40 and scheduling location " [facility]?  No    Do you take an injectable or oral medication for weight loss or diabetes (excluding insulin)?  No    Do you take the medication Naltrexone?  No    Do you take blood thinners?  No       Prep   Are you currently on dialysis or do you have chronic kidney disease?  No    Do you have a diagnosis of diabetes?  No    Do you have a diagnosis of cystic fibrosis (CF)?  No    On a regular basis do you go 3 -5 days between bowel movements?  No    BMI > 40?  No    Preferred Pharmacy:    [facility] [id number] [facility]  [id number] [facility] [address]  Phone: [phone] Fax: [phone]    Final Scheduling Details     Procedure scheduled  Upper endoscopy (EGD)    Surgeon:  Paolo     Date of procedure:  06/03/2025     Pre-OP / PAC:   Yes - Patient informed of pre-op requirement. - ANESTHESIA REVIEWING WHETHER PT'S WELLNESS VISIT ON 05/07 WILL QUALIFY AS A PRE-OP.   Confirmation received on 05/20/2025.     Location  [facility] NEXT AVAILABLE MAC    Sedation   MAC/Deep Sedation - Per order.      Patient Reminders:   You will receive a call from a Nurse to review instructions and health history.  This assessment must be completed prior to your procedure.  Failure to complete the Nurse assessment may result in the procedure being cancelled.      On the day of your procedure, please designate an adult(s) who can drive you home stay with you for the next 24 hours. The medicines used in the exam will make you sleepy. You will not be able to drive.      You cannot take public transportation, ride share services, or non-medical taxi service without a responsible caregiver.  Medical transport services are allowed with the requirement that a responsible caregiver will receive you at your destination.  We require that drivers and caregivers are confirmed prior to your procedure.

## 2025-05-19 NOTE — TELEPHONE ENCOUNTER
"Add on    Pre assessment completed for upcoming procedure.      Procedure details:    Patient scheduled for Upper endoscopy (EGD) on 6/3/25.     Arrival time: 1130. Procedure time 1230    Facility location: [facility]; [facility] [place] [address]. Check in location: [facility].     Sedation type: MAC - per order: \"Chronic or scheduled pain/narcotic medication use\". Percocet noted per med list.  Continuous opioid dependence noted per problem list.     Pre op exam needed? Yes. Pre op exam completed on 5/7/25. Thorough H&P documented in 5/7/25 annual wellness exam office visit with PCP, Dr. Cyndi Chirinos  -  OK to use as pre op exam at . This H&P is within 30 days of procedure date (6/3/25).  An additional pre op exam is not required for the 6/3/25 EGD procedure.     Indication for procedure: Abdominal bloating;  \"one year of increasing discomfort and bloatinng after eating\"    Designated  policy reviewed. Instructed to have someone stay 24 hours post procedure.       Chart review:    Electronic implanted devices? No    Recent diagnosis of diverticulitis within the last 6 weeks?  N/A      Medication review:    Diabetic? No    Anticoagulants? No    Weight loss medication/injectable? No.    Other medication HOLDING recommendations:  N/A      Prep for procedure:    Bowel prep recommendation: N/A  Due to: EGD    Procedure information and instructions sent via [employer]     Reviewed procedure prep instructions.     Patient verbalized understanding and had no questions or concerns at this time.        Natividad Trotter RN  Endoscopy Procedure Pre Assessment   [phone] option 3  "

## 2025-06-03 ENCOUNTER — ANESTHESIA (OUTPATIENT)
Dept: GASTROENTEROLOGY | Facility: CLINIC | Age: 70
End: 2025-06-03
Payer: COMMERCIAL

## 2025-06-03 ENCOUNTER — HOSPITAL ENCOUNTER (OUTPATIENT)
Facility: CLINIC | Age: 70
Discharge: HOME | End: 2025-06-03
Attending: INTERNAL MEDICINE | Admitting: INTERNAL MEDICINE
Payer: COMMERCIAL

## 2025-06-03 ENCOUNTER — ANESTHESIA EVENT (OUTPATIENT)
Dept: GASTROENTEROLOGY | Facility: CLINIC | Age: 70
End: 2025-06-03
Payer: COMMERCIAL

## 2025-06-03 VITALS
BODY MASS INDEX: 28.73 KG/M2 | HEART RATE: 70 BPM | RESPIRATION RATE: 16 BRPM | SYSTOLIC BLOOD PRESSURE: 132 MMHG | OXYGEN SATURATION: 98 % | DIASTOLIC BLOOD PRESSURE: 67 MMHG | WEIGHT: 178 LBS

## 2025-06-03 LAB — UPPER GI ENDOSCOPY: NORMAL

## 2025-06-03 PROCEDURE — 250N000013 HC RX MED GY IP 250 OP 250 PS 637: Performed by: ANESTHESIOLOGY

## 2025-06-03 PROCEDURE — 250N000011 HC RX IP 250 OP 636: Performed by: REGISTERED NURSE

## 2025-06-03 PROCEDURE — 43239 EGD BIOPSY SINGLE/MULTIPLE: CPT | Performed by: INTERNAL MEDICINE

## 2025-06-03 PROCEDURE — 999N000010 HC STATISTIC ANES STAT CODE-CRNA PER MINUTE: Performed by: INTERNAL MEDICINE

## 2025-06-03 PROCEDURE — 258N000003 HC RX IP 258 OP 636: Performed by: REGISTERED NURSE

## 2025-06-03 PROCEDURE — 88305 TISSUE EXAM BY PATHOLOGIST: CPT | Mod: 26 | Performed by: PATHOLOGY

## 2025-06-03 PROCEDURE — 88305 TISSUE EXAM BY PATHOLOGIST: CPT | Mod: TC | Performed by: INTERNAL MEDICINE

## 2025-06-03 PROCEDURE — 250N000009 HC RX 250: Performed by: REGISTERED NURSE

## 2025-06-03 PROCEDURE — 370N000017 HC ANESTHESIA TECHNICAL FEE, PER MIN: Performed by: INTERNAL MEDICINE

## 2025-06-03 RX ORDER — LIDOCAINE HYDROCHLORIDE 20 MG/ML
INJECTION, SOLUTION INFILTRATION; PERINEURAL PRN
Status: DISCONTINUED | OUTPATIENT
Start: 2025-06-03 | End: 2025-06-03

## 2025-06-03 RX ORDER — OXYCODONE HYDROCHLORIDE 5 MG/1
5 TABLET ORAL
Refills: 0 | Status: COMPLETED | OUTPATIENT
Start: 2025-06-03 | End: 2025-06-03

## 2025-06-03 RX ORDER — SODIUM CHLORIDE, SODIUM LACTATE, POTASSIUM CHLORIDE, CALCIUM CHLORIDE 600; 310; 30; 20 MG/100ML; MG/100ML; MG/100ML; MG/100ML
INJECTION, SOLUTION INTRAVENOUS CONTINUOUS PRN
Status: DISCONTINUED | OUTPATIENT
Start: 2025-06-03 | End: 2025-06-03

## 2025-06-03 RX ORDER — PROPOFOL 10 MG/ML
INJECTION, EMULSION INTRAVENOUS PRN
Status: DISCONTINUED | OUTPATIENT
Start: 2025-06-03 | End: 2025-06-03

## 2025-06-03 RX ORDER — PROPOFOL 10 MG/ML
INJECTION, EMULSION INTRAVENOUS CONTINUOUS PRN
Status: DISCONTINUED | OUTPATIENT
Start: 2025-06-03 | End: 2025-06-03

## 2025-06-03 RX ADMIN — LIDOCAINE HYDROCHLORIDE 100 MG: 20 INJECTION, SOLUTION INFILTRATION; PERINEURAL at 12:55

## 2025-06-03 RX ADMIN — SODIUM CHLORIDE, SODIUM LACTATE, POTASSIUM CHLORIDE, AND CALCIUM CHLORIDE: .6; .31; .03; .02 INJECTION, SOLUTION INTRAVENOUS at 12:53

## 2025-06-03 RX ADMIN — PROPOFOL 150 MCG/KG/MIN: 10 INJECTION, EMULSION INTRAVENOUS at 12:55

## 2025-06-03 RX ADMIN — PROPOFOL 50 MG: 10 INJECTION, EMULSION INTRAVENOUS at 12:55

## 2025-06-03 RX ADMIN — OXYCODONE HYDROCHLORIDE 5 MG: 5 TABLET ORAL at 13:47

## 2025-06-03 ASSESSMENT — ACTIVITIES OF DAILY LIVING (ADL)
ADLS_ACUITY_SCORE: 55
ADLS_ACUITY_SCORE: 55

## 2025-06-03 ASSESSMENT — LIFESTYLE VARIABLES: TOBACCO_USE: 1

## 2025-06-03 ASSESSMENT — ENCOUNTER SYMPTOMS
ORTHOPNEA: 0
SEIZURES: 0

## 2025-06-03 NOTE — ANESTHESIA POSTPROCEDURE EVALUATION
Patient: Annalise Wallace    Procedure: Procedure(s):  Esophagoscopy, gastroscopy, duodenoscopy (EGD), combined       Anesthesia Type:  MAC    Note:  Disposition: Outpatient   Postop Pain Control: Uneventful            Sign Out: Well controlled pain   PONV: No   Neuro/Psych: Uneventful            Sign Out: Acceptable/Baseline neuro status   Airway/Respiratory: Uneventful            Sign Out: Acceptable/Baseline resp. status   CV/Hemodynamics: Uneventful            Sign Out: Acceptable CV status; No obvious hypovolemia; No obvious fluid overload   Other NRE: NONE   DID A NON-ROUTINE EVENT OCCUR? No           Last vitals:  Vitals Value Taken Time   /67 06/03/25 13:30   Temp     Pulse 70 06/03/25 13:30   Resp 16 06/03/25 13:30   SpO2 98 % 06/03/25 13:30       Electronically Signed By: Jayla Tejada MD  [place] 3, 2025  2:18 PM

## 2025-06-03 NOTE — ANESTHESIA PREPROCEDURE EVALUATION
Anesthesia Pre-Procedure Evaluation    Patient: Annalise Wallace   MRN: [id number] : 1955          Procedure : Procedure(s):  Esophagoscopy, gastroscopy, duodenoscopy (EGD), combined         Past Medical History:   Diagnosis Date    Abnormal Papanicolaou smear of cervix and cervical HPV     Adrenal nodule 2007    on left - stable since 2017    Degenerative disc disease     Depressive disorder, not elsewhere classified     Displacement of cervical intervertebral disc without myelopathy     Gastro-oesophageal reflux disease     H/O hysterectomy for benign disease     for abnormal cervical cells    Left inguinal hernia     PONV (postoperative nausea and vomiting)     TOBACCO ABUSE-CONTINUOUS       Past Surgical History:   Procedure Laterality Date    COLONOSCOPY      normal    DISCECTOMY LUMBAR MINIMALLY INVASIVE ONE LEVEL  2014    Procedure: DISCECTOMY LUMBAR MINIMALLY INVASIVE ONE LEVEL;  Minimally Invasive Discectomy L2-3 Left ;  Surgeon: Juanjose Pitt MD;  Location: [facility] OR    DISCECTOMY LUMBAR MINIMALLY INVASIVE ONE LEVEL      FECAL COLORECTAL CANCER SCREEN FIT  10/2022    did home test    FUSION SPINE POSTERIOR MINIMALLY INVASIVE ONE LEVEL  05/15/2014    Procedure: FUSION SPINE POSTERIOR MINIMALLY INVASIVE ONE LEVEL;  Surgeon: Juanjose Pitt MD;  Location: [facility] OR    HYSTERECTOMY VAGINAL, BILATERAL SALPINGO-OOPHERECTOMY, COMBINED      done for precancer cells - did not need chemo or radiation    OPTICAL TRACKING SYSTEM FUSION POSTERIOR CERVICAL THREE + LEVELS N/A 2022    Procedure: Cervical 2 to Thoracic 2 posterior segmental instrumentation. Left Cervical 3 to Cervical 4 and Cervical 7 to Thoracic 2 foraminotomies. Posterior arthrodesis Cervical 2 to Thoracic 2 using allograft cancellous chips. Aung lima.;  Surgeon: Layton Ma MD;  Location:  OR    [facility] NONSPECIFIC PROCEDURE      Lump removed L. breast - benign    [facility] NONSPECIFIC  PROCEDURE      6 surgeries over time - first was early  - 3 in front and 2 in back    [facility] NONSPECIFIC PROCEDURE      C-spine - Nany      Allergies   Allergen Reactions    Sulfa Antibiotics Other (See Comments)     Headaches      Social History     Tobacco Use    Smoking status: Some Days     Current packs/day: 0.00     Average packs/day: 1 pack/day for 30.0 years (30.0 ttl pk-yrs)     Types: Cigarettes     Start date: 1987     Last attempt to quit: 2017     Years since quittin.9    Smokeless tobacco: Never    Tobacco comments:     1 pdd   Substance Use Topics    Alcohol use: Yes     Comment: 1-2 drinks a week      Wt Readings from Last 1 Encounters:   25 80.7 kg (178 lb)        Anesthesia Evaluation   Pt has had prior anesthetic.     No history of anesthetic complications       ROS/MED HX  ENT/Pulmonary: Comment: H/o max sinusitis     (+)                tobacco use, Current use,                    (-) asthma and sleep apnea   Neurologic: Comment: Cervical radiculopathy and foraminal stenosis, Pain-neck and Bilat UEs        (-) no seizures, no CVA and no TIA   Cardiovascular:    (-) RAND, orthopnea/PND and syncope   METS/Exercise Tolerance: >4 METS    Hematologic:    (-) history of blood clots   Musculoskeletal: Comment: Cervical and lumbar fusion, 5 prior neck surgeries      GI/Hepatic: Comment: diaphragmatic hernia     (+) GERD, Asymptomatic on medication,                  Renal/Genitourinary:    (-) renal disease   Endo: Comment: Adrenal adenoma    (-) Type II DM   Psychiatric/Substance Use:     (+) psychiatric history anxiety and depression  H/O chronic opioid use .     Infectious Disease:    (-) Recent Fever   Malignancy: Comment: Cervical Ca  (+) Malignancy,     Other:      (+)  , H/O Chronic Pain (Percocet 1-2x daily),           Physical Exam  Airway  Mallampati: II  TM distance: >3 FB  Neck ROM: limited  Mouth opening: >= 4 cm    Cardiovascular   Rhythm: regular  Rate: normal rate      Dental   (+) Minor Abnormalities - some fillings, tiny chips      Pulmonary Breath sounds clear to auscultation        Neurological   She appears awake, alert and oriented x3.    Other Findings       OUTSIDE LABS:  CBC:   Lab Results   Component Value Date    WBC 10.3 05/07/2025    WBC 10.1 04/29/2024    HGB 13.8 05/07/2025    HGB 13.6 04/29/2024    HCT 43.4 05/07/2025    HCT 41.3 04/29/2024     05/07/2025     04/29/2024     BMP:   Lab Results   Component Value Date     05/07/2025     04/29/2024    POTASSIUM 4.3 05/07/2025    POTASSIUM 4.0 04/29/2024    CHLORIDE 103 05/07/2025    CHLORIDE 103 04/29/2024    CO2 25 05/07/2025    CO2 24 04/29/2024    BUN 12.0 05/07/2025    BUN 16.6 04/29/2024    CR 0.91 05/07/2025    CR 0.86 04/29/2024     (H) 05/07/2025    GLC 95 04/29/2024     COAGS:   Lab Results   Component Value Date    PTT 28 12/23/2022    INR 0.97 12/23/2022     POC:   Lab Results   Component Value Date     (H) 02/15/2014     HEPATIC:   Lab Results   Component Value Date    ALBUMIN 4.5 05/07/2025    PROTTOTAL 7.8 05/07/2025    ALT 12 05/07/2025    AST 22 05/07/2025    ALKPHOS 93 05/07/2025    BILITOTAL 0.3 05/07/2025     OTHER:   Lab Results   Component Value Date    PHILIP 9.4 05/07/2025    LIPASE 52 11/11/2003    AMYLASE 45 11/11/2003    TSH 1.58 03/02/2015    T4 8.3 10/29/2007    T3 150 10/29/2007       Anesthesia Plan    ASA Status:  3      NPO Status: NPO Appropriate   Anesthesia Type: MAC.  Maintenance: TIVA.   Techniques and Equipment:       - Monitoring Plan: standard ASA monitoring     Consents    Anesthesia Plan(s) and associated risks, benefits, and realistic alternatives discussed. Questions answered and patient/representative(s) expressed understanding.     - Discussed:     - Discussed with:  Patient               Postoperative Care         Comments:    Other Comments: Vivi Tejada MD    I have reviewed the pertinent notes and labs  "in the chart from the past 30 days and (re)examined the patient.  Any updates or changes from those notes are reflected in this note.    Clinically Significant Risk Factors Present on Admission                             # Overweight: Estimated body mass index is 28.73 kg/m  as calculated from the following:    Height as of 5/7/25: 1.676 m (5' 6\").    Weight as of this encounter: 80.7 kg (178 lb).                    "

## 2025-06-03 NOTE — H&P
[facility]  Pre-Endoscopy History and Physical     Annalise Wallace MRN# [id number]   YOB: 1955 Age: 69 year old     Date of Procedure: 6/3/2025  Primary care provider: Cyndi Chirinos  Type of Endoscopy: esophagogastroduodenoscopy (upper GI endoscopy)  Reason for Procedure: bloating/GERD  Type of Anesthesia Anticipated: MAC    HPI:    Annalise is a 69 year old female who will be undergoing the above procedure.      A history and physical has been performed. The patient's medications and allergies have been reviewed. The risks and benefits of the procedure and the sedation options and risks were discussed with the patient.  All questions were answered and informed consent was obtained.      She denies a personal or family history of anesthesia complications or bleeding disorders.     Allergies   Allergen Reactions    Sulfa Antibiotics Other (See Comments)     Headaches        Prior to Admission Medications   Prescriptions Last Dose Informant Patient Reported? Taking?   citalopram (CELEXA) 40 MG tablet 6/3/2025  No Yes   Sig: Take 1 tablet (40 mg) by mouth daily.   naloxone (NARCAN) 4 MG/0.1ML nasal spray   No No   Sig: Spray 1 spray (4 mg) into one nostril alternating nostrils once as needed for opioid reversal   omeprazole (PRILOSEC) 40 MG DR capsule 6/3/2025  No Yes   Sig: Take 1 capsule (40 mg) by mouth daily.   oxyCODONE-acetaminophen (PERCOCET) 5-325 MG tablet 6/1/2025  No No   Sig: Take 1 tablet by mouth every 6 hours as needed for severe pain.   tiZANidine (ZANAFLEX) 4 MG tablet 6/3/2025  No Yes   Sig: Take 1 tablet (4 mg) by mouth 3 times daily.   traZODone (DESYREL) 150 MG tablet 6/2/2025  No Yes   Sig: Take 1 tablet (150 mg) by mouth daily.      Facility-Administered Medications: None       Patient Active Problem List   Diagnosis    Carcinoma in situ of cervix uteri    Acute maxillary sinusitis    Symptomatic menopausal or female climacteric states    CARDIOVASCULAR  SCREENING; LDL GOAL LESS THAN 160    Depression with anxiety    Benign neoplasm of skin    Adrenal adenoma    Ganglion cyst    Other chronic pain    Inflamed seborrheic keratosis    Persistent disorder of initiating or maintaining sleep    S/P cervical spinal fusion    Primary insomnia    Diaphragmatic hernia without obstruction and without gangrene    Spinal stenosis in cervical region    Controlled substance agreement signed    DDD (degenerative disc disease), cervical    F11.2 - Continuous opioid dependence (H)        Past Medical History:   Diagnosis Date    Abnormal Papanicolaou smear of cervix and cervical HPV 2012    Adrenal nodule 2007    on left - stable since 2017    Degenerative disc disease     Depressive disorder, not elsewhere classified     Displacement of cervical intervertebral disc without myelopathy     Gastro-oesophageal reflux disease     H/O hysterectomy for benign disease     for abnormal cervical cells    Left inguinal hernia 2019    PONV (postoperative nausea and vomiting)     TOBACCO ABUSE-CONTINUOUS         Past Surgical History:   Procedure Laterality Date    COLONOSCOPY  2008    normal    DISCECTOMY LUMBAR MINIMALLY INVASIVE ONE LEVEL  02/13/2014    Procedure: DISCECTOMY LUMBAR MINIMALLY INVASIVE ONE LEVEL;  Minimally Invasive Discectomy L2-3 Left ;  Surgeon: Juanjose Pitt MD;  Location: [facility] OR    DISCECTOMY LUMBAR MINIMALLY INVASIVE ONE LEVEL      FECAL COLORECTAL CANCER SCREEN FIT  10/2022    did home test    FUSION SPINE POSTERIOR MINIMALLY INVASIVE ONE LEVEL  05/15/2014    Procedure: FUSION SPINE POSTERIOR MINIMALLY INVASIVE ONE LEVEL;  Surgeon: Juanjose Pitt MD;  Location: [facility] OR    HYSTERECTOMY VAGINAL, BILATERAL SALPINGO-OOPHERECTOMY, COMBINED  2012    done for precancer cells - did not need chemo or radiation    OPTICAL TRACKING SYSTEM FUSION POSTERIOR CERVICAL THREE + LEVELS N/A 12/23/2022    Procedure: Cervical 2 to Thoracic 2 posterior segmental instrumentation.  "Left Cervical 3 to Cervical 4 and Cervical 7 to Thoracic 2 foraminotomies. Posterior arthrodesis Cervical 2 to Thoracic 2 using allograft cancellous chips. Aung acosta placement.;  Surgeon: Layton Ma MD;  Location:  OR    [facility] NONSPECIFIC PROCEDURE      Lump removed L. breast - benign    [facility] NONSPECIFIC PROCEDURE      6 surgeries over time - first was early  - 3 in front and 2 in back    [facility] NONSPECIFIC PROCEDURE      C-spine - Nany       Social History     Tobacco Use    Smoking status: Some Days     Current packs/day: 0.00     Average packs/day: 1 pack/day for 30.0 years (30.0 ttl pk-yrs)     Types: Cigarettes     Start date: 1987     Last attempt to quit: 2017     Years since quittin.9    Smokeless tobacco: Never    Tobacco comments:     1 pdd   Substance Use Topics    Alcohol use: Yes     Comment: 1-2 drinks a week       Family History   Problem Relation Age of Onset    Cancer Mother         had spread everywhere    Cancer Father         throat    Cerebrovascular Disease Brother 72    Diabetes Paternal Grandmother        REVIEW OF SYSTEMS:     5 point ROS negative except as noted above in HPI, including Gen., Resp., CV, GI &  system review.      PHYSICAL EXAM:   BP (!) 140/68   Resp 16   Wt 80.7 kg (178 lb)   SpO2 98%   BMI 28.73 kg/m   Estimated body mass index is 28.73 kg/m  as calculated from the following:    Height as of 25: 1.676 m (5' 6\").    Weight as of this encounter: 80.7 kg (178 lb).   GENERAL APPEARANCE: healthy, alert, and no distress  MENTAL STATUS: alert  AIRWAY EXAM: Mallampatti per anesthesia    RESP: lungs clear to auscultation - no rales, rhonchi or wheezes  CV: regular rates and rhythm      DIAGNOSTICS:    Not indicated      IMPRESSION   ASA Class per anesthesia          PLAN:       Plan for esophagogastroduodenoscopy (upper GI endoscopy). We discussed the risks, benefits and alternatives and the patient wished to proceed.    The above has " been forwarded to the consulting provider.      Signed Electronically by: Rene Boone MD  Daf[place] 3, 2025    Rene Boone MD  [facility]  Cell: [phone]  Office Phone: [phone]  Office Fax: [phone]

## 2025-06-03 NOTE — ANESTHESIA CARE TRANSFER NOTE
Patient: Annalise Wallace    Procedure: Procedure(s):  Esophagoscopy, gastroscopy, duodenoscopy (EGD), combined       Diagnosis: Abdominal bloating [R14.0]  Diagnosis Additional Information: No value filed.    Anesthesia Type:   MAC     Note:    Oropharynx: oropharynx clear of all foreign objects and spontaneously breathing  Level of Consciousness: awake  Oxygen Supplementation: room air    Independent Airway: airway patency satisfactory and stable  Dentition: dentition unchanged  Vital Signs Stable: post-procedure vital signs reviewed and stable  Report to RN Given: handoff report given  Patient transferred to: Phase II    Handoff Report: Identifed the Patient, Identified the Reponsible Provider, Reviewed the pertinent medical history, Discussed the surgical course, Reviewed Intra-OP anesthesia mangement and issues during anesthesia, Set expectations for post-procedure period and Allowed opportunity for questions and acknowledgement of understanding      Vitals:  Vitals Value Taken Time   BP     Temp     Pulse     Resp     SpO2         Electronically Signed By: KWADWO Christy CRNA  D[place] 3, 2025  1:09 PM

## 2025-06-05 LAB
PATH REPORT.COMMENTS IMP SPEC: NORMAL
PATH REPORT.COMMENTS IMP SPEC: NORMAL
PATH REPORT.FINAL DX SPEC: NORMAL
PATH REPORT.GROSS SPEC: NORMAL
PATH REPORT.MICROSCOPIC SPEC OTHER STN: NORMAL
PATH REPORT.RELEVANT HX SPEC: NORMAL
PHOTO IMAGE: NORMAL

## 2025-06-29 DIAGNOSIS — K21.00 GASTROESOPHAGEAL REFLUX DISEASE WITH ESOPHAGITIS WITHOUT HEMORRHAGE: ICD-10-CM

## 2025-06-30 RX ORDER — OMEPRAZOLE 40 MG/1
40 CAPSULE, DELAYED RELEASE ORAL DAILY
Qty: 90 CAPSULE | Refills: 4 | OUTPATIENT
Start: 2025-06-30

## 2025-07-01 ENCOUNTER — MYC REFILL (OUTPATIENT)
Dept: FAMILY MEDICINE | Facility: CLINIC | Age: 70
End: 2025-07-01
Payer: COMMERCIAL

## 2025-07-01 DIAGNOSIS — M54.2 NECK PAIN: ICD-10-CM

## 2025-07-01 DIAGNOSIS — G89.29 OTHER CHRONIC PAIN: ICD-10-CM

## 2025-07-01 RX ORDER — OXYCODONE AND ACETAMINOPHEN 5; 325 MG/1; MG/1
1 TABLET ORAL EVERY 6 HOURS PRN
Qty: 120 TABLET | Refills: 0 | Status: SHIPPED | OUTPATIENT
Start: 2025-07-03

## 2025-07-31 ENCOUNTER — MYC REFILL (OUTPATIENT)
Dept: FAMILY MEDICINE | Facility: CLINIC | Age: 70
End: 2025-07-31
Payer: COMMERCIAL

## 2025-07-31 DIAGNOSIS — G89.29 OTHER CHRONIC PAIN: ICD-10-CM

## 2025-07-31 DIAGNOSIS — M54.2 NECK PAIN: ICD-10-CM

## 2025-07-31 RX ORDER — OXYCODONE AND ACETAMINOPHEN 5; 325 MG/1; MG/1
1 TABLET ORAL EVERY 6 HOURS PRN
Qty: 120 TABLET | Refills: 0 | Status: SHIPPED | OUTPATIENT
Start: 2025-08-02

## 2025-08-27 ENCOUNTER — MYC REFILL (OUTPATIENT)
Dept: FAMILY MEDICINE | Facility: CLINIC | Age: 70
End: 2025-08-27
Payer: COMMERCIAL

## 2025-08-27 DIAGNOSIS — M54.2 NECK PAIN: ICD-10-CM

## 2025-08-27 DIAGNOSIS — G89.29 OTHER CHRONIC PAIN: ICD-10-CM

## 2025-08-27 RX ORDER — OXYCODONE AND ACETAMINOPHEN 5; 325 MG/1; MG/1
1 TABLET ORAL EVERY 6 HOURS PRN
Qty: 120 TABLET | Refills: 0 | Status: SHIPPED | OUTPATIENT
Start: 2025-09-03

## (undated) DEVICE — Device

## (undated) DEVICE — SPONGE KITTNER 30-101

## (undated) DEVICE — DRAPE O ARM TUBE 9732722

## (undated) DEVICE — SPONGE COTTONOID 1/2X1 1/2" 1-STRING 80-1404

## (undated) DEVICE — SPONGE SURGIFOAM 100 1974

## (undated) DEVICE — LIGHT HANDLE X2

## (undated) DEVICE — PREP CHLORAPREP 26ML TINTED HI-LITE ORANGE 930815

## (undated) DEVICE — SU ETHILON 3-0 FS-1 18" 669H

## (undated) DEVICE — SOL NACL 0.9% IRRIG 1000ML BOTTLE 2F7124

## (undated) DEVICE — PAD CHUX UNDERPAD 23X24" 7136

## (undated) DEVICE — PACK LARGE SPINE SNE15LSFSE

## (undated) DEVICE — DRAPE SPLIT SHEET 77X108 REINFORCED 29436

## (undated) DEVICE — SU VICRYL 2-0 CT-2 CR 8X18" J726D

## (undated) DEVICE — SU VICRYL 0 CT-2 CR 8X18" J727D

## (undated) DEVICE — DRAPE MAYO STAND 23X54 8337

## (undated) DEVICE — TOOL DISSECT MIDAS MR8 14CM MTL CUTTR 3MM MR8-14MC30

## (undated) DEVICE — DRAPE IOBAN INCISE 23X17" 6650EZ

## (undated) DEVICE — SPONGE LAP 18X18" X8435

## (undated) DEVICE — ESU GROUND PAD ADULT W/CORD E7507

## (undated) DEVICE — GLOVE BIOGEL PI MICRO SZ 7.5 48575

## (undated) DEVICE — RX SURGIFLO W/THROMBIN KIT 2ML 1991

## (undated) DEVICE — GLOVE BIOGEL PI MICRO INDICATOR UNDERGLOVE SZ 8.5 48985

## (undated) DEVICE — DRILL BIT W/STOP 2.4X65MM

## (undated) DEVICE — MANIFOLD NEPTUNE 4 PORT 700-20

## (undated) DEVICE — MARKER SPHERES PASSIVE MEDT PACK 5 8801075

## (undated) DEVICE — DRSG TELFA ISLAND 4X10"

## (undated) DEVICE — SOL WATER IRRIG 1000ML BOTTLE 2F7114

## (undated) DEVICE — GOWN XXL 9575

## (undated) DEVICE — TAPE MICROFOAM 3" 1528-3

## (undated) DEVICE — PROTECTOR ARM ONE-STEP TRENDELENBURG 40418

## (undated) DEVICE — LINEN TOWEL PACK X5 5464

## (undated) DEVICE — SPONGE COTTONOID 1X3" 80-1408

## (undated) RX ORDER — ONDANSETRON 2 MG/ML
INJECTION INTRAMUSCULAR; INTRAVENOUS
Status: DISPENSED
Start: 2022-12-23

## (undated) RX ORDER — FENTANYL CITRATE 0.05 MG/ML
INJECTION, SOLUTION INTRAMUSCULAR; INTRAVENOUS
Status: DISPENSED
Start: 2022-12-23

## (undated) RX ORDER — PROPOFOL 10 MG/ML
INJECTION, EMULSION INTRAVENOUS
Status: DISPENSED
Start: 2022-12-23

## (undated) RX ORDER — HYDROMORPHONE HCL IN WATER/PF 6 MG/30 ML
PATIENT CONTROLLED ANALGESIA SYRINGE INTRAVENOUS
Status: DISPENSED
Start: 2022-12-23

## (undated) RX ORDER — TRANEXAMIC ACID 10 MG/ML
INJECTION, SOLUTION INTRAVENOUS
Status: DISPENSED
Start: 2022-12-23

## (undated) RX ORDER — DEXAMETHASONE SODIUM PHOSPHATE 4 MG/ML
INJECTION, SOLUTION INTRA-ARTICULAR; INTRALESIONAL; INTRAMUSCULAR; INTRAVENOUS; SOFT TISSUE
Status: DISPENSED
Start: 2022-12-23

## (undated) RX ORDER — GINSENG 100 MG
CAPSULE ORAL
Status: DISPENSED
Start: 2022-12-23

## (undated) RX ORDER — DEXMEDETOMIDINE HYDROCHLORIDE 4 UG/ML
INJECTION, SOLUTION INTRAVENOUS
Status: DISPENSED
Start: 2022-12-23

## (undated) RX ORDER — HYDROMORPHONE HYDROCHLORIDE 1 MG/ML
INJECTION, SOLUTION INTRAMUSCULAR; INTRAVENOUS; SUBCUTANEOUS
Status: DISPENSED
Start: 2022-12-23

## (undated) RX ORDER — FENTANYL CITRATE 50 UG/ML
INJECTION, SOLUTION INTRAMUSCULAR; INTRAVENOUS
Status: DISPENSED
Start: 2022-12-23

## (undated) RX ORDER — EPHEDRINE SULFATE 50 MG/ML
INJECTION, SOLUTION INTRAMUSCULAR; INTRAVENOUS; SUBCUTANEOUS
Status: DISPENSED
Start: 2022-12-23

## (undated) RX ORDER — CEFAZOLIN SODIUM/WATER 2 G/20 ML
SYRINGE (ML) INTRAVENOUS
Status: DISPENSED
Start: 2022-12-23